# Patient Record
Sex: MALE | Race: ASIAN | NOT HISPANIC OR LATINO | Employment: UNEMPLOYED | URBAN - METROPOLITAN AREA
[De-identification: names, ages, dates, MRNs, and addresses within clinical notes are randomized per-mention and may not be internally consistent; named-entity substitution may affect disease eponyms.]

---

## 2017-01-10 ENCOUNTER — TELEPHONE (OUTPATIENT)
Dept: PHARMACY | Facility: CLINIC | Age: 42
End: 2017-01-10

## 2017-01-18 ENCOUNTER — OFFICE VISIT (OUTPATIENT)
Dept: DERMATOLOGY | Facility: CLINIC | Age: 42
End: 2017-01-18
Payer: MEDICAID

## 2017-01-18 DIAGNOSIS — Z79.899 ENCOUNTER FOR LONG-TERM (CURRENT) USE OF HIGH-RISK MEDICATION: Primary | ICD-10-CM

## 2017-01-18 DIAGNOSIS — L40.0 PSORIASIS VULGARIS: ICD-10-CM

## 2017-01-18 PROCEDURE — 99213 OFFICE O/P EST LOW 20 MIN: CPT | Mod: S$PBB,,, | Performed by: DERMATOLOGY

## 2017-01-18 PROCEDURE — 99212 OFFICE O/P EST SF 10 MIN: CPT | Mod: PBBFAC | Performed by: DERMATOLOGY

## 2017-01-18 PROCEDURE — 99999 PR PBB SHADOW E&M-EST. PATIENT-LVL II: CPT | Mod: PBBFAC,,, | Performed by: DERMATOLOGY

## 2017-01-18 RX ORDER — PREDNISONE 20 MG/1
TABLET ORAL
Refills: 0 | COMMUNITY
Start: 2016-10-21 | End: 2017-04-04

## 2017-01-18 RX ORDER — HYDROCODONE BITARTRATE AND ACETAMINOPHEN 5; 325 MG/1; MG/1
TABLET ORAL
Refills: 0 | COMMUNITY
Start: 2016-12-14 | End: 2017-04-19

## 2017-01-18 RX ORDER — SULFAMETHOXAZOLE AND TRIMETHOPRIM 800; 160 MG/1; MG/1
TABLET ORAL
Refills: 0 | COMMUNITY
Start: 2016-12-14 | End: 2017-04-19

## 2017-01-18 RX ORDER — INDOMETHACIN 50 MG/1
CAPSULE ORAL
Refills: 2 | COMMUNITY
Start: 2016-10-21 | End: 2017-04-19

## 2017-01-18 NOTE — PROGRESS NOTES
After obtaining consent, and per orders of Dr. Patricia Thompson, injection of Stelara 90mg Subq. Lot #[id number] Exp.06/2018 given by Britany Veliz. Patient instructed to remain in clinic for 20 minutes afterwards, and to report any adverse reaction to me immediately.

## 2017-01-18 NOTE — PROGRESS NOTES
Subjective:       Patient ID:  Adrien Panda is a 41 y.o. male who presents for   Chief Complaint   Patient presents with    Psoriasis     Stelara injection      Psoriasis  - Follow-up  Diagnosis: Psoriasis   Currently using: Stelara injection   Affected locations: left lower leg and right lower leg  Signs / symptoms: redness      Started Stelara on 9/21/16 and had his second injection 10/19/16. Here today for another Stelara injection.  Initially seemed to have a flare of his lower legs after starting Stelara, but pt states that eventually calmed down and now his skin is much improved. Currently has psorcon to use prn flares.  No new flares. No recent infections. No side effects noted. Did have a recent gout flare of L elbow treated by Dr. Hermosillo.    PMH: h/o dilated cardiomyopathy tx by Dr. Rubin at [facility], cutaneous and joint gout tx by Dr. Hermosillo (psoriasis flared on colchicine).       PSORIASIS: failed tx with Enbrel, Humira, Soriatane, clobetasol topical, betamethasone topical, Lidex topical, elocon topical.    ILK helps temporarily. ultravate works best out of all the topicals he's tried. psorcon 0.05% oint helps.    On Humira from 6/2015 - 4/2016 until he stopped responding to it. Switched back to enbrel at that point which only helped temporarily.    Added Soriatane to Enbrel but ended up with a bad flare on lower legs.  Not a good candidate for NBUVB as he lives on the [facility] and there is not light unit in this area.     Review of Systems   Constitutional: Negative for fever, chills, weight loss, weight gain, fatigue, night sweats and malaise.   Skin: Negative for daily sunscreen use, activity-related sunscreen use and recent sunburn.   Hematologic/Lymphatic: Does not bruise/bleed easily.        Objective:    Physical Exam   Constitutional: He appears well-developed and well-nourished. No distress.   Neurological: He is alert and oriented to person, place, and time. He is not disoriented.    Psychiatric: He has a normal mood and affect.   Skin:   Areas Examined (abnormalities noted in diagram):   Head / Face Inspection Performed  Neck Inspection Performed  Chest / Axilla Inspection Performed  Abdomen Inspection Performed  Back Inspection Performed  RUE Inspected  LUE Inspection Performed  RLE Inspected  LLE Inspection Performed              Diagram Legend     Erythematous scaling macule/papule c/w actinic keratosis       Vascular papule c/w angioma      Pigmented verrucoid papule/plaque c/w seborrheic keratosis      Yellow umbilicated papule c/w sebaceous hyperplasia      Irregularly shaped tan macule c/w lentigo     1-2 mm smooth white papules consistent with Milia      Movable subcutaneous cyst with punctum c/w epidermal inclusion cyst      Subcutaneous movable cyst c/w pilar cyst      Firm pink to brown papule c/w dermatofibroma      Pedunculated fleshy papule(s) c/w skin tag(s)      Evenly pigmented macule c/w junctional nevus     Mildly variegated pigmented, slightly irregular-bordered macule c/w mildly atypical nevus      Flesh colored to evenly pigmented papule c/w intradermal nevus       Pink pearly papule/plaque c/w basal cell carcinoma      Erythematous hyperkeratotic cursted plaque c/w SCC      Surgical scar with no sign of skin cancer recurrence      Open and closed comedones      Inflammatory papules and pustules      Verrucoid papule consistent consistent with wart     Erythematous eczematous patches and plaques     Dystrophic onycholytic nail with subungual debris c/w onychomycosis     Umbilicated papule    Erythematous-base heme-crusted tan verrucoid plaque consistent with inflamed seborrheic keratosis     Erythematous Silvery Scaling Plaque c/w Psoriasis     See annotation      Assessment / Plan:        Encounter for long-term (current) use of high-risk medication  -     CBC auto differential; Future  -     Comprehensive metabolic panel; Future    Psoriasis vulgaris  Responding well  to Rafal now. Will give next injection today and then again in 3 months.  Pt will notify me of any infections. Counseled to avoid live vaccines.  Continue psorcon and/or ultravate prn flares.     Quantiferon gold due next visit.    Return in about 3 months (around 4/18/2017).

## 2017-01-20 ENCOUNTER — TELEPHONE (OUTPATIENT)
Dept: PHARMACY | Facility: CLINIC | Age: 42
End: 2017-01-20

## 2017-01-31 ENCOUNTER — PATIENT MESSAGE (OUTPATIENT)
Dept: RHEUMATOLOGY | Facility: CLINIC | Age: 42
End: 2017-01-31

## 2017-02-01 ENCOUNTER — PATIENT MESSAGE (OUTPATIENT)
Dept: RHEUMATOLOGY | Facility: CLINIC | Age: 42
End: 2017-02-01

## 2017-02-01 RX ORDER — ALLOPURINOL 300 MG/1
TABLET ORAL
Qty: 45 TABLET | Refills: 6 | Status: SHIPPED | OUTPATIENT
Start: 2017-02-01 | End: 2017-02-02 | Stop reason: SDUPTHER

## 2017-02-02 RX ORDER — ALLOPURINOL 300 MG/1
TABLET ORAL
Qty: 45 TABLET | Refills: 0 | Status: SHIPPED | OUTPATIENT
Start: 2017-02-02 | End: 2017-03-06 | Stop reason: SDUPTHER

## 2017-02-07 ENCOUNTER — TELEPHONE (OUTPATIENT)
Dept: PHARMACY | Facility: CLINIC | Age: 42
End: 2017-02-07

## 2017-02-10 ENCOUNTER — PATIENT MESSAGE (OUTPATIENT)
Dept: DERMATOLOGY | Facility: CLINIC | Age: 42
End: 2017-02-10

## 2017-02-10 DIAGNOSIS — L40.0 PSORIASIS VULGARIS: ICD-10-CM

## 2017-02-15 ENCOUNTER — PATIENT MESSAGE (OUTPATIENT)
Dept: DERMATOLOGY | Facility: CLINIC | Age: 42
End: 2017-02-15

## 2017-02-15 RX ORDER — HALOBETASOL PROPIONATE 0.5 MG/G
OINTMENT TOPICAL 2 TIMES DAILY
Qty: 50 G | Refills: 3 | Status: SHIPPED | OUTPATIENT
Start: 2017-02-15 | End: 2017-07-02 | Stop reason: SDUPTHER

## 2017-02-15 NOTE — TELEPHONE ENCOUNTER
Pt has Medicaid but I have been seeing him now for years (prior to us not seeing Medicaid). Please allow him to be seen in our department.

## 2017-02-22 ENCOUNTER — PATIENT MESSAGE (OUTPATIENT)
Dept: DERMATOLOGY | Facility: CLINIC | Age: 42
End: 2017-02-22

## 2017-03-03 ENCOUNTER — PATIENT MESSAGE (OUTPATIENT)
Dept: DERMATOLOGY | Facility: CLINIC | Age: 42
End: 2017-03-03

## 2017-03-06 RX ORDER — ALLOPURINOL 300 MG/1
TABLET ORAL
Qty: 45 TABLET | Refills: 0 | Status: SHIPPED | OUTPATIENT
Start: 2017-03-06 | End: 2017-04-03 | Stop reason: SDUPTHER

## 2017-03-29 ENCOUNTER — TELEPHONE (OUTPATIENT)
Dept: PHARMACY | Facility: CLINIC | Age: 42
End: 2017-03-29

## 2017-04-04 ENCOUNTER — OFFICE VISIT (OUTPATIENT)
Dept: RHEUMATOLOGY | Facility: CLINIC | Age: 42
End: 2017-04-04
Payer: MEDICAID

## 2017-04-04 VITALS
WEIGHT: 226.81 LBS | HEART RATE: 71 BPM | DIASTOLIC BLOOD PRESSURE: 70 MMHG | SYSTOLIC BLOOD PRESSURE: 102 MMHG | BODY MASS INDEX: 34.37 KG/M2 | HEIGHT: 68 IN

## 2017-04-04 DIAGNOSIS — M1A.9XX1 GOUT WITH TOPHI: Primary | ICD-10-CM

## 2017-04-04 DIAGNOSIS — L40.9 PSORIASIS: ICD-10-CM

## 2017-04-04 DIAGNOSIS — M54.50 ACUTE MIDLINE LOW BACK PAIN WITHOUT SCIATICA: ICD-10-CM

## 2017-04-04 PROCEDURE — 99213 OFFICE O/P EST LOW 20 MIN: CPT | Mod: S$PBB,,, | Performed by: INTERNAL MEDICINE

## 2017-04-04 PROCEDURE — 99213 OFFICE O/P EST LOW 20 MIN: CPT | Mod: PBBFAC | Performed by: INTERNAL MEDICINE

## 2017-04-04 PROCEDURE — 99999 PR PBB SHADOW E&M-EST. PATIENT-LVL III: CPT | Mod: PBBFAC,,, | Performed by: INTERNAL MEDICINE

## 2017-04-04 RX ORDER — ALLOPURINOL 300 MG/1
TABLET ORAL
Qty: 45 TABLET | Refills: 6 | Status: SHIPPED | OUTPATIENT
Start: 2017-04-04 | End: 2018-04-03 | Stop reason: SDUPTHER

## 2017-04-04 RX ORDER — BACLOFEN 20 MG/1
20 TABLET ORAL 3 TIMES DAILY PRN
Qty: 90 TABLET | Refills: 3 | Status: SHIPPED | OUTPATIENT
Start: 2017-04-04 | End: 2017-04-28

## 2017-04-04 NOTE — PROGRESS NOTES
History of present illness: 41-year-old gentleman who has a long history of psoriasis. He also has an idiopathic cardiomyopathy. I have been following him since  for joint problems. His main problem has been an intermittent arthritis. The working diagnosis initially was gout even though I was not able to aspirate any fluid. In  he developed subcutaneous nodules, which were biopsied. This confirmed the presence of monosodium urate crystals. He was started on allopurinol at that time. He also has x-ray evidence of chondrocalcinosis. There was concerned about psoriatic arthritis although the diagnosis was never confirmed. He does have erosive disease on x-ray which is more compatible with gout than psoriatic arthritis.    He has had a recent URI.  He was having what appeared to be a gout attack when I saw him in November.  This resolved and he is had no gout since then.  His psoriasis flared one month ago.  This occurred after his mother .  He is scheduled for his next Stelara injection in 2 weeks.  He has been using topical medications.    His newest problem is pain in the middle of the back.  This began 2 weeks ago.  He had no history of trauma or increased activity.  He denies any similar problem in the back although I had treated him for neck pain in the past.  The pain is worse at the end of the day.  It does wake him up at night.  It is better in the morning.  It does not radiate down the legs.  He has no morning stiffness.  He has been taking Tylenol and tramadol without any response.  Topical medications have given him some relief.  He has not tried heat or ice.  He has had no other recent medical problems.    Physical examination:  Skin: He has diffuse erythema on both legs with no significant scaling  Musculoskeletal: Shoulders, elbows are unremarkable.  He has soft tissue swelling of the left wrist.  He has to tophi in the left hand.  He is tender to palpation in the mid back.  He has full range  of motion of the back without pain on range of motion.  He has no paravertebral spasm.  He has no swelling or tenderness in the lower extremities.    Assessment:  1.  Intercritical gout  2.  Low back pain which is most likely muscular    Plans:  1.  Continue allopurinol 450 mg daily  2.  I am not repeating his uric acid level since he ran out of allopurinol 2 days ago  3.  I placed him on baclofen 3 times daily for his back pain.  It helped his neck in the past.  4.  Return to see me in 6 months

## 2017-04-12 ENCOUNTER — TELEPHONE (OUTPATIENT)
Dept: PHARMACY | Facility: CLINIC | Age: 42
End: 2017-04-12

## 2017-04-19 ENCOUNTER — LAB VISIT (OUTPATIENT)
Dept: LAB | Facility: HOSPITAL | Age: 42
End: 2017-04-19
Attending: DERMATOLOGY
Payer: MEDICAID

## 2017-04-19 ENCOUNTER — OFFICE VISIT (OUTPATIENT)
Dept: DERMATOLOGY | Facility: CLINIC | Age: 42
End: 2017-04-19
Payer: MEDICAID

## 2017-04-19 DIAGNOSIS — Z79.899 ENCOUNTER FOR LONG-TERM (CURRENT) USE OF HIGH-RISK MEDICATION: ICD-10-CM

## 2017-04-19 DIAGNOSIS — L40.0 PSORIASIS VULGARIS: Primary | ICD-10-CM

## 2017-04-19 PROCEDURE — 36415 COLL VENOUS BLD VENIPUNCTURE: CPT

## 2017-04-19 PROCEDURE — 99214 OFFICE O/P EST MOD 30 MIN: CPT | Mod: S$PBB,,, | Performed by: DERMATOLOGY

## 2017-04-19 PROCEDURE — 99999 PR PBB SHADOW E&M-EST. PATIENT-LVL II: CPT | Mod: PBBFAC,,, | Performed by: DERMATOLOGY

## 2017-04-19 PROCEDURE — 86480 TB TEST CELL IMMUN MEASURE: CPT

## 2017-04-19 RX ORDER — TRAMADOL HYDROCHLORIDE 50 MG/1
TABLET ORAL
Refills: 1 | COMMUNITY
Start: 2017-01-26 | End: 2018-09-27

## 2017-04-19 RX ORDER — TRIAMCINOLONE ACETONIDE 1 MG/G
CREAM TOPICAL 2 TIMES DAILY
Qty: 454 G | Refills: 1 | Status: SHIPPED | OUTPATIENT
Start: 2017-04-19 | End: 2018-04-13

## 2017-04-19 NOTE — PROGRESS NOTES
Subjective:       Patient ID:  Adrien Panda is a 41 y.o. male who presents for   Chief Complaint   Patient presents with    Psoriasis     f/u stelara injection     Psoriasis  - Follow-up  Symptom Course: had improved initially but flared after recent stress with death of his mother.  Currently using: stelara 90 mg/mL (injection dates 9/21/16, 10/19/16, 1/18/17); Ultravate topically.  Affected locations: diffuse  Signs / symptoms: redness    Mom passed away almost 2 months ago and has been under significant stress.  Ps started flaring during that time    PMH: h/o dilated cardiomyopathy tx by Dr. Rubin at [facility], cutaneous and joint gout tx by Dr. Hermosillo (psoriasis flared on colchicine).       PSORIASIS Hx: failed tx with Enbrel, Humira, Soriatane, clobetasol topical, betamethasone topical, Lidex topical, elocon topical.    ILK helps temporarily. ultravate works best out of all the topicals he's tried. psorcon 0.05% oint helps.    On Humira from 6/2015 - 4/2016 until he stopped responding to it. Switched back to enbrel at that point which only helped temporarily.    Added Soriatane to Enbrel but ended up with a bad flare on lower legs.  Not a good candidate for NBUVB as he lives on the [facility] and there is not light unit in this area.    Past Medical History:   Diagnosis Date    Arthritis     Blood clotting tendency     Congestive heart failure     Diabetes mellitus     High cholesterol     Hyperlipemia     Hypertension     Joint pain     Psoriasis      Review of Systems   Constitutional: Negative for fever, chills, weight loss, fatigue, night sweats and malaise.   Gastrointestinal: Negative for indigestion.   Musculoskeletal: Positive for arthralgias (hands (h/o gout as well)).   Skin: Positive for itching, rash and activity-related sunscreen use. Negative for daily sunscreen use.   Hematologic/Lymphatic: Negative for adenopathy. Does not bruise/bleed easily.        Objective:    Physical  Exam   Constitutional: He appears well-developed and well-nourished. No distress.   Neurological: He is alert and oriented to person, place, and time. He is not disoriented.   Psychiatric: He has a normal mood and affect.   Skin:   Areas Examined (abnormalities noted in diagram):   Scalp / Hair Palpated and Inspected  Head / Face Inspection Performed  Neck Inspection Performed  Chest / Axilla Inspection Performed  Abdomen Inspection Performed  Back Inspection Performed  RUE Inspected  LUE Inspection Performed  RLE Inspected  LLE Inspection Performed  Nails and Digits Inspection Performed              Diagram Legend     Erythematous scaling macule/papule c/w actinic keratosis       Vascular papule c/w angioma      Pigmented verrucoid papule/plaque c/w seborrheic keratosis      Yellow umbilicated papule c/w sebaceous hyperplasia      Irregularly shaped tan macule c/w lentigo     1-2 mm smooth white papules consistent with Milia      Movable subcutaneous cyst with punctum c/w epidermal inclusion cyst      Subcutaneous movable cyst c/w pilar cyst      Firm pink to brown papule c/w dermatofibroma      Pedunculated fleshy papule(s) c/w skin tag(s)      Evenly pigmented macule c/w junctional nevus     Mildly variegated pigmented, slightly irregular-bordered macule c/w mildly atypical nevus      Flesh colored to evenly pigmented papule c/w intradermal nevus       Pink pearly papule/plaque c/w basal cell carcinoma      Erythematous hyperkeratotic cursted plaque c/w SCC      Surgical scar with no sign of skin cancer recurrence      Open and closed comedones      Inflammatory papules and pustules      Verrucoid papule consistent consistent with wart     Erythematous eczematous patches and plaques     Dystrophic onycholytic nail with subungual debris c/w onychomycosis     Umbilicated papule    Erythematous-base heme-crusted tan verrucoid plaque consistent with inflamed seborrheic keratosis     Erythematous Silvery Scaling  Plaque c/w Psoriasis     See annotation      Assessment / Plan:        Psoriasis vulgaris-currently flaring; approx 15% TBSA involvement today  Will switch to TAC so we can get a larger quantity per month for the patient  -     triamcinolone acetonide 0.1% (KENALOG) 0.1 % cream; Apply topically 2 (two) times daily.  Dispense: 454 g; Refill: 1    Discussed  benefits and risks of Stelara including but not limited to injection site reactions, increased risk of infection, and decreased tumor surveillance.   Patient counseled to avoid live vaccines.    Injection given today (90 mg/mL) by Rena Freeman LPN and pt tolerated well (Lot# [id number]; Exp 9/2018)    Encounter for long-term (current) use of high-risk medication  -     CBC auto differential; Future  -     Comprehensive metabolic panel; Future  -     Quantiferon Gold TB; Future         Return in about 3 months (around 7/19/2017).

## 2017-04-20 LAB
MITOGEN NIL: >10 IU/ML
NIL: 0.04 IU/ML
TB ANTIGEN NIL: 0.01 IU/ML
TB ANTIGEN: 0.05 IU/ML
TB GOLD: NEGATIVE

## 2017-04-21 ENCOUNTER — PATIENT MESSAGE (OUTPATIENT)
Dept: DERMATOLOGY | Facility: CLINIC | Age: 42
End: 2017-04-21

## 2017-04-26 ENCOUNTER — PATIENT MESSAGE (OUTPATIENT)
Dept: RHEUMATOLOGY | Facility: CLINIC | Age: 42
End: 2017-04-26

## 2017-04-26 ENCOUNTER — PATIENT MESSAGE (OUTPATIENT)
Dept: DERMATOLOGY | Facility: CLINIC | Age: 42
End: 2017-04-26

## 2017-04-28 ENCOUNTER — HOSPITAL ENCOUNTER (OUTPATIENT)
Dept: RADIOLOGY | Facility: HOSPITAL | Age: 42
Discharge: HOME | End: 2017-04-28
Attending: INTERNAL MEDICINE
Payer: MEDICAID

## 2017-04-28 ENCOUNTER — OFFICE VISIT (OUTPATIENT)
Dept: RHEUMATOLOGY | Facility: CLINIC | Age: 42
End: 2017-04-28
Payer: MEDICAID

## 2017-04-28 DIAGNOSIS — M54.50 ACUTE MIDLINE LOW BACK PAIN WITHOUT SCIATICA: Primary | ICD-10-CM

## 2017-04-28 DIAGNOSIS — M1A.9XX1 GOUT WITH TOPHI: ICD-10-CM

## 2017-04-28 DIAGNOSIS — M54.50 ACUTE MIDLINE LOW BACK PAIN WITHOUT SCIATICA: ICD-10-CM

## 2017-04-28 DIAGNOSIS — M25.521 RIGHT ELBOW PAIN: ICD-10-CM

## 2017-04-28 PROCEDURE — 99999 PR PBB SHADOW E&M-EST. PATIENT-LVL II: CPT | Mod: PBBFAC,,, | Performed by: INTERNAL MEDICINE

## 2017-04-28 PROCEDURE — 72110 X-RAY EXAM L-2 SPINE 4/>VWS: CPT | Mod: 26,,, | Performed by: RADIOLOGY

## 2017-04-28 PROCEDURE — 99213 OFFICE O/P EST LOW 20 MIN: CPT | Mod: S$PBB,,, | Performed by: INTERNAL MEDICINE

## 2017-04-28 PROCEDURE — 72110 X-RAY EXAM L-2 SPINE 4/>VWS: CPT | Mod: TC

## 2017-04-28 RX ORDER — TIZANIDINE 2 MG/1
2 TABLET ORAL EVERY 8 HOURS
Qty: 90 TABLET | Refills: 2 | Status: SHIPPED | OUTPATIENT
Start: 2017-04-28 | End: 2017-05-05 | Stop reason: SINTOL

## 2017-04-29 ENCOUNTER — PATIENT MESSAGE (OUTPATIENT)
Dept: DERMATOLOGY | Facility: CLINIC | Age: 42
End: 2017-04-29

## 2017-05-01 NOTE — PROGRESS NOTES
History of present illness: 41-year-old gentleman with idiopathic cardiomyopathy.  He has a long history of psoriasis and is presently on Stelara.  He has tophaceous gout.  He recently had muscular back pain.  His back pain has been worse for the past month.  He has had no similar back problems in the past.  The pain is worse with prolonged sitting.  He has some pain in the morning.  He does have some pain with activity.  The pain does not radiate into his legs.  He also had a flare of his right elbow 2 days ago.  He was started on prednisone and this helped.    He has had no response to baclofen.  Topical medications have not helped.  Ice gives him some relief.  He has not had physical therapy or seeing a chiropractor.    Physical examination:  Musculoskeletal: Neck and shoulders are unremarkable.  He has tenderness of the right elbow but no swelling.  Left elbow is unremarkable.  Wrist and hands are within normal limits.  He has tenderness to palpation lower lumbar spine.  He has decreased range of motion with pain on range of motion.  Straight leg raising is negative.  Knees, ankles, small joints of feet are unremarkable.    Assessment:  1.  He may have had another gout attack  2.  I do not think we're dealing with psoriatic arthritis  3.  Mechanical back problems    Plans:  1.  I have referred him for x-ray of the lumbar spine  2.  I have referred him to physical therapy  3.  I discontinued baclofen and placed him on Zanaflex 2 mg 3 times daily.  4.  He is to keep his previous appointment.

## 2017-05-02 ENCOUNTER — PATIENT MESSAGE (OUTPATIENT)
Dept: RHEUMATOLOGY | Facility: CLINIC | Age: 42
End: 2017-05-02

## 2017-05-05 ENCOUNTER — PATIENT MESSAGE (OUTPATIENT)
Dept: RHEUMATOLOGY | Facility: CLINIC | Age: 42
End: 2017-05-05

## 2017-05-05 RX ORDER — METAXALONE 800 MG/1
800 TABLET ORAL 3 TIMES DAILY
Qty: 90 TABLET | Refills: 1 | Status: SHIPPED | OUTPATIENT
Start: 2017-05-05 | End: 2017-05-05 | Stop reason: SDUPTHER

## 2017-05-05 RX ORDER — METAXALONE 800 MG/1
800 TABLET ORAL 3 TIMES DAILY
Qty: 90 TABLET | Refills: 1 | Status: SHIPPED | OUTPATIENT
Start: 2017-05-05 | End: 2017-05-12 | Stop reason: CLARIF

## 2017-05-08 DIAGNOSIS — L29.9 PRURITIC CONDITION: Primary | ICD-10-CM

## 2017-05-08 RX ORDER — HYDROXYZINE PAMOATE 25 MG/1
25 CAPSULE ORAL EVERY 8 HOURS PRN
Qty: 60 CAPSULE | Refills: 1 | Status: SHIPPED | OUTPATIENT
Start: 2017-05-08 | End: 2017-10-18 | Stop reason: SDUPTHER

## 2017-05-10 ENCOUNTER — CLINICAL SUPPORT (OUTPATIENT)
Dept: REHABILITATION | Facility: HOSPITAL | Age: 42
End: 2017-05-10
Attending: INTERNAL MEDICINE
Payer: MEDICAID

## 2017-05-10 ENCOUNTER — TELEPHONE (OUTPATIENT)
Dept: PHARMACY | Facility: CLINIC | Age: 42
End: 2017-05-10

## 2017-05-10 DIAGNOSIS — G89.29 CHRONIC MIDLINE LOW BACK PAIN WITHOUT SCIATICA: ICD-10-CM

## 2017-05-10 DIAGNOSIS — M62.81 WEAKNESS OF TRUNK MUSCULATURE: ICD-10-CM

## 2017-05-10 DIAGNOSIS — M53.86 DECREASED ROM OF LUMBAR SPINE: ICD-10-CM

## 2017-05-10 DIAGNOSIS — M54.50 CHRONIC MIDLINE LOW BACK PAIN WITHOUT SCIATICA: ICD-10-CM

## 2017-05-10 DIAGNOSIS — M62.89 MUSCLE TIGHTNESS: ICD-10-CM

## 2017-05-10 PROCEDURE — 97110 THERAPEUTIC EXERCISES: CPT | Mod: PN

## 2017-05-10 PROCEDURE — 97161 PT EVAL LOW COMPLEX 20 MIN: CPT | Mod: PN

## 2017-05-10 NOTE — PROGRESS NOTES
"  TIME RECORD    Date: 05/10/2017    Start Time:  0905  Stop Time:  1000      OUTPATIENT PHYSICAL THERAPY   PATIENT EVALUATION  Onset Date: 2 months prior  Primary Diagnosis:   1. Chronic midline low back pain without sciatica     2. Weakness of trunk musculature       Treatment Diagnosis: decreased ROM lumbar spine, weakness lumbar spine, muscle tightness  Past Medical History:   Diagnosis Date    Arthritis     Blood clotting tendency     Congestive heart failure     Diabetes mellitus     High cholesterol     Hyperlipemia     Hypertension     Joint pain     Psoriasis      Precautions: CHF, DM, psoriasis  Prior Therapy: none  Medications: Adrien Panda has a current medication list which includes the following prescription(s): allopurinol, calcipotriene, carvedilol, furosemide, halobetasol, hydroxyzine pamoate, klor-con m20, lisinopril, metaxalone, ranitidine, stelara, tramadol, and triamcinolone acetonide 0.1%.  Nutrition:  Overweight  History of Present Illness: Pt reporting insidious onset of lower back pain about 2 months prior. Pt rheumatologist prescribed muscle relaxers with minimal relief.  Pt denies previous history of lower back pain  Prior Level of Function: Independent  Social History: on disability - household chores - running errands - plays with son  Place of Residence (Steps/Adaptations): lives with girlfriend and children - 1 story - 0 steps  Functional Deficits Leading to Referral/Nature of Injury: bending over, sitting for long periods of time  Patient Therapy Goals: "hope the pain would go away"    Subjective     Adrien Panda states his pain is intermittent depending on what he is doing.  Pt reports occasional referred pain into bilateral groin. Pt denies paresthesia in BLE. PT denies drop foot, loss of B/B control, unexplained weight gain/loss, fever, chills, or night sweats. Pt reports pain will wake him up at night, and most of the time he is able to change position " and return to sleep.      Pain:  Location: lower back   Description: Aching and Throbbing  Activities Which Increase Pain: Sitting, Laying and Bending  Activities Which Decrease Pain: pain medication and pain patches  Pain Scale: 0/10 at best 8/10 now  10/10 at worst    Objective     Observation: pt in standing with posterior pelvic tilt - adipose tissue deposit about the L3/4/5 level - decreased lumbar lordosis, increased thoracic kyphosis - level bony prominences  AROM:    FB : fingertips to 10 inches from floor with increased pain midline and bilateral - no reversal of lumbar spine                    BB : mild increased lordosis - 50% limitation -- decreased pain                    SBR : fingertips to 3 inches above tibiofemoral joint line with increased pain L side                         SBL: fingertips to 2 inches above tibiofemoral joint line with increased pain R side                     RotL: 50% limitation increased pain L side                          Rot R: 50% limitation increased pain L side  STR/myotome:  R L   hip flex      4+ 4                      knee ext     3+ 4-                          ankle DF        4+ 4+                   great toe ext         5 5               ankle PF        5 5                       knee flex  4- 4-  Sensation/Reflexes: grossly intact to light touch throughout BLE  Palpation: significant tenderness to palpation over bilateral ES, QL, and SP with VERY light palpation  Gait: without AD - guarded, decreased trunk rotation  Tests:                      SLR: neg   Crossed SLR: neg   Slump Test: neg   Prone Instability/lumbar joint play: unable to assess secondary to increased pain with palpation   GERDA: positive bilateral lumbar  Flexibility:   Hamstring: pain in lumbar with RLE  - 10% limitation bilaterally       hip flexors: positive Ely bilaterally                 rectus femoris: positive homa bilat                Presentation:    supine bridging: increased pain with  increased repetitions            LTR: able to perform within tolerable range,                  SKTC: stretching LLE,               DKTC : increased pain lumbar spine bilaterally                  prone on elbows : relief of pain                                             Hip screen:      R L   Flex 4+ 4    Abd 4- 4   Add 4 4-   Ext 3+ 3+                                                                                                                  Functional assessment: pt able to heel and toe walk yet with provocation of pain - unable to SLS without support with increased pain    FOTO: 72% limitation    today's treatment x10 minutes:  education: educated in evaluation findings and POC.  Educated in expectations of treatment, provider's contact information (email and phone) given to pt for communication of any questions and concerns.  HEP instruction and ther ex: instructed and performed following:  LTR x10  HL piriformis stretch 2x30 seconds ea (FADDIR)  SL clamshell      Assessment       Initial Assessment (Pertinent finding, problem list and factors affecting outcome): Ms. Adrien Panda is a 40 y/o male referred to outpatient PT for lower back pain. Pt presenting with decreased and painful ROM of the lumbar spine, weakness throughout bilateral hips, significant tenderness throughout bilateral lumbar spine musculature, and muscle tightness in BLE.  Pt signs and symptoms consistent with referring diagnosis likely secondary to muscle strain of bilateral erector spinae and QL of the lumbar spine. Pt would benefit from skilled PT to address above stated problems, as well as, achieve pt goals within a timely manner. Pt has set realistic goals and has verbalized good understanding and agreement with reported diagnosis, prognosis and treatment. Pt demonstrates no additional cultural, spiritual or educational need and currently has no barriers to learning.      History  Co-morbidities and personal factors that may impact  the plan of care Examination  Body Structures and Functions, activity limitations and participation restrictions that may impact the plan of care Clinical Presentation   Decision Making/ Complexity Score   Co-morbidities:     Arthritis   Congestive heart failure   Diabetes mellitus   Joint pain   Psoriasis       Personal Factors:   none Body Regions: Lumbar spine, BLE  weakness, pain, decreased ROM, impaired joint extensibility and impaired muscle length  Body Systems:   MSK      Activity limitations:   Unable to squat, lift, push, pull, sit for >10 minutes    Participation Restrictions: (W/D/S)  Unable to sit to eat dinner with family, unable to assist with household chores, unable to play baseball with son       stable  low             Rehab Potiential: fair    Short Term Goals (4 Weeks):   1. Pt will report 20% reduced pain within the lumbar spine for ease with sitting x30 minutes  2. Pt will demonstrate 1/3 improvement MMT in BLE for ease with carrying groceries  4. Pt will demonstrate static standing balance on BLE for 30 seconds without obvious instability or use of UE assistance  5. Pt will demonstrate improved lumbar ROM by 25% in all directions for ease with picking an object up from the floor  Long Term Goals (8 Weeks):   1.Pt will report <4/10 pain within the lumbar spine for ease with ADL's  2. Pt will demonstrate 50% improvement of hamstring and hip flexor length in BLE for ease with ambulation  3. Pt will be independent with HEP for maintenance of improvements gained in therapy sessions   4. Pt will demonstrate 4+/5 strength or greater in BLE for ease with running errands         Plan     Certification Period: 5/10/17 to 8/10/17  Recommended Treatment Plan: 2 times per week for 8 weeks: Manual Therapy, Moist Heat/ Ice, Neuromuscular Re-ed, Patient Education and Therapeutic Exercise  Other Recommendations: none      Therapist: Cece Downey, PT    I CERTIFY THE NEED FOR THESE SERVICES FURNISHED UNDER THIS  PLAN OF TREATMENT AND WHILE UNDER MY CARE    Physician's comments: ________________________________________________________________________________________________________________________________________________      Physician's Name: ___________________________________

## 2017-05-10 NOTE — PLAN OF CARE
"  TIME RECORD    Date: 05/10/2017    Start Time:  0905  Stop Time:  1000      OUTPATIENT PHYSICAL THERAPY   PATIENT EVALUATION  Onset Date: 2 months prior  Primary Diagnosis:   1. Chronic midline low back pain without sciatica     2. Weakness of trunk musculature       Treatment Diagnosis: decreased ROM lumbar spine, weakness lumbar spine, muscle tightness  Past Medical History:   Diagnosis Date    Arthritis     Blood clotting tendency     Congestive heart failure     Diabetes mellitus     High cholesterol     Hyperlipemia     Hypertension     Joint pain     Psoriasis      Precautions: CHF, DM, psoriasis  Prior Therapy: none  Medications: Adrien Panda has a current medication list which includes the following prescription(s): allopurinol, calcipotriene, carvedilol, furosemide, halobetasol, hydroxyzine pamoate, klor-con m20, lisinopril, metaxalone, ranitidine, stelara, tramadol, and triamcinolone acetonide 0.1%.  Nutrition:  Overweight  History of Present Illness: Pt reporting insidious onset of lower back pain about 2 months prior. Pt rheumatologist prescribed muscle relaxers with minimal relief.  Pt denies previous history of lower back pain  Prior Level of Function: Independent  Social History: on disability - household chores - running errands - plays with son  Place of Residence (Steps/Adaptations): lives with girlfriend and children - 1 story - 0 steps  Functional Deficits Leading to Referral/Nature of Injury: bending over, sitting for long periods of time  Patient Therapy Goals: "hope the pain would go away"    Subjective     Adrien Panda states his pain is intermittent depending on what he is doing.  Pt reports occasional referred pain into bilateral groin. Pt denies paresthesia in BLE. PT denies drop foot, loss of B/B control, unexplained weight gain/loss, fever, chills, or night sweats. Pt reports pain will wake him up at night, and most of the time he is able to change position " and return to sleep.      Pain:  Location: lower back   Description: Aching and Throbbing  Activities Which Increase Pain: Sitting, Laying and Bending  Activities Which Decrease Pain: pain medication and pain patches  Pain Scale: 0/10 at best 8/10 now  10/10 at worst    Objective     Observation: pt in standing with posterior pelvic tilt - adipose tissue deposit about the L3/4/5 level - decreased lumbar lordosis, increased thoracic kyphosis - level bony prominences  AROM:    FB : fingertips to 10 inches from floor with increased pain midline and bilateral - no reversal of lumbar spine                    BB : mild increased lordosis - 50% limitation -- decreased pain                    SBR : fingertips to 3 inches above tibiofemoral joint line with increased pain L side                         SBL: fingertips to 2 inches above tibiofemoral joint line with increased pain R side                     RotL: 50% limitation increased pain L side                          Rot R: 50% limitation increased pain L side  STR/myotome:  R L   hip flex      4+ 4                      knee ext     3+ 4-                          ankle DF        4+ 4+                   great toe ext         5 5               ankle PF        5 5                       knee flex  4- 4-  Sensation/Reflexes: grossly intact to light touch throughout BLE  Palpation: significant tenderness to palpation over bilateral ES, QL, and SP with VERY light palpation  Gait: without AD - guarded, decreased trunk rotation  Tests:                      SLR: neg   Crossed SLR: neg   Slump Test: neg   Prone Instability/lumbar joint play: unable to assess secondary to increased pain with palpation   GERDA: positive bilateral lumbar  Flexibility:   Hamstring: pain in lumbar with RLE  - 10% limitation bilaterally       hip flexors: positive E[place] bilaterally                 rectus femoris: positive homa bilat                Presentation:    supine bridging: increased pain with  increased repetitions            LTR: able to perform within tolerable range,                  SKTC: stretching LLE,               DKTC : increased pain lumbar spine bilaterally                  prone on elbows : relief of pain                                             Hip screen:      R L   Flex 4+ 4    Abd 4- 4   Add 4 4-   Ext 3+ 3+                                                                                                                  Functional assessment: pt able to heel and toe walk yet with provocation of pain - unable to SLS without support with increased pain    FOTO: 72% limitation    today's treatment x10 minutes:  education: educated in evaluation findings and POC.  Educated in expectations of treatment, provider's contact information (email and phone) given to pt for communication of any questions and concerns.  HEP instruction and ther ex: instructed and performed following:  LTR x10  HL piriformis stretch 2x30 seconds ea (FADDIR)  SL clamshell      Assessment       Initial Assessment (Pertinent finding, problem list and factors affecting outcome): Ms. Adrien Panda is a 42 y/o male referred to outpatient PT for lower back pain. Pt presenting with decreased and painful ROM of the lumbar spine, weakness throughout bilateral hips, significant tenderness throughout bilateral lumbar spine musculature, and muscle tightness in BLE.  Pt signs and symptoms consistent with referring diagnosis likely secondary to muscle strain of bilateral erector spinae and QL of the lumbar spine. Pt would benefit from skilled PT to address above stated problems, as well as, achieve pt goals within a timely manner. Pt has set realistic goals and has verbalized good understanding and agreement with reported diagnosis, prognosis and treatment. Pt demonstrates no additional cultural, spiritual or educational need and currently has no barriers to learning.      History  Co-morbidities and personal factors that may impact  the plan of care Examination  Body Structures and Functions, activity limitations and participation restrictions that may impact the plan of care Clinical Presentation   Decision Making/ Complexity Score   Co-morbidities:     Arthritis   Congestive heart failure   Diabetes mellitus   Joint pain   Psoriasis       Personal Factors:   none Body Regions: Lumbar spine, BLE  weakness, pain, decreased ROM, impaired joint extensibility and impaired muscle length  Body Systems:   MSK      Activity limitations:   Unable to squat, lift, push, pull, sit for >10 minutes    Participation Restrictions: (W/D/S)  Unable to sit to eat dinner with family, unable to assist with household chores, unable to play baseball with son       stable  low             Rehab Potiential: fair    Short Term Goals (4 Weeks):   1. Pt will report 20% reduced pain within the lumbar spine for ease with sitting x30 minutes  2. Pt will demonstrate 1/3 improvement MMT in BLE for ease with carrying groceries  4. Pt will demonstrate static standing balance on BLE for 30 seconds without obvious instability or use of UE assistance  5. Pt will demonstrate improved lumbar ROM by 25% in all directions for ease with picking an object up from the floor  Long Term Goals (8 Weeks):   1.Pt will report <4/10 pain within the lumbar spine for ease with ADL's  2. Pt will demonstrate 50% improvement of hamstring and hip flexor length in BLE for ease with ambulation  3. Pt will be independent with HEP for maintenance of improvements gained in therapy sessions   4. Pt will demonstrate 4+/5 strength or greater in BLE for ease with running errands         Plan     Certification Period: 5/10/17 to 8/10/17  Recommended Treatment Plan: 2 times per week for 8 weeks: Manual Therapy, Moist Heat/ Ice, Neuromuscular Re-ed, Patient Education and Therapeutic Exercise  Other Recommendations: none      Therapist: Cece Downey, PT    I CERTIFY THE NEED FOR THESE SERVICES FURNISHED UNDER THIS  PLAN OF TREATMENT AND WHILE UNDER MY CARE    Physician's comments: ________________________________________________________________________________________________________________________________________________      Physician's Name: ___________________________________

## 2017-05-12 ENCOUNTER — TELEPHONE (OUTPATIENT)
Dept: RHEUMATOLOGY | Facility: CLINIC | Age: 42
End: 2017-05-12

## 2017-05-12 RX ORDER — METHOCARBAMOL 750 MG/1
750 TABLET, FILM COATED ORAL 4 TIMES DAILY
Qty: 120 TABLET | Refills: 0 | Status: SHIPPED | OUTPATIENT
Start: 2017-05-12 | End: 2017-06-11

## 2017-05-12 NOTE — TELEPHONE ENCOUNTER
----- Message from Sherry Gonzalez MA sent at 5/12/2017  2:41 PM CDT -----  Need to have tried robaxin per insurance company

## 2017-05-15 ENCOUNTER — TELEPHONE (OUTPATIENT)
Dept: REHABILITATION | Facility: HOSPITAL | Age: 42
End: 2017-05-15

## 2017-05-16 ENCOUNTER — PATIENT MESSAGE (OUTPATIENT)
Dept: RHEUMATOLOGY | Facility: CLINIC | Age: 42
End: 2017-05-16

## 2017-05-16 RX ORDER — METHYLPREDNISOLONE 4 MG/1
TABLET ORAL
Qty: 21 TABLET | Refills: 0 | Status: SHIPPED | OUTPATIENT
Start: 2017-05-16 | End: 2018-09-14 | Stop reason: SDUPTHER

## 2017-05-19 ENCOUNTER — CLINICAL SUPPORT (OUTPATIENT)
Dept: REHABILITATION | Facility: HOSPITAL | Age: 42
End: 2017-05-19
Attending: INTERNAL MEDICINE
Payer: MEDICAID

## 2017-05-19 DIAGNOSIS — M53.86 DECREASED ROM OF LUMBAR SPINE: ICD-10-CM

## 2017-05-19 DIAGNOSIS — G89.29 CHRONIC MIDLINE LOW BACK PAIN WITHOUT SCIATICA: Primary | ICD-10-CM

## 2017-05-19 DIAGNOSIS — M62.89 MUSCLE TIGHTNESS: ICD-10-CM

## 2017-05-19 DIAGNOSIS — M62.81 WEAKNESS OF TRUNK MUSCULATURE: ICD-10-CM

## 2017-05-19 DIAGNOSIS — M54.50 CHRONIC MIDLINE LOW BACK PAIN WITHOUT SCIATICA: Primary | ICD-10-CM

## 2017-05-19 PROCEDURE — 97110 THERAPEUTIC EXERCISES: CPT | Mod: PN

## 2017-05-19 NOTE — PROGRESS NOTES
Physical Therapy Daily Note     Name: Adrien Arias   Clinic Number: [id number]  Diagnosis:   Encounter Diagnoses   Name Primary?    Chronic midline low back pain without sciatica Yes    Weakness of trunk musculature     Muscle tightness     Decreased ROM of lumbar spine      Physician: Devonte Hermosillo MD  Visit #: 1 of 6  PTA Visit #: 1    Time In: 0902  Time Out: 0955  Total Treatment time: 53 minutes (1:1 with PTA 30 minutes of treatment session)    Subjective     Pt reports: Adrien reports he is having pain on the left side of his lower back this morning.   Pain Scale: Adrien rates pain on a scale of 0-10 to be 8 currently.    Objective     Adrien received individual therapeutic exercises to develop strength, endurance, ROM, flexibility, posture and core stabilization for 45 minutes including:  Nu step x 7 minutes  LTR x 2 minutes  DKTC on physioball x 2 minutes  HL piriformis stretch 2x30 seconds ea (FADDIR)  Supine hip flexor stretch 2x 1 minutess ea   Posterior pelvic tilts 2x10, 3 sec hold  TrA activation x 2 minutes  TrA with mini marches x 2 minutes   Supine hip adduction ball squeeze x 2 minutes  Supine hip abduction (GTB) x 2 minutes  SL clamshell 1x15 ea LE   Seated ball roll outs x 2 minutes (foward and right direction)     Adrien received the following manual therapy techniques: Soft tissue Mobilization were applied to the: left QL musculature for 10 minutes.  Manual left QL stretch     Written Home Exercises Provided: Reviewed HEP from IE with patient.   Pt demo good understanding of the education provided. Adrien demonstrated good return demonstration of activities.     Education provided re:Adrien verbalized good understanding of education provided.   No spiritual or educational barriers to learning provided    Assessment     Adrien tolerated treatment well today. Patient with increased tissue restriction through left QL  musculature with complaints of pain/discomfort with direct palpation. Patient with easily achieved therapeutic effect when performing QL stretching indicating the needed to increased muscle pliability/flexibility. Patient demonstrates moderate difficulty achieving posterior pelvic tilt positioning but improvements noted when instructed to performing in conjunction with glute set. Patient able to progress to gentle core and hip stabilization exercises today with heavy cues needed for appropriate muscle activation without compensatory patterns.   This is a 41 y.o. male referred to outpatient physical therapy and presents with a medical diagnosis of low back pain and demonstrates limitations as described in the problem list. Pt prognosis is Fair. Pt will continue to benefit from skilled outpatient physical therapy to address the deficits listed in the problem list, provide pt/family education and to maximize pt's level of independence in the home and community environment.     Goals as follows  Short Term Goals (4 Weeks):   1. Pt will report 20% reduced pain within the lumbar spine for ease with sitting x30 minutes  2. Pt will demonstrate 1/3 improvement MMT in BLE for ease with carrying groceries  4. Pt will demonstrate static standing balance on BLE for 30 seconds without obvious instability or use of UE assistance  5. Pt will demonstrate improved lumbar ROM by 25% in all directions for ease with picking an object up from the floor  Long Term Goals (8 Weeks):   1.Pt will report <4/10 pain within the lumbar spine for ease with ADL's  2. Pt will demonstrate 50% improvement of hamstring and hip flexor length in BLE for ease with ambulation  3. Pt will be independent with HEP for maintenance of improvements gained in therapy sessions   4. Pt will demonstrate 4+/5 strength or greater in BLE for ease with running errands   Plan     Continue with established Plan of Care towards PT goals.    Therapist: Brooklynn Fleming,  PTA  5/19/2017

## 2017-05-24 ENCOUNTER — CLINICAL SUPPORT (OUTPATIENT)
Dept: REHABILITATION | Facility: HOSPITAL | Age: 42
End: 2017-05-24
Attending: INTERNAL MEDICINE
Payer: MEDICAID

## 2017-05-24 DIAGNOSIS — G89.29 CHRONIC MIDLINE LOW BACK PAIN WITHOUT SCIATICA: Primary | ICD-10-CM

## 2017-05-24 DIAGNOSIS — M53.86 DECREASED ROM OF LUMBAR SPINE: ICD-10-CM

## 2017-05-24 DIAGNOSIS — M62.81 WEAKNESS OF TRUNK MUSCULATURE: ICD-10-CM

## 2017-05-24 DIAGNOSIS — M62.89 MUSCLE TIGHTNESS: ICD-10-CM

## 2017-05-24 DIAGNOSIS — M54.50 CHRONIC MIDLINE LOW BACK PAIN WITHOUT SCIATICA: Primary | ICD-10-CM

## 2017-05-24 PROCEDURE — 97110 THERAPEUTIC EXERCISES: CPT | Mod: PN

## 2017-05-24 NOTE — PROGRESS NOTES
Physical Therapy Daily Note     Name: Adrien Arias   Clinic Number: [id number]  Diagnosis:   Encounter Diagnoses   Name Primary?    Chronic midline low back pain without sciatica Yes    Weakness of trunk musculature     Muscle tightness     Decreased ROM of lumbar spine      Physician: Devonte Hermosillo MD  Visit #: 2 of 6  PTA Visit #: 1    Time In: 0902  Time Out: 0955  Total Treatment time: 53 minutes (1:1 with PT 30 minutes of treatment session)    Subjective     Pt reports: Adrien reports he is having pain on the left side of his lower back this morning.   Pain Scale: Adrien rates pain on a scale of 0-10 to be 8 currently.    Objective     Adrien received individual therapeutic exercises to develop strength, endurance, ROM, flexibility, posture and core stabilization for 48 minutes including:  Nu step x 7 minutes  LTR x 2 minutes on PB  DKTC on physioball x 2 minutes  HL piriformis stretch 2x30 seconds ea (FADDIR)  Supine hip flexor stretch 2x 1 minutess ea   Posterior pelvic tilts 2x10, 3 sec hold  TrA activation x 2 minutes  TrA with mini marches x 2 minutes   Supine hip adduction ball squeeze x 2 minutes  Supine hip abduction (GTB) x 2 minutes  SL clamshell 1x15 ea LE   Prayer stretch x30 seconds 3 ways  Seated ball roll outs x 2 minutes (foward and right direction)     MHP applied to lumbar spine in HL at end of session x10 minutes    Adrien received the following manual therapy techniques: Soft tissue Mobilization were applied to the: left QL musculature for 5 minutes.  Manual left QL stretch  Gentle sustained pressure with lateral and superior lateral pull     Written Home Exercises Provided: Reviewed HEP from IE with patient.   Pt demo good understanding of the education provided. Adrien demonstrated good return demonstration of activities.     Education provided re:Adrien verbalized good understanding of education provided.   No  spiritual or educational barriers to learning provided    Assessment     Adrien tolerated treatment well today. Patient continues with significant limitations in L QL tissue mobility with significant tenderness to palpation (unable to palpate through subcutaneous adipose tissue layer).  Patient able to progress to gentle core and hip stabilization exercises today with heavy cues needed for appropriate muscle activation without compensatory patterns. Pt with excellent response to moist heat at end of treatment session, demonstrating ease with bed mobility.   This is a 41 y.o. male referred to outpatient physical therapy and presents with a medical diagnosis of low back pain and demonstrates limitations as described in the problem list. Pt prognosis is Fair. Pt will continue to benefit from skilled outpatient physical therapy to address the deficits listed in the problem list, provide pt/family education and to maximize pt's level of independence in the home and community environment.     Goals as follows  Short Term Goals (4 Weeks):   1. Pt will report 20% reduced pain within the lumbar spine for ease with sitting x30 minutes  2. Pt will demonstrate 1/3 improvement MMT in BLE for ease with carrying groceries  4. Pt will demonstrate static standing balance on BLE for 30 seconds without obvious instability or use of UE assistance  5. Pt will demonstrate improved lumbar ROM by 25% in all directions for ease with picking an object up from the floor  Long Term Goals (8 Weeks):   1.Pt will report <4/10 pain within the lumbar spine for ease with ADL's  2. Pt will demonstrate 50% improvement of hamstring and hip flexor length in BLE for ease with ambulation  3. Pt will be independent with HEP for maintenance of improvements gained in therapy sessions   4. Pt will demonstrate 4+/5 strength or greater in BLE for ease with running errands   Plan     Continue with established Plan of Care towards PT goals.    Therapist: Cece  Nasreen, PT  5/24/2017

## 2017-05-30 ENCOUNTER — CLINICAL SUPPORT (OUTPATIENT)
Dept: REHABILITATION | Facility: HOSPITAL | Age: 42
End: 2017-05-30
Attending: INTERNAL MEDICINE
Payer: MEDICAID

## 2017-05-30 DIAGNOSIS — M54.50 CHRONIC MIDLINE LOW BACK PAIN WITHOUT SCIATICA: Primary | ICD-10-CM

## 2017-05-30 DIAGNOSIS — M62.89 MUSCLE TIGHTNESS: ICD-10-CM

## 2017-05-30 DIAGNOSIS — M62.81 WEAKNESS OF TRUNK MUSCULATURE: ICD-10-CM

## 2017-05-30 DIAGNOSIS — G89.29 CHRONIC MIDLINE LOW BACK PAIN WITHOUT SCIATICA: Primary | ICD-10-CM

## 2017-05-30 DIAGNOSIS — M53.86 DECREASED ROM OF LUMBAR SPINE: ICD-10-CM

## 2017-05-30 PROCEDURE — 97110 THERAPEUTIC EXERCISES: CPT | Mod: PN

## 2017-05-30 NOTE — PROGRESS NOTES
Physical Therapy Daily Note     Name: Adrien Arias   Clinic Number: [id number]  Diagnosis:   Encounter Diagnoses   Name Primary?    Chronic midline low back pain without sciatica Yes    Weakness of trunk musculature     Muscle tightness     Decreased ROM of lumbar spine      Physician: Devonte Hermosillo MD  Visit #: 3 of 6  PTA Visit #: 1    Time In: 0930  Time Out: 100  Total Treatment time: 50 minutes (1:1 with PTA 30 minutes of treatment session)    Subjective     Pt reports that his back is feeling somewhat descent, however his left knee is giving him more trouble today.   Pt states that he is having a gout flare up.    Pain Scale: Adrien rates pain on a scale of 0-10 to be 7 currently in his low back, 9/10 pain in his left knee.     Objective     Adrien received individual therapeutic exercises to develop strength, endurance, ROM, flexibility, posture and core stabilization for 40 minutes including:  Nu step x 7 minutes  LTR x 2 minutes on PB  DKTC on physioball x 2 minutes - np d/t increased knee pain  HL piriformis stretch 2x30 seconds ea (FADDIR)  Supine hip flexor stretch 2x 1 minutess ea   Posterior pelvic tilts 2x10, 3 sec hold  TrA activation x 2 minutes  TrA with mini marches x 2 minutes   Supine hip adduction ball squeeze x 2 minutes  Supine hip abduction (GTB) x 2 minutes  SL clamshell 1x15 ea LE   Prayer stretch x30 seconds 3 ways - NP d/t knee pain  Seated ball roll outs x 2 minutes (foward and right direction)     MHP applied to lumbar spine in HL at end of session x10 minutes    Adrien received the following manual therapy techniques: Soft tissue Mobilization were applied to the: left QL musculature for 0 minutes.  Manual left QL stretch  Gentle sustained pressure with lateral and superior lateral pull     Written Home Exercises Provided: Reviewed HEP from IE with patient.   Pt demo good understanding of the education provided.  Adrien demonstrated good return demonstration of activities.     Education provided re:Adrien verbalized good understanding of education provided.   No spiritual or educational barriers to learning provided    Assessment     Adrien tolerated treatment fairly well today.  Patient with limited participation with exercises due to provocation of knee pain.  Patient with good response to MHP with Reduction of pain reported post treatment session.  Per patient presentation exercises were not progressed this session.  This is a 41 y.o. male referred to outpatient physical therapy and presents with a medical diagnosis of low back pain and demonstrates limitations as described in the problem list. Pt prognosis is Fair. Pt will continue to benefit from skilled outpatient physical therapy to address the deficits listed in the problem list, provide pt/family education and to maximize pt's level of independence in the home and community environment.     Goals as follows  Short Term Goals (4 Weeks):   1. Pt will report 20% reduced pain within the lumbar spine for ease with sitting x30 minutes  2. Pt will demonstrate 1/3 improvement MMT in BLE for ease with carrying groceries  4. Pt will demonstrate static standing balance on BLE for 30 seconds without obvious instability or use of UE assistance  5. Pt will demonstrate improved lumbar ROM by 25% in all directions for ease with picking an object up from the floor  Long Term Goals (8 Weeks):   1.Pt will report <4/10 pain within the lumbar spine for ease with ADL's  2. Pt will demonstrate 50% improvement of hamstring and hip flexor length in BLE for ease with ambulation  3. Pt will be independent with HEP for maintenance of improvements gained in therapy sessions   4. Pt will demonstrate 4+/5 strength or greater in BLE for ease with running errands   Plan     Continue with established Plan of Care towards PT goals.    Therapist: Rosalia Shea, PTA  5/30/2017

## 2017-06-01 ENCOUNTER — PATIENT MESSAGE (OUTPATIENT)
Dept: RHEUMATOLOGY | Facility: CLINIC | Age: 42
End: 2017-06-01

## 2017-06-01 DIAGNOSIS — M54.50 MIDLINE LOW BACK PAIN WITHOUT SCIATICA, UNSPECIFIED CHRONICITY: Primary | ICD-10-CM

## 2017-06-01 RX ORDER — ALLOPURINOL 300 MG/1
TABLET ORAL
Qty: 45 TABLET | Refills: 6 | Status: SHIPPED | OUTPATIENT
Start: 2017-06-01 | End: 2018-01-09 | Stop reason: SDUPTHER

## 2017-06-02 ENCOUNTER — CLINICAL SUPPORT (OUTPATIENT)
Dept: REHABILITATION | Facility: HOSPITAL | Age: 42
End: 2017-06-02
Attending: INTERNAL MEDICINE
Payer: MEDICAID

## 2017-06-02 DIAGNOSIS — M62.81 WEAKNESS OF TRUNK MUSCULATURE: ICD-10-CM

## 2017-06-02 DIAGNOSIS — M54.50 CHRONIC MIDLINE LOW BACK PAIN WITHOUT SCIATICA: Primary | ICD-10-CM

## 2017-06-02 DIAGNOSIS — M53.86 DECREASED ROM OF LUMBAR SPINE: ICD-10-CM

## 2017-06-02 DIAGNOSIS — M62.89 MUSCLE TIGHTNESS: ICD-10-CM

## 2017-06-02 DIAGNOSIS — G89.29 CHRONIC MIDLINE LOW BACK PAIN WITHOUT SCIATICA: Primary | ICD-10-CM

## 2017-06-02 PROCEDURE — 97110 THERAPEUTIC EXERCISES: CPT | Mod: PN

## 2017-06-02 NOTE — PROGRESS NOTES
Physical Therapy Daily Note     Name: Adrien Arias   Clinic Number: [id number]  Diagnosis:   Encounter Diagnoses   Name Primary?    Chronic midline low back pain without sciatica Yes    Weakness of trunk musculature     Muscle tightness     Decreased ROM of lumbar spine      Physician: Devonte Hermosillo MD  Visit #: 4 of 6  PTA Visit #: 2    Time In: 0938  Time Out: 1030  Total Treatment time: 52 minutes (1:1 with PTA 30 minutes of treatment session)    Subjective     Pt reports: Adrien states his back sore and stiff this morning, mainly on the left side. Patient states his left knee is feeling much better this AM.     Pain Scale: Adrien rates pain on a scale of 0-10 to be 9 currently in his low back.    Objective     Adrien received individual therapeutic exercises to develop strength, endurance, ROM, flexibility, posture and core stabilization for 40 minutes including:  Nu step x 7 minutes  LTR x 2 minutes on PB  DKTC on physioball x 2 minutes   HL piriformis stretch 2x30 seconds ea (FADDIR)  Supine hip flexor stretch 2x 1 minutess ea   Posterior pelvic tilts 2x10, 3 sec hold  TrA activation x 2 minutes  TrA with mini marches x 2 minutes   +Supine mini bridge 1x10  Supine hip adduction ball squeeze x 2 minutes  Supine hip abduction (BTB) x 2 minutes  SL clamshell 1x15 ea LE (BTB)  Prayer stretch x30 seconds 3 ways - NP d/t knee pain  Seated ball roll outs x 2 minutes (foward and right direction)     MHP applied to lumbar spine in HL at end of session x10 minutes    Adrien received the following manual therapy techniques: Soft tissue Mobilization were applied to the: left QL musculature for 0 minutes.  Manual left QL stretch  +kinesiotape star technique to left QL. Patient received education regarding appropriate care and removal of Kinesiotape. Patient instructed in proper removal techniques if skin irritation occurs.      Written Home Exercises  Provided: Reviewed HEP from IE with patient.   Pt demo good understanding of the education provided. Adrien demonstrated good return demonstration of activities.     Education provided re:Adrien verbalized good understanding of education provided.   No spiritual or educational barriers to learning provided    Assessment     Adrien tolerated treatment well today. Patient demonstrates much improved response to manual therapy techniques with fewer complaints of pain/hypersenisitivity with direct palpation to left QL musculature. Patient able to progress to supine bridging today with fair maintenance of gluteal activation and posterior pelvic tilt.   This is a 41 y.o. male referred to outpatient physical therapy and presents with a medical diagnosis of low back pain and demonstrates limitations as described in the problem list. Pt prognosis is Fair. Pt will continue to benefit from skilled outpatient physical therapy to address the deficits listed in the problem list, provide pt/family education and to maximize pt's level of independence in the home and community environment.     Goals as follows  Short Term Goals (4 Weeks):   1. Pt will report 20% reduced pain within the lumbar spine for ease with sitting x30 minutes  2. Pt will demonstrate 1/3 improvement MMT in BLE for ease with carrying groceries  4. Pt will demonstrate static standing balance on BLE for 30 seconds without obvious instability or use of UE assistance  5. Pt will demonstrate improved lumbar ROM by 25% in all directions for ease with picking an object up from the floor  Long Term Goals (8 Weeks):   1.Pt will report <4/10 pain within the lumbar spine for ease with ADL's  2. Pt will demonstrate 50% improvement of hamstring and hip flexor length in BLE for ease with ambulation  3. Pt will be independent with HEP for maintenance of improvements gained in therapy sessions   4. Pt will demonstrate 4+/5 strength or greater in BLE for ease with running  errands   Plan     Continue with established Plan of Care towards PT goals.    Therapist: Brooklynn Fleming, PTA  6/2/2017

## 2017-06-07 ENCOUNTER — TELEPHONE (OUTPATIENT)
Dept: SPINE | Facility: CLINIC | Age: 42
End: 2017-06-07

## 2017-06-07 DIAGNOSIS — M54.50 LOW BACK PAIN WITHOUT SCIATICA, UNSPECIFIED BACK PAIN LATERALITY, UNSPECIFIED CHRONICITY: Primary | ICD-10-CM

## 2017-06-09 ENCOUNTER — HOSPITAL ENCOUNTER (OUTPATIENT)
Dept: RADIOLOGY | Facility: HOSPITAL | Age: 42
Discharge: HOME | End: 2017-06-09
Attending: ORTHOPAEDIC SURGERY
Payer: MEDICAID

## 2017-06-09 DIAGNOSIS — M54.50 LUMBAR SPINE PAIN: Primary | ICD-10-CM

## 2017-06-09 DIAGNOSIS — M54.50 LOW BACK PAIN WITHOUT SCIATICA, UNSPECIFIED BACK PAIN LATERALITY, UNSPECIFIED CHRONICITY: ICD-10-CM

## 2017-06-09 PROCEDURE — 72100 X-RAY EXAM L-S SPINE 2/3 VWS: CPT | Mod: TC

## 2017-06-09 PROCEDURE — 72110 X-RAY EXAM L-2 SPINE 4/>VWS: CPT | Mod: 26,,, | Performed by: RADIOLOGY

## 2017-06-20 ENCOUNTER — PATIENT MESSAGE (OUTPATIENT)
Dept: RHEUMATOLOGY | Facility: CLINIC | Age: 42
End: 2017-06-20

## 2017-06-26 ENCOUNTER — PATIENT MESSAGE (OUTPATIENT)
Dept: DERMATOLOGY | Facility: CLINIC | Age: 42
End: 2017-06-26

## 2017-07-02 DIAGNOSIS — L40.0 PSORIASIS VULGARIS: ICD-10-CM

## 2017-07-03 RX ORDER — HALOBETASOL PROPIONATE 0.5 MG/G
OINTMENT TOPICAL
Qty: 50 G | Refills: 3 | Status: SHIPPED | OUTPATIENT
Start: 2017-07-03 | End: 2017-07-19 | Stop reason: SDUPTHER

## 2017-07-10 DIAGNOSIS — L40.0 PSORIASIS VULGARIS: ICD-10-CM

## 2017-07-12 ENCOUNTER — TELEPHONE (OUTPATIENT)
Dept: PHARMACY | Facility: CLINIC | Age: 42
End: 2017-07-12

## 2017-07-14 ENCOUNTER — TELEPHONE (OUTPATIENT)
Dept: DERMATOLOGY | Facility: CLINIC | Age: 42
End: 2017-07-14

## 2017-07-14 NOTE — TELEPHONE ENCOUNTER
----- Message from Khadijah Kendall sent at 7/14/2017  2:47 PM CDT -----  Contact: [facility]   [facility]- Licha is calling to speak with the nurse pharmacy needs to make sure where to deliver the pts Stelara medication pt has an appt on 7-19 can you please call Licha at ext [phone]    DARLYN

## 2017-07-19 ENCOUNTER — OFFICE VISIT (OUTPATIENT)
Dept: DERMATOLOGY | Facility: CLINIC | Age: 42
End: 2017-07-19
Payer: MEDICAID

## 2017-07-19 DIAGNOSIS — L40.0 PSORIASIS VULGARIS: ICD-10-CM

## 2017-07-19 DIAGNOSIS — Z79.899 ENCOUNTER FOR LONG-TERM (CURRENT) USE OF HIGH-RISK MEDICATION: Primary | ICD-10-CM

## 2017-07-19 DIAGNOSIS — L40.9 PSORIASIS: ICD-10-CM

## 2017-07-19 PROCEDURE — 99999 PR PBB SHADOW E&M-EST. PATIENT-LVL II: CPT | Mod: PBBFAC,,, | Performed by: DERMATOLOGY

## 2017-07-19 PROCEDURE — 11900 INJECT SKIN LESIONS </W 7: CPT | Mod: PBBFAC | Performed by: DERMATOLOGY

## 2017-07-19 PROCEDURE — 99212 OFFICE O/P EST SF 10 MIN: CPT | Mod: PBBFAC,25 | Performed by: DERMATOLOGY

## 2017-07-19 PROCEDURE — 99213 OFFICE O/P EST LOW 20 MIN: CPT | Mod: 25,S$PBB,, | Performed by: DERMATOLOGY

## 2017-07-19 PROCEDURE — 11900 INJECT SKIN LESIONS </W 7: CPT | Mod: S$PBB,,, | Performed by: DERMATOLOGY

## 2017-07-19 RX ORDER — HALOBETASOL PROPIONATE 0.5 MG/G
OINTMENT TOPICAL 2 TIMES DAILY
Qty: 100 G | Refills: 3 | Status: SHIPPED | OUTPATIENT
Start: 2017-07-19 | End: 2017-12-22 | Stop reason: SDUPTHER

## 2017-07-19 RX ORDER — CLOBETASOL PROPIONATE 0.46 MG/ML
SOLUTION TOPICAL
Qty: 60 ML | Refills: 3 | Status: SHIPPED | OUTPATIENT
Start: 2017-07-19 | End: 2017-09-09 | Stop reason: SDUPTHER

## 2017-07-19 RX ADMIN — TRIAMCINOLONE ACETONIDE 10 MG: 10 INJECTION, SUSPENSION INTRA-ARTICULAR; INTRALESIONAL at 09:07

## 2017-07-19 NOTE — PROGRESS NOTES
After obtaining consent, and per orders of Dr. Patricia Thompson, injection of Stelar 90mg Lot# [id number] Exp. 12/2018 given in Left Arm by Britany Veliz. Patient instructed to remain in clinic for 20 minutes afterwards, and to report any adverse reaction to me immediately.

## 2017-07-19 NOTE — PROGRESS NOTES
Subjective:       Patient ID:  Adrien Panda is a 41 y.o. male who presents for   Chief Complaint   Patient presents with    Follow-up     Psoriasis     HPI  Pt is here today for f/u psoriasis. Currently on Stelara (1st injection was 9/21/16). States he doesn't think it's working as well now as his lower legs are flared again and he's starting to get small lesions elsewhere on his skin.  No infections, no side effects noted. Interested in ILK today for his legs.    PMH: h/o dilated cardiomyopathy tx by Dr. Rubin at [facility], cutaneous and joint gout tx by Dr. Hermosillo (psoriasis flared on colchicine).       PSORIASIS: failed tx with Enbrel, Humira, Soriatane, clobetasol topical, betamethasone topical, Lidex topical, elocon topical.    ILK helps temporarily. ultravate works best out of all the topicals he's tried. psorcon 0.05% oint helps.    On Humira from 6/2015 - 4/2016 until he stopped responding to it. Switched back to enbrel at that point which only helped temporarily.    Added Soriatane to Enbrel but ended up with a bad flare on lower legs.  Not a good candidate for NBUVB as he lives on the [facility] and there is not light unit in this area.     Review of Systems   Constitutional: Positive for weight gain and fatigue. Negative for fever, chills, weight loss, night sweats and malaise.   Skin: Negative for daily sunscreen use and recent sunburn.   Hematologic/Lymphatic: Does not bruise/bleed easily.        Objective:    Physical Exam   Constitutional: He appears well-developed and well-nourished. No distress.   Neurological: He is alert and oriented to person, place, and time. He is not disoriented.   Psychiatric: He has a normal mood and affect.   Skin:   Areas Examined (abnormalities noted in diagram):   Scalp / Hair Palpated and Inspected  Head / Face Inspection Performed  Neck Inspection Performed  Chest / Axilla Inspection Performed  Abdomen Inspection Performed  Back Inspection Performed  RUE  Inspected  LUE Inspection Performed  RLE Inspected  LLE Inspection Performed              Diagram Legend     Erythematous scaling macule/papule c/w actinic keratosis       Vascular papule c/w angioma      Pigmented verrucoid papule/plaque c/w seborrheic keratosis      Yellow umbilicated papule c/w sebaceous hyperplasia      Irregularly shaped tan macule c/w lentigo     1-2 mm smooth white papules consistent with Milia      Movable subcutaneous cyst with punctum c/w epidermal inclusion cyst      Subcutaneous movable cyst c/w pilar cyst      Firm pink to brown papule c/w dermatofibroma      Pedunculated fleshy papule(s) c/w skin tag(s)      Evenly pigmented macule c/w junctional nevus     Mildly variegated pigmented, slightly irregular-bordered macule c/w mildly atypical nevus      Flesh colored to evenly pigmented papule c/w intradermal nevus       Pink pearly papule/plaque c/w basal cell carcinoma      Erythematous hyperkeratotic cursted plaque c/w SCC      Surgical scar with no sign of skin cancer recurrence      Open and closed comedones      Inflammatory papules and pustules      Verrucoid papule consistent consistent with wart     Erythematous eczematous patches and plaques     Dystrophic onycholytic nail with subungual debris c/w onychomycosis     Umbilicated papule    Erythematous-base heme-crusted tan verrucoid plaque consistent with inflamed seborrheic keratosis     Erythematous Silvery Scaling Plaque c/w Psoriasis     See annotation      Assessment / Plan:        Encounter for long-term (current) use of high-risk medication  -     CBC auto differential; Future  -     Comprehensive metabolic panel; Future    Psoriasis vulgaris  Flaring on Stelara (~20% TBSA today)  Pt will get today's dose of Stelara since he already paid for it, but then he would like to switch to another agent in 3 months if he is still flared at that time.  Will consider Taltz (will have to send through [employer])  Will give more  topicals today:  -     clobetasol (TEMOVATE) 0.05 % external solution; Use on scalp one - two times daily as needed for scaling or itching  Dispense: 60 mL; Refill: 3  -     halobetasol (ULTRAVATE) 0.05 % ointment; Apply topically 2 (two) times daily.  Dispense: 100 g; Refill: 3  Letter written today for pt's insurance company to provide him with more than one tube of ultravate at a time.    Intralesional Kenalog 10mg/cc (4 cc total) injected into 2 large lesions on the lower legs today after obtaining verbal consent including risk of surrounding hypopigmentation. Patient tolerated procedure well.    rtc 2-3 months

## 2017-08-03 ENCOUNTER — PATIENT MESSAGE (OUTPATIENT)
Dept: DERMATOLOGY | Facility: CLINIC | Age: 42
End: 2017-08-03

## 2017-08-04 ENCOUNTER — TELEPHONE (OUTPATIENT)
Dept: DERMATOLOGY | Facility: CLINIC | Age: 42
End: 2017-08-04

## 2017-08-04 NOTE — TELEPHONE ENCOUNTER
I did a PA for Halobetasol prop oint 0.05% #100 gram tube and it was approved by [facility] from 8-3-17 to 7-19-18. I faxed the approval letter to [employer] and spoke to the phar.

## 2017-08-09 NOTE — TELEPHONE ENCOUNTER
DOCUMENTATION ONLY   Prior authorization re-approval for [employer] is approved from 8/9/2017 through 8/9/2018. We will follow up with patient about a week before next refill is due. LIZZETTE

## 2017-08-16 ENCOUNTER — PATIENT MESSAGE (OUTPATIENT)
Dept: DERMATOLOGY | Facility: CLINIC | Age: 42
End: 2017-08-16

## 2017-08-18 ENCOUNTER — TELEPHONE (OUTPATIENT)
Dept: DERMATOLOGY | Facility: CLINIC | Age: 42
End: 2017-08-18

## 2017-08-18 NOTE — TELEPHONE ENCOUNTER
----- Message from Khadijah Kendall sent at 8/18/2017  9:41 AM CDT -----  Contact: [facility]   [facility]- Neri is calling to speak with the nurse to see if the pt is going to need his Stelara injection for his 10-18 appt can you please call Apoorva Guerin at [phone] option one    DARLYN

## 2017-09-08 ENCOUNTER — TELEPHONE (OUTPATIENT)
Dept: DERMATOLOGY | Facility: CLINIC | Age: 42
End: 2017-09-08

## 2017-09-09 DIAGNOSIS — L40.0 PSORIASIS VULGARIS: ICD-10-CM

## 2017-09-09 RX ORDER — CLOBETASOL PROPIONATE 0.46 MG/ML
SOLUTION TOPICAL
Qty: 60 ML | Refills: 3 | Status: SHIPPED | OUTPATIENT
Start: 2017-09-09 | End: 2018-03-22 | Stop reason: SDUPTHER

## 2017-09-27 ENCOUNTER — PATIENT MESSAGE (OUTPATIENT)
Dept: DERMATOLOGY | Facility: CLINIC | Age: 42
End: 2017-09-27

## 2017-09-27 RX ORDER — DOXYCYCLINE 100 MG/1
100 CAPSULE ORAL 2 TIMES DAILY
Qty: 20 CAPSULE | Refills: 0 | Status: SHIPPED | OUTPATIENT
Start: 2017-09-27 | End: 2017-11-17

## 2017-09-28 ENCOUNTER — PATIENT MESSAGE (OUTPATIENT)
Dept: DERMATOLOGY | Facility: CLINIC | Age: 42
End: 2017-09-28

## 2017-09-28 ENCOUNTER — PATIENT MESSAGE (OUTPATIENT)
Dept: RHEUMATOLOGY | Facility: CLINIC | Age: 42
End: 2017-09-28

## 2017-09-29 RX ORDER — METHYLPREDNISOLONE 4 MG/1
TABLET ORAL
Qty: 21 TABLET | Refills: 0 | Status: SHIPPED | OUTPATIENT
Start: 2017-09-29 | End: 2019-01-08 | Stop reason: ALTCHOICE

## 2017-10-03 ENCOUNTER — OFFICE VISIT (OUTPATIENT)
Dept: RHEUMATOLOGY | Facility: CLINIC | Age: 42
End: 2017-10-03
Payer: MEDICAID

## 2017-10-03 ENCOUNTER — LAB VISIT (OUTPATIENT)
Dept: LAB | Facility: HOSPITAL | Age: 42
End: 2017-10-03
Attending: INTERNAL MEDICINE
Payer: MEDICAID

## 2017-10-03 VITALS
DIASTOLIC BLOOD PRESSURE: 66 MMHG | SYSTOLIC BLOOD PRESSURE: 94 MMHG | HEART RATE: 69 BPM | WEIGHT: 231 LBS | BODY MASS INDEX: 35.12 KG/M2

## 2017-10-03 DIAGNOSIS — M13.0 POLYARTHRITIS: Primary | ICD-10-CM

## 2017-10-03 DIAGNOSIS — M1A.9XX1 GOUT WITH TOPHI: ICD-10-CM

## 2017-10-03 DIAGNOSIS — L40.9 PSORIASIS: ICD-10-CM

## 2017-10-03 DIAGNOSIS — I42.9 CARDIOMYOPATHY, IDIOPATHIC: ICD-10-CM

## 2017-10-03 LAB — URATE SERPL-MCNC: 10.5 MG/DL

## 2017-10-03 PROCEDURE — 36415 COLL VENOUS BLD VENIPUNCTURE: CPT

## 2017-10-03 PROCEDURE — 99999 PR PBB SHADOW E&M-EST. PATIENT-LVL III: CPT | Mod: PBBFAC,,, | Performed by: INTERNAL MEDICINE

## 2017-10-03 PROCEDURE — 99214 OFFICE O/P EST MOD 30 MIN: CPT | Mod: S$PBB,,, | Performed by: INTERNAL MEDICINE

## 2017-10-03 PROCEDURE — 84550 ASSAY OF BLOOD/URIC ACID: CPT

## 2017-10-03 PROCEDURE — 99213 OFFICE O/P EST LOW 20 MIN: CPT | Mod: PBBFAC | Performed by: INTERNAL MEDICINE

## 2017-10-03 ASSESSMENT — ROUTINE ASSESSMENT OF PATIENT INDEX DATA (RAPID3)
FATIGUE SCORE: 8
TOTAL RAPID3 SCORE: 7.22
PAIN SCORE: 9
AM STIFFNESS SCORE: 1, YES
WHEN YOU AWAKENED IN THE MORNING OVER THE LAST WEEK, PLEASE INDICATE THE AMOUNT OF TIME IT TAKES UNTIL YOU ARE AS LIMBER AS YOU WILL BE FOR THE DAY: 30MIN
PSYCHOLOGICAL DISTRESS SCORE: .4
PATIENT GLOBAL ASSESSMENT SCORE: 8
MDHAQ FUNCTION SCORE: 1.4

## 2017-10-08 NOTE — PROGRESS NOTES
History of present illness: 42-year-old gentleman with idiopathic cardiomyopathy.  He was last seen in April.  He was having problems in the lower back.  He is still having problems in the low back.  He went to therapy which helped some.  He is still doing the exercises.  He never went to the back and spine clinic.  He has been using ice with some relief.  Topical medications have been helping.  He has a history of tophaceous gout but has x-ray evidence of chondrocalcinosis.  He also has psoriasis and possible psoriatic arthritis.  He remains on Stelara which does help but not lasting 3 months.  He is complaining of increased pain in both knees, elbows, and ankles.  It started 2 weeks ago.  It was of sudden onset.  He did develop some swelling in the right olecranon bursa.  He was placed on a Medrol Dosepak but did not start it until Sunday.  He has had no response as yet.  The pain is bad in the morning.  It also increases with activity.  He has 30 minutes of morning stiffness.  He has some nocturnal pain.  His cardiac status has been stable.    Physical examination:  Skin: He has psoriatic plaques on both legs  Musculoskeletal: Cervical spine and shoulders are unremarkable.  He has thickening of the right olecranon bursa but no effusion.  He has no nodules.  He has no tenderness.  Left elbow is unremarkable.  Wrist and hands have no synovitis.  Hips have good range of motion.  The left knee is tender and has pain on range of motion.  He has no effusion in the knee.  Right knee is unremarkable.    Assessment: He has multiple musculoskeletal problems including chronic low back pain, gout, pseudogout, and possibly psoriatic arthritis.  It is hard to tell what is causing the acute problem.    Plans:  1.  Finish up the Medrol Dosepak  2.  I added arthritis Tylenol 2 twice daily.  3.  I added the uric acid to his next blood work  4.  I will see him in follow-up in 6 months unless his pain does not improve.

## 2017-10-10 ENCOUNTER — TELEPHONE (OUTPATIENT)
Dept: PHARMACY | Facility: CLINIC | Age: 42
End: 2017-10-10

## 2017-10-11 ENCOUNTER — TELEPHONE (OUTPATIENT)
Dept: DERMATOLOGY | Facility: CLINIC | Age: 42
End: 2017-10-11

## 2017-10-11 NOTE — TELEPHONE ENCOUNTER
----- Message from She Gipson sent at 10/10/2017 11:32 AM CDT -----  Call BENITO in specialty pharmacy about patient getting medication on the day of his appt on 10/18. Call her at ex [phone]

## 2017-10-13 ENCOUNTER — PATIENT MESSAGE (OUTPATIENT)
Dept: RHEUMATOLOGY | Facility: CLINIC | Age: 42
End: 2017-10-13

## 2017-10-18 ENCOUNTER — OFFICE VISIT (OUTPATIENT)
Dept: DERMATOLOGY | Facility: CLINIC | Age: 42
End: 2017-10-18
Payer: MEDICAID

## 2017-10-18 DIAGNOSIS — L40.0 PSORIASIS VULGARIS: ICD-10-CM

## 2017-10-18 DIAGNOSIS — Z79.899 ENCOUNTER FOR LONG-TERM (CURRENT) USE OF HIGH-RISK MEDICATION: Primary | ICD-10-CM

## 2017-10-18 DIAGNOSIS — L29.9 PRURITIC CONDITION: ICD-10-CM

## 2017-10-18 DIAGNOSIS — L40.9 PSORIASIS: ICD-10-CM

## 2017-10-18 PROCEDURE — 99213 OFFICE O/P EST LOW 20 MIN: CPT | Mod: 25,S$PBB,, | Performed by: DERMATOLOGY

## 2017-10-18 PROCEDURE — 11900 INJECT SKIN LESIONS </W 7: CPT | Mod: S$PBB,,, | Performed by: DERMATOLOGY

## 2017-10-18 PROCEDURE — 99999 PR PBB SHADOW E&M-EST. PATIENT-LVL II: CPT | Mod: PBBFAC,,, | Performed by: DERMATOLOGY

## 2017-10-18 PROCEDURE — 99212 OFFICE O/P EST SF 10 MIN: CPT | Mod: PBBFAC | Performed by: DERMATOLOGY

## 2017-10-18 PROCEDURE — 11900 INJECT SKIN LESIONS </W 7: CPT | Mod: PBBFAC | Performed by: DERMATOLOGY

## 2017-10-18 RX ORDER — HYDROXYZINE PAMOATE 25 MG/1
25 CAPSULE ORAL NIGHTLY PRN
Qty: 30 CAPSULE | Refills: 1 | Status: SHIPPED | OUTPATIENT
Start: 2017-10-18 | End: 2018-09-27

## 2017-10-18 RX ADMIN — TRIAMCINOLONE ACETONIDE 10 MG: 10 INJECTION, SUSPENSION INTRA-ARTICULAR; INTRALESIONAL at 11:10

## 2017-10-18 NOTE — PROGRESS NOTES
Subjective:       Patient ID:  Adrien Panda is a 42 y.o. male who presents for   Chief Complaint   Patient presents with    Follow-up     psoriasis     Pt is here today for f/u psoriasis, last seen 7/23/17. Currently on Stelara (1st injection was 9/21/16). At last visit patient was flaring on legs. He had 4cc ILK 10 to legs, and Stelara injection. He cleared for 2 weeks, but then flared shortly after. Currently with flare on legs, arms and back. He is also using halobetasol ointment prn and clobetasol solution to scalp prn.     No infections, no side effects noted.    Patient endorses constant lower back pain throughout the day.      PMH: h/o dilated cardiomyopathy tx by Dr. Rubin at [facility], cutaneous and joint gout tx by Dr. Hermosillo (psoriasis flared on colchicine).       PSORIASIS: failed tx with Enbrel, Humira, Soriatane, clobetasol topical, betamethasone topical, Lidex topical, elocon topical.    ILK helps temporarily. ultravate works best out of all the topicals he's tried. psorcon 0.05% oint helps.    On Humira from 6/2015 - 4/2016 until he stopped responding to it. Switched back to enbrel at that point which only helped temporarily.    Added Soriatane to Enbrel but ended up with a bad flare on lower legs.  Not a good candidate for NBUVB as he lives on the [facility] and there is not light unit in this area.     TB gold: negative 04/17  CBC CMP wnl 07/17        Review of Systems   Constitutional: Negative for fever, chills, weight loss, fatigue and night sweats.   Cardiovascular: Negative for chest pain.   Gastrointestinal: Negative for nausea and vomiting.   Skin: Negative for daily sunscreen use.   Hematologic/Lymphatic: Does not bruise/bleed easily.        Objective:    Physical Exam   Constitutional: He appears well-developed and well-nourished. No distress.   Neurological: He is alert and oriented to person, place, and time. He is not disoriented.   Psychiatric: He has a normal mood and  affect.   Skin:   Areas Examined (abnormalities noted in diagram):   Scalp / Hair Palpated and Inspected  Head / Face Inspection Performed  Neck Inspection Performed  Chest / Axilla Inspection Performed  Abdomen Inspection Performed  Back Inspection Performed  RUE Inspected  LUE Inspection Performed  RLE Inspected  LLE Inspection Performed              Diagram Legend     Erythematous scaling macule/papule c/w actinic keratosis       Vascular papule c/w angioma      Pigmented verrucoid papule/plaque c/w seborrheic keratosis      Yellow umbilicated papule c/w sebaceous hyperplasia      Irregularly shaped tan macule c/w lentigo     1-2 mm smooth white papules consistent with Milia      Movable subcutaneous cyst with punctum c/w epidermal inclusion cyst      Subcutaneous movable cyst c/w pilar cyst      Firm pink to brown papule c/w dermatofibroma      Pedunculated fleshy papule(s) c/w skin tag(s)      Evenly pigmented macule c/w junctional nevus     Mildly variegated pigmented, slightly irregular-bordered macule c/w mildly atypical nevus      Flesh colored to evenly pigmented papule c/w intradermal nevus       Pink pearly papule/plaque c/w basal cell carcinoma      Erythematous hyperkeratotic cursted plaque c/w SCC      Surgical scar with no sign of skin cancer recurrence      Open and closed comedones      Inflammatory papules and pustules      Verrucoid papule consistent consistent with wart     Erythematous eczematous patches and plaques     Dystrophic onycholytic nail with subungual debris c/w onychomycosis     Umbilicated papule    Erythematous-base heme-crusted tan verrucoid plaque consistent with inflamed seborrheic keratosis     Erythematous Silvery Scaling Plaque c/w Psoriasis     See annotation      Assessment / Plan:        Encounter for long-term (current) use of high-risk medication  -     CBC auto differential; Future  -     Comprehensive metabolic panel; Future    Psoriasis vulgaris  Will discontinue  Stelara as pt is no longer responding well to it.  Will attempt to switch to Taltz.  CBC, CMP today.  Quantiferon gold due 4/2018  -     ixekizumab (TALTZ AUTOINJECTOR, 2 PACK,) 80 mg/mL AtIn; Inject 2 pens (160mg) SC x 1, then 1 pen (80mg) SC q2wk x 12 wks  Dispense: 4 Syringe; Refill: 1    Intralesional Kenalog 10mg/cc (4 cc total) injected into 4 large lesions on the lower legs today after obtaining verbal consent including risk of surrounding hypopigmentation. Patient tolerated procedure well.  Continue halobetasol and clobetasol soln    Pruritic condition  Pt requesting refill to help with itching at bedtime  -     hydrOXYzine pamoate (VISTARIL) 25 MG Cap; Take 1 capsule (25 mg total) by mouth nightly as needed (itching).  Dispense: 30 capsule; Refill: 1    Will schedule f/u after new agent started.

## 2017-10-19 ENCOUNTER — TELEPHONE (OUTPATIENT)
Dept: DERMATOLOGY | Facility: CLINIC | Age: 42
End: 2017-10-19

## 2017-10-19 DIAGNOSIS — Z79.899 ENCOUNTER FOR LONG-TERM (CURRENT) USE OF HIGH-RISK MEDICATION: Primary | ICD-10-CM

## 2017-10-19 NOTE — TELEPHONE ENCOUNTER
----- Message from Leyla Lisa sent at 10/19/2017  1:54 PM CDT -----  Contact: [facility]  [phone]-please pharmacy on above patient need diagnoses code waiting on a call from the nurse call number in message

## 2017-10-19 NOTE — TELEPHONE ENCOUNTER
Please let pt know he had some abnormal white blood cells and elevated glucose in his last blood draw.  I would like him to repeat his labs when he is fasting.  I have already ordered them, so please schedule them whenever he is available to do them (sooner rather than later).

## 2017-10-25 ENCOUNTER — LAB VISIT (OUTPATIENT)
Dept: LAB | Facility: HOSPITAL | Age: 42
End: 2017-10-25
Attending: DERMATOLOGY
Payer: MEDICAID

## 2017-10-25 DIAGNOSIS — Z79.899 ENCOUNTER FOR LONG-TERM (CURRENT) USE OF HIGH-RISK MEDICATION: ICD-10-CM

## 2017-10-25 DIAGNOSIS — D72.829 LEUKOCYTOSIS, UNSPECIFIED TYPE: Primary | ICD-10-CM

## 2017-10-25 LAB
ANION GAP SERPL CALC-SCNC: 12 MMOL/L
BASOPHILS # BLD AUTO: 0.05 K/UL
BASOPHILS NFR BLD: 0.3 %
BUN SERPL-MCNC: 28 MG/DL
CALCIUM SERPL-MCNC: 10.5 MG/DL
CHLORIDE SERPL-SCNC: 104 MMOL/L
CO2 SERPL-SCNC: 21 MMOL/L
CREAT SERPL-MCNC: 1.2 MG/DL
DIFFERENTIAL METHOD: ABNORMAL
EOSINOPHIL # BLD AUTO: 0.2 K/UL
EOSINOPHIL NFR BLD: 1 %
ERYTHROCYTE [DISTWIDTH] IN BLOOD BY AUTOMATED COUNT: 14 %
EST. GFR  (AFRICAN AMERICAN): >60 ML/MIN/1.73 M^2
EST. GFR  (NON AFRICAN AMERICAN): >60 ML/MIN/1.73 M^2
GLUCOSE SERPL-MCNC: 89 MG/DL
HCT VFR BLD AUTO: 47.5 %
HGB BLD-MCNC: 15.6 G/DL
IMM GRANULOCYTES # BLD AUTO: 0.16 K/UL
IMM GRANULOCYTES NFR BLD AUTO: 1 %
LYMPHOCYTES # BLD AUTO: 1.8 K/UL
LYMPHOCYTES NFR BLD: 10.6 %
MCH RBC QN AUTO: 30.1 PG
MCHC RBC AUTO-ENTMCNC: 32.8 G/DL
MCV RBC AUTO: 92 FL
MONOCYTES # BLD AUTO: 0.8 K/UL
MONOCYTES NFR BLD: 4.7 %
NEUTROPHILS # BLD AUTO: 13.8 K/UL
NEUTROPHILS NFR BLD: 82.4 %
NRBC BLD-RTO: 0 /100 WBC
PLATELET # BLD AUTO: 244 K/UL
PMV BLD AUTO: 11.5 FL
POTASSIUM SERPL-SCNC: 4.5 MMOL/L
RBC # BLD AUTO: 5.19 M/UL
SODIUM SERPL-SCNC: 137 MMOL/L
WBC # BLD AUTO: 16.68 K/UL

## 2017-10-25 PROCEDURE — 80048 BASIC METABOLIC PNL TOTAL CA: CPT

## 2017-10-25 PROCEDURE — 85025 COMPLETE CBC W/AUTO DIFF WBC: CPT

## 2017-10-25 PROCEDURE — 36415 COLL VENOUS BLD VENIPUNCTURE: CPT

## 2017-10-25 NOTE — PROGRESS NOTES
I called Mr. Panda today and let him know that he still has abnormal white blood cells and an increased WBC on his CBC. He does have a cold and a sore throat currently. I let him know that I have already spoken to the schedulers in the heme/onc dept and they should be giving him a call to schedule an appt for this. I let him know the concern is that he may be developing leukemia due to his hx of psoriasis treated with biologics. He will let me know if he hasn't heard back from heme/onc to schedule the appt. I also let him know that if he receives Taltz, he should not take it until he hears it's ok from me. He did not have any further questions at this time.

## 2017-10-26 ENCOUNTER — TELEPHONE (OUTPATIENT)
Dept: HEMATOLOGY/ONCOLOGY | Facility: CLINIC | Age: 42
End: 2017-10-26

## 2017-10-26 ENCOUNTER — PATIENT MESSAGE (OUTPATIENT)
Dept: DERMATOLOGY | Facility: CLINIC | Age: 42
End: 2017-10-26

## 2017-11-01 ENCOUNTER — INITIAL CONSULT (OUTPATIENT)
Dept: HEMATOLOGY/ONCOLOGY | Facility: CLINIC | Age: 42
End: 2017-11-01
Payer: MEDICAID

## 2017-11-01 VITALS
WEIGHT: 229.5 LBS | SYSTOLIC BLOOD PRESSURE: 95 MMHG | TEMPERATURE: 98 F | HEART RATE: 71 BPM | OXYGEN SATURATION: 96 % | BODY MASS INDEX: 33.99 KG/M2 | DIASTOLIC BLOOD PRESSURE: 53 MMHG | RESPIRATION RATE: 18 BRPM | HEIGHT: 69 IN

## 2017-11-01 DIAGNOSIS — B34.9 ACUTE VIRAL DISEASE: Primary | ICD-10-CM

## 2017-11-01 PROCEDURE — 99999 PR PBB SHADOW E&M-EST. PATIENT-LVL III: CPT | Mod: PBBFAC,,, | Performed by: INTERNAL MEDICINE

## 2017-11-01 PROCEDURE — 99213 OFFICE O/P EST LOW 20 MIN: CPT | Mod: PBBFAC | Performed by: INTERNAL MEDICINE

## 2017-11-01 PROCEDURE — 99204 OFFICE O/P NEW MOD 45 MIN: CPT | Mod: S$PBB,,, | Performed by: INTERNAL MEDICINE

## 2017-11-01 RX ORDER — AZITHROMYCIN 250 MG/1
TABLET, FILM COATED ORAL
COMMUNITY
Start: 2017-10-30 | End: 2018-04-03 | Stop reason: ALTCHOICE

## 2017-11-01 RX ORDER — BENZONATATE 200 MG/1
CAPSULE ORAL
COMMUNITY
Start: 2017-10-30 | End: 2018-04-03 | Stop reason: ALTCHOICE

## 2017-11-01 NOTE — LETTER
November 3, 2017      Patricia Thompson MD  [address]  [facility] [address]           [facility]  [address]  [facility] [address]  Phone: [phone]          Patient: Adrien Panda   MR Number: [id number]   YOB: 1975   Date of Visit: 11/1/2017       Dear Dr. Patricia Thompson:    Thank you for referring Adrien Panda to me for evaluation. Attached you will find relevant portions of my assessment and plan of care.    If you have questions, please do not hesitate to call me. I look forward to following Adrien Panda along with you.    Sincerely,    Mary Green MD    Enclosure  CC:  No Recipients    If you would like to receive this communication electronically, please contact [email or web address] or [phone] to request more information on [facility] access.    For providers and/or their staff who would like to refer a patient to [facility], please contact us through our one-stop-shop provider referral line, [facility] , at [phone].    If you feel you have received this communication in error or would no longer like to receive these types of communications, please e-mail [email or web address]

## 2017-11-03 NOTE — PROGRESS NOTES
Subjective:       Patient ID: Adrien Panda is a 42 y.o. male.    Chief Complaint: New patient consult (leukocytosis)    Adrien presents for evaluation of leukocytosis. He was noted to have immature granulocytes 2 weeks ago and leukocytosis with left shift granulocytes a few days ago. He developed an upper respiratory viral illness during this time frame. He has a history of psoriasis with concern that the leukocytosis was related to longterm therapy.    HPI  Review of Systems   Constitutional: Negative.    HENT: Negative.    Eyes: Negative.    Respiratory: Negative.    Cardiovascular: Negative.    Gastrointestinal: Negative.    Endocrine: Negative.    Genitourinary: Negative.    Skin: Negative.    Allergic/Immunologic: Negative for environmental allergies, food allergies and immunocompromised state.   Neurological: Negative.    Hematological: Negative for adenopathy. Does not bruise/bleed easily.   Psychiatric/Behavioral: Negative.        Objective:      Physical Exam   Constitutional: He is oriented to person, place, and time. He appears well-developed and well-nourished.   HENT:   Head: Normocephalic and atraumatic.   Eyes: Conjunctivae are normal. No scleral icterus.   Neck: Normal range of motion. Neck supple.   Cardiovascular: Normal rate and intact distal pulses.    Pulmonary/Chest: Effort normal. No respiratory distress.   Abdominal: Soft. He exhibits no distension. There is no tenderness.   Musculoskeletal: Normal range of motion. He exhibits no edema.   Neurological: He is alert and oriented to person, place, and time. No cranial nerve deficit.   Skin: Skin is warm and dry.   Psychiatric: He has a normal mood and affect. His behavior is normal.   Nursing note and vitals reviewed.      Assessment:       1. Acute viral disease        Plan:       Pattern of leukocytosis is consistent with evolution and immune reaction to viral illness.  Repeat CBC in 4 weeks.

## 2017-11-17 ENCOUNTER — PATIENT MESSAGE (OUTPATIENT)
Dept: DERMATOLOGY | Facility: CLINIC | Age: 42
End: 2017-11-17

## 2017-11-17 DIAGNOSIS — L40.0 PSORIASIS VULGARIS: Primary | ICD-10-CM

## 2017-11-17 RX ORDER — ACITRETIN 25 MG/1
25 CAPSULE ORAL DAILY
Qty: 30 CAPSULE | Refills: 0 | Status: SHIPPED | OUTPATIENT
Start: 2017-11-17 | End: 2018-04-13

## 2017-11-19 ENCOUNTER — PATIENT MESSAGE (OUTPATIENT)
Dept: DERMATOLOGY | Facility: CLINIC | Age: 42
End: 2017-11-19

## 2017-11-20 ENCOUNTER — TELEPHONE (OUTPATIENT)
Dept: DERMATOLOGY | Facility: CLINIC | Age: 42
End: 2017-11-20

## 2017-11-20 NOTE — TELEPHONE ENCOUNTER
I did a PA for Soritane 25mg. [facility] transferred me to [employer] (phone # [phone]) which handles the PA's. I got the med approved for 1 yr from 11-20-17 to 11-20-18. I called the pt and [employer] phar to let them know.

## 2017-11-27 ENCOUNTER — PATIENT MESSAGE (OUTPATIENT)
Dept: DERMATOLOGY | Facility: CLINIC | Age: 42
End: 2017-11-27

## 2017-11-27 DIAGNOSIS — L40.0 PSORIASIS VULGARIS: Primary | ICD-10-CM

## 2017-11-29 ENCOUNTER — PATIENT MESSAGE (OUTPATIENT)
Dept: DERMATOLOGY | Facility: CLINIC | Age: 42
End: 2017-11-29

## 2017-11-29 RX ORDER — DIFLORASONE DIACETATE 0.5 MG/G
OINTMENT TOPICAL
Qty: 60 G | Refills: 3 | Status: SHIPPED | OUTPATIENT
Start: 2017-11-29 | End: 2018-09-27

## 2017-11-30 ENCOUNTER — PATIENT MESSAGE (OUTPATIENT)
Dept: DERMATOLOGY | Facility: CLINIC | Age: 42
End: 2017-11-30

## 2017-12-01 ENCOUNTER — LAB VISIT (OUTPATIENT)
Dept: LAB | Facility: HOSPITAL | Age: 42
End: 2017-12-01
Attending: INTERNAL MEDICINE
Payer: MEDICAID

## 2017-12-01 DIAGNOSIS — B34.9 ACUTE VIRAL DISEASE: ICD-10-CM

## 2017-12-01 LAB
BASOPHILS # BLD AUTO: 0.05 K/UL
BASOPHILS NFR BLD: 0.4 %
DIFFERENTIAL METHOD: ABNORMAL
EOSINOPHIL # BLD AUTO: 0.1 K/UL
EOSINOPHIL NFR BLD: 0.4 %
ERYTHROCYTE [DISTWIDTH] IN BLOOD BY AUTOMATED COUNT: 13.7 %
HCT VFR BLD AUTO: 44 %
HGB BLD-MCNC: 14.1 G/DL
IMM GRANULOCYTES # BLD AUTO: 0.08 K/UL
IMM GRANULOCYTES NFR BLD AUTO: 0.7 %
LYMPHOCYTES # BLD AUTO: 1.5 K/UL
LYMPHOCYTES NFR BLD: 12.2 %
MCH RBC QN AUTO: 30.2 PG
MCHC RBC AUTO-ENTMCNC: 32 G/DL
MCV RBC AUTO: 94 FL
MONOCYTES # BLD AUTO: 0.7 K/UL
MONOCYTES NFR BLD: 6.2 %
NEUTROPHILS # BLD AUTO: 9.6 K/UL
NEUTROPHILS NFR BLD: 80.1 %
NRBC BLD-RTO: 0 /100 WBC
PLATELET # BLD AUTO: 196 K/UL
PMV BLD AUTO: 11.2 FL
RBC # BLD AUTO: 4.67 M/UL
WBC # BLD AUTO: 12 K/UL

## 2017-12-01 PROCEDURE — 36415 COLL VENOUS BLD VENIPUNCTURE: CPT

## 2017-12-01 PROCEDURE — 85025 COMPLETE CBC W/AUTO DIFF WBC: CPT

## 2017-12-02 ENCOUNTER — PATIENT MESSAGE (OUTPATIENT)
Dept: DERMATOLOGY | Facility: CLINIC | Age: 42
End: 2017-12-02

## 2017-12-02 DIAGNOSIS — L40.0 PSORIASIS VULGARIS: Primary | ICD-10-CM

## 2017-12-05 ENCOUNTER — PATIENT MESSAGE (OUTPATIENT)
Dept: HEMATOLOGY/ONCOLOGY | Facility: CLINIC | Age: 42
End: 2017-12-05

## 2017-12-08 ENCOUNTER — PATIENT MESSAGE (OUTPATIENT)
Dept: DERMATOLOGY | Facility: CLINIC | Age: 42
End: 2017-12-08

## 2017-12-08 RX ORDER — IXEKIZUMAB 80 MG/ML
INJECTION, SOLUTION SUBCUTANEOUS
Qty: 8 ML | Refills: 0 | Status: SHIPPED | OUTPATIENT
Start: 2017-12-08 | End: 2018-04-13

## 2017-12-14 ENCOUNTER — PATIENT MESSAGE (OUTPATIENT)
Dept: DERMATOLOGY | Facility: CLINIC | Age: 42
End: 2017-12-14

## 2017-12-19 ENCOUNTER — TELEPHONE (OUTPATIENT)
Dept: DERMATOLOGY | Facility: CLINIC | Age: 42
End: 2017-12-19

## 2017-12-19 NOTE — TELEPHONE ENCOUNTER
----- Message from Khadijah Kendall sent at 12/19/2017 11:20 AM CST -----  Contact: [facility]   [facility]- Lou is calling to speak with the nurse in ref to the pts taltz prescription pharmacy needs a signature on the pts taltz together enrollment form can you please call pharmacy at [phone]    DARLYN

## 2017-12-21 ENCOUNTER — PATIENT MESSAGE (OUTPATIENT)
Dept: DERMATOLOGY | Facility: CLINIC | Age: 42
End: 2017-12-21

## 2017-12-21 DIAGNOSIS — L40.0 PSORIASIS VULGARIS: ICD-10-CM

## 2017-12-22 ENCOUNTER — PATIENT MESSAGE (OUTPATIENT)
Dept: DERMATOLOGY | Facility: CLINIC | Age: 42
End: 2017-12-22

## 2017-12-22 RX ORDER — HALOBETASOL PROPIONATE 0.5 MG/G
OINTMENT TOPICAL 2 TIMES DAILY
Qty: 100 G | Refills: 3 | Status: SHIPPED | OUTPATIENT
Start: 2017-12-22 | End: 2018-03-29 | Stop reason: SDUPTHER

## 2017-12-27 ENCOUNTER — PATIENT MESSAGE (OUTPATIENT)
Dept: DERMATOLOGY | Facility: CLINIC | Age: 42
End: 2017-12-27

## 2017-12-28 ENCOUNTER — PATIENT MESSAGE (OUTPATIENT)
Dept: DERMATOLOGY | Facility: CLINIC | Age: 42
End: 2017-12-28

## 2017-12-29 ENCOUNTER — PATIENT MESSAGE (OUTPATIENT)
Dept: DERMATOLOGY | Facility: CLINIC | Age: 42
End: 2017-12-29

## 2018-01-03 ENCOUNTER — PATIENT MESSAGE (OUTPATIENT)
Dept: DERMATOLOGY | Facility: CLINIC | Age: 43
End: 2018-01-03

## 2018-01-03 ENCOUNTER — TELEPHONE (OUTPATIENT)
Dept: DERMATOLOGY | Facility: CLINIC | Age: 43
End: 2018-01-03

## 2018-01-03 NOTE — TELEPHONE ENCOUNTER
----- Message from Khadijah Kendall sent at 1/3/2018  2:21 PM CST -----  Contact: Stefani grover is calling to speak with the nurse about the enrollment form pt needs to sign and date and they are calling about the coupon for Taltz Together can you please call Stefani  [phone]    DARLYN

## 2018-01-05 ENCOUNTER — HOSPITAL ENCOUNTER (INPATIENT)
Facility: HOSPITAL | Age: 43
LOS: 1 days | Discharge: LEFT BEFORE DISCHARGE | DRG: 065 | End: 2018-01-06
Attending: EMERGENCY MEDICINE | Admitting: EMERGENCY MEDICINE
Payer: MEDICAID

## 2018-01-05 DIAGNOSIS — R51.9 HEADACHE: ICD-10-CM

## 2018-01-05 DIAGNOSIS — I63.9 CEREBRAL INFARCT: ICD-10-CM

## 2018-01-05 DIAGNOSIS — I63.9 CEREBRAL INFARCTION, UNSPECIFIED MECHANISM: Primary | ICD-10-CM

## 2018-01-05 PROBLEM — E78.5 HYPERLIPIDEMIA: Status: ACTIVE | Noted: 2018-01-05

## 2018-01-05 PROBLEM — I10 ESSENTIAL HYPERTENSION: Status: ACTIVE | Noted: 2018-01-05

## 2018-01-05 LAB
CREAT SERPL-MCNC: 1 MG/DL
EST. GFR  (AFRICAN AMERICAN): >60 ML/MIN/1.73 M^2
EST. GFR  (NON AFRICAN AMERICAN): >60 ML/MIN/1.73 M^2
ESTIMATED PA SYSTOLIC PRESSURE: 10.76
GLOBAL PERICARDIAL EFFUSION: NORMAL
MITRAL VALVE MOBILITY: NORMAL
RETIRED EF AND QEF - SEE NOTES: 60 (ref 55–65)

## 2018-01-05 PROCEDURE — 63600175 PHARM REV CODE 636 W HCPCS: Performed by: PHYSICIAN ASSISTANT

## 2018-01-05 PROCEDURE — 93306 TTE W/DOPPLER COMPLETE: CPT | Mod: 26,,, | Performed by: INTERNAL MEDICINE

## 2018-01-05 PROCEDURE — 25000003 PHARM REV CODE 250: Performed by: PHYSICIAN ASSISTANT

## 2018-01-05 PROCEDURE — 36415 COLL VENOUS BLD VENIPUNCTURE: CPT

## 2018-01-05 PROCEDURE — 63600175 PHARM REV CODE 636 W HCPCS: Performed by: HOSPITALIST

## 2018-01-05 PROCEDURE — 82565 ASSAY OF CREATININE: CPT

## 2018-01-05 PROCEDURE — 25500020 PHARM REV CODE 255: Performed by: HOSPITALIST

## 2018-01-05 PROCEDURE — 99285 EMERGENCY DEPT VISIT HI MDM: CPT | Mod: ,,, | Performed by: PSYCHIATRY & NEUROLOGY

## 2018-01-05 PROCEDURE — 93306 TTE W/DOPPLER COMPLETE: CPT

## 2018-01-05 PROCEDURE — A9585 GADOBUTROL INJECTION: HCPCS | Performed by: HOSPITALIST

## 2018-01-05 PROCEDURE — 12000002 HC ACUTE/MED SURGE SEMI-PRIVATE ROOM

## 2018-01-05 PROCEDURE — 25000003 PHARM REV CODE 250: Performed by: HOSPITALIST

## 2018-01-05 RX ORDER — BUTALBITAL, ACETAMINOPHEN AND CAFFEINE 50; 325; 40 MG/1; MG/1; MG/1
1 TABLET ORAL EVERY 4 HOURS PRN
COMMUNITY
End: 2018-09-27

## 2018-01-05 RX ORDER — SODIUM CHLORIDE 9 MG/ML
1000 INJECTION, SOLUTION INTRAVENOUS
Status: COMPLETED | OUTPATIENT
Start: 2018-01-05 | End: 2018-01-05

## 2018-01-05 RX ORDER — ENOXAPARIN SODIUM 100 MG/ML
40 INJECTION SUBCUTANEOUS EVERY 24 HOURS
Status: DISCONTINUED | OUTPATIENT
Start: 2018-01-05 | End: 2018-01-06 | Stop reason: HOSPADM

## 2018-01-05 RX ORDER — BUTALBITAL, ACETAMINOPHEN AND CAFFEINE 50; 325; 40 MG/1; MG/1; MG/1
1 TABLET ORAL EVERY 4 HOURS PRN
Status: DISCONTINUED | OUTPATIENT
Start: 2018-01-05 | End: 2018-01-06 | Stop reason: HOSPADM

## 2018-01-05 RX ORDER — GADOBUTROL 604.72 MG/ML
10 INJECTION INTRAVENOUS
Status: COMPLETED | OUTPATIENT
Start: 2018-01-05 | End: 2018-01-05

## 2018-01-05 RX ORDER — PRAVASTATIN SODIUM 40 MG/1
40 TABLET ORAL DAILY
Status: DISCONTINUED | OUTPATIENT
Start: 2018-01-05 | End: 2018-01-06 | Stop reason: HOSPADM

## 2018-01-05 RX ORDER — ASPIRIN 325 MG
325 TABLET ORAL
Status: COMPLETED | OUTPATIENT
Start: 2018-01-05 | End: 2018-01-05

## 2018-01-05 RX ORDER — KETOROLAC TROMETHAMINE 30 MG/ML
15 INJECTION, SOLUTION INTRAMUSCULAR; INTRAVENOUS
Status: COMPLETED | OUTPATIENT
Start: 2018-01-05 | End: 2018-01-05

## 2018-01-05 RX ORDER — ALLOPURINOL 100 MG/1
300 TABLET ORAL DAILY
Status: DISCONTINUED | OUTPATIENT
Start: 2018-01-05 | End: 2018-01-06 | Stop reason: HOSPADM

## 2018-01-05 RX ORDER — PROCHLORPERAZINE EDISYLATE 5 MG/ML
10 INJECTION INTRAMUSCULAR; INTRAVENOUS
Status: COMPLETED | OUTPATIENT
Start: 2018-01-05 | End: 2018-01-05

## 2018-01-05 RX ORDER — DIPHENHYDRAMINE HYDROCHLORIDE 50 MG/ML
12.5 INJECTION INTRAMUSCULAR; INTRAVENOUS
Status: COMPLETED | OUTPATIENT
Start: 2018-01-05 | End: 2018-01-05

## 2018-01-05 RX ADMIN — SODIUM CHLORIDE 1000 ML: 0.9 INJECTION, SOLUTION INTRAVENOUS at 10:01

## 2018-01-05 RX ADMIN — KETOROLAC TROMETHAMINE 15 MG: 30 INJECTION, SOLUTION INTRAMUSCULAR at 10:01

## 2018-01-05 RX ADMIN — ENOXAPARIN SODIUM 40 MG: 100 INJECTION SUBCUTANEOUS at 05:01

## 2018-01-05 RX ADMIN — PRAVASTATIN SODIUM 40 MG: 40 TABLET ORAL at 11:01

## 2018-01-05 RX ADMIN — ASPIRIN 325 MG ORAL TABLET 325 MG: 325 PILL ORAL at 11:01

## 2018-01-05 RX ADMIN — ALLOPURINOL 300 MG: 100 TABLET ORAL at 05:01

## 2018-01-05 RX ADMIN — PROCHLORPERAZINE EDISYLATE 10 MG: 5 INJECTION INTRAMUSCULAR; INTRAVENOUS at 10:01

## 2018-01-05 RX ADMIN — GADOBUTROL 10 ML: 604.72 INJECTION INTRAVENOUS at 03:01

## 2018-01-05 RX ADMIN — DIPHENHYDRAMINE HYDROCHLORIDE 12.5 MG: 50 INJECTION, SOLUTION INTRAMUSCULAR; INTRAVENOUS at 10:01

## 2018-01-05 NOTE — ED TRIAGE NOTES
Pt to the ED with c/o frontal HA which extends to back of head. Pt seen last night in ED but LWBS.

## 2018-01-05 NOTE — HPI
This 42 y.o. male with  a past medical history of Arthritis; Blood clotting tendency? Cardiomyopathy,;  High cholesterol; Hyperlipemia; Hypertension; Joint pain; and Psoriasis, presents to the ED complaining of an acute onset headache that starts in back of his head and radiates to front for last x4 days. Pain is sharp, severe and constant. He also notes dizziness and blurry vision w/ standing up.  Denies tinnitus. Denies trauma and car accident. Denies weakness/ numbness, SOB, bladder/bowel incontinence. No hx of headaches. Denies hx of heart attack and strokes in family. No relief w/Fioricet prescribed by PCP. Patient was left w/o being seen yesterday due to long wait time in this ER. 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

## 2018-01-05 NOTE — H&P
[facility] Medicine  History & Physical    Patient Name: Adrien Panda  MRN: [id number]  Admission Date: 1/5/2018  Attending Physician: Ilia Beebe MD   Primary Care Provider: Pop Cheney MD         Patient information was obtained from patient and ER records.     Subjective:     Principal Problem:Cerebral infarction    Chief Complaint:   Chief Complaint   Patient presents with    Headache     headaches x 4 days.  posterior head pain radiating aroung to bilat sides.  denies trauma.  Primary gave Fioricet without relief.  Was here last pm, had CT of head but left prior to being seen.        HPI: This 42 y.o. male with  a past medical history of Arthritis; Blood clotting tendency? Cardiomyopathy,;  High cholesterol; Hyperlipemia; Hypertension; Joint pain; and Psoriasis, presents to the ED complaining of an acute onset headache that starts in back of his head and radiates to front for last x4 days. Pain is sharp, severe and constant. He also notes dizziness and blurry vision w/ standing up.  Denies tinnitus. Denies trauma and car accident. Denies weakness/ numbness, SOB, bladder/bowel incontinence. No hx of headaches. Denies hx of heart attack and strokes in family. No relief w/Fioricet prescribed by PCP. Patient was left w/o being seen yesterday due to long wait time in this ER. 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    Past Medical History:   Diagnosis Date    Arthritis     Blood clotting tendency     Congestive heart failure     High cholesterol     Hyperlipemia     Hypertension     Joint  pain     Psoriasis        History reviewed. No pertinent surgical history.    Review of patient's allergies indicates:  No Known Allergies    No current facility-administered medications on file prior to encounter.      Current Outpatient Prescriptions on File Prior to Encounter   Medication Sig    allopurinol (ZYLOPRIM) 300 MG tablet TAKE 1 AND 1/2 TABLET BY MOUTH DAILY    carvedilol (COREG) 25 MG tablet Take 25 mg by mouth 2 (two) times daily with meals.    furosemide (LASIX) 40 MG tablet Take 40 mg by mouth 2 (two) times daily.      KLOR-CON M20 20 mEq tablet     lisinopril (PRINIVIL,ZESTRIL) 20 MG tablet Take 20 mg by mouth once daily.      acitretin (SORIATANE) 25 MG capsule Take 1 capsule (25 mg total) by mouth once daily.    allopurinol (ZYLOPRIM) 300 MG tablet TAKE ONE & ONE-HALF TABLETS BY MOUTH ONCE DAILY    azithromycin (Z-LISSETH) 250 MG tablet     benzonatate (TESSALON) 200 MG capsule     clobetasol (TEMOVATE) 0.05 % external solution Use on scalp one - two times daily as needed for scaling or itching    diflorasone (PSORCON) 0.05 % ointment AAA bid    halobetasol (ULTRAVATE) 0.05 % ointment Apply topically 2 (two) times daily.    hydrOXYzine pamoate (VISTARIL) 25 MG Cap Take 1 capsule (25 mg total) by mouth nightly as needed (itching).    methylPREDNISolone (MEDROL DOSEPACK) 4 mg tablet use as directed    methylPREDNISolone (MEDROL DOSEPACK) 4 mg tablet use as directed    ranitidine (ZANTAC) 150 MG tablet TK 1 T PO  BID UTD    TALTZ AUTOINJECTOR, 3 PACK, 80 mg/mL AtIn Start Taltz at 160mg SQ on day 1 then 80mg SQ day 14 and qoweek through week 12    tramadol (ULTRAM) 50 mg tablet TK 1 T PO Q 6 H PRN P.    triamcinolone acetonide 0.1% (KENALOG) 0.1 % cream Apply topically 2 (two) times daily.     Family History     Problem Relation (Age of Onset)    Coronary artery disease Mother        Social History Main Topics    Smoking status: Former Smoker     Packs/day: 0.25    Smokeless  tobacco: Former User    Alcohol use Yes      Comment: Social    Drug use: No    Sexual activity: Not on file     Review of Systems   Constitutional: Negative for activity change and appetite change.   HENT: Negative for congestion and dental problem.         Head ache   Eyes: Negative for discharge and itching.   Respiratory: Negative for apnea and chest tightness.    Cardiovascular: Negative for chest pain and leg swelling.   Gastrointestinal: Negative for abdominal distention and abdominal pain.   Endocrine: Negative for cold intolerance and heat intolerance.   Genitourinary: Negative for difficulty urinating and dysuria.   Musculoskeletal: Negative for arthralgias.   Skin: Negative for color change and pallor.   Allergic/Immunologic: Negative for environmental allergies and food allergies.   Neurological: Negative for dizziness and facial asymmetry.   Hematological: Negative for adenopathy. Does not bruise/bleed easily.   Psychiatric/Behavioral: Negative for agitation and behavioral problems.     Objective:     Vital Signs (Most Recent):  Temp: 97.5 °F (36.4 °C) (01/05/18 0855)  Pulse: 66 (01/05/18 1225)  Resp: 16 (01/05/18 0855)  BP: 113/76 (01/05/18 1225)  SpO2: 98 % (01/05/18 1225) Vital Signs (24h Range):  Temp:  [97.5 °F (36.4 °C)] 97.5 °F (36.4 °C)  Pulse:  [] 66  Resp:  [16] 16  SpO2:  [97 %-98 %] 98 %  BP: (113-135)/() 113/76     Weight: 102.1 kg (225 lb)  Body mass index is 33.23 kg/m².    Physical Exam   Constitutional: He is oriented to person, place, and time. No distress.   HENT:   Head: Atraumatic.   Eyes: EOM are normal. Pupils are equal, round, and reactive to light.   Neck: Normal range of motion. Neck supple.   Cardiovascular: Normal rate and regular rhythm.    Pulmonary/Chest: Effort normal and breath sounds normal.   Abdominal: Soft. Bowel sounds are normal.   Musculoskeletal: Normal range of motion. He exhibits no edema.   Neurological: He is oriented to person, place, and  time. No cranial nerve deficit. Coordination normal.   Skin: Skin is warm and dry. He is not diaphoretic.   Psychiatric: He has a normal mood and affect. His behavior is normal.         CRANIAL NERVES     CN III, IV, VI   Pupils are equal, round, and reactive to light.  Extraocular motions are normal.        Significant Labs: CBC: No results for input(s): WBC, HGB, HCT, PLT in the last 48 hours.  CMP: No results for input(s): NA, K, CL, CO2, GLU, BUN, CREATININE, CALCIUM, PROT, ALBUMIN, BILITOT, ALKPHOS, AST, ALT, ANIONGAP, EGFRNONAA in the last 48 hours.    Invalid input(s): ESTGF[place]    Significant Imaging:reviuewed.    Assessment/Plan:     * Cerebral infarction    Not sure acuity,he will have MRI,MRA brain.carotid doppler show no significant stenosis.he will be on ASA,Statin,consulted neurology,no need for ST since patient is eating well with no problems.possible thrombophilia is not exluded per history,will follow Echo and monitor in [facility] for cardiac arrhyhtmia,consulted hematology.          Essential hypertension      Permissive HTN.        Hyperlipidemia    On statin,check lipid panel.          Gout with tophi      On Allupuriinol.        Cardiomyopathy, idiopathic    Was on lasix at home,will check Echo and follow.            VTE Risk Mitigation     None             Cristino Morley MD  Department of Hospital Medicine   [facility]

## 2018-01-05 NOTE — ED TRIAGE NOTES
C/o constant HA since New Year's darlene. No OTC meds taken today. fioricet ineffective. Reports nausea, dizziness, blurred vision.

## 2018-01-05 NOTE — CONSULTS
[facility]  Neurology  Consult Note    Patient Name: Adrien Panda  MRN: [id number]  Admission Date: 1/5/2018  Hospital Length of Stay: 0 days  Code Status: No Order   Attending Provider: Ilia Beebe MD   Consulting Provider: Chay Jo MD  Primary Care Physician: Pop Cheney MD  Principal Problem:Cerebral infarction    Consults  Subjective:     Chief Complaint: Headache for one week     HPI: 41 y/o male with medical Hx as listed below comes to ED for an intractable headache since 12/31/17. Mr. Panda states that headache began in the left lateral/posterior aspect and then progressed as a generalized headache. Denies phono/photophobia, nausea or vomiting but intensity varies 8-10/10. Slightly relieved by OTC analgesics. No definite worsening factor. Denies fever, weakness, numbness, loss of vision, vertigo, weakness.    Patient states that he has Hx of psoriasis and has been on Enbrel which later was switched to Stelara. Due to abnormal lab workup and suspicion of leukemia Stelara was stopped . Workup was negative for malignancy. Pt then decided to take Enbrel again as he has medication left at home and took a total of 6 injections last one being on 12/31/17.    Pt had a head CT that showed a hypodense area on right frontal lobe concerning for stroke.    Past Medical History:   Diagnosis Date    Arthritis     Blood clotting tendency     Congestive heart failure     High cholesterol     Hyperlipemia     Hypertension     Joint pain     Psoriasis        History reviewed. No pertinent surgical history.    Review of patient's allergies indicates:  No Known Allergies    Current Neurological Medications:     No current facility-administered medications on file prior to encounter.      Current Outpatient Prescriptions on File Prior to Encounter   Medication Sig    allopurinol (ZYLOPRIM) 300 MG tablet TAKE 1 AND 1/2 TABLET BY MOUTH DAILY    carvedilol (COREG) 25 MG tablet Take 25  mg by mouth 2 (two) times daily with meals.    furosemide (LASIX) 40 MG tablet Take 40 mg by mouth 2 (two) times daily.      KLOR-CON M20 20 mEq tablet     lisinopril (PRINIVIL,ZESTRIL) 20 MG tablet Take 20 mg by mouth once daily.      acitretin (SORIATANE) 25 MG capsule Take 1 capsule (25 mg total) by mouth once daily.    allopurinol (ZYLOPRIM) 300 MG tablet TAKE ONE & ONE-HALF TABLETS BY MOUTH ONCE DAILY    azithromycin (Z-LISSETH) 250 MG tablet     benzonatate (TESSALON) 200 MG capsule     clobetasol (TEMOVATE) 0.05 % external solution Use on scalp one - two times daily as needed for scaling or itching    diflorasone (PSORCON) 0.05 % ointment AAA bid    halobetasol (ULTRAVATE) 0.05 % ointment Apply topically 2 (two) times daily.    hydrOXYzine pamoate (VISTARIL) 25 MG Cap Take 1 capsule (25 mg total) by mouth nightly as needed (itching).    methylPREDNISolone (MEDROL DOSEPACK) 4 mg tablet use as directed    methylPREDNISolone (MEDROL DOSEPACK) 4 mg tablet use as directed    ranitidine (ZANTAC) 150 MG tablet TK 1 T PO  BID UTD    TALTZ AUTOINJECTOR, 3 PACK, 80 mg/mL AtIn Start Taltz at 160mg SQ on day 1 then 80mg SQ day 14 and qoweek through week 12    tramadol (ULTRAM) 50 mg tablet TK 1 T PO Q 6 H PRN P.    triamcinolone acetonide 0.1% (KENALOG) 0.1 % cream Apply topically 2 (two) times daily.      Family History     Problem Relation (Age of Onset)    Coronary artery disease Mother        Social History Main Topics    Smoking status: Former Smoker     Packs/day: 0.25    Smokeless tobacco: Former User    Alcohol use Yes      Comment: Social    Drug use: No    Sexual activity: Not on file     Review of Systems   Constitutional: Negative for fever and unexpected weight change.   HENT: Negative for trouble swallowing and voice change.    Eyes: Negative for photophobia.   Respiratory: Negative for shortness of breath.    Cardiovascular: Negative for chest pain.   Gastrointestinal: Negative for  abdominal pain.   Endocrine: Negative for polyuria.   Musculoskeletal: Negative for back pain.   Neurological: Positive for headaches. Negative for tremors, seizures, syncope, facial asymmetry, speech difficulty, weakness and numbness.   Psychiatric/Behavioral: Negative for confusion and hallucinations.          Objective:     Vital Signs (Most Recent):  Temp: 97.5 °F (36.4 °C) (01/05/18 0855)  Pulse: 66 (01/05/18 1225)  Resp: 16 (01/05/18 0855)  BP: 113/76 (01/05/18 1225)  SpO2: 98 % (01/05/18 1225) Vital Signs (24h Range):  Temp:  [97.5 °F (36.4 °C)] 97.5 °F (36.4 °C)  Pulse:  [] 66  Resp:  [16] 16  SpO2:  [97 %-98 %] 98 %  BP: (113-135)/() 113/76     Weight: 102.1 kg (225 lb)  Body mass index is 33.23 kg/m².    Physical Exam   Constitutional: No distress.   HENT:   Head: Atraumatic.   Eyes: Right eye exhibits no discharge. Left eye exhibits no discharge. No scleral icterus.   Neck: Neck supple.   Cardiovascular: Normal rate and regular rhythm.    Pulmonary/Chest: Breath sounds normal.   Abdominal: Bowel sounds are normal.   Musculoskeletal: He exhibits no edema.   Neurological: He has normal strength. He has a normal Finger-Nose-Finger Test.   Skin: He is not diaphoretic.   Psychiatric: His speech is normal.       NEUROLOGICAL EXAMINATION:     MENTAL STATUS   Oriented to person.   Oriented to place.   Oriented to time.   Attention: normal. Concentration: normal.   Speech: speech is normal   Level of consciousness: alert    CRANIAL NERVES     CN III, IV, VI   Right pupil: Size: 2 mm. Shape: regular. Reactivity: brisk.   Left pupil: Size: 2 mm. Shape: regular. Reactivity: brisk.   Nystagmus: none   Ophthalmoparesis: none    CN V   Right facial sensation deficit: none  Left facial sensation deficit: none    CN VII   Right facial weakness: none  Left facial weakness: none    CN IX, X   CN IX normal.   CN X normal.     CN XI   Right sternocleidomastoid strength: normal  Left sternocleidomastoid strength:  normal    CN XII   Tongue deviation: none    MOTOR EXAM   Overall muscle tone: normal    Strength   Strength 5/5 throughout.     SENSORY EXAM   Right arm light touch: normal  Left arm light touch: normal  Right leg light touch: normal  Left leg light touch: normal    GAIT AND COORDINATION      Coordination   Finger to nose coordination: normal    Tremor   Resting tremor: absent  Intention tremor: absent  Action tremor: absent      Significant Labs: CBC: No results for input(s): WBC, HGB, HCT, PLT in the last 48 hours.  CMP: No results for input(s): GLU, NA, K, CL, CO2, BUN, CREATININE, CALCIUM, MG, PROT, ALBUMIN, BILITOT, ALKPHOS, AST, ALT, ANIONGAP, EGFRNONAA in the last 48 hours.    Significant Imaging:   Head CT  Small right frontal lobe infarction, likely remote although new since previous CT head from 2011.    Otherwise no acute intracranial abnormalities identified.      Electronically signed by: RONEN MIJARES MD  Date: 01/04/18  Time: 20:06              Assessment and Plan:     41 y/o male consulted for stroke    1. Stroke: right frontal hypodense area of unknown age. Pt is young but has risk factors for neurovascular insults.   -To better assess the are MRI of brain is recommended.   -In ED Toradol/Compazine/Benadryl given with partial,relief of headache. May repeat in 6 hours after first dose.    Active Diagnoses:    Diagnosis Date Noted POA    PRINCIPAL PROBLEM:  Cerebral infarction [I63.9] 01/05/2018 Yes    Gout with tophi [M1A.9XX1] 03/06/2015 Yes    Cardiomyopathy, idiopathic [I42.8] 12/12/2012 Yes      Problems Resolved During this Admission:    Diagnosis Date Noted Date Resolved POA       VTE Risk Mitigation     None          Thank you for your consult. I will follow-up with patient. Please contact us if you have any additional questions.    Chay Jo MD  Neurology  [facility]

## 2018-01-05 NOTE — ED PROVIDER NOTES
Encounter Date: 1/5/2018    SCRIBE #1 NOTE: I, Gina Mejia, am scribing for, and in the presence of,  Yi Felton PA-C. I have scribed the following portions of the note - Other sections scribed: ROS and HPI.       History     Chief Complaint   Patient presents with    Headache     headaches x 4 days.  posterior head pain radiating aroung to bilat sides.  denies trauma.  Primary gave Fioricet without relief.  Was here last pm, had CT of head but left prior to being seen.     CC: Headache  HPI: This 42 y.o. male with  a past medical history of Arthritis; Blood clotting tendency; Congestive heart failure; Diabetes mellitus; High cholesterol; Hyperlipemia; Hypertension; Joint pain; and Psoriasis, presents to the ED complaining of an acute onset headache that starts in back of his head and radiates to front for last x4 days. Pain is sharp, severe and constant. He also notes dizziness and blurry vision w/ standing up.  Denies tinnitus. Denies trauma and car accident. Denies weakness/ numbness, SOB, bladder/bowel incontinence. No hx of headaches. Denies hx of heart attack and strokes in family. No relief w/Fioricet prescribed by PCP. Patient was left w/o being seen yesterday due to long wait time in this ER. He did get a CT done yesterday.       The history is provided by the patient. No  was used.     Review of patient's allergies indicates:  No Known Allergies  Past Medical History:   Diagnosis Date    Arthritis     Blood clotting tendency     Congestive heart failure     High cholesterol     Hyperlipemia     Hypertension     Joint pain     Psoriasis      History reviewed. No pertinent surgical history.  Family History   Problem Relation Age of Onset    Coronary artery disease Mother     Melanoma Neg Hx     Psoriasis Neg Hx     Lupus Neg Hx      Social History   Substance Use Topics    Smoking status: Former Smoker     Packs/day: 0.25    Smokeless tobacco: Former User    Alcohol  use Yes      Comment: Social     Review of Systems   Constitutional: Negative for activity change, appetite change, chills, diaphoresis, fatigue and fever.   HENT: Negative for congestion, rhinorrhea and sore throat.    Eyes: Negative for pain, redness and visual disturbance.   Respiratory: Negative for cough.    Cardiovascular: Negative for chest pain.   Gastrointestinal: Negative for abdominal pain, constipation, diarrhea, nausea and vomiting.        (-) bladder/bowel incontinence or retention   Genitourinary: Negative for dysuria.   Musculoskeletal: Negative for back pain, gait problem and neck pain.   Skin: Negative for color change and rash.   Neurological: Positive for headaches (posterior head). Negative for tremors, syncope, speech difficulty, weakness, light-headedness and numbness.   Psychiatric/Behavioral: Negative for confusion. The patient is not nervous/anxious.        Physical Exam     Initial Vitals [01/05/18 0855]   BP Pulse Resp Temp SpO2   (!) 135/100 (!) 111 16 97.5 °F (36.4 °C) 98 %      MAP       111.67         Physical Exam    Nursing note and vitals reviewed.  Constitutional: Vital signs are normal. He appears well-developed and well-nourished. He is not diaphoretic. He is cooperative.  Non-toxic appearance. He does not have a sickly appearance. He does not appear ill. No distress.   HENT:   Head: Normocephalic and atraumatic. Head is without abrasion and without contusion.   Right Ear: Tympanic membrane, external ear and ear canal normal. Tympanic membrane is not perforated. No middle ear effusion. No hemotympanum.   Left Ear: Tympanic membrane, external ear and ear canal normal. Tympanic membrane is not perforated.  No middle ear effusion. No hemotympanum.   Nose: Nose normal. No rhinorrhea.   Mouth/Throat: Uvula is midline, oropharynx is clear and moist and mucous membranes are normal. No trismus in the jaw. No uvula swelling. No oropharyngeal exudate, posterior oropharyngeal edema or  posterior oropharyngeal erythema.   Eyes: Conjunctivae, EOM and lids are normal. Pupils are equal, round, and reactive to light.   Neck: Trachea normal, normal range of motion, full passive range of motion without pain and phonation normal. Neck supple. No neck rigidity.   Cardiovascular: Normal rate, regular rhythm, normal heart sounds and intact distal pulses. Exam reveals no gallop and no friction rub.    No murmur heard.  Pulmonary/Chest: Effort normal and breath sounds normal. No respiratory distress. He has no decreased breath sounds. He has no wheezes. He has no rhonchi. He has no rales.   Abdominal: Soft. Normal appearance and bowel sounds are normal. He exhibits no distension. There is no tenderness. There is no rigidity, no rebound and no guarding.   Musculoskeletal: Normal range of motion.   Lymphadenopathy:     He has no cervical adenopathy.   Neurological: He is alert and oriented to person, place, and time. He has normal strength and normal reflexes. He displays no atrophy and no tremor. No cranial nerve deficit or sensory deficit. He exhibits normal muscle tone. Coordination and gait normal. GCS eye subscore is 4. GCS verbal subscore is 5. GCS motor subscore is 6.   Cerebellar function intact.    Skin: Skin is warm and dry. Capillary refill takes less than 2 seconds. No rash noted.   Psychiatric: He has a normal mood and affect. His speech is normal and behavior is normal. Judgment and thought content normal. Cognition and memory are normal.         ED Course   Procedures  Labs Reviewed   COMPREHENSIVE METABOLIC PANEL   B-TYPE NATRIURETIC PEPTIDE   LIPID PANEL             Medical Decision Making:   Initial Assessment:   This is an evaluation of a 42 y.o. male that presents to the Emergency Department for headache.  She presents with 4 day history of posterior headache that radiates to the front of his scalp.  He describes his headache as a stabbing pain 8 out of 10.  He also admits to intermittent  dizziness.  He reports seeing his PCP yesterday and receiving Fioricet with no relief.  Patient was seen in this ED last night and had a head CT but chose to leave before results were given.  He reports that he came back this morning because his headache is so severe.    Physical Exam shows a non-toxic, afebrile, and well appearing male. PERRL. EOMI. Nares patent, no polyps.  Oropharyngeal cavity clear.  TMs clear.  No facial drooping.  No slurred speech.  Facial nerve intact.  No focal deficits.  No gait abnormality.  Strength and sensation intact.  Normal coordination.  Rapid alternating intact with rapid alternating movements and heel-to-shin. There are diffuse psoriatic plaques on the trunk and bilateral upper and lower extremities. No rashes. RRR, no murmurs, gallops or rubs. Lungs CTA bilaterally, no adventitious breath sounds. Abdomen soft and nontender. +BS. No peritoneal signs.     Vital Signs Are Reassuring. If available, previous records reviewed.   RESULTS: US carotid bilateral shows less than 50% stenosis of the internal carotid arteries bilaterally and antegrade vertebral arteries bilaterally. MRI brain no evidence of acute intracranial pathology; remote bilateral frontal artifacts noted.     My overall impression is Right frontal cerebral infarction with headache. I considered, but at this time, do not suspect TIA, subdural hematoma, seizure, brain tumor.    ED Course: IV fluids, IV Compazine, IV Benadryl, IV Toradol. This patient meets admission criteria. I consulted with Dr. Jo who agreed to see this patient as an inpatient and do a neuro workup; Dr Sy will be primary during admission. The diagnosis, treatment plan and admission plan was discussed with the patient. He expressed understanding and is agreeable with the plan.  All questions or concerns have been addressed. Patient was discharged home with an instructional sheet which gave not only information regarding the most likely diagnoses  but also information regarding when to return to the emergency department for alarming symptoms and when to seek further care.      This case was discussed with and the patient has been examined by Dr. Beebe who is in agreement with my assessment and plan.     Yi Felton PA-C    Clinical Tests:   Lab Tests: Ordered and Reviewed  Radiological Study: Ordered and Reviewed            Scribe Attestation:   Scribe #1: I performed the above scribed service and the documentation accurately describes the services I performed. I attest to the accuracy of the note.    Attending Attestation:   Physician Attestation Statement for Resident:  As the supervising MD   Physician Attestation Statement: I have personally seen and examined this patient.   I agree with the above history. -:   As the supervising MD I agree with the above PE.   -: Neurologic examination: Cranial nerves II through XII intact; no central neglect is noted; there is no ataxia.  Symmetric sensory as well as motor function is noted with no obvious deficits.  There is no dysmetria.  -: Unenhanced CT scan of the brain done yesterday shows a small to moderate-sized right frontal lobe infarct with a small amount of surrounding vasogenic edema.  There is no midline shift.  Findings: Size with the start of his headache, approximately 4 years ago.    We'll treat the headache, consult neurology, inpatient workup to follow.  I have reviewed the following: old x-rays and old CTs.          Physician Attestation for Scribe:  Physician Attestation Statement for Scribe #1: I, Yi Felton PA-C, reviewed documentation, as scribed by Gina Mejia in my presence, and it is both accurate and complete.                 ED Course      Clinical Impression:   The primary encounter diagnosis was Cerebral infarction, unspecified mechanism. Diagnoses of Headache and Cerebral infarct were also pertinent to this visit.    Disposition:   Disposition: Admitted                         Yi eFlton PA-C  01/05/18 3289

## 2018-01-05 NOTE — SUBJECTIVE & OBJECTIVE
Past Medical History:   Diagnosis Date    Arthritis     Blood clotting tendency     Congestive heart failure     High cholesterol     Hyperlipemia     Hypertension     Joint pain     Psoriasis        History reviewed. No pertinent surgical history.    Review of patient's allergies indicates:  No Known Allergies    No current facility-administered medications on file prior to encounter.      Current Outpatient Prescriptions on File Prior to Encounter   Medication Sig    allopurinol (ZYLOPRIM) 300 MG tablet TAKE 1 AND 1/2 TABLET BY MOUTH DAILY    carvedilol (COREG) 25 MG tablet Take 25 mg by mouth 2 (two) times daily with meals.    furosemide (LASIX) 40 MG tablet Take 40 mg by mouth 2 (two) times daily.      KLOR-CON M20 20 mEq tablet     lisinopril (PRINIVIL,ZESTRIL) 20 MG tablet Take 20 mg by mouth once daily.      acitretin (SORIATANE) 25 MG capsule Take 1 capsule (25 mg total) by mouth once daily.    allopurinol (ZYLOPRIM) 300 MG tablet TAKE ONE & ONE-HALF TABLETS BY MOUTH ONCE DAILY    azithromycin (Z-LISSETH) 250 MG tablet     benzonatate (TESSALON) 200 MG capsule     clobetasol (TEMOVATE) 0.05 % external solution Use on scalp one - two times daily as needed for scaling or itching    diflorasone (PSORCON) 0.05 % ointment AAA bid    halobetasol (ULTRAVATE) 0.05 % ointment Apply topically 2 (two) times daily.    hydrOXYzine pamoate (VISTARIL) 25 MG Cap Take 1 capsule (25 mg total) by mouth nightly as needed (itching).    methylPREDNISolone (MEDROL DOSEPACK) 4 mg tablet use as directed    methylPREDNISolone (MEDROL DOSEPACK) 4 mg tablet use as directed    ranitidine (ZANTAC) 150 MG tablet TK 1 T PO  BID UTD    TALTZ AUTOINJECTOR, 3 PACK, 80 mg/mL AtIn Start Taltz at 160mg SQ on day 1 then 80mg SQ day 14 and qoweek through week 12    tramadol (ULTRAM) 50 mg tablet TK 1 T PO Q 6 H PRN P.    triamcinolone acetonide 0.1% (KENALOG) 0.1 % cream Apply topically 2 (two) times daily.     Family  History     Problem Relation (Age of Onset)    Coronary artery disease Mother        Social History Main Topics    Smoking status: Former Smoker     Packs/day: 0.25    Smokeless tobacco: Former User    Alcohol use Yes      Comment: Social    Drug use: No    Sexual activity: Not on file     Review of Systems   Constitutional: Negative for activity change and appetite change.   HENT: Negative for congestion and dental problem.         Head ache   Eyes: Negative for discharge and itching.   Respiratory: Negative for apnea and chest tightness.    Cardiovascular: Negative for chest pain and leg swelling.   Gastrointestinal: Negative for abdominal distention and abdominal pain.   Endocrine: Negative for cold intolerance and heat intolerance.   Genitourinary: Negative for difficulty urinating and dysuria.   Musculoskeletal: Negative for arthralgias.   Skin: Negative for color change and pallor.   Allergic/Immunologic: Negative for environmental allergies and food allergies.   Neurological: Negative for dizziness and facial asymmetry.   Hematological: Negative for adenopathy. Does not bruise/bleed easily.   Psychiatric/Behavioral: Negative for agitation and behavioral problems.     Objective:     Vital Signs (Most Recent):  Temp: 97.5 °F (36.4 °C) (01/05/18 0855)  Pulse: 66 (01/05/18 1225)  Resp: 16 (01/05/18 0855)  BP: 113/76 (01/05/18 1225)  SpO2: 98 % (01/05/18 1225) Vital Signs (24h Range):  Temp:  [97.5 °F (36.4 °C)] 97.5 °F (36.4 °C)  Pulse:  [] 66  Resp:  [16] 16  SpO2:  [97 %-98 %] 98 %  BP: (113-135)/() 113/76     Weight: 102.1 kg (225 lb)  Body mass index is 33.23 kg/m².    Physical Exam   Constitutional: He is oriented to person, place, and time. No distress.   HENT:   Head: Atraumatic.   Eyes: EOM are normal. Pupils are equal, round, and reactive to light.   Neck: Normal range of motion. Neck supple.   Cardiovascular: Normal rate and regular rhythm.    Pulmonary/Chest: Effort normal and breath  sounds normal.   Abdominal: Soft. Bowel sounds are normal.   Musculoskeletal: Normal range of motion. He exhibits no edema.   Neurological: He is oriented to person, place, and time. No cranial nerve deficit. Coordination normal.   Skin: Skin is warm and dry. He is not diaphoretic.   Psychiatric: He has a normal mood and affect. His behavior is normal.         CRANIAL NERVES     CN III, IV, VI   Pupils are equal, round, and reactive to light.  Extraocular motions are normal.        Significant Labs: CBC: No results for input(s): WBC, HGB, HCT, PLT in the last 48 hours.  CMP: No results for input(s): NA, K, CL, CO2, GLU, BUN, CREATININE, CALCIUM, PROT, ALBUMIN, BILITOT, ALKPHOS, AST, ALT, ANIONGAP, EGFRNONAA in the last 48 hours.    Invalid input(s): ESTGFA[place]    Significant Imaging:reviuewed.

## 2018-01-05 NOTE — H&P
[facility] Hospital Medicine  History & Physical    Patient Name: Adrien Panda  MRN: [id number]  Admission Date: 1/5/2018  Attending Physician: Ilia Beebe MD   Primary Care Provider: Pop Cheney MD         Patient information was obtained from patient and ER records.     Subjective:     Principal Problem:Cerebral infarction    Chief Complaint:   Chief Complaint   Patient presents with    Headache     headaches x 4 days.  posterior head pain radiating aroung to bilat sides.  denies trauma.  Primary gave Fioricet without relief.  Was here last pm, had CT of head but left prior to being seen.        HPI: This 42 y.o. male with  a past medical history of Arthritis; Blood clotting tendency? Cardiomyopathy,;  High cholesterol; Hyperlipemia; Hypertension; Joint pain; and Psoriasis, presents to the ED complaining of an acute onset headache that starts in back of his head and radiates to front for last x4 days. Pain is sharp, severe and constant. He also notes dizziness and blurry vision w/ standing up.  Denies tinnitus. Denies trauma and car accident. Denies weakness/ numbness, SOB, bladder/bowel incontinence. No hx of headaches. Denies hx of heart attack and strokes in family. No relief w/Fioricet prescribed by PCP. Patient was left w/o being seen yesterday due to long wait time in this ER. 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    No new subjective & objective note has been filed under this hospital service since the last note was generated.    Assessment/Plan:     * Cerebral infarction    Not sure acuity,he  will have MRI,MRA brain.carotid doppler show no significant stenosis.he will be on ASA,Statin,consulted neurology,no need for ST since patient is eating well with no problems.possible thrombophilia is not exluded per history,will follow Echo and monitor in [facility] for cardiac arrhyhtmia,consulted hematology.          Essential hypertension      Permissive HTN.        Hyperlipidemia    On statin,check lipid panel.          Gout with tophi      On Allupuriinol.        Cardiomyopathy, idiopathic    Was on lasix at home,will check Echo and follow.            VTE Risk Mitigation     None             Cristino Morley MD  Department of Hospital Medicine   [facility]

## 2018-01-06 VITALS
HEART RATE: 88 BPM | TEMPERATURE: 96 F | BODY MASS INDEX: 33.33 KG/M2 | DIASTOLIC BLOOD PRESSURE: 101 MMHG | OXYGEN SATURATION: 98 % | SYSTOLIC BLOOD PRESSURE: 136 MMHG | RESPIRATION RATE: 18 BRPM | WEIGHT: 225 LBS | HEIGHT: 69 IN

## 2018-01-06 LAB
ALBUMIN SERPL BCP-MCNC: 3.3 G/DL
ALP SERPL-CCNC: 40 U/L
ALT SERPL W/O P-5'-P-CCNC: 33 U/L
ANION GAP SERPL CALC-SCNC: 13 MMOL/L
AST SERPL-CCNC: 33 U/L
BASOPHILS # BLD AUTO: 0.02 K/UL
BASOPHILS NFR BLD: 0.2 %
BILIRUB SERPL-MCNC: 0.4 MG/DL
BUN SERPL-MCNC: 21 MG/DL
CALCIUM SERPL-MCNC: 9.3 MG/DL
CHLORIDE SERPL-SCNC: 108 MMOL/L
CHOLEST SERPL-MCNC: 195 MG/DL
CHOLEST/HDLC SERPL: 4.1 {RATIO}
CO2 SERPL-SCNC: 19 MMOL/L
CREAT SERPL-MCNC: 0.9 MG/DL
DIFFERENTIAL METHOD: ABNORMAL
EOSINOPHIL # BLD AUTO: 0.3 K/UL
EOSINOPHIL NFR BLD: 4.1 %
ERYTHROCYTE [DISTWIDTH] IN BLOOD BY AUTOMATED COUNT: 14.1 %
EST. GFR  (AFRICAN AMERICAN): >60 ML/MIN/1.73 M^2
EST. GFR  (NON AFRICAN AMERICAN): >60 ML/MIN/1.73 M^2
GLUCOSE SERPL-MCNC: 73 MG/DL
HCT VFR BLD AUTO: 48.2 %
HDLC SERPL-MCNC: 47 MG/DL
HDLC SERPL: 24.1 %
HGB BLD-MCNC: 15.7 G/DL
LDLC SERPL CALC-MCNC: 106.4 MG/DL
LYMPHOCYTES # BLD AUTO: 2.1 K/UL
LYMPHOCYTES NFR BLD: 24.6 %
MCH RBC QN AUTO: 30.5 PG
MCHC RBC AUTO-ENTMCNC: 32.6 G/DL
MCV RBC AUTO: 94 FL
MONOCYTES # BLD AUTO: 0.8 K/UL
MONOCYTES NFR BLD: 10.1 %
NEUTROPHILS # BLD AUTO: 5 K/UL
NEUTROPHILS NFR BLD: 60.3 %
NONHDLC SERPL-MCNC: 148 MG/DL
PLATELET # BLD AUTO: 93 K/UL
PMV BLD AUTO: 12.1 FL
POTASSIUM SERPL-SCNC: 4.2 MMOL/L
PROT SERPL-MCNC: 6.9 G/DL
RBC # BLD AUTO: 5.15 M/UL
SODIUM SERPL-SCNC: 140 MMOL/L
TRIGL SERPL-MCNC: 208 MG/DL
WBC # BLD AUTO: 8.35 K/UL

## 2018-01-06 PROCEDURE — 85025 COMPLETE CBC W/AUTO DIFF WBC: CPT

## 2018-01-06 PROCEDURE — 25000003 PHARM REV CODE 250: Performed by: HOSPITALIST

## 2018-01-06 PROCEDURE — 36415 COLL VENOUS BLD VENIPUNCTURE: CPT

## 2018-01-06 PROCEDURE — 80053 COMPREHEN METABOLIC PANEL: CPT

## 2018-01-06 PROCEDURE — 25000003 PHARM REV CODE 250: Performed by: PHYSICIAN ASSISTANT

## 2018-01-06 PROCEDURE — 80061 LIPID PANEL: CPT

## 2018-01-06 RX ADMIN — ALLOPURINOL 300 MG: 100 TABLET ORAL at 08:01

## 2018-01-06 RX ADMIN — PRAVASTATIN SODIUM 40 MG: 40 TABLET ORAL at 08:01

## 2018-01-06 NOTE — NURSING
When entering pt's room upon rounding I noteiced pt's personal belongings packed and sitting on the bed, I asked ptsuman BELCHER. Was discharging elif, pt stated no but im going home, he stated that his son did not return home on yesterday and he had to go [place] for him, Dr. Sy was informed and he came spoke to the pt informing him of the seriousness of his disease process and the risk of him leaving the hospital without proper evaluation and treatment, pt read the refusal of treatment form aloud then stated his son was more important at this time, I d/c'd his IV and pt left the unit. Dr. Yossi durham.

## 2018-01-06 NOTE — PT/OT/SLP PROGRESS
Occupational Therapy      Patient Name:  Adrien Panda   MRN:  [id number]    Patient not seen today secondary to  D/C prior to OT evaluation completion.    Yolanda Jules, OT  1/6/2018

## 2018-01-06 NOTE — PROGRESS NOTES
[facility] Hospital Medicine  Progress Note    Patient Name: Adrien Panda  MRN: [id number]  Patient Class: IP- Inpatient   Admission Date: 1/5/2018  Length of Stay: 1 days  Attending Physician: Cristino Morley MD  Primary Care Provider: Pop Cheney MD        Subjective:     Principal Problem:Cerebral infarction    HPI:  This 42 y.o. male with  a past medical history of Arthritis; Blood clotting tendency? Cardiomyopathy,;  High cholesterol; Hyperlipemia; Hypertension; Joint pain; and Psoriasis, presents to the ED complaining of an acute onset headache that starts in back of his head and radiates to front for last x4 days. Pain is sharp, severe and constant. He also notes dizziness and blurry vision w/ standing up.  Denies tinnitus. Denies trauma and car accident. Denies weakness/ numbness, SOB, bladder/bowel incontinence. No hx of headaches. Denies hx of heart attack and strokes in family. No relief w/Fioricet prescribed by PCP. Patient was left w/o being seen yesterday due to long wait time in this ER. 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    Hospital Course:  This 42 y.o. male with  a past medical history of Arthritis; Blood clotting tendency? Cardiomyopathy,;  High cholesterol; Hyperlipemia; Hypertension; Joint pain; and Psoriasis, presents to the ED complaining of an acute onset headache that starts in back of his head and radiates to front for last x4 days. Pain is sharp, severe and constant. He also notes dizziness and blurry vision w/ standing up.  Denies tinnitus. Denies trauma and car  accident. Denies weakness/ numbness, SOB, bladder/bowel incontinence. No hx of headaches. Denies hx of heart attack and strokes in family. No relief w/Fioricet prescribed by PCP. Patient was left w/o being seen  due to long wait time in this ER. He did get a CT done yesterday,but did not wait for results,he came back  because his headache was getting worse,he has left frontal CVA on CT head,with no sign of focal neuro deficiency,he denies blurry vision,facial drop,slurtred speech,local weakness,numbness,dysphagia,he say he has history of thrombosis in his heart,unknown origin , about 2 years ago and was treated with coumadin,currentyly is not on any OAC.MRI confirm bilateral frontal CVA,he will have MRA brain today.Echo and carotid doppler is unremarkable,will monitor in Tele for any arryhmia and consulted hematology for thrombophilia workout.    Past Medical History:   Diagnosis Date    Arthritis     Blood clotting tendency     Congestive heart failure     High cholesterol     Hyperlipemia     Hypertension     Joint pain     Psoriasis        History reviewed. No pertinent surgical history.    Review of patient's allergies indicates:  No Known Allergies    No current facility-administered medications on file prior to encounter.      Current Outpatient Prescriptions on File Prior to Encounter   Medication Sig    allopurinol (ZYLOPRIM) 300 MG tablet TAKE 1 AND 1/2 TABLET BY MOUTH DAILY    carvedilol (COREG) 25 MG tablet Take 25 mg by mouth 2 (two) times daily with meals.    furosemide (LASIX) 40 MG tablet Take 40 mg by mouth 2 (two) times daily.      KLOR-CON M20 20 mEq tablet     lisinopril (PRINIVIL,ZESTRIL) 20 MG tablet Take 20 mg by mouth once daily.      acitretin (SORIATANE) 25 MG capsule Take 1 capsule (25 mg total) by mouth once daily.    allopurinol (ZYLOPRIM) 300 MG tablet TAKE ONE & ONE-HALF TABLETS BY MOUTH ONCE DAILY    azithromycin (Z-LISSETH) 250 MG tablet     benzonatate (TESSALON) 200 MG  capsule     clobetasol (TEMOVATE) 0.05 % external solution Use on scalp one - two times daily as needed for scaling or itching    diflorasone (PSORCON) 0.05 % ointment AAA bid    halobetasol (ULTRAVATE) 0.05 % ointment Apply topically 2 (two) times daily.    hydrOXYzine pamoate (VISTARIL) 25 MG Cap Take 1 capsule (25 mg total) by mouth nightly as needed (itching).    methylPREDNISolone (MEDROL DOSEPACK) 4 mg tablet use as directed    methylPREDNISolone (MEDROL DOSEPACK) 4 mg tablet use as directed    ranitidine (ZANTAC) 150 MG tablet TK 1 T PO  BID UTD    TALTZ AUTOINJECTOR, 3 PACK, 80 mg/mL AtIn Start Taltz at 160mg SQ on day 1 then 80mg SQ day 14 and qoweek through week 12    tramadol (ULTRAM) 50 mg tablet TK 1 T PO Q 6 H PRN P.    triamcinolone acetonide 0.1% (KENALOG) 0.1 % cream Apply topically 2 (two) times daily.     Family History     Problem Relation (Age of Onset)    Coronary artery disease Mother        Social History Main Topics    Smoking status: Former Smoker     Packs/day: 0.25    Smokeless tobacco: Former User    Alcohol use Yes      Comment: Social    Drug use: No    Sexual activity: Not on file     Review of Systems   Constitutional: Negative for activity change and appetite change.   HENT: Negative for congestion and dental problem.         Head ache   Eyes: Negative for discharge and itching.   Respiratory: Negative for apnea and chest tightness.    Cardiovascular: Negative for chest pain and leg swelling.   Gastrointestinal: Negative for abdominal distention and abdominal pain.   Endocrine: Negative for cold intolerance and heat intolerance.   Genitourinary: Negative for difficulty urinating and dysuria.   Musculoskeletal: Negative for arthralgias.   Skin: Negative for color change and pallor.   Allergic/Immunologic: Negative for environmental allergies and food allergies.   Neurological: Negative for dizziness and facial asymmetry.   Hematological: Negative for adenopathy. Does  not bruise/bleed easily.   Psychiatric/Behavioral: Negative for agitation and behavioral problems.     Objective:     Vital Signs (Most Recent):  Temp: 97.5 °F (36.4 °C) (01/05/18 0855)  Pulse: 66 (01/05/18 1225)  Resp: 16 (01/05/18 0855)  BP: 113/76 (01/05/18 1225)  SpO2: 98 % (01/05/18 1225) Vital Signs (24h Range):  Temp:  [97.5 °F (36.4 °C)] 97.5 °F (36.4 °C)  Pulse:  [] 66  Resp:  [16] 16  SpO2:  [97 %-98 %] 98 %  BP: (113-135)/() 113/76     Weight: 102.1 kg (225 lb)  Body mass index is 33.23 kg/m².    Physical Exam   Constitutional: He is oriented to person, place, and time. No distress.   HENT:   Head: Atraumatic.   Eyes: EOM are normal. Pupils are equal, round, and reactive to light.   Neck: Normal range of motion. Neck supple.   Cardiovascular: Normal rate and regular rhythm.    Pulmonary/Chest: Effort normal and breath sounds normal.   Abdominal: Soft. Bowel sounds are normal.   Musculoskeletal: Normal range of motion. He exhibits no edema.   Neurological: He is oriented to person, place, and time. No cranial nerve deficit. Coordination normal.   Skin: Skin is warm and dry. He is not diaphoretic.   Psychiatric: He has a normal mood and affect. His behavior is normal.         CRANIAL NERVES     CN III, IV, VI   Pupils are equal, round, and reactive to light.  Extraocular motions are normal.        Significant Labs: CBC: No results for input(s): WBC, HGB, HCT, PLT in the last 48 hours.  CMP: No results for input(s): NA, K, CL, CO2, GLU, BUN, CREATININE, CALCIUM, PROT, ALBUMIN, BILITOT, ALKPHOS, AST, ALT, ANIONGAP, EGFRNONAA in the last 48 hours.    Invalid input(s): ESTGFAFRICA    Significant Imaging:reviuewed.    Assessment/Plan:      * Cerebral infarction    MRI show bilateral frontal CVA,will have ,MRA brain.carotid doppler show no significant stenosis.on ASA,Statin,consulted neurology,no need for ST since patient is eating well with no problems.possible thrombophilia is not exluded per  history, Echo show no thrombosis, monitor in Telemetrty for cardiac arrhyhtmia,consulted hematology.          Essential hypertension      Permissive HTN.        Hyperlipidemia    On statin,check lipid panel.          Gout with tophi      On Allupuriinol.        Cardiomyopathy, idiopathic    Was on lasix at home, Echo is unremarkable.            VTE Risk Mitigation         Ordered     enoxaparin injection 40 mg  Daily     Route:  Subcutaneous        01/05/18 1425     Medium Risk of VTE  Once      01/05/18 1425     Place BILLIE hose  Until discontinued      01/05/18 1425     Place sequential compression device  Until discontinued      01/05/18 1425              Cristino Morley MD  Department of Hospital Medicine   [facility]

## 2018-01-06 NOTE — PT/OT/SLP PROGRESS
Physical Therapy Discharge      Patient Name:  Adrien Panda   MRN:  [id number]    Patient not seen today secondary to pt left AMA.  PT to sign off.    Hortencia Merrill, PT

## 2018-01-06 NOTE — DISCHARGE SUMMARY
[facility] Hospital Medicine  Discharge Summary      Patient Name: Adrien Panda  MRN: [id number]  Admission Date: 1/5/2018  Hospital Length of Stay: 1 days  Discharge Date and Time:  01/06/2018 10:55 AM  Attending Physician: No att. providers found   Discharging Provider: Cristino Morley MD  Primary Care Provider: Pop Cheney MD      HPI:   This 42 y.o. male with  a past medical history of Arthritis; Blood clotting tendency? Cardiomyopathy,;  High cholesterol; Hyperlipemia; Hypertension; Joint pain; and Psoriasis, presents to the ED complaining of an acute onset headache that starts in back of his head and radiates to front for last x4 days. Pain is sharp, severe and constant. He also notes dizziness and blurry vision w/ standing up.  Denies tinnitus. Denies trauma and car accident. Denies weakness/ numbness, SOB, bladder/bowel incontinence. No hx of headaches. Denies hx of heart attack and strokes in family. No relief w/Fioricet prescribed by PCP. Patient was left w/o being seen yesterday due to long wait time in this ER. 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    * No surgery found *      Hospital Course:   This 42 y.o. male with  a past medical history of Arthritis; Blood clotting tendency? Cardiomyopathy,;  High cholesterol; Hyperlipemia; Hypertension; Joint pain; and Psoriasis, presents to the ED complaining of an acute onset headache that starts in back of his head and radiates to front for last x4 days. Pain is sharp, severe and constant. He also notes dizziness and blurry vision w/ standing up.   Denies tinnitus. Denies trauma and car accident. Denies weakness/ numbness, SOB, bladder/bowel incontinence. No hx of headaches. Denies hx of heart attack and strokes in family. No relief w/Fioricet prescribed by PCP. Patient was left w/o being seen  due to long wait time in this ER. He did get a CT done yesterday,but did not wait for results,he came back  because his headache was getting worse,he has left frontal CVA on CT head,with no sign of focal neuro deficiency,he denies blurry vision,facial drop,slurtred speech,local weakness,numbness,dysphagia,he say he has history of thrombosis in his heart,unknown origin , about 2 years ago and was treated with coumadin,currentyly is not on any OAC.MRI confirm bilateral frontal CVA,he will have MRA brain today.Echo and carotid doppler is unremarkable,will monitor in Tele for any arryhmia and consulted hematology for thrombophilia workout.  Patient decide leaving hospital before workout was finished,he has been consulted possible major CVA and possible death,he understand consulting,but he decide leave AMA.     Consults:   Consults         Status Ordering Provider     Inpatient consult to Hematology/Oncology - Community  Once     Provider:  Marcello Flannery MD    Acknowledged TESS OSORIO     Inpatient consult to Neurology  Once     Provider:  Chay Jo MD    Completed MALGORZATA FOX          No new Assessment & Plan notes have been filed under this hospital service since the last note was generated.  Service: Hospital Medicine    Final Active Diagnoses:    Diagnosis Date Noted POA    PRINCIPAL PROBLEM:  Cerebral infarction [I63.9] 01/05/2018 Yes    Hyperlipidemia [E78.5] 01/05/2018 Yes    Essential hypertension [I10] 01/05/2018 Yes    Gout with tophi [M1A.9XX1] 03/06/2015 Yes    Cardiomyopathy, idiopathic [I42.8] 12/12/2012 Yes      Problems Resolved During this Admission:    Diagnosis Date Noted Date Resolved POA       Discharged Condition: against  medical advice    Disposition: Left Against Medical Adv*    Follow Up:    Patient Instructions:   No discharge procedures on file.    Significant Diagnostic Studies: Labs:   BMP:   Recent Labs  Lab 01/05/18  1456 01/06/18  0509   GLU  --  73   NA  --  140   K  --  4.2   CL  --  108   CO2  --  19*   BUN  --  21*   CREATININE 1.0 0.9   CALCIUM  --  9.3   , CBC   Recent Labs  Lab 01/06/18  0509   WBC 8.35   HGB 15.7   HCT 48.2   PLT 93*    and Lipid Panel   Lab Results   Component Value Date    CHOL 195 01/06/2018    HDL 47 01/06/2018    LDLCALC 106.4 01/06/2018    TRIG 208 (H) 01/06/2018    CHOLHDL 24.1 01/06/2018     Radiology: MRI: brain   CT scan: head   Cardiac Graphics: Echocardiogram:   2D echo with color flow doppler:   Results for orders placed or performed during the hospital encounter of 01/05/18   2D echo with color flow doppler   Result Value Ref Range    EF 60 55 - 65    Est. PA Systolic Pressure 10.76     Pericardial Effusion NONE     Mitral Valve Mobility NORMAL        Pending Diagnostic Studies:     Procedure Component Value Units Date/Time    MRA Brain without contrast [[id number]]     Order Status:  Sent Lab Status:  No result          Medications:  Reconciled Home Medications:   Discharge Medication List as of 1/6/2018  9:58 AM      CONTINUE these medications which have NOT CHANGED    Details   acitretin (SORIATANE) 25 MG capsule Take 1 capsule (25 mg total) by mouth once daily., Starting Fri 11/17/2017, Until Sat 11/17/2018, Normal      !! allopurinol (ZYLOPRIM) 300 MG tablet TAKE 1 AND 1/2 TABLET BY MOUTH DAILY, Normal      !! allopurinol (ZYLOPRIM) 300 MG tablet TAKE ONE & ONE-HALF TABLETS BY MOUTH ONCE DAILY, Normal      azithromycin (Z-LISSETH) 250 MG tablet Starting Mon 10/30/2017, Historical Med      benzonatate (TESSALON) 200 MG capsule Starting Mon 10/30/2017, Historical Med      butalbital-acetaminophen-caffeine -40 mg (FIORICET, ESGIC) -40 mg per tablet Take 1 tablet by mouth every  4 (four) hours as needed for Pain., Historical Med      carvedilol (COREG) 25 MG tablet Take 25 mg by mouth 2 (two) times daily with meals., Until Discontinued, Historical Med      clobetasol (TEMOVATE) 0.05 % external solution Use on scalp one - two times daily as needed for scaling or itching, Normal      diflorasone (PSORCON) 0.05 % ointment AAA bid, Normal      furosemide (LASIX) 40 MG tablet Take 40 mg by mouth 2 (two) times daily.  , Until Discontinued, Historical Med      halobetasol (ULTRAVATE) 0.05 % ointment Apply topically 2 (two) times daily., Starting Fri 12/22/2017, Normal      hydrOXYzine pamoate (VISTARIL) 25 MG Cap Take 1 capsule (25 mg total) by mouth nightly as needed (itching)., Starting Wed 10/18/2017, Normal      KLOR-CON M20 20 mEq tablet Starting 1/3/2016, Until Discontinued, Historical Med      lisinopril (PRINIVIL,ZESTRIL) 20 MG tablet Take 20 mg by mouth once daily.  , Until Discontinued, Historical Med      !! methylPREDNISolone (MEDROL DOSEPACK) 4 mg tablet use as directed, Normal      !! methylPREDNISolone (MEDROL DOSEPACK) 4 mg tablet use as directed, Normal      ranitidine (ZANTAC) 150 MG tablet TK 1 T PO  BID UTD, Historical Med      TALTZ AUTOINJECTOR, 3 PACK, 80 mg/mL AtIn Start Taltz at 160mg SQ on day 1 then 80mg SQ day 14 and qoweek through week 12, Normal      tramadol (ULTRAM) 50 mg tablet TK 1 T PO Q 6 H PRN P., Historical Med      triamcinolone acetonide 0.1% (KENALOG) 0.1 % cream Apply topically 2 (two) times daily., Starting 4/19/2017, Until Discontinued, Normal       !! - Potential duplicate medications found. Please discuss with provider.          Indwelling Lines/Drains at time of discharge:   Lines/Drains/Airways          No matching active lines, drains, or airways          Time spent on the discharge of patient: 30  minutes  Patient was seen and examined on the date of discharge and determined to be suitable for discharge.         Cristino Morley,  MD  Department of Hospital Medicine  [facility]

## 2018-01-06 NOTE — PLAN OF CARE
Problem: Patient Care Overview  Goal: Plan of Care Review  Pt headache free, no stoke symptoms, resting with safety in place.

## 2018-01-06 NOTE — HOSPITAL COURSE
This 42 y.o. male with  a past medical history of Arthritis; Blood clotting tendency? Cardiomyopathy,;  High cholesterol; Hyperlipemia; Hypertension; Joint pain; and Psoriasis, presents to the ED complaining of an acute onset headache that starts in back of his head and radiates to front for last x4 days. Pain is sharp, severe and constant. He also notes dizziness and blurry vision w/ standing up.  Denies tinnitus. Denies trauma and car accident. Denies weakness/ numbness, SOB, bladder/bowel incontinence. No hx of headaches. Denies hx of heart attack and strokes in family. No relief w/Fioricet prescribed by PCP. Patient was left w/o being seen  due to long wait time in this ER. He did get a CT done yesterday,but did not wait for results,he came back  because his headache was getting worse,he has left frontal CVA on CT head,with no sign of focal neuro deficiency,he denies blurry vision,facial drop,slurtred speech,local weakness,numbness,dysphagia,he say he has history of thrombosis in his heart,unknown origin , about 2 years ago and was treated with coumadin,currentyly is not on any OAC.MRI confirm bilateral frontal CVA,he will have MRA brain today.Echo and carotid doppler is unremarkable,will monitor in Tele for any arryhmia and consulted hematology for thrombophilia workout.  Patient decide leaving hospital before workout was finished,he has been consulted possible major CVA and possible death,he understand consulting,but he decide leave AMA.

## 2018-01-10 RX ORDER — ALLOPURINOL 300 MG/1
TABLET ORAL
Qty: 45 TABLET | Refills: 6 | Status: SHIPPED | OUTPATIENT
Start: 2018-01-10 | End: 2018-04-03

## 2018-01-11 ENCOUNTER — PATIENT MESSAGE (OUTPATIENT)
Dept: DERMATOLOGY | Facility: CLINIC | Age: 43
End: 2018-01-11

## 2018-01-28 ENCOUNTER — PATIENT MESSAGE (OUTPATIENT)
Dept: DERMATOLOGY | Facility: CLINIC | Age: 43
End: 2018-01-28

## 2018-02-16 ENCOUNTER — DOCUMENTATION ONLY (OUTPATIENT)
Dept: DERMATOLOGY | Facility: CLINIC | Age: 43
End: 2018-02-16

## 2018-02-16 NOTE — PROGRESS NOTES
The PA for Lilia [facility] 1-10-19. His co-pay is $3.00 per [employer].

## 2018-02-27 ENCOUNTER — TELEPHONE (OUTPATIENT)
Dept: DERMATOLOGY | Facility: CLINIC | Age: 43
End: 2018-02-27

## 2018-03-07 ENCOUNTER — DOCUMENTATION ONLY (OUTPATIENT)
Dept: DERMATOLOGY | Facility: CLINIC | Age: 43
End: 2018-03-07

## 2018-03-22 DIAGNOSIS — L40.0 PSORIASIS VULGARIS: ICD-10-CM

## 2018-03-22 RX ORDER — CLOBETASOL PROPIONATE 0.46 MG/ML
SOLUTION TOPICAL
Qty: 50 ML | Refills: 3 | Status: SHIPPED | OUTPATIENT
Start: 2018-03-22 | End: 2021-04-08

## 2018-03-28 ENCOUNTER — PATIENT MESSAGE (OUTPATIENT)
Dept: DERMATOLOGY | Facility: CLINIC | Age: 43
End: 2018-03-28

## 2018-03-28 DIAGNOSIS — L40.0 PSORIASIS VULGARIS: ICD-10-CM

## 2018-03-29 ENCOUNTER — PATIENT MESSAGE (OUTPATIENT)
Dept: DERMATOLOGY | Facility: CLINIC | Age: 43
End: 2018-03-29

## 2018-03-29 RX ORDER — HALOBETASOL PROPIONATE 0.5 MG/G
OINTMENT TOPICAL 2 TIMES DAILY
Qty: 100 G | Refills: 3 | Status: SHIPPED | OUTPATIENT
Start: 2018-03-29 | End: 2018-06-22 | Stop reason: SDUPTHER

## 2018-04-03 ENCOUNTER — LAB VISIT (OUTPATIENT)
Dept: LAB | Facility: HOSPITAL | Age: 43
End: 2018-04-03
Attending: INTERNAL MEDICINE
Payer: MEDICAID

## 2018-04-03 ENCOUNTER — OFFICE VISIT (OUTPATIENT)
Dept: RHEUMATOLOGY | Facility: CLINIC | Age: 43
End: 2018-04-03
Payer: MEDICAID

## 2018-04-03 VITALS
BODY MASS INDEX: 33.18 KG/M2 | SYSTOLIC BLOOD PRESSURE: 102 MMHG | DIASTOLIC BLOOD PRESSURE: 69 MMHG | WEIGHT: 224 LBS | HEIGHT: 69 IN | HEART RATE: 66 BPM

## 2018-04-03 DIAGNOSIS — G89.29 CHRONIC MIDLINE LOW BACK PAIN WITHOUT SCIATICA: ICD-10-CM

## 2018-04-03 DIAGNOSIS — M1A.9XX1 GOUT WITH TOPHI: ICD-10-CM

## 2018-04-03 DIAGNOSIS — L40.9 PSORIASIS: ICD-10-CM

## 2018-04-03 DIAGNOSIS — M54.50 CHRONIC MIDLINE LOW BACK PAIN WITHOUT SCIATICA: ICD-10-CM

## 2018-04-03 DIAGNOSIS — M1A.9XX1 GOUT WITH TOPHI: Primary | ICD-10-CM

## 2018-04-03 LAB — URATE SERPL-MCNC: 4.4 MG/DL

## 2018-04-03 PROCEDURE — 99999 PR PBB SHADOW E&M-EST. PATIENT-LVL III: CPT | Mod: PBBFAC,,, | Performed by: INTERNAL MEDICINE

## 2018-04-03 PROCEDURE — 84550 ASSAY OF BLOOD/URIC ACID: CPT

## 2018-04-03 PROCEDURE — 99213 OFFICE O/P EST LOW 20 MIN: CPT | Mod: PBBFAC | Performed by: INTERNAL MEDICINE

## 2018-04-03 PROCEDURE — 99213 OFFICE O/P EST LOW 20 MIN: CPT | Mod: S$PBB,,, | Performed by: INTERNAL MEDICINE

## 2018-04-03 PROCEDURE — 36415 COLL VENOUS BLD VENIPUNCTURE: CPT

## 2018-04-03 RX ORDER — ALLOPURINOL 300 MG/1
600 TABLET ORAL DAILY
Qty: 60 TABLET | Refills: 6 | Status: SHIPPED | OUTPATIENT
Start: 2018-04-03 | End: 2018-05-03

## 2018-04-03 NOTE — PROGRESS NOTES
History of present illness: 42-year-old gentleman with idiopathic cardiomyopathy.  He has tophaceous gout.  He has x-ray evidence of chondrocalcinosis.  He has psoriasis and possibly psoriatic arthritis.  At the time of his last visit he was complaining of back problems.  I referred him to back and spine clinic but he did not make the appointment.  His back is actually better.  He had one possible gout flare.  He took a Medrol pack and a went away.  His psoriasis medicine was changed to Taltz.  This has helped his skin.  He is overall feeling better on the new medication.  He denies any joint swelling.  He was hospitalized with headaches since his last visit.  He was found to have previous CVA.  He has been seeing neurology at [facility] who has scheduled him for a sleep study.  He remains on allopurinol 600 mg daily.  He had laboratory studies today.    Physical examination:  Skin: He has psoriatic plaques on his legs  Musculoskeletal: He has nodules of the left fifth PIP, third DIP, and the right ankle.  He has no active synovitis.    Assessment:  1.  Intercritical tophaceous gout  2.  Psoriasis with possible psoriatic arthritis  3.  Chondrocalcinosis  4.  Chronic low back pain    Plans: Review laboratory studies from today.  Continue allopurinol based on the laboratory studies.  I will see him in follow-up in 6 months.

## 2018-04-13 ENCOUNTER — OFFICE VISIT (OUTPATIENT)
Dept: DERMATOLOGY | Facility: CLINIC | Age: 43
End: 2018-04-13
Payer: MEDICAID

## 2018-04-13 DIAGNOSIS — Z79.899 ENCOUNTER FOR LONG-TERM (CURRENT) USE OF HIGH-RISK MEDICATION: Primary | ICD-10-CM

## 2018-04-13 DIAGNOSIS — L40.0 PSORIASIS VULGARIS: ICD-10-CM

## 2018-04-13 DIAGNOSIS — L40.9 PSORIASIS: ICD-10-CM

## 2018-04-13 PROCEDURE — 99999 PR PBB SHADOW E&M-EST. PATIENT-LVL II: CPT | Mod: PBBFAC,,, | Performed by: DERMATOLOGY

## 2018-04-13 PROCEDURE — 99213 OFFICE O/P EST LOW 20 MIN: CPT | Mod: S$PBB,,, | Performed by: DERMATOLOGY

## 2018-04-13 PROCEDURE — 99212 OFFICE O/P EST SF 10 MIN: CPT | Mod: PBBFAC | Performed by: DERMATOLOGY

## 2018-04-13 RX ORDER — TRIAMCINOLONE ACETONIDE 1 MG/G
OINTMENT TOPICAL
Qty: 454 G | Refills: 2 | Status: SHIPPED | OUTPATIENT
Start: 2018-04-13 | End: 2018-07-05 | Stop reason: SDUPTHER

## 2018-04-13 RX ORDER — IXEKIZUMAB 80 MG/ML
INJECTION, SOLUTION SUBCUTANEOUS
Qty: 3 SYRINGE | Refills: 1 | Status: SHIPPED | OUTPATIENT
Start: 2018-04-13 | End: 2018-09-24 | Stop reason: SDUPTHER

## 2018-04-13 NOTE — PROGRESS NOTES
Subjective:       Patient ID:  Adrien Panda is a 42 y.o. male who presents for   Chief Complaint   Patient presents with    Follow-up     Psoriasis     HPI  Pt is here today for f/u psoriasis, last seen 10/2017. Last Stelara injection was 7/2017, and then pt was switched to Taltz because he stopped responding to Stelara (had bad flare on legs, arms, back).  He is also using halobetasol ointment prn and clobetasol solution to scalp prn.      Started Taltz 1/2018. States he was clear when he first started it, but psoriasis has started to recur on his lower legs. Last injection was 3 days ago on Tuesday. He admits that he has not been taking the shots on a regular schedule.  No infections, no side effects noted.     Would like a large jar of TAC ointment to use when he runs out of ultravate.    Just prior to starting Taltz, pt states he developed terrible headaches. Ended up going to the ER and was told he had a stroke.      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 Rubin at [facility], cutaneous and joint gout tx by Dr. Hermosillo (psoriasis flared on colchicine), stroke      PSORIASIS: failed tx with Enbrel, Humira, Soriatane, clobetasol topical, betamethasone topical, Lidex topical, elocon topical.    ILK helps temporarily. ultravate works best out of all the topicals he's tried. psorcon 0.05% oint helps.    On Humira from 6/2015 - 4/2016 until he stopped responding to it. Switched back to enbrel at that point which only helped temporarily.    Added Soriatane to Enbrel but ended up with a bad flare on lower legs.  Not a good candidate for NBUVB as he lives on the [facility] and there is not light unit in this area.   Stelara 9/21/16 - 7/19/17 (stopped responding to it)    TB gold: negative 04/17    Review of Systems   Constitutional: Negative for fever, chills and fatigue.   Skin: Positive for itching and rash. Negative for daily sunscreen use.   Hematologic/Lymphatic: Does not  bruise/bleed easily.        Objective:    Physical Exam   Constitutional: He appears well-developed and well-nourished. No distress.   Neurological: He is alert and oriented to person, place, and time. He is not disoriented.   Psychiatric: He has a normal mood and affect.   Skin:   Areas Examined (abnormalities noted in diagram):   Scalp / Hair Palpated and Inspected  Head / Face Inspection Performed  Neck Inspection Performed  Chest / Axilla Inspection Performed  Abdomen Inspection Performed  Back Inspection Performed  RUE Inspected  LUE Inspection Performed  RLE Inspected  LLE Inspection Performed              Diagram Legend     Erythematous scaling macule/papule c/w actinic keratosis       Vascular papule c/w angioma      Pigmented verrucoid papule/plaque c/w seborrheic keratosis      Yellow umbilicated papule c/w sebaceous hyperplasia      Irregularly shaped tan macule c/w lentigo     1-2 mm smooth white papules consistent with Milia      Movable subcutaneous cyst with punctum c/w epidermal inclusion cyst      Subcutaneous movable cyst c/w pilar cyst      Firm pink to brown papule c/w dermatofibroma      Pedunculated fleshy papule(s) c/w skin tag(s)      Evenly pigmented macule c/w junctional nevus     Mildly variegated pigmented, slightly irregular-bordered macule c/w mildly atypical nevus      Flesh colored to evenly pigmented papule c/w intradermal nevus       Pink pearly papule/plaque c/w basal cell carcinoma      Erythematous hyperkeratotic cursted plaque c/w SCC      Surgical scar with no sign of skin cancer recurrence      Open and closed comedones      Inflammatory papules and pustules      Verrucoid papule consistent consistent with wart     Erythematous eczematous patches and plaques     Dystrophic onycholytic nail with subungual debris c/w onychomycosis     Umbilicated papule    Erythematous-base heme-crusted tan verrucoid plaque consistent with inflamed seborrheic keratosis     Erythematous Silvery  Scaling Plaque c/w Psoriasis     See annotation      Assessment / Plan:        Encounter for long-term (current) use of high-risk medication  -     CBC auto differential; Future  -     Comprehensive metabolic panel; Future  -     Quantiferon Gold TB; Future    Psoriasis  -     triamcinolone acetonide 0.1% (KENALOG) 0.1 % ointment; AAA bid  Dispense: 454 g; Refill: 2    Psoriasis vulgaris  Emphasized importance of sticking to a schedule with Taltz. Encouraged using a calendar to remind himself when his injections are due.  He will do 2 more Taltz injections 2 weeks apart, then will space out as directed to monthly.  -     TALTZ AUTOINJECTOR, 3 PACK, 80 mg/mL AtIn; 1 - 80mg dose SQ q month  Dispense: 3 Syringe; Refill: 1  Labs above due today.    rtc 3 months

## 2018-04-17 ENCOUNTER — LAB VISIT (OUTPATIENT)
Dept: LAB | Facility: HOSPITAL | Age: 43
End: 2018-04-17
Attending: DERMATOLOGY
Payer: MEDICAID

## 2018-04-17 DIAGNOSIS — Z79.899 ENCOUNTER FOR LONG-TERM (CURRENT) USE OF HIGH-RISK MEDICATION: ICD-10-CM

## 2018-04-17 LAB
ALBUMIN SERPL BCP-MCNC: 3.8 G/DL
ALP SERPL-CCNC: 57 U/L
ALT SERPL W/O P-5'-P-CCNC: 25 U/L
ANION GAP SERPL CALC-SCNC: 8 MMOL/L
AST SERPL-CCNC: 20 U/L
BASOPHILS # BLD AUTO: 0.02 K/UL
BASOPHILS NFR BLD: 0.3 %
BILIRUB SERPL-MCNC: 0.4 MG/DL
BUN SERPL-MCNC: 19 MG/DL
CALCIUM SERPL-MCNC: 9.3 MG/DL
CHLORIDE SERPL-SCNC: 103 MMOL/L
CO2 SERPL-SCNC: 26 MMOL/L
CREAT SERPL-MCNC: 0.9 MG/DL
DIFFERENTIAL METHOD: NORMAL
EOSINOPHIL # BLD AUTO: 0.3 K/UL
EOSINOPHIL NFR BLD: 4.5 %
ERYTHROCYTE [DISTWIDTH] IN BLOOD BY AUTOMATED COUNT: 13.2 %
EST. GFR  (AFRICAN AMERICAN): >60 ML/MIN/1.73 M^2
EST. GFR  (NON AFRICAN AMERICAN): >60 ML/MIN/1.73 M^2
GLUCOSE SERPL-MCNC: 100 MG/DL
HCT VFR BLD AUTO: 43.9 %
HGB BLD-MCNC: 14.4 G/DL
IMM GRANULOCYTES # BLD AUTO: 0.03 K/UL
IMM GRANULOCYTES NFR BLD AUTO: 0.4 %
LYMPHOCYTES # BLD AUTO: 1.4 K/UL
LYMPHOCYTES NFR BLD: 18.8 %
MCH RBC QN AUTO: 30.1 PG
MCHC RBC AUTO-ENTMCNC: 32.8 G/DL
MCV RBC AUTO: 92 FL
MONOCYTES # BLD AUTO: 0.5 K/UL
MONOCYTES NFR BLD: 6.7 %
NEUTROPHILS # BLD AUTO: 5.1 K/UL
NEUTROPHILS NFR BLD: 69.3 %
NRBC BLD-RTO: 0 /100 WBC
PLATELET # BLD AUTO: 203 K/UL
PMV BLD AUTO: 11.2 FL
POTASSIUM SERPL-SCNC: 3.6 MMOL/L
PROT SERPL-MCNC: 7.7 G/DL
RBC # BLD AUTO: 4.79 M/UL
SODIUM SERPL-SCNC: 137 MMOL/L
WBC # BLD AUTO: 7.36 K/UL

## 2018-04-17 PROCEDURE — 80053 COMPREHEN METABOLIC PANEL: CPT

## 2018-04-17 PROCEDURE — 85025 COMPLETE CBC W/AUTO DIFF WBC: CPT

## 2018-04-18 ENCOUNTER — PATIENT MESSAGE (OUTPATIENT)
Dept: DERMATOLOGY | Facility: CLINIC | Age: 43
End: 2018-04-18

## 2018-05-02 ENCOUNTER — PATIENT MESSAGE (OUTPATIENT)
Dept: DERMATOLOGY | Facility: CLINIC | Age: 43
End: 2018-05-02

## 2018-06-20 ENCOUNTER — PATIENT MESSAGE (OUTPATIENT)
Dept: DERMATOLOGY | Facility: CLINIC | Age: 43
End: 2018-06-20

## 2018-06-22 DIAGNOSIS — L40.0 PSORIASIS VULGARIS: ICD-10-CM

## 2018-06-22 RX ORDER — HALOBETASOL PROPIONATE 0.5 MG/G
OINTMENT TOPICAL
Qty: 100 G | Refills: 3 | Status: SHIPPED | OUTPATIENT
Start: 2018-06-22 | End: 2019-04-23 | Stop reason: SDUPTHER

## 2018-06-27 ENCOUNTER — OFFICE VISIT (OUTPATIENT)
Dept: DERMATOLOGY | Facility: CLINIC | Age: 43
End: 2018-06-27
Payer: MEDICAID

## 2018-06-27 DIAGNOSIS — Z79.899 ENCOUNTER FOR LONG-TERM (CURRENT) USE OF HIGH-RISK MEDICATION: ICD-10-CM

## 2018-06-27 DIAGNOSIS — L40.0 PSORIASIS VULGARIS: Primary | ICD-10-CM

## 2018-06-27 PROCEDURE — 11900 INJECT SKIN LESIONS </W 7: CPT | Mod: S$PBB,,, | Performed by: DERMATOLOGY

## 2018-06-27 PROCEDURE — 99212 OFFICE O/P EST SF 10 MIN: CPT | Mod: PBBFAC | Performed by: DERMATOLOGY

## 2018-06-27 PROCEDURE — 99999 PR PBB SHADOW E&M-EST. PATIENT-LVL II: CPT | Mod: PBBFAC,,, | Performed by: DERMATOLOGY

## 2018-06-27 PROCEDURE — 11900 INJECT SKIN LESIONS </W 7: CPT | Mod: PBBFAC | Performed by: DERMATOLOGY

## 2018-06-27 PROCEDURE — 99499 UNLISTED E&M SERVICE: CPT | Mod: S$PBB,,, | Performed by: DERMATOLOGY

## 2018-06-27 RX ORDER — ALLOPURINOL 300 MG/1
TABLET ORAL
COMMUNITY
Start: 2018-05-24 | End: 2018-12-11 | Stop reason: SDUPTHER

## 2018-06-27 RX ORDER — DIVALPROEX SODIUM 500 MG/1
TABLET, FILM COATED, EXTENDED RELEASE ORAL
COMMUNITY
Start: 2018-06-02 | End: 2020-06-01

## 2018-06-27 NOTE — PROGRESS NOTES
Subjective:       Patient ID:  Adrien Panda is a 42 y.o. male who presents for   Chief Complaint   Patient presents with    Follow-up     Psoriasis     HPI  Pt is here today for f/u psoriasis, last seen 4/2018. Last Taltz injection was yesterday.  He is also using halobetasol ointment and TAC prn and clobetasol solution to scalp prn. Is here for more ILK due to breakthrough of psoriasis on legs.     Started Taltz 1/2018. States he was clear when he first started it, but psoriasis has started to recur on his lower legs. He is getting better about taking the shots on a regular schedule.  No infections, but he has noticed a runny nose x a few months that won't go away. States the discharge is clear, and it happens off and on.  Just prior to starting Taltz, pt states he developed terrible headaches. Ended up going to the ER and was told he had a stroke.      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 Rubin at [facility], cutaneous and joint gout tx by Dr. Hermosillo (psoriasis flared on colchicine), stroke      PSORIASIS: failed tx with Enbrel, Humira, Soriatane, clobetasol topical, betamethasone topical, Lidex topical, elocon topical.    ILK helps temporarily. ultravate works best out of all the topicals he's tried. psorcon 0.05% oint helps.    On Humira from 6/2015 - 4/2016 until he stopped responding to it. Switched back to enbrel at that point which only helped temporarily.    Added Soriatane to Enbrel but ended up with a bad flare on lower legs.  Not a good candidate for NBUVB as he lives on the [facility] and there is not light unit in this area.   Stelara 9/21/16 - 7/19/17 (stopped responding to it)     TB gold: negative 04/18    Review of Systems   Constitutional: Positive for fatigue. Negative for fever, chills, weight loss, weight gain, night sweats and malaise.   Skin: Positive for recent sunburn. Negative for daily sunscreen use.   Hematologic/Lymphatic: Does not bruise/bleed  easily.        Objective:    Physical Exam   Constitutional: He appears well-developed and well-nourished. No distress.   Neurological: He is alert and oriented to person, place, and time. He is not disoriented.   Psychiatric: He has a normal mood and affect.   Skin:   Areas Examined (abnormalities noted in diagram):   RLE Inspected  LLE Inspection Performed              Diagram Legend     Erythematous scaling macule/papule c/w actinic keratosis       Vascular papule c/w angioma      Pigmented verrucoid papule/plaque c/w seborrheic keratosis      Yellow umbilicated papule c/w sebaceous hyperplasia      Irregularly shaped tan macule c/w lentigo     1-2 mm smooth white papules consistent with Milia      Movable subcutaneous cyst with punctum c/w epidermal inclusion cyst      Subcutaneous movable cyst c/w pilar cyst      Firm pink to brown papule c/w dermatofibroma      Pedunculated fleshy papule(s) c/w skin tag(s)      Evenly pigmented macule c/w junctional nevus     Mildly variegated pigmented, slightly irregular-bordered macule c/w mildly atypical nevus      Flesh colored to evenly pigmented papule c/w intradermal nevus       Pink pearly papule/plaque c/w basal cell carcinoma      Erythematous hyperkeratotic cursted plaque c/w SCC      Surgical scar with no sign of skin cancer recurrence      Open and closed comedones      Inflammatory papules and pustules      Verrucoid papule consistent consistent with wart     Erythematous eczematous patches and plaques     Dystrophic onycholytic nail with subungual debris c/w onychomycosis     Umbilicated papule    Erythematous-base heme-crusted tan verrucoid plaque consistent with inflamed seborrheic keratosis     Erythematous Silvery Scaling Plaque c/w Psoriasis     See annotation      Assessment / Plan:        Psoriasis vulgaris  Continue halobetasol ointment, TAC ointment, and clobetasol scalp soln as needed.  Will continue monthly Taltz injections.  Rec: seeing PCP or ENT  regarding ongoing clear nasal drainage    Intralesional Kenalog 10mg/cc (2.7 cc total) injected into 4 lesions on the lower legs today after obtaining verbal consent including risk of surrounding hypopigmentation. Patient tolerated procedure well.    Units: 3  NDC for Kenalog 10mg/cc:  5535-7502-93      rtc 2 months with labs

## 2018-07-05 DIAGNOSIS — L40.9 PSORIASIS: ICD-10-CM

## 2018-07-06 RX ORDER — TRIAMCINOLONE ACETONIDE 1 MG/G
OINTMENT TOPICAL
Qty: 454 G | Refills: 2 | Status: SHIPPED | OUTPATIENT
Start: 2018-07-06 | End: 2019-01-16 | Stop reason: SDUPTHER

## 2018-07-13 RX ORDER — ALLOPURINOL 300 MG/1
TABLET ORAL
Qty: 45 TABLET | Refills: 6 | Status: SHIPPED | OUTPATIENT
Start: 2018-07-13 | End: 2019-01-28 | Stop reason: SDUPTHER

## 2018-08-09 ENCOUNTER — PATIENT MESSAGE (OUTPATIENT)
Dept: DERMATOLOGY | Facility: CLINIC | Age: 43
End: 2018-08-09

## 2018-08-31 ENCOUNTER — PATIENT MESSAGE (OUTPATIENT)
Dept: DERMATOLOGY | Facility: CLINIC | Age: 43
End: 2018-08-31

## 2018-09-08 ENCOUNTER — PATIENT MESSAGE (OUTPATIENT)
Dept: DERMATOLOGY | Facility: CLINIC | Age: 43
End: 2018-09-08

## 2018-09-14 ENCOUNTER — PATIENT MESSAGE (OUTPATIENT)
Dept: RHEUMATOLOGY | Facility: CLINIC | Age: 43
End: 2018-09-14

## 2018-09-14 RX ORDER — METHYLPREDNISOLONE 4 MG/1
TABLET ORAL
Qty: 21 TABLET | Refills: 0 | Status: SHIPPED | OUTPATIENT
Start: 2018-09-14 | End: 2018-09-27

## 2018-09-24 DIAGNOSIS — L40.0 PSORIASIS VULGARIS: ICD-10-CM

## 2018-09-24 RX ORDER — IXEKIZUMAB 80 MG/ML
INJECTION, SOLUTION SUBCUTANEOUS
Qty: 3 SYRINGE | Refills: 1 | Status: SHIPPED | OUTPATIENT
Start: 2018-09-24 | End: 2018-09-24

## 2018-09-24 RX ORDER — IXEKIZUMAB 80 MG/ML
INJECTION, SOLUTION SUBCUTANEOUS
Qty: 3 SYRINGE | Refills: 1 | Status: SHIPPED | OUTPATIENT
Start: 2018-09-24 | End: 2018-12-18

## 2018-09-24 NOTE — TELEPHONE ENCOUNTER
Spoke with pharmacy and explained that Dr. Thompson is out on maternity leave and will send over the request to the physician he will be seeing while she is out on leave.

## 2018-09-27 ENCOUNTER — LAB VISIT (OUTPATIENT)
Dept: LAB | Facility: HOSPITAL | Age: 43
End: 2018-09-27
Payer: MEDICAID

## 2018-09-27 ENCOUNTER — OFFICE VISIT (OUTPATIENT)
Dept: INTERNAL MEDICINE | Facility: CLINIC | Age: 43
End: 2018-09-27
Payer: MEDICAID

## 2018-09-27 VITALS
WEIGHT: 225.75 LBS | HEIGHT: 69 IN | SYSTOLIC BLOOD PRESSURE: 110 MMHG | BODY MASS INDEX: 33.44 KG/M2 | HEART RATE: 64 BPM | OXYGEN SATURATION: 97 % | DIASTOLIC BLOOD PRESSURE: 70 MMHG

## 2018-09-27 DIAGNOSIS — M1A.9XX1 GOUT WITH TOPHI: ICD-10-CM

## 2018-09-27 DIAGNOSIS — E78.5 HYPERLIPIDEMIA, UNSPECIFIED HYPERLIPIDEMIA TYPE: ICD-10-CM

## 2018-09-27 DIAGNOSIS — I42.9 CARDIOMYOPATHY, IDIOPATHIC: ICD-10-CM

## 2018-09-27 DIAGNOSIS — I10 ESSENTIAL HYPERTENSION: ICD-10-CM

## 2018-09-27 DIAGNOSIS — Z00.00 HEALTHCARE MAINTENANCE: ICD-10-CM

## 2018-09-27 DIAGNOSIS — Z00.00 HEALTHCARE MAINTENANCE: Primary | ICD-10-CM

## 2018-09-27 DIAGNOSIS — L40.9 PSORIASIS: ICD-10-CM

## 2018-09-27 LAB
25(OH)D3+25(OH)D2 SERPL-MCNC: 14 NG/ML
CHOLEST SERPL-MCNC: 231 MG/DL
CHOLEST/HDLC SERPL: 4.4 {RATIO}
ESTIMATED AVG GLUCOSE: 103 MG/DL
HBA1C MFR BLD HPLC: 5.2 %
HDLC SERPL-MCNC: 53 MG/DL
HDLC SERPL: 22.9 %
LDLC SERPL CALC-MCNC: 133.6 MG/DL
NONHDLC SERPL-MCNC: 178 MG/DL
TRIGL SERPL-MCNC: 222 MG/DL

## 2018-09-27 PROCEDURE — 80061 LIPID PANEL: CPT

## 2018-09-27 PROCEDURE — 99999 PR PBB SHADOW E&M-EST. PATIENT-LVL IV: CPT | Mod: PBBFAC,,, | Performed by: STUDENT IN AN ORGANIZED HEALTH CARE EDUCATION/TRAINING PROGRAM

## 2018-09-27 PROCEDURE — 99214 OFFICE O/P EST MOD 30 MIN: CPT | Mod: PBBFAC | Performed by: STUDENT IN AN ORGANIZED HEALTH CARE EDUCATION/TRAINING PROGRAM

## 2018-09-27 PROCEDURE — 83036 HEMOGLOBIN GLYCOSYLATED A1C: CPT

## 2018-09-27 PROCEDURE — 99214 OFFICE O/P EST MOD 30 MIN: CPT | Mod: S$PBB,,, | Performed by: STUDENT IN AN ORGANIZED HEALTH CARE EDUCATION/TRAINING PROGRAM

## 2018-09-27 PROCEDURE — 82306 VITAMIN D 25 HYDROXY: CPT

## 2018-09-27 PROCEDURE — 36415 COLL VENOUS BLD VENIPUNCTURE: CPT

## 2018-09-27 NOTE — PROGRESS NOTES
Subjective:       Patient ID: Adrien Panda is a 43 y.o. male.    Chief Complaint: Establish Care    This is Adrien Panda, 43 y.o., patient of Dr. Pop Cheney MD, presented to the primary care clinic for establish care.     He is known to have Essential hypertension, Cardiomyopathy, idiopathic, Hyperlipidemia. He is also had Cerebral infarction and resulted in weakness of trunk musculature and Muscle tightness as well as decreased ROM of lumbar spine.     Gout with tophi  Taking Allopurinol 300 mg following Dr. Hermosillo and asymptomatic and UA number is < 6     Hyperlipidemia  LDL ~ 100 not taking Statin. The 10-year ASCVD risk score ([place] DC Jr., et al., 2013) is: 1.2%    Essential hypertension  Taking carvedilol 25 mg PO BID and Lisinopril 20 mg PO daily    Cardiomyopathy, idiopathic  Around 8 years ago, non ischemic in nature and unsure about the etiology EF: 60% 01/05/2018.  Medication on GDMT with carvedilol 25 mg PO BID and Lisinopril 20 mg PO daily  Furosemide 40 mg PO BID and he has been on it for the last 7 years ago   Has an appointment with Dr. De La Torre in Cardiology next month     Chronic midline low back pain without sciatica, weakness of trunk musculature and muscle tightness  PRN Tylenol and stable    Psoriasis   Using topical medication triamcinolone acetonide 0.1% (KENALOG) 0.1 % ointment and TALTZ AUTOINJECTOR  Dr. Patricia Thompson following him closely with labs.       Problem List:  Patient Active Problem List   Diagnosis    Cardiomyopathy, idiopathic    Psoriasis    Chondrocalcinosis    Visit for suture removal    Gout with tophi    Chronic midline low back pain without sciatica    Weakness of trunk musculature    Muscle tightness    Decreased ROM of lumbar spine    Cerebral infarction    Hyperlipidemia    Essential hypertension    Healthcare maintenance       Past Medical History:   Past Medical History:   Diagnosis Date    Arthritis     Blood clotting tendency      Congestive heart failure     High cholesterol     Hyperlipemia     Hypertension     Joint pain     Psoriasis         Past Surgical History:   No past surgical history on file.    Medication History:  Current Outpatient Medications on File Prior to Visit   Medication Sig Dispense Refill    allopurinol (ZYLOPRIM) 300 MG tablet TAKE ONE AND ONE-HALF TABLETS BY MOUTH ONCE DAILY 45 tablet 6    carvedilol (COREG) 25 MG tablet Take 25 mg by mouth 2 (two) times daily with meals.      clobetasol (TEMOVATE) 0.05 % external solution USE ON SCALP ONCE TO TWICE DAILY AS NEEDED FOR SCALING OR ITCHING 50 mL 3    furosemide (LASIX) 40 MG tablet Take 40 mg by mouth 2 (two) times daily.        halobetasol (ULTRAVATE) 0.05 % ointment APPLY  OINTMENT TOPICALLY TO AFFECTED AREA TWICE DAILY 100 g 3    KLOR-CON M20 20 mEq tablet       lisinopril (PRINIVIL,ZESTRIL) 20 MG tablet Take 20 mg by mouth once daily.        triamcinolone acetonide 0.1% (KENALOG) 0.1 % ointment APPLY  OINTMENT TOPICALLY TO AFFECTED AREA TWICE DAILY 454 g 2    allopurinol (ZYLOPRIM) 300 MG tablet       divalproex ER (DEPAKOTE) 500 MG Tb24       methylPREDNISolone (MEDROL DOSEPACK) 4 mg tablet use as directed 21 tablet 0    TALTZ AUTOINJECTOR, 3 PACK, 80 mg/mL AtIn 1 - 80mg dose SQ q month 3 Syringe 1    [DISCONTINUED] butalbital-acetaminophen-caffeine -40 mg (FIORICET, ESGIC) -40 mg per tablet Take 1 tablet by mouth every 4 (four) hours as needed for Pain.      [DISCONTINUED] diflorasone (PSORCON) 0.05 % ointment AAA bid 60 g 3    [DISCONTINUED] hydrOXYzine pamoate (VISTARIL) 25 MG Cap Take 1 capsule (25 mg total) by mouth nightly as needed (itching). 30 capsule 1    [DISCONTINUED] methylPREDNISolone (MEDROL DOSEPACK) 4 mg tablet use as directed 21 tablet 0    [DISCONTINUED] ranitidine (ZANTAC) 150 MG tablet TK 1 T PO  BID UTD  2    [DISCONTINUED] tramadol (ULTRAM) 50 mg tablet TK 1 T PO Q 6 H PRN P.  1     No current  facility-administered medications on file prior to visit.        Allergy:   Review of patient's allergies indicates:  No Known Allergies    Social Hx:  Social History     Tobacco Use    Smoking status: Former Smoker     Packs/day: 0.25    Smokeless tobacco: Former User   Substance Use Topics    Alcohol use: Yes     Comment: Social    Drug use: No       Family Hx:  Family History   Problem Relation Age of Onset    Coronary artery disease Mother     Melanoma Neg Hx     Psoriasis Neg Hx     Lupus Neg Hx         Review of Systems   Constitutional: Negative for activity change, appetite change, chills, diaphoresis, fatigue and fever.   HENT: Negative for dental problem and drooling.    Respiratory: Negative for choking and shortness of breath.    Cardiovascular: Negative for chest pain, palpitations and leg swelling.   Gastrointestinal: Negative for abdominal distention, abdominal pain, constipation and diarrhea.   Genitourinary: Negative for difficulty urinating, dysuria and hematuria.   Musculoskeletal: Negative for arthralgias and back pain.   Neurological: Negative for weakness.   Psychiatric/Behavioral: Negative for agitation and behavioral problems.       Objective:      Physical Exam   Constitutional: He is oriented to person, place, and time. He appears well-developed and well-nourished.   HENT:   Head: Normocephalic.   Eyes: Pupils are equal, round, and reactive to light. Right eye exhibits no discharge. Left eye exhibits no discharge.   Neck: No JVD present.   Cardiovascular: Normal rate and regular rhythm.   Pulmonary/Chest: Effort normal. He has no wheezes. He has no rales.   Abdominal: Soft. He exhibits no distension. There is no tenderness.   Musculoskeletal: He exhibits no edema.   Neurological: He is alert and oriented to person, place, and time.   Skin: Skin is warm.        Non scaly psoriatic  Rash    Vitals reviewed.      Assessment:       1. Healthcare maintenance    2. Cardiomyopathy,  idiopathic    3. Psoriasis    4. Hyperlipidemia, unspecified hyperlipidemia type    5. Essential hypertension    6. Gout with tophi        Plan:       Adrien was seen today for establish care.    Diagnoses and all orders for this visit:    Healthcare maintenance  -     Advised to lose weigt and decrease his caloritc intake   - Vitamin D; Hemoglobin A1c; Lipid panel; Future    Cardiomyopathy, idiopathic       - Around 8 years ago, non ischemic in nature and unsure about the etiology EF: 60% 01/05/2018.       - Medication on GDMT with carvedilol 25 mg PO BID and Lisinopril 20 mg PO daily       - KCl and Furosemide 40 mg PO BID and he has been on it for the last 7 years ago        - Has an appointment with Dr. De La Torre in Cardiology next month     Gout with tophi      - Taking Allopurinol 300 mg following Dr. Hermosillo and asymptomatic and UA number is < 6     Hyperlipidemia     - LDL ~ 100 not taking Statin. The 10-year ASCVD risk score ([place] DC Jr., et al., 2013) is: 1.2%     - Will repeat Lipid Panel     Essential hypertension     - Taking carvedilol 25 mg PO BID and Lisinopril 20 mg PO daily    Chronic midline low back pain without sciatica, weakness of trunk musculature and muscle tightness     - PRN Tylenol and stable    Psoriasis      - Using topical medication triamcinolone acetonide 0.1% (KENALOG) 0.1 % ointment and TALTZ AUTOINJECTOR     - Dr. Patricia Thompson following him closely with labs.     The patient Adrien Panda was seen, assessed, evaluated and examined with the presence of the attending provider Dr. Camacho.  Return to clinic in 3 months.    Kimberley Sandoval MD  Internal Medicine Resident (PGY-III)  Pager: [phone]

## 2018-09-29 ENCOUNTER — TELEPHONE (OUTPATIENT)
Dept: INTERNAL MEDICINE | Facility: CLINIC | Age: 43
End: 2018-09-29

## 2018-09-29 DIAGNOSIS — E55.9 VITAMIN D DEFICIENCY: Primary | ICD-10-CM

## 2018-09-29 RX ORDER — ERGOCALCIFEROL 1.25 MG/1
50000 CAPSULE ORAL
Qty: 8 CAPSULE | Refills: 0 | Status: SHIPPED | OUTPATIENT
Start: 2018-09-29 | End: 2020-06-01

## 2018-09-29 NOTE — PROGRESS NOTES
Called about and informed him about the result of Vit D, Lipid panel and A1c.    Encouraged to improve his diet and avoid fat food and fried food and will repeat it on near future.    Sent a prescription for Vit D for the next 8 weeks

## 2018-10-22 ENCOUNTER — OFFICE VISIT (OUTPATIENT)
Dept: CARDIOLOGY | Facility: CLINIC | Age: 43
End: 2018-10-22
Payer: MEDICAID

## 2018-10-22 VITALS
RESPIRATION RATE: 20 BRPM | WEIGHT: 224.88 LBS | HEART RATE: 92 BPM | SYSTOLIC BLOOD PRESSURE: 116 MMHG | BODY MASS INDEX: 33.21 KG/M2 | OXYGEN SATURATION: 96 % | DIASTOLIC BLOOD PRESSURE: 72 MMHG

## 2018-10-22 DIAGNOSIS — I50.22 CHRONIC SYSTOLIC CONGESTIVE HEART FAILURE: Primary | ICD-10-CM

## 2018-10-22 DIAGNOSIS — E78.2 MIXED HYPERLIPIDEMIA: ICD-10-CM

## 2018-10-22 DIAGNOSIS — I63.89 CEREBRAL INFARCTION DUE TO OTHER MECHANISM: ICD-10-CM

## 2018-10-22 DIAGNOSIS — I10 ESSENTIAL HYPERTENSION: ICD-10-CM

## 2018-10-22 DIAGNOSIS — R06.02 SOB (SHORTNESS OF BREATH): ICD-10-CM

## 2018-10-22 PROCEDURE — 93005 ELECTROCARDIOGRAM TRACING: CPT | Mod: PBBFAC | Performed by: INTERNAL MEDICINE

## 2018-10-22 PROCEDURE — 93010 ELECTROCARDIOGRAM REPORT: CPT | Mod: S$PBB,,, | Performed by: INTERNAL MEDICINE

## 2018-10-22 PROCEDURE — 99204 OFFICE O/P NEW MOD 45 MIN: CPT | Mod: S$PBB,,, | Performed by: INTERNAL MEDICINE

## 2018-10-22 PROCEDURE — 99999 PR PBB SHADOW E&M-EST. PATIENT-LVL III: CPT | Mod: PBBFAC,,, | Performed by: INTERNAL MEDICINE

## 2018-10-22 PROCEDURE — 99213 OFFICE O/P EST LOW 20 MIN: CPT | Mod: PBBFAC,25 | Performed by: INTERNAL MEDICINE

## 2018-10-22 NOTE — PROGRESS NOTES
Subjective:    Patient ID:  Adrien Panda is a 43 y.o. male who presents for follow-up of [facility] ([facility] ); Hypertension; and Congestive Heart Failure      HPI  Patient is here for establishing care with history of cardiomyopathy.  Says he was previously followed at [facility] but lives locally.  He says at 1 point he was told that his ejection fraction was 10%.  He is known to have normalized by a recent echo.  He has been stable on his medications.  The weaned his diuretic to 40 mg daily last visit.  He is somewhat limited from previous admission with possible cerebral infarction.  He says he just feels weak and gets more short of breath these days.  He has been trying to start exercising and his friend gave him a treadmill which she uses daily per him.  He denies any PND, orthopnea or lower extremity edema.  He has not experiencing dizziness to the point of presyncope or syncope.  He says he does notice when he bends over gets up too fast he feels lightheaded.    Review of Systems   Constitution: Positive for weakness and malaise/fatigue.   HENT: Negative.    Eyes: Negative.    Cardiovascular: Positive for dyspnea on exertion. Negative for chest pain, irregular heartbeat, leg swelling, near-syncope, orthopnea, palpitations, paroxysmal nocturnal dyspnea and syncope.   Respiratory: Negative for shortness of breath.    Skin: Negative.    Musculoskeletal: Negative.    Gastrointestinal: Negative for abdominal pain, constipation and diarrhea.   Genitourinary: Negative for dysuria.   Neurological: Negative for dizziness.   Psychiatric/Behavioral: Negative.      Past Medical History:   Diagnosis Date    Arthritis     Blood clotting tendency     Congestive heart failure     High cholesterol     Hyperlipemia     Hypertension     Joint pain     Psoriasis    History reviewed. No pertinent surgical history.   Social History     Tobacco Use    Smoking status: Former Smoker     Packs/day: 0.25     Smokeless tobacco: Former User   Substance Use Topics    Alcohol use: Yes     Comment: Social    Drug use: No     Family History   Problem Relation Age of Onset    Coronary artery disease Mother     Melanoma Neg Hx     Psoriasis Neg Hx     Lupus Neg Hx         Objective:    Physical Exam   Constitutional: He is oriented to person, place, and time. He appears well-developed and well-nourished. No distress.   HENT:   Head: Normocephalic and atraumatic.   Eyes: Conjunctivae and EOM are normal. Pupils are equal, round, and reactive to light.   Neck: Normal range of motion. Neck supple. No thyromegaly present.   Cardiovascular: Normal rate, regular rhythm and normal heart sounds.   No murmur heard.  Pulmonary/Chest: Effort normal and breath sounds normal. No respiratory distress. He has no wheezes. He has no rales. He exhibits no tenderness.   Abdominal: Soft. Bowel sounds are normal.   Musculoskeletal: He exhibits no edema.   Neurological: He is alert and oriented to person, place, and time.   Skin: Skin is warm and dry.   Psychiatric: He has a normal mood and affect. His behavior is normal.       ekg normal sinus rhythm with nonspecific ST-T changes    ECHO:  1-18  CONCLUSIONS     1 - TDS.     2 - Normal left ventricular systolic function (EF 60-65%).     3 - No diagnostic regional wall motion abnormalities.     4 - Concentric remodeling.     5 - No intracardiac source of embolus noted on this exam.  If high clinical suspicion, consider BENNETT +/- bubble study.     Carotid ultrasound:   Less than 50% stenosis of the internal carotid arteries bilaterally    Antegrade vertebral arteries bilateral    Assessment:       1. Chronic systolic congestive heart failure    2. Essential hypertension    3. Mixed hyperlipidemia    4. Cerebral infarction due to other mechanism         Plan:       -continue medical therapy  -wean diuretic is tolerated  -get records from [facility]  -counseled on sodium restriction and exercise as  tolerated    Return to clinic in 6 months

## 2018-10-25 ENCOUNTER — PATIENT MESSAGE (OUTPATIENT)
Dept: DERMATOLOGY | Facility: CLINIC | Age: 43
End: 2018-10-25

## 2018-10-30 ENCOUNTER — OFFICE VISIT (OUTPATIENT)
Dept: DERMATOLOGY | Facility: CLINIC | Age: 43
End: 2018-10-30
Payer: MEDICAID

## 2018-10-30 DIAGNOSIS — Z79.899 ENCOUNTER FOR LONG-TERM (CURRENT) USE OF HIGH-RISK MEDICATION: ICD-10-CM

## 2018-10-30 DIAGNOSIS — L40.9 PSORIASIS: Primary | ICD-10-CM

## 2018-10-30 PROCEDURE — 99999 PR PBB SHADOW E&M-EST. PATIENT-LVL I: CPT | Mod: PBBFAC,,, | Performed by: PATHOLOGY

## 2018-10-30 PROCEDURE — 99214 OFFICE O/P EST MOD 30 MIN: CPT | Mod: S$PBB,,, | Performed by: PATHOLOGY

## 2018-10-30 PROCEDURE — 99211 OFF/OP EST MAY X REQ PHY/QHP: CPT | Mod: PBBFAC | Performed by: PATHOLOGY

## 2018-10-30 RX ORDER — FOLIC ACID 1 MG/1
TABLET ORAL
Qty: 30 TABLET | Refills: 5 | Status: SHIPPED | OUTPATIENT
Start: 2018-10-30 | End: 2020-06-01

## 2018-10-30 RX ORDER — METHOTREXATE 2.5 MG/1
TABLET ORAL
Qty: 16 TABLET | Refills: 2 | Status: SHIPPED | OUTPATIENT
Start: 2018-10-30 | End: 2018-12-18

## 2018-10-30 RX ORDER — GUSELKUMAB 100 MG/ML
INJECTION SUBCUTANEOUS
Qty: 2 ML | Refills: 2 | Status: SHIPPED | OUTPATIENT
Start: 2018-10-30 | End: 2020-08-13

## 2018-10-30 NOTE — PROGRESS NOTES
Subjective:       Patient ID:  Adrien Panda is a 43 y.o. male who presents for   Chief Complaint   Patient presents with    Follow-up     Psoriasis f/u     HPI  Pt of Dr. Thompson's here today for f/u psoriasis:    Pt is here today for f/u psoriasis, last seen 6/2018. Currently on Taltz since January 2018.  He is also using halobetasol ointment and TAC prn and clobetasol solution to scalp prn.  Taltz initially worked best of all of the other systemic agents he has tried, but legs flaring within the last month with progressive new scaly plaques moving proximal on bilateral legs to thighs.       Started Taltz 1/2018. States he was clear when he first started it, but psoriasis has started to recur on his legs. No infections.       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 Rubin at [facility], cutaneous and joint gout tx by Dr. Hermosillo (psoriasis flared on colchicine), stroke      PSORIASIS: failed tx with Enbrel, Humira, Soriatane, clobetasol topical, betamethasone topical, Lidex topical, elocon topical.    ILK helps temporarily. ultravate works best out of all the topicals he's tried. psorcon 0.05% oint helps.    On Humira from 6/2015 - 4/2016 until he stopped responding to it. Switched back to enbrel at that point which only helped temporarily.    Added Soriatane to Enbrel but ended up with a bad flare on lower legs.  Not a good candidate for NBUVB as he lives on the [facility] and there is not light unit in this area.   Stelara 9/21/16 - 7/19/17 (stopped responding to it)     TB gold: negative 04/18    Review of Systems   Constitutional: Negative for fever, chills, weight loss, weight gain, fatigue, night sweats and malaise.   HENT: Negative for mouth sores (no tongue whiteness).    Respiratory: Negative for shortness of breath.    Gastrointestinal: Negative for nausea, vomiting, abdominal pain and diarrhea.   Musculoskeletal: Negative for arthralgias.   Skin: Positive for itching  and rash. Negative for daily sunscreen use, activity-related sunscreen use and recent sunburn.        Injection site reaction - no   yes   Psychiatric/Behavioral: Negative for depressed mood.   Hematologic/Lymphatic: Bruises/bleeds easily.        Objective:    Physical Exam   Constitutional: He appears well-developed and well-nourished. No distress.   Neurological: He is alert and oriented to person, place, and time. He is not disoriented.   Psychiatric: He has a normal mood and affect.   Skin:   Areas Examined (abnormalities noted in diagram):   Scalp / Hair Palpated and Inspected  Head / Face Inspection Performed  Neck Inspection Performed  Chest / Axilla Inspection Performed  Abdomen Inspection Performed  Back Inspection Performed  RUE Inspected  LUE Inspection Performed  RLE Inspected  LLE Inspection Performed              Diagram Legend     Erythematous scaling macule/papule c/w actinic keratosis       Vascular papule c/w angioma      Pigmented verrucoid papule/plaque c/w seborrheic keratosis      Yellow umbilicated papule c/w sebaceous hyperplasia      Irregularly shaped tan macule c/w lentigo     1-2 mm smooth white papules consistent with Milia      Movable subcutaneous cyst with punctum c/w epidermal inclusion cyst      Subcutaneous movable cyst c/w pilar cyst      Firm pink to brown papule c/w dermatofibroma      Pedunculated fleshy papule(s) c/w skin tag(s)      Evenly pigmented macule c/w junctional nevus     Mildly variegated pigmented, slightly irregular-bordered macule c/w mildly atypical nevus      Flesh colored to evenly pigmented papule c/w intradermal nevus       Pink pearly papule/plaque c/w basal cell carcinoma      Erythematous hyperkeratotic cursted plaque c/w SCC      Surgical scar with no sign of skin cancer recurrence      Open and closed comedones      Inflammatory papules and pustules      Verrucoid papule consistent consistent with wart     Erythematous eczematous patches and plaques      Dystrophic onycholytic nail with subungual debris c/w onychomycosis     Umbilicated papule    Erythematous-base heme-crusted tan verrucoid plaque consistent with inflamed seborrheic keratosis     Erythematous Silvery Scaling Plaque c/w Psoriasis     See annotation      Assessment / Plan:        Psoriasis - has failed multiple systemic agents including Enbrel with Soriatane, Humira, and now flaring on Taltz.  Will plan to switch to Tremfya.  Continue Talta and add MTX for now.  Start MTX at 10 mg weekly.  CBC, CMP in 2 weeks, then may increase to 15 mg weekly if WNL.  Will update pt on Tremfya approval.  Continue topical steroids.  -     methotrexate 2.5 MG Tab; Take 4 tablets po weekly in divided doses.  Dispense: 16 tablet; Refill: 2  -     folic acid (FOLVITE) 1 MG tablet; 1 po qday - do not take on same day as taking methotrexate  Dispense: 30 tablet; Refill: 5  -     TREMFYA 100 mg/mL Syrg; Inject 100mg (1ml) SQ at week 0 and week 4 then q o month  Dispense: 2 mL; Refill: 2    Encounter for long-term (current) use of high-risk medication  -     CBC auto differential; Future  -     Comprehensive metabolic panel; Future             Follow-up in about 6 weeks (around 12/11/2018).

## 2018-10-31 ENCOUNTER — TELEPHONE (OUTPATIENT)
Dept: PHARMACY | Facility: CLINIC | Age: 43
End: 2018-10-31

## 2018-11-02 NOTE — TELEPHONE ENCOUNTER
FOR DOCUMENTATION:  Tremfya prior authorization approved   Dates: 10/31/18 through 11/01/2019  Case ID: [id number]  Co pay: $3

## 2018-11-13 ENCOUNTER — LAB VISIT (OUTPATIENT)
Dept: LAB | Facility: HOSPITAL | Age: 43
End: 2018-11-13
Payer: MEDICAID

## 2018-11-13 DIAGNOSIS — Z79.899 ENCOUNTER FOR LONG-TERM (CURRENT) USE OF HIGH-RISK MEDICATION: ICD-10-CM

## 2018-11-13 LAB
ALBUMIN SERPL BCP-MCNC: 3.7 G/DL
ALP SERPL-CCNC: 46 U/L
ALT SERPL W/O P-5'-P-CCNC: 22 U/L
ANION GAP SERPL CALC-SCNC: 9 MMOL/L
AST SERPL-CCNC: 21 U/L
BASOPHILS # BLD AUTO: 0.04 K/UL
BASOPHILS NFR BLD: 0.5 %
BILIRUB SERPL-MCNC: 0.6 MG/DL
BUN SERPL-MCNC: 21 MG/DL
CALCIUM SERPL-MCNC: 9.9 MG/DL
CHLORIDE SERPL-SCNC: 107 MMOL/L
CO2 SERPL-SCNC: 22 MMOL/L
CREAT SERPL-MCNC: 0.9 MG/DL
DIFFERENTIAL METHOD: ABNORMAL
EOSINOPHIL # BLD AUTO: 0.3 K/UL
EOSINOPHIL NFR BLD: 3.6 %
ERYTHROCYTE [DISTWIDTH] IN BLOOD BY AUTOMATED COUNT: 13.3 %
EST. GFR  (AFRICAN AMERICAN): >60 ML/MIN/1.73 M^2
EST. GFR  (NON AFRICAN AMERICAN): >60 ML/MIN/1.73 M^2
GLUCOSE SERPL-MCNC: 117 MG/DL
HCT VFR BLD AUTO: 46.1 %
HGB BLD-MCNC: 14.7 G/DL
IMM GRANULOCYTES # BLD AUTO: 0.04 K/UL
IMM GRANULOCYTES NFR BLD AUTO: 0.5 %
LYMPHOCYTES # BLD AUTO: 1.3 K/UL
LYMPHOCYTES NFR BLD: 17.7 %
MCH RBC QN AUTO: 30.6 PG
MCHC RBC AUTO-ENTMCNC: 31.9 G/DL
MCV RBC AUTO: 96 FL
MONOCYTES # BLD AUTO: 0.5 K/UL
MONOCYTES NFR BLD: 6.6 %
NEUTROPHILS # BLD AUTO: 5.4 K/UL
NEUTROPHILS NFR BLD: 71.1 %
NRBC BLD-RTO: 0 /100 WBC
PLATELET # BLD AUTO: 238 K/UL
PMV BLD AUTO: 11.1 FL
POTASSIUM SERPL-SCNC: 4.1 MMOL/L
PROT SERPL-MCNC: 7.6 G/DL
RBC # BLD AUTO: 4.8 M/UL
SODIUM SERPL-SCNC: 138 MMOL/L
WBC # BLD AUTO: 7.55 K/UL

## 2018-11-13 PROCEDURE — 80053 COMPREHEN METABOLIC PANEL: CPT

## 2018-11-13 PROCEDURE — 36415 COLL VENOUS BLD VENIPUNCTURE: CPT

## 2018-11-13 PROCEDURE — 85025 COMPLETE CBC W/AUTO DIFF WBC: CPT

## 2018-11-15 ENCOUNTER — TELEPHONE (OUTPATIENT)
Dept: DERMATOLOGY | Facility: CLINIC | Age: 43
End: 2018-11-15

## 2018-11-15 NOTE — TELEPHONE ENCOUNTER
Mayra called to see if the pt was still on Taltz. I checked the office notes and I told them that [facility] said Tremfya was approved. I told Mayra the pt is no longer on Taltz, to discontinue the script.  I then called [facility] to find out when the pt started his Tremfya. The pt has not picked up the med. [facility] said the pt wanted to wait adam Galavizgianam before he started.

## 2018-12-11 ENCOUNTER — PATIENT MESSAGE (OUTPATIENT)
Dept: CARDIOLOGY | Facility: CLINIC | Age: 43
End: 2018-12-11

## 2018-12-11 RX ORDER — FUROSEMIDE 40 MG/1
40 TABLET ORAL 2 TIMES DAILY
Qty: 60 TABLET | Refills: 3 | Status: SHIPPED | OUTPATIENT
Start: 2018-12-11 | End: 2019-02-20 | Stop reason: SDUPTHER

## 2018-12-11 RX ORDER — ALLOPURINOL 300 MG/1
300 TABLET ORAL
Qty: 30 TABLET | Refills: 4 | Status: SHIPPED | OUTPATIENT
Start: 2018-12-11 | End: 2019-01-29 | Stop reason: SDUPTHER

## 2018-12-11 RX ORDER — LISINOPRIL 20 MG/1
20 TABLET ORAL DAILY
Qty: 60 TABLET | Refills: 3 | Status: SHIPPED | OUTPATIENT
Start: 2018-12-11 | End: 2019-09-05 | Stop reason: SDUPTHER

## 2018-12-11 RX ORDER — CARVEDILOL 25 MG/1
25 TABLET ORAL 2 TIMES DAILY WITH MEALS
Qty: 60 TABLET | Refills: 3 | Status: SHIPPED | OUTPATIENT
Start: 2018-12-11 | End: 2019-09-05 | Stop reason: SDUPTHER

## 2018-12-18 ENCOUNTER — OFFICE VISIT (OUTPATIENT)
Dept: DERMATOLOGY | Facility: CLINIC | Age: 43
End: 2018-12-18
Payer: MEDICAID

## 2018-12-18 DIAGNOSIS — L40.9 PSORIASIS: Primary | ICD-10-CM

## 2018-12-18 DIAGNOSIS — Z79.899 ENCOUNTER FOR LONG-TERM (CURRENT) USE OF HIGH-RISK MEDICATION: ICD-10-CM

## 2018-12-18 PROCEDURE — 99213 OFFICE O/P EST LOW 20 MIN: CPT | Mod: PBBFAC | Performed by: PATHOLOGY

## 2018-12-18 PROCEDURE — 99213 OFFICE O/P EST LOW 20 MIN: CPT | Mod: S$PBB,,, | Performed by: PATHOLOGY

## 2018-12-18 PROCEDURE — 99999 PR PBB SHADOW E&M-EST. PATIENT-LVL III: CPT | Mod: PBBFAC,,, | Performed by: PATHOLOGY

## 2018-12-18 RX ORDER — METHOTREXATE 2.5 MG/1
TABLET ORAL
Qty: 24 TABLET | Refills: 2 | Status: SHIPPED | OUTPATIENT
Start: 2018-12-18 | End: 2019-01-22 | Stop reason: SDUPTHER

## 2018-12-18 NOTE — PROGRESS NOTES
Subjective:       Patient ID:  Adrien Panda is a 43 y.o. male who presents for   Chief Complaint   Patient presents with    Psoriasis     Follow up for Tremfya     Psoriasis  - Follow-up  Symptom course: unchanged  Signs / symptoms: asymptomatic    Pt of Dr. Thompson's here today for f/u psoriasis:     Pt is here today for f/u psoriasis, last seen 10/30/2018. Currently on Tremfya - had first dose 3 weeks ago (November 2018).  He is also using halobetasol ointment and TAC prn and clobetasol solution to scalp prn.  Taltz initially worked best of all of the other systemic agents he has tried, but legs flared prior to last visit with progressive new scaly plaques moving proximal on bilateral legs to thighs.  Started MTX 10 mg weekly last visit and had first Tremfya injection 11/26/2018.  Had dark stools for a few days after this injection.  No associated symptoms.       Started Taltz 1/2018. States he was clear when he first started it, but psoriasis has started to recur on his legs. No infections.       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 Rubin at [facility], cutaneous and joint gout tx by Dr. Hermosillo (psoriasis flared on colchicine), stroke      PSORIASIS: failed tx with Enbrel, Humira, Soriatane, clobetasol topical, betamethasone topical, Lidex topical, elocon topical.    ILK helps temporarily. ultravate works best out of all the topicals he's tried. psorcon 0.05% oint helps.    On Humira from 6/2015 - 4/2016 until he stopped responding to it. Switched back to enbrel at that point which only helped temporarily.    Added Soriatane to Enbrel but ended up with a bad flare on lower legs.  Not a good candidate for NBUVB as he lives on the [facility] and there is not light unit in this area.   Stelara 9/21/16 - 7/19/17 (stopped responding to it)  Taltz 1/2018 - 11/2018 (stopped responding to it)     TB gold: negative 04/18    Review of Systems   Constitutional: Negative for fever,  chills, weight loss, weight gain, fatigue, night sweats and malaise.   HENT: Negative for mouth sores (no tongue whiteness).    Respiratory: Negative for shortness of breath.    Gastrointestinal: Negative for nausea, vomiting, abdominal pain and diarrhea.   Musculoskeletal: Negative for arthralgias.   Skin: Positive for itching and rash (but no rashes/itching in folds). Negative for daily sunscreen use, activity-related sunscreen use and recent sunburn.        Injection site reaction - no   yes   Psychiatric/Behavioral: Negative for depressed mood.   Hematologic/Lymphatic: Bruises/bleeds easily.        Objective:    Physical Exam   Constitutional: He appears well-developed and well-nourished. No distress.   Neurological: He is alert and oriented to person, place, and time. He is not disoriented.   Psychiatric: He has a normal mood and affect.   Skin:   Areas Examined (abnormalities noted in diagram):   Scalp / Hair Palpated and Inspected  Head / Face Inspection Performed  Neck Inspection Performed  Chest / Axilla Inspection Performed  Abdomen Inspection Performed  Back Inspection Performed  RUE Inspected  LUE Inspection Performed  RLE Inspected  LLE Inspection Performed              Diagram Legend     Erythematous scaling macule/papule c/w actinic keratosis       Vascular papule c/w angioma      Pigmented verrucoid papule/plaque c/w seborrheic keratosis      Yellow umbilicated papule c/w sebaceous hyperplasia      Irregularly shaped tan macule c/w lentigo     1-2 mm smooth white papules consistent with Milia      Movable subcutaneous cyst with punctum c/w epidermal inclusion cyst      Subcutaneous movable cyst c/w pilar cyst      Firm pink to brown papule c/w dermatofibroma      Pedunculated fleshy papule(s) c/w skin tag(s)      Evenly pigmented macule c/w junctional nevus     Mildly variegated pigmented, slightly irregular-bordered macule c/w mildly atypical nevus      Flesh colored to evenly pigmented papule c/w  intradermal nevus       Pink pearly papule/plaque c/w basal cell carcinoma      Erythematous hyperkeratotic cursted plaque c/w SCC      Surgical scar with no sign of skin cancer recurrence      Open and closed comedones      Inflammatory papules and pustules      Verrucoid papule consistent consistent with wart     Erythematous eczematous patches and plaques     Dystrophic onycholytic nail with subungual debris c/w onychomycosis     Umbilicated papule    Erythematous-base heme-crusted tan verrucoid plaque consistent with inflamed seborrheic keratosis     Erythematous Silvery Scaling Plaque c/w Psoriasis     See annotation      Assessment / Plan:        Psoriasis - has failed multiple systemic agents including Enbrel with Soriatane, Humira, Taltz.  Currently has had 1 dose of Tremfya and on MTX 10 mg weekly and continuing to flare.  CBC, CMP in 2-3 weeks.  Increase MTX to 15 mg weekly.  Continue topical steroids.  -     CBC auto differential; Future  -     Hepatic function panel; Future  -     methotrexate 2.5 MG Tab; Take 6 tablets po qweek in divided doses as directed  Dispense: 24 tablet; Refill: 2  - continue Tremfya    Encounter for long tern use of high risk medication - labs in 2-3 weeks         Follow-up in about 4 weeks (around 1/15/2019).

## 2018-12-31 PROBLEM — Z00.00 HEALTHCARE MAINTENANCE: Status: RESOLVED | Noted: 2018-09-27 | Resolved: 2018-12-31

## 2019-01-02 ENCOUNTER — PATIENT MESSAGE (OUTPATIENT)
Dept: DERMATOLOGY | Facility: CLINIC | Age: 44
End: 2019-01-02

## 2019-01-02 ENCOUNTER — PATIENT MESSAGE (OUTPATIENT)
Dept: RHEUMATOLOGY | Facility: CLINIC | Age: 44
End: 2019-01-02

## 2019-01-02 DIAGNOSIS — M25.50 PAIN IN JOINT INVOLVING MULTIPLE SITES: ICD-10-CM

## 2019-01-02 DIAGNOSIS — M10.9 GOUT, ARTHRITIS: Primary | ICD-10-CM

## 2019-01-08 ENCOUNTER — TELEPHONE (OUTPATIENT)
Dept: RHEUMATOLOGY | Facility: CLINIC | Age: 44
End: 2019-01-08

## 2019-01-08 ENCOUNTER — LAB VISIT (OUTPATIENT)
Dept: LAB | Facility: HOSPITAL | Age: 44
End: 2019-01-08
Payer: MEDICAID

## 2019-01-08 ENCOUNTER — OFFICE VISIT (OUTPATIENT)
Dept: RHEUMATOLOGY | Facility: CLINIC | Age: 44
End: 2019-01-08
Payer: MEDICAID

## 2019-01-08 VITALS
HEIGHT: 69 IN | BODY MASS INDEX: 33.11 KG/M2 | WEIGHT: 223.56 LBS | DIASTOLIC BLOOD PRESSURE: 80 MMHG | SYSTOLIC BLOOD PRESSURE: 113 MMHG | HEART RATE: 77 BPM

## 2019-01-08 DIAGNOSIS — M54.50 CHRONIC MIDLINE LOW BACK PAIN WITHOUT SCIATICA: ICD-10-CM

## 2019-01-08 DIAGNOSIS — M10.9 GOUT, ARTHRITIS: ICD-10-CM

## 2019-01-08 DIAGNOSIS — M25.50 PAIN IN JOINT INVOLVING MULTIPLE SITES: ICD-10-CM

## 2019-01-08 DIAGNOSIS — L40.9 PSORIASIS: ICD-10-CM

## 2019-01-08 DIAGNOSIS — G89.29 CHRONIC MIDLINE LOW BACK PAIN WITHOUT SCIATICA: ICD-10-CM

## 2019-01-08 DIAGNOSIS — L40.50 PSORIATIC ARTHRITIS: Primary | ICD-10-CM

## 2019-01-08 DIAGNOSIS — Z55.9 EDUCATIONAL CIRCUMSTANCE: ICD-10-CM

## 2019-01-08 DIAGNOSIS — E55.9 VITAMIN D INSUFFICIENCY: ICD-10-CM

## 2019-01-08 DIAGNOSIS — E87.6 HYPOKALEMIA: Primary | ICD-10-CM

## 2019-01-08 DIAGNOSIS — Z79.899 LONG TERM CURRENT USE OF IMMUNOSUPPRESSIVE DRUG: ICD-10-CM

## 2019-01-08 DIAGNOSIS — E66.9 OBESITY (BMI 30.0-34.9): ICD-10-CM

## 2019-01-08 DIAGNOSIS — Z79.631 LONG TERM METHOTREXATE USER: ICD-10-CM

## 2019-01-08 LAB
ALBUMIN SERPL BCP-MCNC: 3.8 G/DL
ALBUMIN SERPL BCP-MCNC: 3.8 G/DL
ALP SERPL-CCNC: 51 U/L
ALP SERPL-CCNC: 51 U/L
ALT SERPL W/O P-5'-P-CCNC: 28 U/L
ALT SERPL W/O P-5'-P-CCNC: 28 U/L
ANION GAP SERPL CALC-SCNC: 8 MMOL/L
AST SERPL-CCNC: 26 U/L
AST SERPL-CCNC: 26 U/L
BASOPHILS # BLD AUTO: 0.03 K/UL
BASOPHILS # BLD AUTO: 0.03 K/UL
BASOPHILS NFR BLD: 0.3 %
BASOPHILS NFR BLD: 0.3 %
BILIRUB DIRECT SERPL-MCNC: 0.3 MG/DL
BILIRUB SERPL-MCNC: 0.7 MG/DL
BILIRUB SERPL-MCNC: 0.7 MG/DL
BUN SERPL-MCNC: 16 MG/DL
CALCIUM SERPL-MCNC: 9.6 MG/DL
CHLORIDE SERPL-SCNC: 106 MMOL/L
CO2 SERPL-SCNC: 25 MMOL/L
CREAT SERPL-MCNC: 1 MG/DL
CRP SERPL-MCNC: 10.7 MG/L
DIFFERENTIAL METHOD: ABNORMAL
DIFFERENTIAL METHOD: ABNORMAL
EOSINOPHIL # BLD AUTO: 0.2 K/UL
EOSINOPHIL # BLD AUTO: 0.2 K/UL
EOSINOPHIL NFR BLD: 2.5 %
EOSINOPHIL NFR BLD: 2.5 %
ERYTHROCYTE [DISTWIDTH] IN BLOOD BY AUTOMATED COUNT: 12.8 %
ERYTHROCYTE [DISTWIDTH] IN BLOOD BY AUTOMATED COUNT: 12.8 %
ERYTHROCYTE [SEDIMENTATION RATE] IN BLOOD BY WESTERGREN METHOD: 43 MM/HR
EST. GFR  (AFRICAN AMERICAN): >60 ML/MIN/1.73 M^2
EST. GFR  (NON AFRICAN AMERICAN): >60 ML/MIN/1.73 M^2
GLUCOSE SERPL-MCNC: 96 MG/DL
HCT VFR BLD AUTO: 46.8 %
HCT VFR BLD AUTO: 46.8 %
HGB BLD-MCNC: 15.2 G/DL
HGB BLD-MCNC: 15.2 G/DL
IMM GRANULOCYTES # BLD AUTO: 0.03 K/UL
IMM GRANULOCYTES # BLD AUTO: 0.03 K/UL
IMM GRANULOCYTES NFR BLD AUTO: 0.3 %
IMM GRANULOCYTES NFR BLD AUTO: 0.3 %
LYMPHOCYTES # BLD AUTO: 1.5 K/UL
LYMPHOCYTES # BLD AUTO: 1.5 K/UL
LYMPHOCYTES NFR BLD: 16.9 %
LYMPHOCYTES NFR BLD: 16.9 %
MCH RBC QN AUTO: 30 PG
MCH RBC QN AUTO: 30 PG
MCHC RBC AUTO-ENTMCNC: 32.5 G/DL
MCHC RBC AUTO-ENTMCNC: 32.5 G/DL
MCV RBC AUTO: 93 FL
MCV RBC AUTO: 93 FL
MONOCYTES # BLD AUTO: 0.6 K/UL
MONOCYTES # BLD AUTO: 0.6 K/UL
MONOCYTES NFR BLD: 7 %
MONOCYTES NFR BLD: 7 %
NEUTROPHILS # BLD AUTO: 6.4 K/UL
NEUTROPHILS # BLD AUTO: 6.4 K/UL
NEUTROPHILS NFR BLD: 73 %
NEUTROPHILS NFR BLD: 73 %
NRBC BLD-RTO: 0 /100 WBC
NRBC BLD-RTO: 0 /100 WBC
PLATELET # BLD AUTO: 221 K/UL
PLATELET # BLD AUTO: 221 K/UL
PMV BLD AUTO: 11 FL
PMV BLD AUTO: 11 FL
POTASSIUM SERPL-SCNC: 3.4 MMOL/L
PROT SERPL-MCNC: 7.6 G/DL
PROT SERPL-MCNC: 7.6 G/DL
RBC # BLD AUTO: 5.06 M/UL
RBC # BLD AUTO: 5.06 M/UL
SODIUM SERPL-SCNC: 139 MMOL/L
URATE SERPL-MCNC: 5.7 MG/DL
WBC # BLD AUTO: 8.83 K/UL
WBC # BLD AUTO: 8.83 K/UL

## 2019-01-08 PROCEDURE — 99999 PR PBB SHADOW E&M-EST. PATIENT-LVL IV: CPT | Mod: PBBFAC,,, | Performed by: INTERNAL MEDICINE

## 2019-01-08 PROCEDURE — 86706 HEP B SURFACE ANTIBODY: CPT

## 2019-01-08 PROCEDURE — 80053 COMPREHEN METABOLIC PANEL: CPT

## 2019-01-08 PROCEDURE — 80076 HEPATIC FUNCTION PANEL: CPT

## 2019-01-08 PROCEDURE — 86140 C-REACTIVE PROTEIN: CPT

## 2019-01-08 PROCEDURE — 99999 PR PBB SHADOW E&M-EST. PATIENT-LVL IV: ICD-10-PCS | Mod: PBBFAC,,, | Performed by: INTERNAL MEDICINE

## 2019-01-08 PROCEDURE — 99214 OFFICE O/P EST MOD 30 MIN: CPT | Mod: PBBFAC | Performed by: INTERNAL MEDICINE

## 2019-01-08 PROCEDURE — 86803 HEPATITIS C AB TEST: CPT

## 2019-01-08 PROCEDURE — 36415 COLL VENOUS BLD VENIPUNCTURE: CPT

## 2019-01-08 PROCEDURE — 85025 COMPLETE CBC W/AUTO DIFF WBC: CPT

## 2019-01-08 PROCEDURE — 86704 HEP B CORE ANTIBODY TOTAL: CPT

## 2019-01-08 PROCEDURE — 84550 ASSAY OF BLOOD/URIC ACID: CPT

## 2019-01-08 PROCEDURE — 87340 HEPATITIS B SURFACE AG IA: CPT

## 2019-01-08 PROCEDURE — 99215 PR OFFICE/OUTPT VISIT, EST, LEVL V, 40-54 MIN: ICD-10-PCS | Mod: S$PBB,,, | Performed by: INTERNAL MEDICINE

## 2019-01-08 PROCEDURE — 85652 RBC SED RATE AUTOMATED: CPT

## 2019-01-08 PROCEDURE — 99215 OFFICE O/P EST HI 40 MIN: CPT | Mod: S$PBB,,, | Performed by: INTERNAL MEDICINE

## 2019-01-08 SDOH — SOCIAL DETERMINANTS OF HEALTH (SDOH): PROBLEMS RELATED TO EDUCATION AND LITERACY, UNSPECIFIED: Z55.9

## 2019-01-08 ASSESSMENT — ROUTINE ASSESSMENT OF PATIENT INDEX DATA (RAPID3)
TOTAL RAPID3 SCORE: 6.28
FATIGUE SCORE: 5
WHEN YOU AWAKENED IN THE MORNING OVER THE LAST WEEK, PLEASE INDICATE THE AMOUNT OF TIME IT TAKES UNTIL YOU ARE AS LIMBER AS YOU WILL BE FOR THE DAY: 1 HOUR
PATIENT GLOBAL ASSESSMENT SCORE: 5.5
PSYCHOLOGICAL DISTRESS SCORE: 4.4
MDHAQ FUNCTION SCORE: 1.3
PAIN SCORE: 9
AM STIFFNESS SCORE: 1, YES

## 2019-01-08 NOTE — PATIENT INSTRUCTIONS
Daily, low impact, dedicated, aerobic exercise.    Eat breakfast, a late lunch and a very small early dinner.    Do not eat anything late at night (just water).

## 2019-01-08 NOTE — PROGRESS NOTES
Subjective:     Patient ID: Adrien Panda is a 43 y.o. male     Chief Complaint: Disease Management       HPI   43 yr old man last seen by Dr. Hermosillo on 4/3/18. He has multiple morbidities which include tophaceous gout & psoriasis (with possibly PsA).  He has chronic intermittent low back pain and chondrocalcinosis of knees on imaging.  He has cardiac issues with idiopathic cardiomyopathy, hypertension and dyslipidemia. His mom  with heart disease and a cardiac tumor.     He is currently on allopurinol 450 mg/day. Last attack on L elbow about 6 months ago that responded to medrol dose flash. No renal calculi. No moonshine. Occasional ETOH on weekend (cognac); no beer; little brother has gout.     Has had psoriasis most of his life. Had been on Enbrel, Humira & Taltz in past for psoriasis. At first these drugs worked well for his skin lesions but subsequently lost efficacy and treatment was changed. Does not recall having any joint pain or swelling other than gout until recently after Taltz was discontinued. He's been off Taltz x 2 months.  Was switched from Taltz to Tremfya by Dr. Daryl Son. Has gotten only 2 doses yet (wk 0 & 4).  MTX was also added --started 10 mg on 10/30/18 & increased to 15 mg/wk on 18. Has not seen a response yet to either rash or joint pain.    Began having joint pain about 6 weeks ago. Both ankles and, hands and low back are stiff and painful. AM stiffness lasts 1 hr or so. He does not appreciate much joint swelling.   Psoriasis is still active in many areas, especially LEs. Denies enthesopathies. Denies IBD sxs; Denies uveitis. Low back has been stiff chronically (can't tell at what age began).   Back pain does not improve with activity.       Current Outpatient Medications   Medication Sig Dispense Refill    allopurinol (ZYLOPRIM) 300 MG tablet TAKE ONE AND ONE-HALF TABLETS BY MOUTH ONCE DAILY 45 tablet 6    allopurinol (ZYLOPRIM) 300 MG tablet Take 1 tablet (300  mg total) by mouth daily 2 hours after breakfast. 30 tablet 4    carvedilol (COREG) 25 MG tablet Take 1 tablet (25 mg total) by mouth 2 (two) times daily with meals. 60 tablet 3    clobetasol (TEMOVATE) 0.05 % external solution USE ON SCALP ONCE TO TWICE DAILY AS NEEDED FOR SCALING OR ITCHING 50 mL 3    diphth,pertus,acell,,tetanus (BOOSTRIX) 2.5-8-5 Lf-mcg-Lf/0.5mL Syrg injection Inject into the muscle. 0.5 mL 0    divalproex ER (DEPAKOTE) 500 MG Tb24       ergocalciferol (ERGOCALCIFEROL) 50,000 unit Cap Take 1 capsule (50,000 Units total) by mouth every 7 days. 8 capsule 0    folic acid (FOLVITE) 1 MG tablet 1 po qday - do not take on same day as taking methotrexate 30 tablet 5    furosemide (LASIX) 40 MG tablet Take 1 tablet (40 mg total) by mouth 2 (two) times daily. 60 tablet 3    halobetasol (ULTRAVATE) 0.05 % ointment APPLY  OINTMENT TOPICALLY TO AFFECTED AREA TWICE DAILY 100 g 3    KLOR-CON M20 20 mEq tablet       lisinopril (PRINIVIL,ZESTRIL) 20 MG tablet Take 1 tablet (20 mg total) by mouth once daily. 60 tablet 3    methotrexate 2.5 MG Tab Take 6 tablets po qweek in divided doses as directed 24 tablet 2    methylPREDNISolone (MEDROL DOSEPACK) 4 mg tablet use as directed 21 tablet 0    TREMFYA 100 mg/mL Syrg Inject 100mg (1ml) into the skin at week 0 and week 4 then every other month 2 mL 2    triamcinolone acetonide 0.1% (KENALOG) 0.1 % ointment APPLY  OINTMENT TOPICALLY TO AFFECTED AREA TWICE DAILY 454 g 2     No current facility-administered medications for this visit.        Review of patient's allergies indicates:  No Known Allergies    Review of Systems   Constitutional: Positive for fatigue. Negative for fever.   HENT: Negative.  Negative for hearing loss, mouth sores, sore throat, tinnitus and trouble swallowing.         Dry mouth   Eyes: Negative.  Negative for visual disturbance.   Respiratory: Positive for cough. Negative for choking, chest tightness and shortness of breath.   "  Cardiovascular: Negative.  Negative for chest pain, palpitations and leg swelling.   Gastrointestinal: Positive for constipation. Negative for abdominal pain, blood in stool, diarrhea, nausea and vomiting.   Endocrine: Negative.  Negative for cold intolerance and heat intolerance.   Genitourinary: Negative.  Negative for frequency, hematuria and urgency.   Musculoskeletal: Negative.  Negative for back pain, myalgias, neck pain and neck stiffness.   Skin: Positive for rash.   Allergic/Immunologic: Positive for immunocompromised state. Negative for environmental allergies and food allergies.   Neurological: Positive for headaches. Negative for dizziness, syncope and numbness.   Hematological: Does not bruise/bleed easily.   Psychiatric/Behavioral: Positive for dysphoric mood and sleep disturbance. The patient is not nervous/anxious.        Past Medical History:   Diagnosis Date    Arthritis     Blood clotting tendency     Congestive heart failure     High cholesterol     Hyperlipemia     Hypertension     Joint pain     Psoriasis        No past surgical history on file.    Family History   Problem Relation Age of Onset    Coronary artery disease Mother     Melanoma Neg Hx     Psoriasis Neg Hx     Lupus Neg Hx    Mom had tumor on her heart ("atrial myxoma') & CAD.  Paternal hx unknown.  2 children ages 16 & 20;   Son with depression on prozac.    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     Objective:     /80   Pulse 77   Ht 5' 9" (1.753 m)   Wt 101.4 kg (223 lb 8.7 oz)   BMI 33.01 kg/m²     Physical Exam   Vitals reviewed.  Constitutional: He is oriented to person, place, and time and well-developed, well-nourished, and in " no distress. No distress.   HENT:   Head: Normocephalic and atraumatic.   Mouth/Throat: Oropharynx is clear and moist. No oropharyngeal exudate.   No facial rashes  Parotids not enlarged  No oral ulcers   Eyes: Conjunctivae and EOM are normal. Pupils are equal, round, and reactive to light. Right eye exhibits no discharge. Left eye exhibits no discharge. No scleral icterus.   Neck: Neck supple. No JVD present. No tracheal deviation present. No thyromegaly present.   Cardiovascular: Normal rate, regular rhythm, normal heart sounds and intact distal pulses.  Exam reveals no gallop and no friction rub.    No murmur heard.  Pulmonary/Chest: Effort normal and breath sounds normal. No respiratory distress. He has no wheezes. He has no rales. He exhibits no tenderness.   Abdominal: Soft. Bowel sounds are normal. He exhibits no distension and no mass. There is no splenomegaly or hepatomegaly. There is no tenderness. There is no rebound and no guarding.   Lymphadenopathy:     He has no cervical adenopathy.        Right: No inguinal adenopathy present.        Left: No inguinal adenopathy present.   Neurological: He is alert and oriented to person, place, and time. He has normal reflexes. No cranial nerve deficit. Gait normal.   Proximal and distal muscle strength 5/5.   Skin: Skin is warm and dry. Rash noted. He is not diaphoretic.     See photos of psoriasis; also plaques on shins.   Multiple tattoos.  No nail pitting but onycholysis of R 5th toenail w discoloration.    Psychiatric: Mood, memory, affect and judgment normal.   Musculoskeletal: Normal range of motion. He exhibits no edema or tenderness.   See photos:  Mild dactylitis 2nd L digit.  Mild hypertrophy of several DIPs (tophi vs Heberden's)  Bilateral ankle puffiness?synovitis? Non tender; no metatarsalgia or metacarpalgia;   L spine with FROM                            Left thigh      1/8/19: ESR 43; CRP 10.7; CBC ok; CMP K+ 3.4; UA 5.7;   10/25/16: Bilateral  ankles:personally viewed: mild swelling R; mild OA L  10/25/16: Bilateral feet: personally viewed: R There is mild DJD and a hallux valgus deformity.  No fracture dislocation bone destruction or coalition seen. Left: There is mild DJD and a mild hallux valgus deformity.  No fracture dislocation bone destruction or coalition seen.Bilater    Assessment:   Psoriatic Arthritis--some activity   Elevated ESR & CRP  Gout   On target  Chronic LBP   Doubt due to SpA  Idiopathic cardiomyopathy   Mom w CAD & cardiac tumor  Bilateral frontal infarcts--remote by brain MRI  HTN  Dyslipidemia        Plan:   Add HCV & HBV to labs done today.  Imaging of hands, feet and SI joints & Arthritis Survey.  Ordered HLA B-27 to be done next time labs drawn.   Reviewed diagnoses;   Shown 3 types of low back exercises to do.  Counseled on weight loss.  Will need labs q 3 months on MTX.  RTC 3-4 months or prn.

## 2019-01-09 ENCOUNTER — PATIENT MESSAGE (OUTPATIENT)
Dept: RHEUMATOLOGY | Facility: CLINIC | Age: 44
End: 2019-01-09

## 2019-01-09 LAB
HBV CORE AB SERPL QL IA: NEGATIVE
HBV SURFACE AB SER-ACNC: NEGATIVE M[IU]/ML
HBV SURFACE AG SERPL QL IA: NEGATIVE
HCV AB SERPL QL IA: NEGATIVE

## 2019-01-10 ENCOUNTER — HOSPITAL ENCOUNTER (OUTPATIENT)
Dept: RADIOLOGY | Facility: HOSPITAL | Age: 44
Discharge: HOME | End: 2019-01-10
Attending: INTERNAL MEDICINE
Payer: MEDICAID

## 2019-01-10 DIAGNOSIS — M10.9 GOUT, ARTHRITIS: ICD-10-CM

## 2019-01-10 DIAGNOSIS — L40.50 PSORIATIC ARTHRITIS: ICD-10-CM

## 2019-01-10 PROCEDURE — 77077 JOINT SURVEY SINGLE VIEW: CPT | Mod: TC

## 2019-01-10 PROCEDURE — 77077 JOINT SURVEY SINGLE VIEW: CPT | Mod: 26,,, | Performed by: RADIOLOGY

## 2019-01-10 PROCEDURE — 73630 X-RAY EXAM OF FOOT: CPT | Mod: 50,TC,FY

## 2019-01-10 PROCEDURE — 73130 X-RAY EXAM OF HAND: CPT | Mod: 26,50,, | Performed by: RADIOLOGY

## 2019-01-10 PROCEDURE — 77077 XR ARTHRITIS SURVEY: ICD-10-PCS | Mod: 26,,, | Performed by: RADIOLOGY

## 2019-01-10 PROCEDURE — 73130 XR HAND COMPLETE 3 VIEWS BILATERAL: ICD-10-PCS | Mod: 26,50,, | Performed by: RADIOLOGY

## 2019-01-10 PROCEDURE — 72200 X-RAY EXAM SI JOINTS: CPT | Mod: 26,,, | Performed by: RADIOLOGY

## 2019-01-10 PROCEDURE — 72200 X-RAY EXAM SI JOINTS: CPT | Mod: TC,FY

## 2019-01-10 PROCEDURE — 73630 X-RAY EXAM OF FOOT: CPT | Mod: 26,50,, | Performed by: RADIOLOGY

## 2019-01-10 PROCEDURE — 72200 XR SACROILIAC JOINTS 3 VIEWS: ICD-10-PCS | Mod: 26,,, | Performed by: RADIOLOGY

## 2019-01-10 PROCEDURE — 73130 X-RAY EXAM OF HAND: CPT | Mod: 50,TC,FY

## 2019-01-10 PROCEDURE — 73630 XR FOOT COMPLETE 3 VIEW BILATERAL: ICD-10-PCS | Mod: 26,50,, | Performed by: RADIOLOGY

## 2019-01-16 ENCOUNTER — PATIENT MESSAGE (OUTPATIENT)
Dept: CARDIOLOGY | Facility: CLINIC | Age: 44
End: 2019-01-16

## 2019-01-16 ENCOUNTER — PATIENT MESSAGE (OUTPATIENT)
Dept: INTERNAL MEDICINE | Facility: CLINIC | Age: 44
End: 2019-01-16

## 2019-01-16 DIAGNOSIS — I42.9 CARDIOMYOPATHY, IDIOPATHIC: Primary | ICD-10-CM

## 2019-01-16 DIAGNOSIS — L40.9 PSORIASIS: ICD-10-CM

## 2019-01-16 RX ORDER — TRIAMCINOLONE ACETONIDE 1 MG/G
OINTMENT TOPICAL
Qty: 454 G | Refills: 2 | Status: SHIPPED | OUTPATIENT
Start: 2019-01-16 | End: 2019-04-23 | Stop reason: SDUPTHER

## 2019-01-16 RX ORDER — POTASSIUM CHLORIDE 1500 MG/1
20 TABLET, EXTENDED RELEASE ORAL DAILY
Qty: 90 TABLET | Refills: 2 | Status: SHIPPED | OUTPATIENT
Start: 2019-01-16 | End: 2019-01-25 | Stop reason: SDUPTHER

## 2019-01-22 ENCOUNTER — OFFICE VISIT (OUTPATIENT)
Dept: DERMATOLOGY | Facility: CLINIC | Age: 44
End: 2019-01-22
Payer: MEDICAID

## 2019-01-22 DIAGNOSIS — L40.9 PSORIASIS: ICD-10-CM

## 2019-01-22 PROCEDURE — 99999 PR PBB SHADOW E&M-EST. PATIENT-LVL III: ICD-10-PCS | Mod: PBBFAC,,, | Performed by: PATHOLOGY

## 2019-01-22 PROCEDURE — 99213 OFFICE O/P EST LOW 20 MIN: CPT | Mod: PBBFAC | Performed by: PATHOLOGY

## 2019-01-22 PROCEDURE — 99213 OFFICE O/P EST LOW 20 MIN: CPT | Mod: S$PBB,,, | Performed by: PATHOLOGY

## 2019-01-22 PROCEDURE — 99999 PR PBB SHADOW E&M-EST. PATIENT-LVL III: CPT | Mod: PBBFAC,,, | Performed by: PATHOLOGY

## 2019-01-22 PROCEDURE — 99213 PR OFFICE/OUTPT VISIT, EST, LEVL III, 20-29 MIN: ICD-10-PCS | Mod: S$PBB,,, | Performed by: PATHOLOGY

## 2019-01-22 RX ORDER — METHOTREXATE 2.5 MG/1
TABLET ORAL
Qty: 24 TABLET | Refills: 2 | Status: SHIPPED | OUTPATIENT
Start: 2019-01-22 | End: 2019-12-12

## 2019-01-22 NOTE — PROGRESS NOTES
Subjective:       Patient ID:  Adrien Panda is a 43 y.o. male who presents for   Chief Complaint   Patient presents with    Psoriasis     f/u, worse, legs, elbows, Tx Tremfya, folic acid     Psoriasis  - Follow-up  Symptom course: worsening  Affected locations: left lower leg, right lower leg, left elbow and right elbow  Signs / symptoms: redness and itching  Severity: moderate    Pt is here today for f/u psoriasis, last seen 12/18/2018. Currently on Tremfya - has had first 2 doses in late November and December 2018.  Has had worsening arthritis with imaging showing both inflammatory and osteoarthritis.  He is also using halobetasol ointment and TAC prn and clobetasol solution to scalp prn.  Taltz initially worked best of all of the other systemic agents he has tried, but legs flared late summer/early fall 2018 with progressive new scaly plaques moving proximal on bilateral legs to thighs.  Started MTX 10 mg weekly in October 2018, which was subsequently increased to 15 mg weekly .  First Tremfya injection 11/26/2018.  Had dark stools for a few days after this injection.  No associated symptoms.       Started Taltz 1/2018. States he was clear when he first started it, but psoriasis has started to recur on his legs. No infections.       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 Rubin at [facility], cutaneous and joint gout tx by Dr. Hermosillo (psoriasis flared on colchicine), stroke      PSORIASIS: failed tx with Enbrel, Humira, Soriatane, clobetasol topical, betamethasone topical, Lidex topical, elocon topical.    ILK helps temporarily. ultravate works best out of all the topicals he's tried. psorcon 0.05% oint helps.    On Humira from 6/2015 - 4/2016 until he stopped responding to it. Switched back to enbrel at that point which only helped temporarily.    Added Soriatane to Enbrel but ended up with a bad flare on lower legs.  [place] a good candidate for [place]  [facility] [place].   Stelara 9/21/16 - 7/19/17 (stopped responding to it)  Taltz 1/2018 - 11/2018 (stopped responding to it)     TB gold: negative 04/18    Review of Systems   Constitutional: Negative for fever, chills, weight loss, weight gain, fatigue, night sweats and malaise.   HENT: Negative for mouth sores (no tongue whiteness).    Respiratory: Negative for shortness of breath.    Gastrointestinal: Negative for nausea, vomiting, abdominal pain and diarrhea.   Musculoskeletal: Positive for joint swelling and arthralgias.   Skin: Positive for itching and rash. Negative for daily sunscreen use and activity-related sunscreen use.        Injection site reaction - no   yes   Psychiatric/Behavioral: Negative for depressed mood.        Objective:    Physical Exam   Constitutional: He appears well-developed and well-nourished. No distress.   Neurological: He is alert and oriented to person, place, and time. He is not disoriented.   Psychiatric: He has a normal mood and affect.   Skin:   Areas Examined (abnormalities noted in diagram):   Scalp / Hair Palpated and Inspected  Head / Face Inspection Performed  Neck Inspection Performed  Chest / Axilla Inspection Performed  Abdomen Inspection Performed  Back Inspection Performed  RUE Inspected  LUE Inspection Performed  RLE Inspected  LLE Inspection Performed              Diagram Legend     Erythematous scaling macule/papule c/w actinic keratosis       Vascular papule c/w angioma      Pigmented verrucoid papule/plaque c/w seborrheic keratosis      Yellow umbilicated papule c/w sebaceous hyperplasia      Irregularly shaped tan macule c/w lentigo     1-2 mm smooth white papules consistent with Milia      Movable subcutaneous cyst with punctum c/w epidermal inclusion cyst      Subcutaneous movable cyst c/w pilar cyst      Firm pink to brown papule c/w dermatofibroma      Pedunculated fleshy papule(s) c/w skin tag(s)      Evenly pigmented macule  c/w junctional nevus     Mildly variegated pigmented, slightly irregular-bordered macule c/w mildly atypical nevus      Flesh colored to evenly pigmented papule c/w intradermal nevus       Pink pearly papule/plaque c/w basal cell carcinoma      Erythematous hyperkeratotic cursted plaque c/w SCC      Surgical scar with no sign of skin cancer recurrence      Open and closed comedones      Inflammatory papules and pustules      Verrucoid papule consistent consistent with wart     Erythematous eczematous patches and plaques     Dystrophic onycholytic nail with subungual debris c/w onychomycosis     Umbilicated papule    Erythematous-base heme-crusted tan verrucoid plaque consistent with inflamed seborrheic keratosis     Erythematous Silvery Scaling Plaque c/w Psoriasis     See annotation      Assessment / Plan:        Psoriasis - skin with slightly more extensive involvement than last visit ~8% BSA but worsening arthritis.  Has only had first 2 Tremfya injections but also on MTX 15 mg weekly.  Failed multiple systemic agents including Enbrel with Soriatane, Humira, Taltz.  Continue topical steroids, MTX 15 mg weekly and Tremfya for now.  Would like for him to wait another 1-2 months on this regimen, given that he has already tried and failed multiple biologics.  If no improvement in 6-8 weeks, consider Cosentyx or Ilumya.  -     methotrexate 2.5 MG Tab; Take 6 tablets po qweek in divided doses as directed  Dispense: 24 tablet; Refill: 2  - continue folate  - labs at next visit - CBC, CMP, quant gold             No Follow-up on file.

## 2019-01-25 DIAGNOSIS — I42.9 CARDIOMYOPATHY, IDIOPATHIC: ICD-10-CM

## 2019-01-25 RX ORDER — POTASSIUM CHLORIDE 1500 MG/1
20 TABLET, EXTENDED RELEASE ORAL DAILY
Qty: 90 TABLET | Refills: 2 | Status: SHIPPED | OUTPATIENT
Start: 2019-01-25 | End: 2019-01-25 | Stop reason: SDUPTHER

## 2019-01-25 RX ORDER — POTASSIUM CHLORIDE 1500 MG/1
20 TABLET, EXTENDED RELEASE ORAL DAILY
Qty: 90 TABLET | Refills: 2 | Status: SHIPPED | OUTPATIENT
Start: 2019-01-25 | End: 2019-01-27

## 2019-01-27 DIAGNOSIS — I42.9 CARDIOMYOPATHY, IDIOPATHIC: Primary | ICD-10-CM

## 2019-01-27 RX ORDER — POTASSIUM CHLORIDE 20 MEQ/1
20 TABLET, EXTENDED RELEASE ORAL DAILY
Qty: 90 TABLET | Refills: 0 | Status: SHIPPED | OUTPATIENT
Start: 2019-01-27 | End: 2019-05-06 | Stop reason: SDUPTHER

## 2019-01-29 RX ORDER — ALLOPURINOL 300 MG/1
TABLET ORAL
Qty: 45 TABLET | Refills: 6 | Status: SHIPPED | OUTPATIENT
Start: 2019-01-29 | End: 2019-08-20 | Stop reason: SDUPTHER

## 2019-01-31 ENCOUNTER — PATIENT MESSAGE (OUTPATIENT)
Dept: DERMATOLOGY | Facility: CLINIC | Age: 44
End: 2019-01-31

## 2019-02-04 ENCOUNTER — TELEPHONE (OUTPATIENT)
Dept: PHARMACY | Facility: CLINIC | Age: 44
End: 2019-02-04

## 2019-02-07 ENCOUNTER — PATIENT MESSAGE (OUTPATIENT)
Dept: DERMATOLOGY | Facility: CLINIC | Age: 44
End: 2019-02-07

## 2019-02-13 RX ORDER — ALLOPURINOL 300 MG/1
TABLET ORAL
Qty: 45 TABLET | Refills: 6 | Status: SHIPPED | OUTPATIENT
Start: 2019-02-13 | End: 2020-06-24 | Stop reason: SDUPTHER

## 2019-02-15 ENCOUNTER — PATIENT MESSAGE (OUTPATIENT)
Dept: DERMATOLOGY | Facility: CLINIC | Age: 44
End: 2019-02-15

## 2019-02-18 ENCOUNTER — PATIENT MESSAGE (OUTPATIENT)
Dept: RHEUMATOLOGY | Facility: CLINIC | Age: 44
End: 2019-02-18

## 2019-02-19 ENCOUNTER — OFFICE VISIT (OUTPATIENT)
Dept: RHEUMATOLOGY | Facility: CLINIC | Age: 44
End: 2019-02-19
Payer: MEDICAID

## 2019-02-19 VITALS
DIASTOLIC BLOOD PRESSURE: 89 MMHG | HEIGHT: 69 IN | WEIGHT: 227.06 LBS | HEART RATE: 82 BPM | SYSTOLIC BLOOD PRESSURE: 135 MMHG | BODY MASS INDEX: 33.63 KG/M2

## 2019-02-19 DIAGNOSIS — E66.9 OBESITY (BMI 30.0-34.9): ICD-10-CM

## 2019-02-19 DIAGNOSIS — M10.9 GOUT, ARTHRITIS: Primary | ICD-10-CM

## 2019-02-19 DIAGNOSIS — Z79.899 LONG TERM CURRENT USE OF IMMUNOSUPPRESSIVE DRUG: ICD-10-CM

## 2019-02-19 DIAGNOSIS — Z79.631 LONG TERM METHOTREXATE USER: ICD-10-CM

## 2019-02-19 DIAGNOSIS — E55.9 VITAMIN D INSUFFICIENCY: ICD-10-CM

## 2019-02-19 DIAGNOSIS — L40.50 PSORIATIC ARTHRITIS: ICD-10-CM

## 2019-02-19 PROCEDURE — 99214 PR OFFICE/OUTPT VISIT, EST, LEVL IV, 30-39 MIN: ICD-10-PCS | Mod: S$PBB,,, | Performed by: INTERNAL MEDICINE

## 2019-02-19 PROCEDURE — 99999 PR PBB SHADOW E&M-EST. PATIENT-LVL IV: ICD-10-PCS | Mod: PBBFAC,,, | Performed by: INTERNAL MEDICINE

## 2019-02-19 PROCEDURE — 99214 OFFICE O/P EST MOD 30 MIN: CPT | Mod: PBBFAC | Performed by: INTERNAL MEDICINE

## 2019-02-19 PROCEDURE — 99999 PR PBB SHADOW E&M-EST. PATIENT-LVL IV: CPT | Mod: PBBFAC,,, | Performed by: INTERNAL MEDICINE

## 2019-02-19 PROCEDURE — 99214 OFFICE O/P EST MOD 30 MIN: CPT | Mod: S$PBB,,, | Performed by: INTERNAL MEDICINE

## 2019-02-19 RX ORDER — TRIAMCINOLONE ACETONIDE 40 MG/ML
40 INJECTION, SUSPENSION INTRA-ARTICULAR; INTRAMUSCULAR
Status: COMPLETED | OUTPATIENT
Start: 2019-02-19 | End: 2019-02-19

## 2019-02-19 RX ORDER — COLCHICINE 0.6 MG/1
0.6 CAPSULE ORAL 2 TIMES DAILY
Qty: 70 CAPSULE | Refills: 2 | Status: SHIPPED | OUTPATIENT
Start: 2019-02-19 | End: 2019-03-12

## 2019-02-19 RX ADMIN — TRIAMCINOLONE ACETONIDE 40 MG: 40 INJECTION, SUSPENSION INTRA-ARTICULAR; INTRAMUSCULAR at 03:02

## 2019-02-19 ASSESSMENT — ROUTINE ASSESSMENT OF PATIENT INDEX DATA (RAPID3)
PATIENT GLOBAL ASSESSMENT SCORE: 8
FATIGUE SCORE: 8
PAIN SCORE: 10
MDHAQ FUNCTION SCORE: 2.1
TOTAL RAPID3 SCORE: 8.33
PSYCHOLOGICAL DISTRESS SCORE: 5.5
WHEN YOU AWAKENED IN THE MORNING OVER THE LAST WEEK, PLEASE INDICATE THE AMOUNT OF TIME IT TAKES UNTIL YOU ARE AS LIMBER AS YOU WILL BE FOR THE DAY: 1 HOUR
AM STIFFNESS SCORE: 1, YES

## 2019-02-19 NOTE — PATIENT INSTRUCTIONS
To abort an acute attack of gout take 2 colchicine capsules immediately and then one more, one hour later.  If it doesn't stop the attack let us know and we will order a medrol dose flash to take.   Medrol is a steroid and has multiple side effects: may increase your blood pressure, blood sugar, appetite and weaken your bones etc.    Read on steroids.    Discuss taking a biologic agent with your dermatologist that will also treat arthritis such as: Cosentyx or a TNF inhibitor that you haven't been on like Cimzia    Slow weight loss.    Eat properly.

## 2019-02-19 NOTE — PROGRESS NOTES
Subjective:     Patient ID: Adrien Panda is a 43 y.o. male with PsA & gout.     Chief Complaint: No chief complaint on file.       HPI   43 yr old man last seen by us on 19. He is a patient of Dr. Romero. He has multiple morbidities which include tophaceous gout & psoriasis (with possibly PsA).  He has chronic intermittent low back pain and chondrocalcinosis of knees on imaging.  He has cardiac issues with idiopathic cardiomyopathy, hypertension and dyslipidemia. His mom  with heart disease and a cardiac tumor.     He returns urgently. He developed L podagra after a meal on Sanchez's day. Getting a bit better but he requests an injection.  Last attack on L elbow >7  months ago that responded to medrol dose flash. No renal calculi. No moonshine. Still occasional ETOH on weekend (cognac); no beer; little brother has gout.  Has not been able to afford colchicine tablets. Has not tried mitigare.     He is currently on allopurinol 450 mg/day. His last uric acids were on target. He is usually more or less compliant with his diet.     He also has psoriasis with PsA and is currently on Tremfya which does not seem to be helping. He has AM stiffness x 1 hr and multiple joint pain wo visible swelling.         From previous note 19  Has had psoriasis most of his life. Had been on Enbrel, Humira & Taltz in past for psoriasis. At first these drugs worked well for his skin lesions but subsequently lost efficacy and treatment was changed. Does not recall having any joint pain or swelling other than gout until recently after Taltz was discontinued. He's been off Taltz x 2 months.  Was switched from Taltz to Tremfya by Dr. Daryl Son. Has gotten only 2 doses yet (wk 0 & 4).  MTX was also added --started 10 mg on 10/30/18 & increased to 15 mg/wk on 18. Has not seen a response yet to either rash or joint pain.    Began having joint pain about 6 weeks ago. Both ankles and, hands and low back are stiff  and painful. AM stiffness lasts 1 hr or so. He does not appreciate much joint swelling.   Psoriasis is still active in many areas, especially LEs. Denies enthesopathies. Denies IBD sxs; Denies uveitis. Low back has been stiff chronically (can't tell at what age began).   Back pain does not improve with activity.     Current Outpatient Medications   Medication Sig Dispense Refill    allopurinol (ZYLOPRIM) 300 MG tablet TAKE 1 & 1/2 (ONE & ONE-HALF) TABLETS BY MOUTH ONCE DAILY 45 tablet 6    allopurinol (ZYLOPRIM) 300 MG tablet TAKE 1 & 1/2 (ONE & ONE-HALF) TABLETS BY MOUTH ONCE DAILY 45 tablet 6    carvedilol (COREG) 25 MG tablet Take 1 tablet (25 mg total) by mouth 2 (two) times daily with meals. 60 tablet 3    clobetasol (TEMOVATE) 0.05 % external solution USE ON SCALP ONCE TO TWICE DAILY AS NEEDED FOR SCALING OR ITCHING 50 mL 3    diphth,pertus,acell,,tetanus (BOOSTRIX) 2.5-8-5 Lf-mcg-Lf/0.5mL Syrg injection Inject into the muscle. 0.5 mL 0    folic acid (FOLVITE) 1 MG tablet 1 po qday - do not take on same day as taking methotrexate 30 tablet 5    furosemide (LASIX) 40 MG tablet Take 1 tablet (40 mg total) by mouth 2 (two) times daily. 60 tablet 3    halobetasol (ULTRAVATE) 0.05 % ointment APPLY  OINTMENT TOPICALLY TO AFFECTED AREA TWICE DAILY 100 g 3    lisinopril (PRINIVIL,ZESTRIL) 20 MG tablet Take 1 tablet (20 mg total) by mouth once daily. 60 tablet 3    methotrexate 2.5 MG Tab Take 6 tablets po qweek in divided doses as directed 24 tablet 2    potassium chloride SA (K-DUR,KLOR-CON) 20 MEQ tablet Take 1 tablet (20 mEq total) by mouth once daily. 90 tablet 0    TREMFYA 100 mg/mL Syrg Inject 100mg (1ml) into the skin at week 0 and week 4 then every other month 2 mL 2    triamcinolone acetonide 0.1% (KENALOG) 0.1 % ointment APPLY OINTMENT TOPICALLY TO AFFECTED AREA TWICE DAILY 454 g 2    colchicine (MITIGARE) 0.6 mg Cap Take 1 capsule (0.6 mg total) by mouth 2 (two) times daily. To abort an acute  attack take 2 capsules immediately and 1 capsule one hour later. 70 capsule 2    divalproex ER (DEPAKOTE) 500 MG Tb24       ergocalciferol (ERGOCALCIFEROL) 50,000 unit Cap Take 1 capsule (50,000 Units total) by mouth every 7 days. 8 capsule 0     No current facility-administered medications for this visit.            Review of patient's allergies indicates:  No Known Allergies    Review of Systems   Constitutional: Positive for fatigue. Negative for fever.   HENT: Negative.  Negative for hearing loss, mouth sores, sore throat, tinnitus and trouble swallowing.         Dry mouth   Eyes: Negative.  Negative for visual disturbance.   Respiratory: Positive for cough. Negative for choking, chest tightness and shortness of breath.    Cardiovascular: Negative.  Negative for chest pain, palpitations and leg swelling.   Gastrointestinal: Negative for abdominal pain, blood in stool, constipation, diarrhea, nausea and vomiting.   Endocrine: Negative.  Negative for cold intolerance and heat intolerance.   Genitourinary: Negative.  Negative for frequency, hematuria and urgency.   Musculoskeletal: Negative.  Negative for back pain, myalgias, neck pain and neck stiffness.   Skin: Positive for rash.   Allergic/Immunologic: Positive for immunocompromised state. Negative for environmental allergies and food allergies.   Neurological: Positive for headaches. Negative for dizziness, syncope and numbness.   Hematological: Bruises/bleeds easily.   Psychiatric/Behavioral: Positive for dysphoric mood and sleep disturbance. The patient is not nervous/anxious.        Past Medical History:   Diagnosis Date    Arthritis     Blood clotting tendency     Congestive heart failure     High cholesterol     Hyperlipemia     Hypertension     Joint pain     Psoriasis        No past surgical history on file.    Family History   Problem Relation Age of Onset    Coronary artery disease Mother     Melanoma Neg Hx     Psoriasis Neg Hx     Lupus  "Neg Hx    Mom had tumor on her heart ("atrial myxoma') & CAD.  Paternal hx unknown.  2 children ages 16 & 20;   Son with depression on prozac.    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     Objective:     /89   Pulse 82   Ht 5' 9" (1.753 m)   Wt 103 kg (227 lb 1.2 oz)   BMI 33.53 kg/m²   PE is from 1/9/19 except for left foot      Physical Exam   Vitals reviewed.  Constitutional: He is oriented to person, place, and time and well-developed, well-nourished, and in no distress. No distress.   HENT:   Head: Normocephalic and atraumatic.   Mouth/Throat: Oropharynx is clear and moist. No oropharyngeal exudate.   No facial rashes  Parotids not enlarged  No oral ulcers   Eyes: Conjunctivae and EOM are normal. Pupils are equal, round, and reactive to light. Right eye exhibits no discharge. Left eye exhibits no discharge. No scleral icterus.   Neck: Neck supple. No JVD present. No tracheal deviation present. No thyromegaly present.   Cardiovascular: Normal rate, regular rhythm, normal heart sounds and intact distal pulses.  Exam reveals no gallop and no friction rub.    No murmur heard.  Pulmonary/Chest: Effort normal and breath sounds normal. No respiratory distress. He has no wheezes. He has no rales. He exhibits no tenderness.   Abdominal: Soft. Bowel sounds are normal. He exhibits no distension and no mass. There is no splenomegaly or hepatomegaly. There is no tenderness. There is no rebound and no guarding.   Lymphadenopathy:     He has no cervical adenopathy.        Right: No inguinal adenopathy present.        Left: No inguinal adenopathy present.   Neurological: He is alert and oriented to person, place, and time. He has normal " reflexes. No cranial nerve deficit. Gait normal.   Proximal and distal muscle strength 5/5.   Skin: Skin is warm and dry. Rash noted. He is not diaphoretic.     See photos of psoriasis; also plaques on shins.   Multiple tattoos.  No nail pitting but onycholysis of R 5th toenail w discoloration.    Psychiatric: Mood, memory, affect and judgment normal.   Musculoskeletal: Normal range of motion. He exhibits no edema or tenderness.   See photos:  Mild dactylitis 2nd L digit.  Mild hypertrophy of several DIPs (tophi vs Heberden's)  Bilateral ankle puffiness?synovitis? Non tender; no metatarsalgia or metacarpalgia;   L spine with FROM          From 1/9/19 1/9/19 1/9/19 1/9/19      Left thigh 1/9/19 1/8/19: ESR 43; CRP 10.7; CBC ok; CMP K+ 3.4; UA 5.7; HCV neg; HBV neg;   9/27/18: Vit D 14;   4/17/18: QG neg      1/10/19: Bilateral feet: personally viewed: juxta-articular demineralization at IP and MTP joints.  Erosive changes lateral left 5th MTP joint.  DJD changes tarsal joints, ankles, and 1st left MTP joint.  1/10/19: Bilateral hands: personally viewed:  Juxta-articular erosive cystic change left wrist particularly 2nd through 5th metacarpal carpal joint areas.  Superimposed DJD changes left radiocarpal, 3rd DIP joint.  Focal benign periosteal cortical change lateral ventral aspect 3rd metacarpal.  Tiny remote calcification ventral aspect 3rd PIP   1/10/19: SI joints: personally viewed: OA changes; SI jts ok;   1/10/19: Arthritis survey:personally viewed: Fusion C3-C4,, spondylosis degenerative disc disease C2 and C4 disc levels.  C1-C2 neck soft tissues normal.    10/25/16: Bilateral ankles:personally viewed: mild swelling R; mild OA L  10/25/16: Bilateral feet: personally viewed: R There is mild DJD and a hallux valgus deformity.  No fracture dislocation bone destruction or coalition seen. Left: There is mild DJD and a mild hallux valgus deformity.  No fracture dislocation bone destruction or  brendonition seen.Bilater    Assessment:     L sided Podagra due to Gout   Attributed to dietary indiscretion   Brother w gout   On target on allopurinol 450 mg/    Psoriatic Arthritis--some activity   On MTX since 10/30/18--handled by dermatology   On Tremfya (IL23) since 10/3/18--handled by dermatology   Elevated ESR & CRP    Chronic LBP   No evidence due to SpA    Idiopathic cardiomyopathy   Mom w CAD & cardiac tumor    Bilateral frontal infarcts--remote by brain MRI    HTN    Dyslipidemia    Obesity        Plan:   Kenalog 40 mg IM today.  Reviewed colchicine use. He's been on it before.  We will try for the capsules. They may be covered.  Reviewed abortive doses.  Sent to [facility]  If we can't get it will consider providing him with medrol dose flash rx to keep & use prn attacks  He is aware of do's & don'ts for gout.  Will need labs q 3 months on MTX.  Ordered HLA B-27 to be done next time labs drawn.   Reviewed biologics for PsA & PsO  RTC 6 months or prn to see me or Dr. Hermosillo

## 2019-02-20 RX ORDER — FUROSEMIDE 40 MG/1
40 TABLET ORAL 2 TIMES DAILY
Qty: 60 TABLET | Refills: 3 | Status: SHIPPED | OUTPATIENT
Start: 2019-02-20 | End: 2019-09-05 | Stop reason: SDUPTHER

## 2019-02-22 ENCOUNTER — TELEPHONE (OUTPATIENT)
Dept: PHARMACY | Facility: CLINIC | Age: 44
End: 2019-02-22

## 2019-03-12 ENCOUNTER — TELEPHONE (OUTPATIENT)
Dept: RHEUMATOLOGY | Facility: CLINIC | Age: 44
End: 2019-03-12

## 2019-03-12 DIAGNOSIS — M10.9 GOUT, ARTHRITIS: ICD-10-CM

## 2019-03-12 RX ORDER — COLCHICINE 0.6 MG/1
0.6 CAPSULE ORAL 2 TIMES DAILY
Qty: 70 CAPSULE | Refills: 2 | Status: SHIPPED | OUTPATIENT
Start: 2019-03-12 | End: 2019-04-29 | Stop reason: SDUPTHER

## 2019-03-12 NOTE — TELEPHONE ENCOUNTER
Rx for mitigare sent again as [employer] denial is due to their approved indication for the use of this drug ONLY for prevention of gout rather than for treatment of gout flares.

## 2019-03-13 ENCOUNTER — PATIENT MESSAGE (OUTPATIENT)
Dept: DERMATOLOGY | Facility: CLINIC | Age: 44
End: 2019-03-13

## 2019-03-14 ENCOUNTER — TELEPHONE (OUTPATIENT)
Dept: PHARMACY | Facility: CLINIC | Age: 44
End: 2019-03-14

## 2019-03-14 DIAGNOSIS — L40.9 PSORIASIS: Primary | ICD-10-CM

## 2019-03-14 RX ORDER — APREMILAST 30 MG/1
TABLET, FILM COATED ORAL
Qty: 60 TABLET | Refills: 2 | Status: SHIPPED | OUTPATIENT
Start: 2019-03-14 | End: 2019-08-09

## 2019-03-14 RX ORDER — APREMILAST 10-20-30MG
KIT ORAL
Qty: 56 TABLET | Refills: 0 | Status: SHIPPED | OUTPATIENT
Start: 2019-03-14 | End: 2019-04-23

## 2019-03-21 NOTE — TELEPHONE ENCOUNTER
DOCUMENTATION ONLY:  Prior authorization for Otezla approved from 3/20/19 to 3/19/20    Co-pay: $3.00    Patient Assistance is not required.

## 2019-03-26 ENCOUNTER — OFFICE VISIT (OUTPATIENT)
Dept: DERMATOLOGY | Facility: CLINIC | Age: 44
End: 2019-03-26
Payer: MEDICAID

## 2019-03-26 DIAGNOSIS — Z79.899 ENCOUNTER FOR LONG-TERM (CURRENT) USE OF HIGH-RISK MEDICATION: Primary | ICD-10-CM

## 2019-03-26 DIAGNOSIS — L40.50 PSORIATIC ARTHRITIS: ICD-10-CM

## 2019-03-26 DIAGNOSIS — L40.9 PSORIASIS: ICD-10-CM

## 2019-03-26 PROCEDURE — 99999 PR PBB SHADOW E&M-EST. PATIENT-LVL II: ICD-10-PCS | Mod: PBBFAC,,, | Performed by: PATHOLOGY

## 2019-03-26 PROCEDURE — 99999 PR PBB SHADOW E&M-EST. PATIENT-LVL II: CPT | Mod: PBBFAC,,, | Performed by: PATHOLOGY

## 2019-03-26 PROCEDURE — 99212 OFFICE O/P EST SF 10 MIN: CPT | Mod: PBBFAC | Performed by: PATHOLOGY

## 2019-03-26 PROCEDURE — 99214 PR OFFICE/OUTPT VISIT, EST, LEVL IV, 30-39 MIN: ICD-10-PCS | Mod: S$PBB,,, | Performed by: PATHOLOGY

## 2019-03-26 PROCEDURE — 99214 OFFICE O/P EST MOD 30 MIN: CPT | Mod: S$PBB,,, | Performed by: PATHOLOGY

## 2019-03-26 NOTE — PROGRESS NOTES
Subjective:       Patient ID:  Adrien Panda is a 43 y.o. male who presents for   Chief Complaint   Patient presents with    Psoriasis     Follow up, worsening      HPI  Pt is here today for f/u psoriasis, last seen 12/18/2018. Currently on Tremfya - had first dose November 2018.  He is also using halobetasol ointment and TAC prn and clobetasol solution to scalp prn.  Taltz initially worked best of all of the other systemic agents he has tried, but legs flared Fall 2018 with progressive new scaly plaques moving proximal on bilateral legs to thighs.  Currently on MTX 15 mg weekly and Tremfya x 4 months.  Has some improvement for 1-2 weeks after injection, but psoriatic plaques quickly recur to legs > extensor arms > frontal scalp.       Started Taltz 1/2018. States he was clear when he first started it, but psoriasis recurred on his legs. No infections.       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 Rubin at [facility], cutaneous and joint gout tx by Dr. Hermosillo (psoriasis flared on colchicine), stroke      PSORIASIS: failed tx with Enbrel, Humira, Soriatane, Taltz, clobetasol topical, betamethasone topical, Lidex topical, elocon topical.    ILK helps temporarily. ultravate works best out of all the topicals he's tried. psorcon 0.05% oint helps.    On Humira from 6/2015 - 4/2016 until he stopped responding to it. Switched back to enbrel at that point which only helped temporarily.    Added Soriatane to Enbrel but ended up with a bad flare on lower legs.  Not a good candidate for NBUVB as he lives on the [facility] and there is not light unit in this area.   Stelara 9/21/16 - 7/19/17 (stopped responding to it)  Taltz 1/2018 - 11/2018 (stopped responding to it)  Tremfya 11/2018 - present (never achieved significant clearance)     TB gold: negative 04/18    Pt also with psoriatic arthritis - joints in feet have also been flaring.    Prescribed Otezla a few weeks ago, but pt awaiting  approval.        Review of Systems   Constitutional: Positive for fatigue. Negative for fever, chills, weight loss, weight gain, night sweats and malaise.   HENT: Negative for mouth sores.    Respiratory: Negative for cough.    Gastrointestinal: Negative for nausea and vomiting.   Genitourinary: Negative for dysuria and hematuria.   Musculoskeletal: Positive for arthralgias.   Skin: Positive for itching, rash and dry skin. Negative for sun sensitivity, daily sunscreen use, recent sunburn and abscesses.   Hematologic/Lymphatic: Does not bruise/bleed easily.        Objective:    Physical Exam   Constitutional: He appears well-developed and well-nourished. No distress.   Neurological: He is alert and oriented to person, place, and time. He is not disoriented.   Psychiatric: He has a normal mood and affect.   Skin:   Areas Examined (abnormalities noted in diagram):   Scalp / Hair Palpated and Inspected  Head / Face Inspection Performed  Neck Inspection Performed  Chest / Axilla Inspection Performed  Abdomen Inspection Performed  Back Inspection Performed  RUE Inspected  LUE Inspection Performed  RLE Inspected  LLE Inspection Performed  Nails and Digits Inspection Performed                   Diagram Legend     Erythematous scaling macule/papule c/w actinic keratosis       Vascular papule c/w angioma      Pigmented verrucoid papule/plaque c/w seborrheic keratosis      Yellow umbilicated papule c/w sebaceous hyperplasia      Irregularly shaped tan macule c/w lentigo     1-2 mm smooth white papules consistent with Milia      Movable subcutaneous cyst with punctum c/w epidermal inclusion cyst      Subcutaneous movable cyst c/w pilar cyst      Firm pink to brown papule c/w dermatofibroma      Pedunculated fleshy papule(s) c/w skin tag(s)      Evenly pigmented macule c/w junctional nevus     Mildly variegated pigmented, slightly irregular-bordered macule c/w mildly atypical nevus      Flesh colored to evenly pigmented papule  c/w intradermal nevus       Pink pearly papule/plaque c/w basal cell carcinoma      Erythematous hyperkeratotic cursted plaque c/w SCC      Surgical scar with no sign of skin cancer recurrence      Open and closed comedones      Inflammatory papules and pustules      Verrucoid papule consistent consistent with wart     Erythematous eczematous patches and plaques     Dystrophic onycholytic nail with subungual debris c/w onychomycosis     Umbilicated papule    Erythematous-base heme-crusted tan verrucoid plaque consistent with inflamed seborrheic keratosis     Erythematous Silvery Scaling Plaque c/w Psoriasis     See annotation      Assessment / Plan:        Encounter for long-term (current) use of high-risk medication  -     CBC auto differential; Future; Expected date: 03/26/2019  -     Comprehensive metabolic panel; Future; Expected date: 03/26/2019  -     Quantiferon Gold TB; Future; Expected date: 03/26/2019  -     Hepatitis B surface antigen; Future; Expected date: 03/26/2019  -     Hepatitis B surface antibody; Future; Expected date: 03/26/2019  -     Hepatitis C antibody; Future; Expected date: 03/26/2019    Psoriasis - ~ 18% BSA affected.  Has failed multiple systemic agents including Enbrel with Soriatane, Humira, Taltz.  Currently has had 3 doses of Tremfya and on MTX 15 mg weekly and continuing to flare.  Continue topical steroids.  Would like to change biologic agent to Ilumya from Tremfya.  Would like to change pt from MTX to Otezla - awaiting approval.  -     tildrakizumab-asmn (ILUMYA) 100 mg/mL Syrg; Inject 100 mg SQ at weeks 0, 4, and every 12 weeks thereafter  Dispense: 1 mL; Refill: 4  - continue topical steroids prn    Psoriatic arthritis - treatment as above.  Failed Tremfya and MTX (current systemic regimen).  Would like to add Otezla and d/c Tremfya and start Ilumya.  -     tildrakizumab-asmn (ILUMYA) 100 mg/mL Syrg; Inject 100 mg SQ at weeks 0, 4, and every 12 weeks thereafter  Dispense: 1 mL;  Refill: 4             Follow up in about 3 months (around 6/26/2019).

## 2019-03-27 ENCOUNTER — TELEPHONE (OUTPATIENT)
Dept: PHARMACY | Facility: CLINIC | Age: 44
End: 2019-03-27

## 2019-03-27 NOTE — TELEPHONE ENCOUNTER
Pt plans to  Otezla starter kit from [facility] on 3/27. Name and  verified, $3 copay in 004. Pt plans to start on 3/28.     Patient was given Otezla counseling as follows:  - Take Otezla Starter pack as directed on package label, then take Otezla 30mg (maintenance dose) twice daily with or without food.   - Patient was counseled on the following possible side effects, which include, but are not limited to: headache, diarrhea, upset stomach, weight loss. Contact provider if allergic reaction, major weight loss, or depression should occur.    Compliance stressed. Patient has no further questions. Patient plans to start Otezla on 3/28. I will f/u with patient in 1 week and [facility] will f/u in 3 weeks for further refills.

## 2019-03-28 ENCOUNTER — TELEPHONE (OUTPATIENT)
Dept: PHARMACY | Facility: CLINIC | Age: 44
End: 2019-03-28

## 2019-04-12 ENCOUNTER — PATIENT MESSAGE (OUTPATIENT)
Dept: DERMATOLOGY | Facility: CLINIC | Age: 44
End: 2019-04-12

## 2019-04-12 ENCOUNTER — TELEPHONE (OUTPATIENT)
Dept: DERMATOLOGY | Facility: CLINIC | Age: 44
End: 2019-04-12

## 2019-04-12 NOTE — TELEPHONE ENCOUNTER
DOCUMENTATION ONLY:  [place] prior authorization approved x 1 year  Dates: 4/11/19 through 4/11/20  Case ID: [id number]  Co pay: $0  Co pay assistance IS NOT required  Forwarded to clinical pharmacist for consultation & shipment.

## 2019-04-16 ENCOUNTER — LAB VISIT (OUTPATIENT)
Dept: LAB | Facility: HOSPITAL | Age: 44
End: 2019-04-16
Payer: MEDICAID

## 2019-04-16 DIAGNOSIS — Z79.899 ENCOUNTER FOR LONG-TERM (CURRENT) USE OF HIGH-RISK MEDICATION: ICD-10-CM

## 2019-04-16 PROCEDURE — 86480 TB TEST CELL IMMUN MEASURE: CPT

## 2019-04-16 PROCEDURE — 36415 COLL VENOUS BLD VENIPUNCTURE: CPT

## 2019-04-17 LAB
M TB IFN-G CD4+ BCKGRND COR BLD-ACNC: -0.01 IU/ML
MITOGEN IGNF BCKGRD COR BLD-ACNC: 7.55 IU/ML
MITOGEN IGNF BCKGRD COR BLD-ACNC: NEGATIVE [IU]/ML
NIL: 0.04 IU/ML
TB2 - NIL: -0.01 IU/ML

## 2019-04-22 ENCOUNTER — TELEPHONE (OUTPATIENT)
Dept: PHARMACY | Facility: CLINIC | Age: 44
End: 2019-04-22

## 2019-04-22 NOTE — TELEPHONE ENCOUNTER
Ilumya consult declined, pt will receive counseling at MD office when he presents for first dose. Pt will receive Ilumya injection at MD office. Pt plans to go to [facility] office on  for 1st dose. Pt gave consent to schedule . Will schedule  on . Name and  verified, $0 copay in 004. Messaged [facility] staff on  for exact delivery address. Pt plans to  Otezla from [facility] on . $0 copay in 004. Pt reported about 1 week on hand. He reported he may have forgotten 1 or 2 tablets. Pt had no further questions or concerns.

## 2019-04-23 ENCOUNTER — PATIENT MESSAGE (OUTPATIENT)
Dept: DERMATOLOGY | Facility: CLINIC | Age: 44
End: 2019-04-23

## 2019-04-23 DIAGNOSIS — L40.0 PSORIASIS VULGARIS: ICD-10-CM

## 2019-04-23 DIAGNOSIS — L40.9 PSORIASIS: ICD-10-CM

## 2019-04-24 RX ORDER — TRIAMCINOLONE ACETONIDE 1 MG/G
OINTMENT TOPICAL
Qty: 454 G | Refills: 2 | OUTPATIENT
Start: 2019-04-24

## 2019-04-24 RX ORDER — HALOBETASOL PROPIONATE 0.5 MG/G
OINTMENT TOPICAL
Qty: 100 G | Refills: 3 | Status: SHIPPED | OUTPATIENT
Start: 2019-04-24 | End: 2020-02-12

## 2019-04-24 RX ORDER — HALOBETASOL PROPIONATE 0.5 MG/G
OINTMENT TOPICAL
Qty: 100 G | Refills: 3 | OUTPATIENT
Start: 2019-04-24

## 2019-04-24 RX ORDER — TRIAMCINOLONE ACETONIDE 1 MG/G
OINTMENT TOPICAL
Qty: 454 G | Refills: 2 | Status: SHIPPED | OUTPATIENT
Start: 2019-04-24 | End: 2019-09-18 | Stop reason: SDUPTHER

## 2019-04-29 DIAGNOSIS — M10.9 GOUT, ARTHRITIS: ICD-10-CM

## 2019-04-29 RX ORDER — COLCHICINE 0.6 MG/1
0.6 CAPSULE ORAL 2 TIMES DAILY
Qty: 70 CAPSULE | Refills: 0 | Status: SHIPPED | OUTPATIENT
Start: 2019-04-29 | End: 2021-04-08 | Stop reason: SDUPTHER

## 2019-04-30 ENCOUNTER — CLINICAL SUPPORT (OUTPATIENT)
Dept: DERMATOLOGY | Facility: CLINIC | Age: 44
End: 2019-04-30
Payer: MEDICAID

## 2019-04-30 PROCEDURE — 96372 PR INJECTION,THERAP/PROPH/DIAG2ST, IM OR SUBCUT: ICD-10-PCS | Mod: S$PBB,,, | Performed by: PATHOLOGY

## 2019-04-30 PROCEDURE — 96372 THER/PROPH/DIAG INJ SC/IM: CPT | Mod: S$PBB,,, | Performed by: PATHOLOGY

## 2019-05-01 NOTE — PROGRESS NOTES
Late entry  After obtaining consent, and per orders of Dr. Son, instructed patient on injection of Ilumya 100mg/ml given in the left upper arm subcutaneously by Jewel Trim on 04/30/2019. Lot #[id number] Exp. 08/17/2020 Patient instructed to remain in clinic for 20 minutes afterwards, and to report any adverse reaction to me immediately.NDC # 41022-535-32

## 2019-05-06 DIAGNOSIS — I42.9 CARDIOMYOPATHY, IDIOPATHIC: ICD-10-CM

## 2019-05-07 RX ORDER — POTASSIUM CHLORIDE 20 MEQ/1
20 TABLET, EXTENDED RELEASE ORAL DAILY
Qty: 90 TABLET | Refills: 0 | Status: SHIPPED | OUTPATIENT
Start: 2019-05-07 | End: 2019-09-05 | Stop reason: SDUPTHER

## 2019-05-10 ENCOUNTER — PATIENT MESSAGE (OUTPATIENT)
Dept: DERMATOLOGY | Facility: CLINIC | Age: 44
End: 2019-05-10

## 2019-05-16 ENCOUNTER — PATIENT MESSAGE (OUTPATIENT)
Dept: RHEUMATOLOGY | Facility: CLINIC | Age: 44
End: 2019-05-16

## 2019-05-20 ENCOUNTER — TELEPHONE (OUTPATIENT)
Dept: PHARMACY | Facility: CLINIC | Age: 44
End: 2019-05-20

## 2019-05-20 NOTE — TELEPHONE ENCOUNTER
RX call regarding Otezla and Ilumya from [facility]. Patient reached-- shipping Otezla 5/20 for 5/21 arrival with patients consent. Patient was unsure of when appointment for Ilumya was, so I emailed the doctor and am awaiting response. Copay of 0.00  004.

## 2019-05-28 ENCOUNTER — OFFICE VISIT (OUTPATIENT)
Dept: DERMATOLOGY | Facility: CLINIC | Age: 44
End: 2019-05-28
Payer: MEDICAID

## 2019-05-28 DIAGNOSIS — L40.9 PSORIASIS: Primary | ICD-10-CM

## 2019-05-28 DIAGNOSIS — Z79.899 ENCOUNTER FOR LONG-TERM (CURRENT) USE OF HIGH-RISK MEDICATION: ICD-10-CM

## 2019-05-28 PROCEDURE — 99213 OFFICE O/P EST LOW 20 MIN: CPT | Mod: PBBFAC | Performed by: PATHOLOGY

## 2019-05-28 PROCEDURE — 99999 PR PBB SHADOW E&M-EST. PATIENT-LVL III: CPT | Mod: PBBFAC,,, | Performed by: PATHOLOGY

## 2019-05-28 PROCEDURE — 99214 PR OFFICE/OUTPT VISIT, EST, LEVL IV, 30-39 MIN: ICD-10-PCS | Mod: S$PBB,,, | Performed by: PATHOLOGY

## 2019-05-28 PROCEDURE — 99999 PR PBB SHADOW E&M-EST. PATIENT-LVL III: ICD-10-PCS | Mod: PBBFAC,,, | Performed by: PATHOLOGY

## 2019-05-28 PROCEDURE — 99214 OFFICE O/P EST MOD 30 MIN: CPT | Mod: S$PBB,,, | Performed by: PATHOLOGY

## 2019-05-28 NOTE — PROGRESS NOTES
Subjective:       Patient ID:  Adrien Panda is a 43 y.o. male who presents for   Chief Complaint   Patient presents with    Follow-up     Psoriasis f/y, getting worse      HPI  Pt is here today for f/u psoriasis, last seen 3/26/2019. Currently on ilumya - had first dose 4/30/19 and here for 2nd injection today.  Also started Otezla 3/28/19, but did not take it for a few weeks due to losing this medication.  Had some stomach discomfort and diarrhea with it that improved after the first few doses.  He is also using halobetasol ointment and TAC prn and clobetasol solution to scalp prn.  Taltz initially worked best of all of the other systemic agents he has tried, but legs flared Fall 2018 with progressive new scaly plaques moving proximal on bilateral legs to thighs.  Most recently, was on MTX and Tremfya, which were ineffective and discontinued last visit.  Currently with psoriatic plaques to legs > extensor arms > frontal scalp.       Started Taltz 1/2018. States he was clear when he first started it, but psoriasis recurred on his legs. No infections.       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 Rubin at [facility], cutaneous and joint gout tx by Dr. Hermosillo (psoriasis flared on colchicine), stroke      PSORIASIS: failed tx with Enbrel, Humira, Soriatane, Taltz, clobetasol topical, betamethasone topical, Lidex topical, elocon topical.    ILK helps temporarily. ultravate works best out of all the topicals he's tried. psorcon 0.05% oint helps.    On Humira from 6/2015 - 4/2016 until he stopped responding to it. Switched back to enbrel at that point which only helped temporarily.    Added Soriatane to Enbrel but ended up with a bad flare on lower legs.  Not a good candidate for NBUVB as he lives on the [facility] and there is not light unit in this area.   Stelara 9/21/16 - 7/19/17 (stopped responding to it)  Taltz 1/2018 - 11/2018 (stopped responding to it)  Tremfya 11/2018 -  4/2019 - never achieved significant clearance     TB gold: negative 04/19     Pt also with psoriatic arthritis - joints in feet have also been flaring.  Missed rheum appt and now re-scheduled for July.          Review of Systems   Constitutional: Negative for fever, chills, weight loss, weight gain, fatigue, night sweats and malaise.   Respiratory: Negative for cough and shortness of breath.    Gastrointestinal: Negative for nausea, vomiting and diarrhea.   Musculoskeletal: Positive for joint swelling and arthralgias.   Skin: Negative for daily sunscreen use, activity-related sunscreen use, recent sunburn and abscesses.   Hematologic/Lymphatic: Bruises/bleeds easily.        Objective:    Physical Exam   Constitutional: He appears well-developed and well-nourished. No distress.   Neurological: He is alert and oriented to person, place, and time. He is not disoriented.   Psychiatric: He has a normal mood and affect.   Skin:   Areas Examined (abnormalities noted in diagram):   Scalp / Hair Palpated and Inspected  Head / Face Inspection Performed  Neck Inspection Performed  Chest / Axilla Inspection Performed  Abdomen Inspection Performed  Back Inspection Performed  RUE Inspected  LUE Inspection Performed  RLE Inspected  LLE Inspection Performed  Nails and Digits Inspection Performed                   Diagram Legend     Erythematous scaling macule/papule c/w actinic keratosis       Vascular papule c/w angioma      Pigmented verrucoid papule/plaque c/w seborrheic keratosis      Yellow umbilicated papule c/w sebaceous hyperplasia      Irregularly shaped tan macule c/w lentigo     1-2 mm smooth white papules consistent with Milia      Movable subcutaneous cyst with punctum c/w epidermal inclusion cyst      Subcutaneous movable cyst c/w pilar cyst      Firm pink to brown papule c/w dermatofibroma      Pedunculated fleshy papule(s) c/w skin tag(s)      Evenly pigmented macule c/w junctional nevus     Mildly variegated  pigmented, slightly irregular-bordered macule c/w mildly atypical nevus      Flesh colored to evenly pigmented papule c/w intradermal nevus       Pink pearly papule/plaque c/w basal cell carcinoma      Erythematous hyperkeratotic cursted plaque c/w SCC      Surgical scar with no sign of skin cancer recurrence      Open and closed comedones      Inflammatory papules and pustules      Verrucoid papule consistent consistent with wart     Erythematous eczematous patches and plaques     Dystrophic onycholytic nail with subungual debris c/w onychomycosis     Umbilicated papule    Erythematous-base heme-crusted tan verrucoid plaque consistent with inflamed seborrheic keratosis     Erythematous Silvery Scaling Plaque c/w Psoriasis     See annotation      Assessment / Plan:        Psoriasis - ~ 15% BSA affected.  Has failed multiple systemic agents including Enbrel with Soriatane, Humira, Taltz, Tremfya with MTX.  Has had 1 dose of Ilumya without much improvement.  Inconsistent concomitant use of Otezla for the past 2 months.  Continue topical steroids.  - Ilumya 100 mg injected SQ today by LPN without complications  - next injection in 12 weeks    Encounter for long-term (current) use of high-risk medication - labs 4/2019 WNL; repeat labs in 12 weeks  -     CBC auto differential; Future; Expected date: 08/28/2019  -     Comprehensive metabolic panel; Future; Expected date: 08/28/2019             No follow-ups on file.

## 2019-06-13 ENCOUNTER — TELEPHONE (OUTPATIENT)
Dept: PHARMACY | Facility: CLINIC | Age: 44
End: 2019-06-13

## 2019-07-01 ENCOUNTER — TELEPHONE (OUTPATIENT)
Dept: PHARMACY | Facility: CLINIC | Age: 44
End: 2019-07-01

## 2019-07-03 ENCOUNTER — PATIENT MESSAGE (OUTPATIENT)
Dept: CARDIOLOGY | Facility: CLINIC | Age: 44
End: 2019-07-03

## 2019-07-23 ENCOUNTER — TELEPHONE (OUTPATIENT)
Dept: RHEUMATOLOGY | Facility: CLINIC | Age: 44
End: 2019-07-23

## 2019-07-23 NOTE — TELEPHONE ENCOUNTER
Left message to follow up on the cancelled appointment and that the next available would be in October or november

## 2019-08-09 DIAGNOSIS — L40.9 PSORIASIS: ICD-10-CM

## 2019-08-12 RX ORDER — APREMILAST 30 MG/1
TABLET, FILM COATED ORAL
Qty: 60 TABLET | Refills: 2 | Status: SHIPPED | OUTPATIENT
Start: 2019-08-12 | End: 2019-11-26 | Stop reason: SDUPTHER

## 2019-08-19 ENCOUNTER — TELEPHONE (OUTPATIENT)
Dept: PHARMACY | Facility: CLINIC | Age: 44
End: 2019-08-19

## 2019-08-19 NOTE — TELEPHONE ENCOUNTER
Pt gave consent to schedule Ilumya delivery. Name and  verified, $0 copay in 004. Pt has derm appt  for Ilumya dose. Pt did not start any new medications. Pt had no further questions.      address -   Dr. Martha Son - Pathology   11th Floor - Derm Clinic   [address] [place] [address]

## 2019-08-20 ENCOUNTER — OFFICE VISIT (OUTPATIENT)
Dept: DERMATOLOGY | Facility: CLINIC | Age: 44
End: 2019-08-20
Payer: MEDICAID

## 2019-08-20 ENCOUNTER — LAB VISIT (OUTPATIENT)
Dept: LAB | Facility: HOSPITAL | Age: 44
End: 2019-08-20
Payer: MEDICAID

## 2019-08-20 ENCOUNTER — PATIENT MESSAGE (OUTPATIENT)
Dept: CARDIOLOGY | Facility: CLINIC | Age: 44
End: 2019-08-20

## 2019-08-20 ENCOUNTER — TELEPHONE (OUTPATIENT)
Dept: PHARMACY | Facility: CLINIC | Age: 44
End: 2019-08-20

## 2019-08-20 DIAGNOSIS — M10.9 GOUT, ARTHRITIS: ICD-10-CM

## 2019-08-20 DIAGNOSIS — Z79.899 ENCOUNTER FOR LONG-TERM (CURRENT) USE OF HIGH-RISK MEDICATION: ICD-10-CM

## 2019-08-20 DIAGNOSIS — L40.9 PSORIASIS: Primary | ICD-10-CM

## 2019-08-20 DIAGNOSIS — L40.50 PSORIATIC ARTHRITIS: ICD-10-CM

## 2019-08-20 LAB
ALBUMIN SERPL BCP-MCNC: 3.8 G/DL (ref 3.5–5.2)
ALP SERPL-CCNC: 59 U/L (ref 55–135)
ALT SERPL W/O P-5'-P-CCNC: 22 U/L (ref 10–44)
ANION GAP SERPL CALC-SCNC: 9 MMOL/L (ref 8–16)
AST SERPL-CCNC: 16 U/L (ref 10–40)
BASOPHILS # BLD AUTO: 0.05 K/UL (ref 0–0.2)
BASOPHILS NFR BLD: 0.5 % (ref 0–1.9)
BILIRUB SERPL-MCNC: 0.4 MG/DL (ref 0.1–1)
BUN SERPL-MCNC: 23 MG/DL (ref 6–20)
CALCIUM SERPL-MCNC: 10 MG/DL (ref 8.7–10.5)
CHLORIDE SERPL-SCNC: 103 MMOL/L (ref 95–110)
CO2 SERPL-SCNC: 25 MMOL/L (ref 23–29)
CREAT SERPL-MCNC: 1.1 MG/DL (ref 0.5–1.4)
DIFFERENTIAL METHOD: ABNORMAL
EOSINOPHIL # BLD AUTO: 0.3 K/UL (ref 0–0.5)
EOSINOPHIL NFR BLD: 3.4 % (ref 0–8)
ERYTHROCYTE [DISTWIDTH] IN BLOOD BY AUTOMATED COUNT: 12.9 % (ref 11.5–14.5)
EST. GFR  (AFRICAN AMERICAN): >60 ML/MIN/1.73 M^2
EST. GFR  (NON AFRICAN AMERICAN): >60 ML/MIN/1.73 M^2
GLUCOSE SERPL-MCNC: 94 MG/DL (ref 70–110)
HCT VFR BLD AUTO: 52.2 % (ref 40–54)
HGB BLD-MCNC: 16.4 G/DL (ref 14–18)
IMM GRANULOCYTES # BLD AUTO: 0.04 K/UL (ref 0–0.04)
IMM GRANULOCYTES NFR BLD AUTO: 0.4 % (ref 0–0.5)
LYMPHOCYTES # BLD AUTO: 1.6 K/UL (ref 1–4.8)
LYMPHOCYTES NFR BLD: 16 % (ref 18–48)
MCH RBC QN AUTO: 30.1 PG (ref 27–31)
MCHC RBC AUTO-ENTMCNC: 31.4 G/DL (ref 32–36)
MCV RBC AUTO: 96 FL (ref 82–98)
MONOCYTES # BLD AUTO: 0.7 K/UL (ref 0.3–1)
MONOCYTES NFR BLD: 6.6 % (ref 4–15)
NEUTROPHILS # BLD AUTO: 7.4 K/UL (ref 1.8–7.7)
NEUTROPHILS NFR BLD: 73.1 % (ref 38–73)
NRBC BLD-RTO: 0 /100 WBC
PLATELET # BLD AUTO: 244 K/UL (ref 150–350)
PMV BLD AUTO: 11.4 FL (ref 9.2–12.9)
POTASSIUM SERPL-SCNC: 4.4 MMOL/L (ref 3.5–5.1)
PROT SERPL-MCNC: 7.7 G/DL (ref 6–8.4)
RBC # BLD AUTO: 5.45 M/UL (ref 4.6–6.2)
SODIUM SERPL-SCNC: 137 MMOL/L (ref 136–145)
WBC # BLD AUTO: 10.11 K/UL (ref 3.9–12.7)

## 2019-08-20 PROCEDURE — 36415 COLL VENOUS BLD VENIPUNCTURE: CPT

## 2019-08-20 PROCEDURE — 85025 COMPLETE CBC W/AUTO DIFF WBC: CPT

## 2019-08-20 PROCEDURE — 99214 PR OFFICE/OUTPT VISIT, EST, LEVL IV, 30-39 MIN: ICD-10-PCS | Mod: S$PBB,,, | Performed by: PATHOLOGY

## 2019-08-20 PROCEDURE — 80053 COMPREHEN METABOLIC PANEL: CPT

## 2019-08-20 PROCEDURE — 99214 OFFICE O/P EST MOD 30 MIN: CPT | Mod: S$PBB,,, | Performed by: PATHOLOGY

## 2019-08-20 PROCEDURE — 99999 PR PBB SHADOW E&M-EST. PATIENT-LVL II: CPT | Mod: PBBFAC,,, | Performed by: PATHOLOGY

## 2019-08-20 PROCEDURE — 99999 PR PBB SHADOW E&M-EST. PATIENT-LVL II: ICD-10-PCS | Mod: PBBFAC,,, | Performed by: PATHOLOGY

## 2019-08-20 PROCEDURE — 99212 OFFICE O/P EST SF 10 MIN: CPT | Mod: PBBFAC | Performed by: PATHOLOGY

## 2019-08-20 RX ORDER — COLCHICINE 0.6 MG/1
CAPSULE ORAL
Qty: 70 CAPSULE | Refills: 2 | Status: SHIPPED | OUTPATIENT
Start: 2019-08-20 | End: 2021-08-24 | Stop reason: SDUPTHER

## 2019-08-20 RX ORDER — ALLOPURINOL 300 MG/1
TABLET ORAL
Qty: 45 TABLET | Refills: 6 | Status: SHIPPED | OUTPATIENT
Start: 2019-08-20 | End: 2020-05-10

## 2019-08-20 NOTE — PROGRESS NOTES
Subjective:       Patient ID:  Adrien Panda is a 43 y.o. male who presents for   Chief Complaint   Patient presents with    Psoriasis     Otezla f/u     HPI  Pt is here today for f/u psoriasis, last seen 5/28/2019. Currently on ilumya - had first dose 4/30/19 and here for 3rd injection today.  Also started Otezla 3/28/19, but did not take it for a few weeks due to losing this medication.  Had some stomach discomfort and diarrhea with it that improved after the first few doses.  However, he has had intermittent dark stools, stomach discomfort and weight loss while on this.  He is also using halobetasol ointment and TAC prn and clobetasol solution to scalp prn.  Taltz initially worked best of all of the other systemic agents he has tried, but legs flared Fall 2018 with progressive new scaly plaques moving proximal on bilateral legs to thighs.  Most recently, was on MTX and Tremfya, which were ineffective and discontinued last visit.  Currently with psoriatic plaques to legs - arms and frontal scalp essentially resolved.         Started Taltz 1/2018. States he was clear when he first started it, but psoriasis recurred on his legs. No infections.       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 Rubin at [facility], cutaneous and joint gout tx by Dr. Hermosillo (psoriasis flared on colchicine), stroke      PSORIASIS: failed tx with Enbrel, Humira, Soriatane, Taltz, clobetasol topical, betamethasone topical, Lidex topical, elocon topical.    ILK helps temporarily. ultravate works best out of all the topicals he's tried. psorcon 0.05% oint helps.    On Humira from 6/2015 - 4/2016 until he stopped responding to it. Switched back to enbrel at that point which only helped temporarily.    Added Soriatane to Enbrel but ended up with a bad flare on lower legs.  Not a good candidate for NBUVB as [place] the [facility] and [place] is not light unit in this area.   Stelara 9/21/16 - 7/19/17  (stopped responding to it)  Taltz 1/2018 - 11/2018 (stopped responding to it)  Tremfya 11/2018 - 4/2019 - never achieved significant clearance     TB gold: negative 04/19     Pt also with psoriatic arthritis - joints in feet have also been flaring.  Missed rheum appt and now re-scheduled for Oct/Nov.    Review of Systems   Constitutional: Positive for weight loss. Negative for fever, chills, weight gain, fatigue, night sweats and malaise.   Gastrointestinal: Positive for nausea, abdominal pain and diarrhea.   Skin: Positive for itching, rash and dry skin. Negative for daily sunscreen use, activity-related sunscreen use and recent sunburn.   Hematologic/Lymphatic: Does not bruise/bleed easily.        Objective:    Physical Exam   Constitutional: He appears well-developed and well-nourished. No distress.   Neurological: He is alert and oriented to person, place, and time. He is not disoriented.   Psychiatric: He has a normal mood and affect.   Skin:   Areas Examined (abnormalities noted in diagram):   Scalp / Hair Palpated and Inspected  Head / Face Inspection Performed  Neck Inspection Performed  Chest / Axilla Inspection Performed  Abdomen Inspection Performed  Back Inspection Performed  RUE Inspected  LUE Inspection Performed  RLE Inspected  LLE Inspection Performed  Nails and Digits Inspection Performed              Diagram Legend     Erythematous scaling macule/papule c/w actinic keratosis       Vascular papule c/w angioma      Pigmented verrucoid papule/plaque c/w seborrheic keratosis      Yellow umbilicated papule c/w sebaceous hyperplasia      Irregularly shaped tan macule c/w lentigo     1-2 mm smooth white papules consistent with Milia      Movable subcutaneous cyst with punctum c/w epidermal inclusion cyst      Subcutaneous movable cyst c/w pilar cyst      Firm pink to brown papule c/w dermatofibroma      Pedunculated fleshy papule(s) c/w skin tag(s)      Evenly pigmented macule c/w junctional nevus      Mildly variegated pigmented, slightly irregular-bordered macule c/w mildly atypical nevus      Flesh colored to evenly pigmented papule c/w intradermal nevus       Pink pearly papule/plaque c/w basal cell carcinoma      Erythematous hyperkeratotic cursted plaque c/w SCC      Surgical scar with no sign of skin cancer recurrence      Open and closed comedones      Inflammatory papules and pustules      Verrucoid papule consistent consistent with wart     Erythematous eczematous patches and plaques     Dystrophic onycholytic nail with subungual debris c/w onychomycosis     Umbilicated papule    Erythematous-base heme-crusted tan verrucoid plaque consistent with inflamed seborrheic keratosis     Erythematous Silvery Scaling Plaque c/w Psoriasis     See annotation      Assessment / Plan:        Psoriasis - ~ 10% BSA affected.  Has failed multiple systemic agents including Enbrel with Soriatane, Humira, Taltz, Tremfya with MTX.  Not tolerating Otezla - will d/c.  Has had 2 dose of Ilumya with 3rd dose today.  Some improvement to scalp, arms, but legs most severely affected and without much improvement.  Continue topical steroids.  - Ilumya 100 mg injected SQ today by LPN without complications  - next injection in 12 weeks  - will consider switch to Skyrizi and/or consider addition of nbUVB        Psoriatic arthritis - continue Ilumya and consider switch to Skyrizi; f/u with rheum    Encounter for long-term (current) use of high-risk medication - CBC, CMP from today reviewed and WNL.              No follow-ups on file.

## 2019-08-22 ENCOUNTER — PATIENT MESSAGE (OUTPATIENT)
Dept: CARDIOLOGY | Facility: CLINIC | Age: 44
End: 2019-08-22

## 2019-08-22 NOTE — PROGRESS NOTES
After obtaining consent, and per orders of Dr. Son, injection of Ilumya 100mg/ml given subcutaneously in the right upper arm  by Jewel Trim . Patient instructed to remain in clinic for 20 minutes afterwards, and to report any adverse reaction to me immediately. Lot [id number], Exp 08/17/2020, NDC 61967-384-37.

## 2019-08-26 ENCOUNTER — PATIENT MESSAGE (OUTPATIENT)
Dept: RHEUMATOLOGY | Facility: CLINIC | Age: 44
End: 2019-08-26

## 2019-08-27 ENCOUNTER — PATIENT MESSAGE (OUTPATIENT)
Dept: RHEUMATOLOGY | Facility: CLINIC | Age: 44
End: 2019-08-27

## 2019-09-05 DIAGNOSIS — I42.9 CARDIOMYOPATHY, IDIOPATHIC: ICD-10-CM

## 2019-09-05 RX ORDER — POTASSIUM CHLORIDE 20 MEQ/1
20 TABLET, EXTENDED RELEASE ORAL DAILY
Qty: 90 TABLET | Refills: 0 | Status: SHIPPED | OUTPATIENT
Start: 2019-09-05 | End: 2019-11-30 | Stop reason: SDUPTHER

## 2019-09-05 RX ORDER — LISINOPRIL 20 MG/1
20 TABLET ORAL DAILY
Qty: 90 TABLET | Refills: 1 | Status: SHIPPED | OUTPATIENT
Start: 2019-09-05 | End: 2020-02-24

## 2019-09-05 RX ORDER — CARVEDILOL 25 MG/1
25 TABLET ORAL 2 TIMES DAILY WITH MEALS
Qty: 180 TABLET | Refills: 1 | Status: SHIPPED | OUTPATIENT
Start: 2019-09-05 | End: 2020-03-23

## 2019-09-05 RX ORDER — FUROSEMIDE 40 MG/1
40 TABLET ORAL 2 TIMES DAILY
Qty: 180 TABLET | Refills: 1 | Status: SHIPPED | OUTPATIENT
Start: 2019-09-05 | End: 2021-02-18 | Stop reason: SDUPTHER

## 2019-09-05 NOTE — TELEPHONE ENCOUNTER
----- Message from Cathleen Kimble sent at 9/5/2019  9:40 AM CDT -----  Contact: self   Type: Patient Call Back    What is the request in detail: Pt calling to speak to a nurse regarding refills on his medication. Pt has questions before getting a new refill.     Can the clinic reply by JOVANNASGENEVIEVE? No    Would the patient rather a call back or a response via [facility]? Call back     Best call back number: [phone]

## 2019-09-16 ENCOUNTER — PATIENT MESSAGE (OUTPATIENT)
Dept: DERMATOLOGY | Facility: CLINIC | Age: 44
End: 2019-09-16

## 2019-09-17 DIAGNOSIS — L40.9 PSORIASIS: Primary | ICD-10-CM

## 2019-09-18 DIAGNOSIS — L40.9 PSORIASIS: ICD-10-CM

## 2019-09-18 RX ORDER — TRIAMCINOLONE ACETONIDE 1 MG/G
OINTMENT TOPICAL
Qty: 454 G | Refills: 2 | Status: SHIPPED | OUTPATIENT
Start: 2019-09-18 | End: 2021-04-08 | Stop reason: SDUPTHER

## 2019-09-19 ENCOUNTER — TELEPHONE (OUTPATIENT)
Dept: DERMATOLOGY | Facility: CLINIC | Age: 44
End: 2019-09-19

## 2019-09-26 ENCOUNTER — OFFICE VISIT (OUTPATIENT)
Dept: RHEUMATOLOGY | Facility: CLINIC | Age: 44
End: 2019-09-26
Payer: MEDICAID

## 2019-09-26 ENCOUNTER — LAB VISIT (OUTPATIENT)
Dept: LAB | Facility: HOSPITAL | Age: 44
End: 2019-09-26
Attending: INTERNAL MEDICINE
Payer: MEDICAID

## 2019-09-26 VITALS
TEMPERATURE: 98 F | WEIGHT: 211.19 LBS | HEIGHT: 69 IN | SYSTOLIC BLOOD PRESSURE: 108 MMHG | DIASTOLIC BLOOD PRESSURE: 74 MMHG | HEART RATE: 67 BPM | BODY MASS INDEX: 31.28 KG/M2

## 2019-09-26 DIAGNOSIS — M10.9 GOUT, ARTHRITIS: ICD-10-CM

## 2019-09-26 DIAGNOSIS — L40.50 PSORIATIC ARTHRITIS: ICD-10-CM

## 2019-09-26 DIAGNOSIS — M54.50 CHRONIC MIDLINE LOW BACK PAIN WITHOUT SCIATICA: ICD-10-CM

## 2019-09-26 DIAGNOSIS — L40.50 PSORIATIC ARTHRITIS: Primary | ICD-10-CM

## 2019-09-26 DIAGNOSIS — G89.29 CHRONIC MIDLINE LOW BACK PAIN WITHOUT SCIATICA: ICD-10-CM

## 2019-09-26 DIAGNOSIS — I42.9 CARDIOMYOPATHY, IDIOPATHIC: ICD-10-CM

## 2019-09-26 LAB
CRP SERPL-MCNC: 12.3 MG/L (ref 0–8.2)
ERYTHROCYTE [SEDIMENTATION RATE] IN BLOOD BY WESTERGREN METHOD: 56 MM/HR (ref 0–23)
URATE SERPL-MCNC: 4.5 MG/DL (ref 3.4–7)

## 2019-09-26 PROCEDURE — 99214 PR OFFICE/OUTPT VISIT, EST, LEVL IV, 30-39 MIN: ICD-10-PCS | Mod: S$PBB,,, | Performed by: INTERNAL MEDICINE

## 2019-09-26 PROCEDURE — 86140 C-REACTIVE PROTEIN: CPT

## 2019-09-26 PROCEDURE — 99999 PR PBB SHADOW E&M-EST. PATIENT-LVL III: CPT | Mod: PBBFAC,,, | Performed by: INTERNAL MEDICINE

## 2019-09-26 PROCEDURE — 36415 COLL VENOUS BLD VENIPUNCTURE: CPT

## 2019-09-26 PROCEDURE — 99999 PR PBB SHADOW E&M-EST. PATIENT-LVL III: ICD-10-PCS | Mod: PBBFAC,,, | Performed by: INTERNAL MEDICINE

## 2019-09-26 PROCEDURE — 85652 RBC SED RATE AUTOMATED: CPT

## 2019-09-26 PROCEDURE — 99214 OFFICE O/P EST MOD 30 MIN: CPT | Mod: S$PBB,,, | Performed by: INTERNAL MEDICINE

## 2019-09-26 PROCEDURE — 99213 OFFICE O/P EST LOW 20 MIN: CPT | Mod: PBBFAC | Performed by: INTERNAL MEDICINE

## 2019-09-26 PROCEDURE — 84550 ASSAY OF BLOOD/URIC ACID: CPT

## 2019-09-26 RX ORDER — MELOXICAM 15 MG/1
15 TABLET ORAL DAILY
Qty: 30 TABLET | Refills: 5 | Status: SHIPPED | OUTPATIENT
Start: 2019-09-26 | End: 2020-03-14

## 2019-09-26 RX ORDER — SULFASALAZINE 500 MG/1
TABLET, DELAYED RELEASE ORAL
Qty: 120 TABLET | Refills: 4 | Status: SHIPPED | OUTPATIENT
Start: 2019-09-26 | End: 2019-12-12 | Stop reason: ALTCHOICE

## 2019-09-26 ASSESSMENT — ROUTINE ASSESSMENT OF PATIENT INDEX DATA (RAPID3)
TOTAL RAPID3 SCORE: 6.89
WHEN YOU AWAKENED IN THE MORNING OVER THE LAST WEEK, PLEASE INDICATE THE AMOUNT OF TIME IT TAKES UNTIL YOU ARE AS LIMBER AS YOU WILL BE FOR THE DAY: 1 HR
FATIGUE SCORE: 8
PAIN SCORE: 8
MDHAQ FUNCTION SCORE: 1.7
PATIENT GLOBAL ASSESSMENT SCORE: 7
PSYCHOLOGICAL DISTRESS SCORE: 5.5
AM STIFFNESS SCORE: 1, YES

## 2019-09-26 NOTE — PROGRESS NOTES
History of present illness:  44-year-old gentleman with idiopathic cardiomyopathy.  He has had no recent evidence of congestive heart failure and sees his cardiologist infrequently.  He had had a previous CVA.  I am following him for several different problems.  He has chronic tophaceous gout.  He is on allopurinol 450 mg daily.  He has had several gout flares since I saw him last.  He blames item on dietary indiscretion.  He has x-ray evidence of chondrocalcinosis.  His main problem has been psoriasis.  He most likely has psoriatic arthritis.  He had been treated in the past with Enbrel, Humira, Taltz, Tremfya, MTX.  He was on Otezla but had a 20 lb weight loss.  He is presently on Ilumya.  He has had 3 infusions.  He does not think it is helping.    He complains of pain in his back and feet.  The foot pain is worse when he is on his feet.  It is in his toes and on the bottom of the feet.  He she describes is is a sharp pain but also some tingling.  He does have a history of neuropathy.  The back pain is worse in the morning.  He has 1 hr morning stiffness.  The pain does not radiate from the back.  Tylenol has not been helping.  He has also taken Advil and Aleve without any response but he tolerated them.    Physical examination:  Skin:  He has diffuse psoriatic plaques especially on the lower extremities  Musculoskeletal:  Cervical spine, shoulders, elbows are unremarkable.  He has soft tissue swelling of the left wrist but no tenderness.  He has full range of motion.  Small joints of the hands are unremarkable.  He has tenderness of the lower lumbar spine.  He has full range of motion without pain on range of motion.  Straight leg raising is negative.  Hips and knees are unremarkable.  He has soft tissue swelling of the ankles with pain on range of motion.  He has no tenderness in the ankle but does have tenderness in the subtalar joint and across the MTPs.  He has soft tissue swelling of the 2nd, 3rd, 4th  MTPs.  Radiology:  X-ray of the lumbar spine and SI joint shows no evidence of spondyloarthropathy.  X-ray of the foot shows erosive disease but it looks more like gout then psoriatic arthritis    Assessment:  1.  Active psoriatic arthritis  2.  Inter critical gout    Plans:  1.  Laboratory studies are obtained  2.  I placed him on meloxicam 15 mg daily  3.  For his psoriatic arthritis I added sulfasalazine starting at 500 mg daily, increasing to 2 g daily  4.  Return to see me in 3 months

## 2019-10-01 ENCOUNTER — PATIENT MESSAGE (OUTPATIENT)
Dept: DERMATOLOGY | Facility: CLINIC | Age: 44
End: 2019-10-01

## 2019-10-01 ENCOUNTER — TELEPHONE (OUTPATIENT)
Dept: DERMATOLOGY | Facility: CLINIC | Age: 44
End: 2019-10-01

## 2019-10-03 DIAGNOSIS — L40.9 PSORIASIS: Primary | ICD-10-CM

## 2019-10-04 ENCOUNTER — TELEPHONE (OUTPATIENT)
Dept: PHARMACY | Facility: CLINIC | Age: 44
End: 2019-10-04

## 2019-10-07 ENCOUNTER — TELEPHONE (OUTPATIENT)
Dept: DERMATOLOGY | Facility: CLINIC | Age: 44
End: 2019-10-07

## 2019-10-07 NOTE — TELEPHONE ENCOUNTER
Spoke with patient and he is unable to afford light at this froilan. He will contact [facility] for photo therapy.

## 2019-10-07 NOTE — TELEPHONE ENCOUNTER
----- Message from Daryl Son MD sent at 10/7/2019  2:47 PM CDT -----  Regarding: RE: Skyrizi Rx  Yes - verbal order is perfect so that pharmacy can have separate loading and maintenance dosing.  Thanks,  bv  ----- Message -----  From: Bernardo Guerrier LPN  Sent: 10/7/2019   9:22 AM CDT  To: Daryl Son MD  Subject: FW: Skyrizi Rx                                   Please advise, if it is okay for me to give verbal to pharmacy to create a new Rx for the maintenance dose.    Thank You,    JT  ----- Message -----  From: Lakshmi Steve PharmD  Sent: 10/5/2019  11:30 AM CDT  To: Daryl Son MD, #  Subject: Skyrizi Rx                                       Good morning, Dr. Son and staff,    [facility] received a Skyrizi Rx for Mr. Panda. The pt's insurance requires that we submit two separate PAs for loading and maintenance doses of Skyrizi since they will be billed for different day supplies. Can you send a separate maintenace Rx order to [facility] at your convenience? Or for the sake of your convenience, I can also take a verbal from you to authorize me to create a new Rx for the maintenance.    Thank you,    Lakshmi Steve, PharmD  Clinical Pharmacist  [facility]   [phone]

## 2019-10-15 ENCOUNTER — PATIENT MESSAGE (OUTPATIENT)
Dept: DERMATOLOGY | Facility: CLINIC | Age: 44
End: 2019-10-15

## 2019-10-21 NOTE — TELEPHONE ENCOUNTER
LVM for pt on 10/21 for Skyrizi initial consult and to gain consent to  to [facility] for first injection. $0 copay in 004. Will continue to follow up.

## 2019-10-22 ENCOUNTER — PATIENT MESSAGE (OUTPATIENT)
Dept: DERMATOLOGY | Facility: CLINIC | Age: 44
End: 2019-10-22

## 2019-10-22 NOTE — TELEPHONE ENCOUNTER
Pt was contacted on 10/22 for Skyrizi Initial Consult. Pt is scheduled to receive first dose of Skyrizi at Dr. Son's office at 10AM on 10/23. Pt gave consent for [facility] to  Savita to [facility] on 10/23. $0 copay in 004. Pt declined injection training as he will get training from [facility] staff during first dose appt. Pt is an existing pt of [facility] and is familiar with [facility] clinical services and refill process. All questions answered and addressed to patients satisfaction. Pt had no further questions or concerns. RPh will follow up with patient 1 week from start and [facility] will follow up with patient in 3 weeks for a refill.     Bernardo Guerrier LPN confirmed 10/23 Savita  to [facility]. [facility] will walk Skyrizi over to [facility] 10/23 before 10AM to the following address:  [address] [place] [address]   Dermatology 11th [place] [address]    **Pt's second loading dose will be due on 11/20 and will be delivered to pt's home.**

## 2019-10-23 ENCOUNTER — TELEPHONE (OUTPATIENT)
Dept: PHARMACY | Facility: CLINIC | Age: 44
End: 2019-10-23

## 2019-10-23 ENCOUNTER — TELEPHONE (OUTPATIENT)
Dept: DERMATOLOGY | Facility: CLINIC | Age: 44
End: 2019-10-23

## 2019-10-23 NOTE — TELEPHONE ENCOUNTER
chicho delivered  med name:skyrizi 150 (75x2)  delivered to:viki julian  Location:11th floor derm  Time:9:30  date: 10/23/19

## 2019-10-23 NOTE — TELEPHONE ENCOUNTER
----- Message from Susana Torre sent at 10/23/2019  9:31 AM CDT -----  Contact: PT  PT called to reschedule his injection that's set for today to another day  Reason: Caught a cold    Callback: [phone]

## 2019-10-29 ENCOUNTER — CLINICAL SUPPORT (OUTPATIENT)
Dept: DERMATOLOGY | Facility: CLINIC | Age: 44
End: 2019-10-29
Payer: MEDICAID

## 2019-10-29 DIAGNOSIS — L40.9 PSORIASIS: Primary | ICD-10-CM

## 2019-10-29 PROCEDURE — 99999 PR PBB SHADOW E&M-EST. PATIENT-LVL II: CPT | Mod: PBBFAC,,,

## 2019-10-29 PROCEDURE — 99999 PR PBB SHADOW E&M-EST. PATIENT-LVL II: ICD-10-PCS | Mod: PBBFAC,,,

## 2019-10-29 PROCEDURE — 99499 NO LOS: ICD-10-PCS | Mod: S$PBB,,, | Performed by: PATHOLOGY

## 2019-10-29 PROCEDURE — 99212 OFFICE O/P EST SF 10 MIN: CPT | Mod: PBBFAC

## 2019-10-29 PROCEDURE — 99499 UNLISTED E&M SERVICE: CPT | Mod: S$PBB,,, | Performed by: PATHOLOGY

## 2019-11-05 NOTE — ADDENDUM NOTE
Addended by: DAVID MCCLURE on: 11/5/2019 08:37 AM     Modules accepted: Orders, Level of Service

## 2019-11-05 NOTE — PROGRESS NOTES
After obtaining consent, and per orders of Dr. NICO Son, instruction of injection of Skyrizi 75 mg /0.83 ml per syringe given in the right and left upper abdomen by Jewel Trim . Patient instructed to remain in clinic for 20 minutes afterwards, and to report any adverse reaction to me immediately. Lot [id number], Exp MAR2021, NDC 7628-7856-96. Patient SUPPLIED medication. No charge.

## 2019-11-13 ENCOUNTER — TELEPHONE (OUTPATIENT)
Dept: PHARMACY | Facility: CLINIC | Age: 44
End: 2019-11-13

## 2019-11-21 ENCOUNTER — OFFICE VISIT (OUTPATIENT)
Dept: CARDIOLOGY | Facility: CLINIC | Age: 44
End: 2019-11-21
Payer: MEDICAID

## 2019-11-21 VITALS
DIASTOLIC BLOOD PRESSURE: 80 MMHG | SYSTOLIC BLOOD PRESSURE: 132 MMHG | WEIGHT: 207 LBS | HEIGHT: 69 IN | BODY MASS INDEX: 30.66 KG/M2 | OXYGEN SATURATION: 95 % | HEART RATE: 71 BPM | RESPIRATION RATE: 15 BRPM

## 2019-11-21 DIAGNOSIS — I63.89 CEREBRAL INFARCTION DUE TO OTHER MECHANISM: ICD-10-CM

## 2019-11-21 DIAGNOSIS — I42.9 CARDIOMYOPATHY, IDIOPATHIC: Primary | ICD-10-CM

## 2019-11-21 DIAGNOSIS — L40.9 PSORIASIS: ICD-10-CM

## 2019-11-21 DIAGNOSIS — I10 ESSENTIAL HYPERTENSION: ICD-10-CM

## 2019-11-21 PROBLEM — E78.5 HYPERLIPIDEMIA: Status: RESOLVED | Noted: 2018-01-05 | Resolved: 2019-11-21

## 2019-11-21 PROCEDURE — 99215 PR OFFICE/OUTPT VISIT, EST, LEVL V, 40-54 MIN: ICD-10-PCS | Mod: S$PBB,,, | Performed by: INTERNAL MEDICINE

## 2019-11-21 PROCEDURE — 99999 PR PBB SHADOW E&M-EST. PATIENT-LVL III: ICD-10-PCS | Mod: PBBFAC,,, | Performed by: INTERNAL MEDICINE

## 2019-11-21 PROCEDURE — 99215 OFFICE O/P EST HI 40 MIN: CPT | Mod: S$PBB,,, | Performed by: INTERNAL MEDICINE

## 2019-11-21 PROCEDURE — 93005 ELECTROCARDIOGRAM TRACING: CPT | Mod: PBBFAC,PO | Performed by: INTERNAL MEDICINE

## 2019-11-21 PROCEDURE — 93010 EKG 12-LEAD: ICD-10-PCS | Mod: S$PBB,,, | Performed by: INTERNAL MEDICINE

## 2019-11-21 PROCEDURE — 99999 PR PBB SHADOW E&M-EST. PATIENT-LVL III: CPT | Mod: PBBFAC,,, | Performed by: INTERNAL MEDICINE

## 2019-11-21 PROCEDURE — 99213 OFFICE O/P EST LOW 20 MIN: CPT | Mod: PBBFAC,PO,25 | Performed by: INTERNAL MEDICINE

## 2019-11-21 PROCEDURE — 93010 ELECTROCARDIOGRAM REPORT: CPT | Mod: S$PBB,,, | Performed by: INTERNAL MEDICINE

## 2019-11-21 NOTE — PROGRESS NOTES
CARDIOVASCULAR PROGRESS NOTE    REASON FOR CONSULT:   Adrien Panda is a 44 y.o. male who presents for follow up of NICM.    Rheum: Zakem  HISTORY OF PRESENT ILLNESS:   Previously followed by Dr. Wakefield, last seen October 2018.    The patient returns for routine follow-up of his prior nonischemic cardiomyopathy.  Extensive records were reviewed in the media tab from [facility].  He apparently had an episode of myocarditis back in 2012, thought to be related to psoriasis.  There is a report of LV apical thrombus.  He also apparently has a history of stroke related to this thrombus as there is no residual deficit.  His LV function has since normalized.  There is a report of catheterization in August 2012 noting the absence of obstructive CAD.  At present, patient denies any angina or dyspnea.  He has had no palpitations, lightheadedness, dizziness, or syncope.  There has been no PND, orthopnea, or lower extremity edema.  He denies melena, hematuria, or claudicant symptoms.  He appears to be exercising and running around after his 16-year-old son without any difficulties.    CARDIOVASCULAR HISTORY:   NICM/?psoritiac myocarditis, EF 25%->55% ([facility] records, Media tab 11/30/18).    Cath 8/20/12 with nonobst CAD ([facility] records, Media tab 11/30/18).  Hx LV apical thrombus ([facility] records, Media tab 11/30/18).    PAST MEDICAL HISTORY:     Past Medical History:   Diagnosis Date    Arthritis     Blood clotting tendency     Congestive heart failure     High cholesterol     Hyperlipemia     Hypertension     Joint pain     Psoriasis        PAST SURGICAL HISTORY:   History reviewed. No pertinent surgical history.    ALLERGIES AND MEDICATION:   Review of patient's allergies indicates:  No Known Allergies     Medication List           Accurate as of November 21, 2019 10:06 AM. If you have any questions, ask your nurse or doctor.               CONTINUE taking these medications    * allopurinol 300 MG tablet  Commonly  known as:  ZYLOPRIM  TAKE 1 & 1/2 (ONE & ONE-HALF) TABLETS BY MOUTH ONCE DAILY     * allopurinol 300 MG tablet  Commonly known as:  ZYLOPRIM  TAKE 1 & 1/2 (ONE & ONE-HALF) TABLETS BY MOUTH ONCE DAILY     carvedilol 25 MG tablet  Commonly known as:  COREG  Take 1 tablet (25 mg total) by mouth 2 (two) times daily with meals.     clobetasol 0.05 % external solution  Commonly known as:  TEMOVATE  USE ON SCALP ONCE TO TWICE DAILY AS NEEDED FOR SCALING OR ITCHING     * colchicine 0.6 mg Cap  Commonly known as:  Mitigare  Take 1 capsule (0.6 mg total) by mouth 2 (two) times daily. To prevent gout flares.     * colchicine 0.6 mg Cap  Commonly known as:  MITIGARE  TAKE 1 CAPSULE BY MOUTH TWICE DAILY TO PREVENT GOUT FLARES     diphth,pertus(acell),tetanus 2.5-8-5 Lf-mcg-Lf/0.5mL Syrg injection  Commonly known as:  BOOSTRIX  Inject into the muscle.     divalproex  MG Tb24  Commonly known as:  DEPAKOTE     ergocalciferol 50,000 unit Cap  Commonly known as:  ERGOCALCIFEROL  Take 1 capsule (50,000 Units total) by mouth every 7 days.     folic acid 1 MG tablet  Commonly known as:  FOLVITE  1 po qday - do not take on same day as taking methotrexate     furosemide 40 MG tablet  Commonly known as:  LASIX  Take 1 tablet (40 mg total) by mouth 2 (two) times daily.     halobetasol 0.05 % ointment  Commonly known as:  ULTRAVATE  APPLY OINTMENT TOPICALLY TO AFFECTED AREA TWICE DAILY     Ilumya 100 mg/mL Syrg  Generic drug:  tildrakizumab-asmn  Inject 100 mg into the skin at weeks 0, 4, and every 12 weeks thereafter     lisinopril 20 MG tablet  Commonly known as:  PRINIVIL,ZESTRIL  Take 1 tablet (20 mg total) by mouth once daily.     methotrexate 2.5 MG Tab  Take 6 tablets po qweek in divided doses as directed     Otezla 30 mg Tab  Generic drug:  apremilast  Take 1 tablet by mouth twice daily     potassium chloride SA 20 MEQ tablet  Commonly known as:  K-DUR,KLOR-CON  Take 1 tablet (20 mEq total) by mouth once daily.     * Skyrizi  150mg/1.66mL(75 mg/0.83 mL x2) subcutaneous injection  Generic drug:  risankizumab-rzaa  Inject 150 mg (2 syringes) into the skin once. Repeat in 28 days, and thereafter every 12 weeks.     * risankizumab-rzaa 150mg/1.66mL(75 mg/0.83 mL x2) subcutaneous injection  Commonly known as:  Skyrizi  Inject 150 mg (2 syringes) into the skin every 12 weeks.     sulfaSALAzine 500 MG Tbec  Commonly known as:  AZULFIDINE  1 daily for 5 days, 1 twice daily for 5 days, 1 three time daily for 5 days, 2 twice daily     Tremfya 100 mg/mL Syrg  Generic drug:  guselkumab  Inject 100mg (1ml) into the skin at week 0 and week 4 then every other month     triamcinolone acetonide 0.1% 0.1 % ointment  Commonly known as:  KENALOG  APPLY  OINTMENT TOPICALLY TO AFFECTED AREA TWICE DAILY         * This list has 6 medication(s) that are the same as other medications prescribed for you. Read the directions carefully, and ask your doctor or other care provider to review them with you.                SOCIAL HISTORY:     Social History     Socioeconomic History    Marital status: Single     Spouse name: Not on file    Number of children: Not on file    Years of education: Not on file    Highest education level: Not on file   Occupational History    Not on file   Social Needs    Financial resource strain: Not on file    Food insecurity:     Worry: Not on file     Inability: Not on file    Transportation needs:     Medical: Not on file     Non-medical: Not on file   Tobacco Use    Smoking status: Former Smoker     Packs/day: 0.25    Smokeless tobacco: Former User   Substance and Sexual Activity    Alcohol use: Yes     Comment: Social    Drug use: No    Sexual activity: Not on file   Lifestyle    Physical activity:     Days per week: Not on file     Minutes per session: Not on file    Stress: Not on file   Relationships    Social connections:     Talks on phone: Not on file     Gets together: Not on file     Attends Christian service:  "Not on file     Active member of club or organization: Not on file     Attends meetings of clubs or organizations: Not on file     Relationship status: Not on file   Other Topics Concern    Not on file   Social History Narrative    Not on file       FAMILY HISTORY:     Family History   Problem Relation Age of Onset    Coronary artery disease Mother     Melanoma Neg Hx     Psoriasis Neg Hx     Lupus Neg Hx        REVIEW OF SYSTEMS:   Review of Systems   Constitutional: Negative for chills, diaphoresis and fever.   HENT: Negative for nosebleeds.    Eyes: Negative for blurred vision, double vision and photophobia.   Respiratory: Negative for hemoptysis, shortness of breath and wheezing.    Cardiovascular: Negative for chest pain, palpitations, orthopnea, claudication, leg swelling and PND.   Gastrointestinal: Negative for abdominal pain, blood in stool, heartburn, melena, nausea and vomiting.   Genitourinary: Negative for flank pain and hematuria.   Musculoskeletal: Negative for falls, myalgias and neck pain.   Skin: Positive for rash (psoriatic).   Neurological: Negative for dizziness, seizures, loss of consciousness, weakness and headaches.   Endo/Heme/Allergies: Negative for polydipsia. Does not bruise/bleed easily.   Psychiatric/Behavioral: Negative for depression and memory loss. The patient is not nervous/anxious.        PHYSICAL EXAM:     Vitals:    11/21/19 0949   BP: 132/80   Pulse: 71   Resp: 15    Body mass index is 30.57 kg/m².  Weight: 93.9 kg (207 lb)   Height: 5' 9" (175.3 cm)     Physical Exam   Constitutional: He is oriented to person, place, and time. He appears well-developed and well-nourished. He is cooperative.  Non-toxic appearance. No distress.   HENT:   Head: Normocephalic and atraumatic.   Eyes: Pupils are equal, round, and reactive to light. Conjunctivae and EOM are normal. No scleral icterus.   Neck: Trachea normal and normal range of motion. Neck supple. Normal carotid pulses and no " JVD present. Carotid bruit is not present. No neck rigidity. No tracheal deviation and no edema present. No thyromegaly present.   Cardiovascular: Normal rate, regular rhythm, S1 normal and S2 normal. PMI is not displaced. Exam reveals no gallop and no friction rub.   No murmur heard.  Pulses:       Carotid pulses are 2+ on the right side, and 2+ on the left side.  Pulmonary/Chest: Effort normal and breath sounds normal. No stridor. No respiratory distress. He has no wheezes. He has no rales. He exhibits no tenderness.   Abdominal: Soft. He exhibits no distension. There is no hepatosplenomegaly.   Musculoskeletal: Normal range of motion. He exhibits no edema or tenderness.   Feet:   Right Foot:   Skin Integrity: Negative for ulcer.   Left Foot:   Skin Integrity: Negative for ulcer.   Neurological: He is alert and oriented to person, place, and time. No cranial nerve deficit.   Skin: Skin is warm and dry. Rash (psoriatic) noted. No erythema.   Psychiatric: He has a normal mood and affect. His speech is normal and behavior is normal.   Vitals reviewed.      DATA:   EKG: (personally reviewed tracing(s))  11/21/19 SR 71, PRWP, similar to 10/22/18    Laboratory:  CBC:  Recent Labs   Lab 01/08/19  1210 04/16/19  1147 08/20/19  1116   WBC 8.83  8.83 9.02 10.11   Hemoglobin 15.2  15.2 15.9 16.4   Hematocrit 46.8  46.8 48.7 52.2   Platelets 221  221 244 244       CHEMISTRIES:  Recent Labs   Lab 01/08/19  1210 04/16/19  1147 08/20/19  1116   Glucose 96 102 94   Sodium 139 140 137   Potassium 3.4 L 3.9 4.4   BUN, Bld 16 10 23 H   Creatinine 1.0 1.0 1.1   eGFR if African American >60.0 >60.0 >60.0   eGFR if non African American >60.0 >60.0 >60.0   Calcium 9.6 9.9 10.0       CARDIAC BIOMARKERS:        COAGS:        LIPIDS/LFTS:  Recent Labs   Lab 01/06/18  0509  09/27/18  1208  01/08/19  1210 04/16/19  1147 08/20/19  1116   Cholesterol 195  --  231 H  --   --   --   --    Triglycerides 208 H  --  222 H  --   --   --   --     HDL 47  --  53  --   --   --   --    LDL Cholesterol 106.4  --  133.6  --   --   --   --    Non-HDL Cholesterol 148  --  178  --   --   --   --    AST 33   < >  --    < > 26  26 17 16   ALT 33   < >  --    < > 28  28 27 22    < > = values in this interval not displayed.     The 10-year ASCVD risk score (Humberto FIGUEROA JrMary, et al., 2013) is: 2.1%    Values used to calculate the score:      Age: 44 years      Sex: Male      Is Non- : No      Diabetic: No      Tobacco smoker: No      Systolic Blood Pressure: 132 mmHg      Is BP treated: Yes      HDL Cholesterol: 53 mg/dL      Total Cholesterol: 231 mg/dL      Cardiovascular Testing:  Echo 1/5/18    1 - TDS.     2 - Normal left ventricular systolic function (EF 60-65%).     3 - No diagnostic regional wall motion abnormalities.     4 - Concentric remodeling.     5 - No intracardiac source of embolus noted on this exam.  If high clinical suspicion, consider BENNETT +/- bubble study.     ASSESSMENT:   # hx NICM, ?psoriatic myocarditis 2012, LVEF normalized.  Currently euvolemic and asymptomatic  # HTN, controlled  # ?hx CVA r/t LV apical thrombus.  ASCVD calc does not suggest statin benefit.  # psoriasis/gout, followed by Rheum  # BMI 31    PLAN:   Cont med rx/GDMT  Diet/exercise/weight loss  Follow up with rheum as planned  RTC 1 year, sooner prn    [place] [facility] (28 pages in media tab) reviewed      Flako Cunningham MD, FACC

## 2019-11-26 ENCOUNTER — OFFICE VISIT (OUTPATIENT)
Dept: DERMATOLOGY | Facility: CLINIC | Age: 44
End: 2019-11-26
Payer: MEDICAID

## 2019-11-26 DIAGNOSIS — L40.9 PSORIASIS: Primary | ICD-10-CM

## 2019-11-26 DIAGNOSIS — L40.50 PSORIATIC ARTHRITIS: ICD-10-CM

## 2019-11-26 DIAGNOSIS — Z79.899 ENCOUNTER FOR LONG-TERM (CURRENT) USE OF HIGH-RISK MEDICATION: ICD-10-CM

## 2019-11-26 PROCEDURE — 99213 OFFICE O/P EST LOW 20 MIN: CPT | Mod: S$PBB,,, | Performed by: PATHOLOGY

## 2019-11-26 PROCEDURE — 99999 PR PBB SHADOW E&M-EST. PATIENT-LVL II: ICD-10-PCS | Mod: PBBFAC,,, | Performed by: PATHOLOGY

## 2019-11-26 PROCEDURE — 99212 OFFICE O/P EST SF 10 MIN: CPT | Mod: PBBFAC | Performed by: PATHOLOGY

## 2019-11-26 PROCEDURE — 99999 PR PBB SHADOW E&M-EST. PATIENT-LVL II: CPT | Mod: PBBFAC,,, | Performed by: PATHOLOGY

## 2019-11-26 PROCEDURE — 99213 PR OFFICE/OUTPT VISIT, EST, LEVL III, 20-29 MIN: ICD-10-PCS | Mod: S$PBB,,, | Performed by: PATHOLOGY

## 2019-11-26 RX ORDER — APREMILAST 30 MG/1
TABLET, FILM COATED ORAL
Qty: 60 TABLET | Refills: 2 | Status: SHIPPED | OUTPATIENT
Start: 2019-11-26 | End: 2020-03-02 | Stop reason: SDUPTHER

## 2019-11-26 RX ORDER — TRIAMCINOLONE ACETONIDE 1 MG/G
OINTMENT TOPICAL
Qty: 454 G | Refills: 3 | Status: SHIPPED | OUTPATIENT
Start: 2019-11-26 | End: 2020-07-06

## 2019-11-26 NOTE — PROGRESS NOTES
Subjective:       Patient ID:  Adrien Panda is a 44 y.o. male who presents for   Chief Complaint   Patient presents with    Psoriasis     Follow up     Pt here today for psoriasis follow up, pt states improving, tx skyrizi   no new lesions since first injection 1 month ago.  Also re-started Otezla about 1 month ago.  Existing plaques thinning out.  + pruritus.  + feet/ankle joint pain.    Taltz initially worked best of all of the other systemic agents he has tried, but legs flared Fall 2018 with progressive new scaly plaques moving proximal on bilateral legs to thighs.  Most recently, was on Ilumya, and prior to that MTX and Tremfya, which were ineffective and discontinued last visit.     Started Taltz 1/2018. States he was clear when he first started it, but psoriasis recurred on his legs. No infections.       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 Rubin at [facility], cutaneous and joint gout tx by Dr. Hermosillo (psoriasis flared on colchicine), stroke      PSORIASIS: failed tx with Enbrel, Humira, Soriatane, Taltz, Ilumya, tremfya, otezla, clobetasol topical, betamethasone topical, Lidex topical, elocon topical.    ILK helps temporarily. ultravate works best out of all the topicals he's tried. psorcon 0.05% oint helps.    On Humira from 6/2015 - 4/2016 until he stopped responding to it. Switched back to enbrel at that point which only helped temporarily.    Added Soriatane to Enbrel but ended up with a bad flare on lower legs.  Not a good candidate for NBUVB as he lives on the [facility] and there is not light unit in this area.   Stelara 9/21/16 - 7/19/17 (stopped responding to it)  Taltz 1/2018 - 11/2018 (stopped responding to it)  Tremfya 11/2018 - 4/2019 - never achieved significant clearance  Ilumya 4/2019 - 8/ 2019 - no improvement and progression  Currently - Skyrizi (started Oct. 2019) and Otezla     TB gold: negative 04/19     Pt also with psoriatic arthritis - joints  in feet have also been flaring.  Rheum is following - treating arthritis with meloxicam and sulfasalazine.    Review of Systems   Constitutional: Positive for fatigue. Negative for fever, chills and malaise.   Respiratory: Negative for cough and shortness of breath.    Gastrointestinal: Negative for nausea, vomiting and diarrhea.   Musculoskeletal: Positive for arthralgias. Negative for joint swelling.   Skin: Positive for itching, rash and dry skin. Negative for abscesses.   Hematologic/Lymphatic: Does not bruise/bleed easily.        Objective:    Physical Exam   Constitutional: He appears well-developed and well-nourished. No distress.   Neurological: He is alert and oriented to person, place, and time. He is not disoriented.   Psychiatric: He has a normal mood and affect.   Skin:   Areas Examined (abnormalities noted in diagram):   Scalp / Hair Palpated and Inspected  Head / Face Inspection Performed  Neck Inspection Performed  Chest / Axilla Inspection Performed  Abdomen Inspection Performed  Back Inspection Performed  RUE Inspected  LUE Inspection Performed  RLE Inspected  LLE Inspection Performed  Nails and Digits Inspection Performed              Diagram Legend     Erythematous scaling macule/papule c/w actinic keratosis       Vascular papule c/w angioma      Pigmented verrucoid papule/plaque c/w seborrheic keratosis      Yellow umbilicated papule c/w sebaceous hyperplasia      Irregularly shaped tan macule c/w lentigo     1-2 mm smooth white papules consistent with Milia      Movable subcutaneous cyst with punctum c/w epidermal inclusion cyst      Subcutaneous movable cyst c/w pilar cyst      Firm pink to brown papule c/w dermatofibroma      Pedunculated fleshy papule(s) c/w skin tag(s)      Evenly pigmented macule c/w junctional nevus     Mildly variegated pigmented, slightly irregular-bordered macule c/w mildly atypical nevus      Flesh colored to evenly pigmented papule c/w intradermal nevus       Pink  pearly papule/plaque c/w basal cell carcinoma      Erythematous hyperkeratotic cursted plaque c/w SCC      Surgical scar with no sign of skin cancer recurrence      Open and closed comedones      Inflammatory papules and pustules      Verrucoid papule consistent consistent with wart     Erythematous eczematous patches and plaques     Dystrophic onycholytic nail with subungual debris c/w onychomycosis     Umbilicated papule    Erythematous-base heme-crusted tan verrucoid plaque consistent with inflamed seborrheic keratosis     Erythematous Silvery Scaling Plaque c/w Psoriasis     See annotation      Assessment / Plan:        Psoriasis - improving on skyrizi and otezla; see PMH for previous systemic treatment failures  -     triamcinolone acetonide 0.1% (KENALOG) 0.1 % ointment; AAA bid  Dispense: 454 g; Refill: 3  -     OTEZLA 30 mg Tab; Take 1 tablet by mouth twice daily  Dispense: 60 tablet; Refill: 2    Psoriatic arthritis - skyrizi and otezla as above; co-managed by rheum; some improvement    Encounter for long-term (current) use of high-risk medication  -     Quantiferon Gold TB; Future; Expected date: 02/26/2020  -     Comprehensive metabolic panel; Future; Expected date: 02/26/2020  -     CBC auto differential; Future; Expected date: 02/26/2020             No follow-ups on file.

## 2019-11-30 DIAGNOSIS — I42.9 CARDIOMYOPATHY, IDIOPATHIC: ICD-10-CM

## 2019-12-01 RX ORDER — POTASSIUM CHLORIDE 20 MEQ/1
TABLET, EXTENDED RELEASE ORAL
Qty: 90 TABLET | Refills: 0 | Status: SHIPPED | OUTPATIENT
Start: 2019-12-01 | End: 2020-03-23

## 2019-12-05 ENCOUNTER — PATIENT MESSAGE (OUTPATIENT)
Dept: DERMATOLOGY | Facility: CLINIC | Age: 44
End: 2019-12-05

## 2019-12-09 NOTE — PROGRESS NOTES
After obtaining consent, and per orders of Dr. NICO Son, instruction of injection of Skyrizi 75 mg /0.83 ml per syringe given in the right and left lower abdomen by Jewel Trim . Patient instructed to remain in clinic for 20 minutes afterwards, and to report any adverse reaction to me immediately. Lot [id number], Exp MAR2021, NDC 0901-5667-98. Patient SUPPLIED medication. No charge.

## 2019-12-11 ENCOUNTER — TELEPHONE (OUTPATIENT)
Dept: PHARMACY | Facility: CLINIC | Age: 44
End: 2019-12-11

## 2019-12-11 NOTE — TELEPHONE ENCOUNTER
Pt confirmed shipping of Otezla on  for delivery on .  Pt  and address verified.  Pt is continuing on this medication, and is currently out of doses.  He is familiar with [facility], because we fill his other medication.  This Rx used to be filled at [facility], but was sent to [facility].  The pt is fine with using [facility] as long as we can ship to him.  $0.00 copay in 004.  Pt had no further questions or concerns.

## 2019-12-12 ENCOUNTER — OFFICE VISIT (OUTPATIENT)
Dept: RHEUMATOLOGY | Facility: CLINIC | Age: 44
End: 2019-12-12
Payer: MEDICAID

## 2019-12-12 VITALS
SYSTOLIC BLOOD PRESSURE: 98 MMHG | WEIGHT: 215.63 LBS | HEIGHT: 69 IN | BODY MASS INDEX: 31.94 KG/M2 | DIASTOLIC BLOOD PRESSURE: 64 MMHG | HEART RATE: 73 BPM

## 2019-12-12 DIAGNOSIS — M1A.9XX1 GOUT WITH TOPHI: ICD-10-CM

## 2019-12-12 DIAGNOSIS — M11.20 CHONDROCALCINOSIS: ICD-10-CM

## 2019-12-12 DIAGNOSIS — I42.9 CARDIOMYOPATHY, IDIOPATHIC: ICD-10-CM

## 2019-12-12 DIAGNOSIS — L40.50 PSORIATIC ARTHROPATHY: Primary | ICD-10-CM

## 2019-12-12 PROCEDURE — 99999 PR PBB SHADOW E&M-EST. PATIENT-LVL III: ICD-10-PCS | Mod: PBBFAC,,, | Performed by: INTERNAL MEDICINE

## 2019-12-12 PROCEDURE — 99999 PR PBB SHADOW E&M-EST. PATIENT-LVL III: CPT | Mod: PBBFAC,,, | Performed by: INTERNAL MEDICINE

## 2019-12-12 PROCEDURE — 99213 OFFICE O/P EST LOW 20 MIN: CPT | Mod: PBBFAC | Performed by: INTERNAL MEDICINE

## 2019-12-12 PROCEDURE — 99213 OFFICE O/P EST LOW 20 MIN: CPT | Mod: S$PBB,,, | Performed by: INTERNAL MEDICINE

## 2019-12-12 PROCEDURE — 99213 PR OFFICE/OUTPT VISIT, EST, LEVL III, 20-29 MIN: ICD-10-PCS | Mod: S$PBB,,, | Performed by: INTERNAL MEDICINE

## 2019-12-12 RX ORDER — SULFASALAZINE 500 MG/1
500 TABLET ORAL 2 TIMES DAILY
Qty: 60 TABLET | Refills: 11 | Status: SHIPPED | OUTPATIENT
Start: 2019-12-12 | End: 2020-12-11

## 2019-12-12 ASSESSMENT — ROUTINE ASSESSMENT OF PATIENT INDEX DATA (RAPID3)
FATIGUE SCORE: 8
AM STIFFNESS SCORE: 1, YES
PAIN SCORE: 7.5
MDHAQ FUNCTION SCORE: 1.2
PATIENT GLOBAL ASSESSMENT SCORE: 8
PSYCHOLOGICAL DISTRESS SCORE: 4.4
PAIN SCORE: 7.5
PATIENT GLOBAL ASSESSMENT SCORE: 8
PSYCHOLOGICAL DISTRESS SCORE: 2.2
WHEN YOU AWAKENED IN THE MORNING OVER THE LAST WEEK, PLEASE INDICATE THE AMOUNT OF TIME IT TAKES UNTIL YOU ARE AS LIMBER AS YOU WILL BE FOR THE DAY: 60
WHEN YOU AWAKENED IN THE MORNING OVER THE LAST WEEK, PLEASE INDICATE THE AMOUNT OF TIME IT TAKES UNTIL YOU ARE AS LIMBER AS YOU WILL BE FOR THE DAY: 60
FATIGUE SCORE: 8
TOTAL RAPID3 SCORE: 6.5

## 2019-12-13 NOTE — PROGRESS NOTES
History of present illness:  44-year-old gentleman with idiopathic cardiomyopathy.  He has had no recent evidence of congestive heart failure and sees his cardiologist infrequently.  He had had a previous CVA.  I am following him for several different problems.  He has chronic tophaceous gout.  He is on allopurinol 450 mg daily.  He has x-ray evidence of chondrocalcinosis.  His main problem has been psoriasis.  He most likely has psoriatic arthritis.  He had been treated in the past with Enbrel, Humira, Taltz, Tremfya, MTX and Ilumya.  He is now on Skyrizi.  He was also was placed back on Otezla which she is tolerating better than before.  He has had no weight loss.    When I saw him last he still had evidence of active arthritis.  I placed him on sulfasalazine starting at 500 mg daily.  He was supposed to increase it up to 4 pills daily but is only on 2 pills daily.  This has helped his arthritis.  He is tolerating the sulfasalazine.  He also has been taking meloxicam which has helped.  He denies other recent medical problems.  He has had no recent gout attack.  His cardiac status has been stable.  He has had some difficulty getting the sulfasalazine filled    Physical examination:  Musculoskeletal:  Good range of motion of all joints.  No synovitis.  No tenderness to palpation.    Assessment:  1.  Improved psoriatic arthritis  2.  Inter critical gout  3.  Improve psoriasis    Plans:  1..  I changed him from sulfasalazine enteric-coated to plain sulfasalazine  2.  Continue other medications as before.  3.  Return to see me in 4 months

## 2019-12-19 ENCOUNTER — OFFICE VISIT (OUTPATIENT)
Dept: INTERNAL MEDICINE | Facility: CLINIC | Age: 44
End: 2019-12-19
Payer: MEDICAID

## 2019-12-19 VITALS
HEIGHT: 69 IN | SYSTOLIC BLOOD PRESSURE: 108 MMHG | OXYGEN SATURATION: 96 % | HEART RATE: 75 BPM | WEIGHT: 210.56 LBS | BODY MASS INDEX: 31.19 KG/M2 | TEMPERATURE: 98 F | DIASTOLIC BLOOD PRESSURE: 68 MMHG

## 2019-12-19 DIAGNOSIS — I42.9 CARDIOMYOPATHY, IDIOPATHIC: Primary | ICD-10-CM

## 2019-12-19 DIAGNOSIS — M1A.9XX1 GOUT WITH TOPHI: ICD-10-CM

## 2019-12-19 DIAGNOSIS — I10 ESSENTIAL HYPERTENSION: ICD-10-CM

## 2019-12-19 DIAGNOSIS — E55.9 VITAMIN D DEFICIENCY: ICD-10-CM

## 2019-12-19 DIAGNOSIS — L40.50 PSORIATIC ARTHRITIS: ICD-10-CM

## 2019-12-19 LAB
BILIRUB UR QL STRIP: NEGATIVE
CLARITY UR REFRACT.AUTO: CLEAR
COLOR UR AUTO: YELLOW
GLUCOSE UR QL STRIP: NEGATIVE
HGB UR QL STRIP: NEGATIVE
KETONES UR QL STRIP: NEGATIVE
LEUKOCYTE ESTERASE UR QL STRIP: NEGATIVE
MICROSCOPIC COMMENT: NORMAL
NITRITE UR QL STRIP: NEGATIVE
PH UR STRIP: 5 [PH] (ref 5–8)
PROT UR QL STRIP: NEGATIVE
SP GR UR STRIP: 1.01 (ref 1–1.03)
URN SPEC COLLECT METH UR: NORMAL

## 2019-12-19 PROCEDURE — 99999 PR PBB SHADOW E&M-EST. PATIENT-LVL III: ICD-10-PCS | Mod: PBBFAC,,, | Performed by: INTERNAL MEDICINE

## 2019-12-19 PROCEDURE — 99213 OFFICE O/P EST LOW 20 MIN: CPT | Mod: PBBFAC | Performed by: INTERNAL MEDICINE

## 2019-12-19 PROCEDURE — 99215 PR OFFICE/OUTPT VISIT, EST, LEVL V, 40-54 MIN: ICD-10-PCS | Mod: S$PBB,,, | Performed by: INTERNAL MEDICINE

## 2019-12-19 PROCEDURE — 99999 PR PBB SHADOW E&M-EST. PATIENT-LVL III: CPT | Mod: PBBFAC,,, | Performed by: INTERNAL MEDICINE

## 2019-12-19 PROCEDURE — 81001 URINALYSIS AUTO W/SCOPE: CPT

## 2019-12-19 PROCEDURE — 99215 OFFICE O/P EST HI 40 MIN: CPT | Mod: S$PBB,,, | Performed by: INTERNAL MEDICINE

## 2019-12-22 NOTE — PROGRESS NOTES
CC:  Establishing care.    HPI:  The patient is a 44-year-old gentleman with a history of nonischemic cardiomyopathy, myocarditis secondary to psoriasis, left ventricular mural thrombus, psoriatic arthritis, gout, and old frontal infarcts who presents today to establish care.  He was seen in internal medicine in 2018 as well.  The patient is being followed by Cardiology, Rheumatology and Dermatology.  His psoriasis is doing much better.  The patient has no new complaints.    ROS:  Weight has remained stable.  He states he had his eyes checked 6 months ago.  No auditory changes.  No dysphagia.  No chest pain.  No shortness of breath.  He does walk on a treadmill 3 times a week.  No nausea or vomiting.  No abdominal pain. No bowel changes.  No bladder changes.  He does get up 2 times at night to urinate which he attributes to a p.m. dose of Lasix.  Does have some tingling in the feet.  His skin is doing much better.  His psoriasis was worse on his legs.    Physical exam:  General appearance no acute distress.  HEENT:  Conjunctiva is clear.  Pupils are equal without photophobia.  TMs are clear.  Nasal septum is midline without discharge. Oropharynx is without erythema.  Trachea is midline without JVD.  Pulmonary:  Good inspiratory, expiratory breath sounds are heard.  Lungs are clear to auscultation.  Cardiovascular:  S1-S2.  2+ carotid pulses without bruits.  Extremities with trace edema.  GI:  Abdomen was nontender, nondistended without hepatosplenomegaly.  :  Scrotum and penis were normal.  Lymphatics:  No cervical, axillary or inguinal adenopathy.  Derm.  Patient's skin still show some changes due to his psoriasis.  Comments:  Patient's records from [facility] were reviewed as well as his recent blood test from August of this year.    Assessment:  1.  Nonischemic cardiomyopathy  2.  History of left ventricular mural thrombus  3.  History of frontal lobe infarcts  4.  Vitamin-D deficiency  5.  Gout  6.  Psoriatic  arthritis.    Plan:  1.  Will add a UA and lipid panel.  2.  The patient will follow up pending results

## 2020-01-06 ENCOUNTER — TELEPHONE (OUTPATIENT)
Dept: PHARMACY | Facility: CLINIC | Age: 45
End: 2020-01-06

## 2020-01-31 ENCOUNTER — TELEPHONE (OUTPATIENT)
Dept: PHARMACY | Facility: CLINIC | Age: 45
End: 2020-01-31

## 2020-02-12 ENCOUNTER — TELEPHONE (OUTPATIENT)
Dept: PHARMACY | Facility: CLINIC | Age: 45
End: 2020-02-12

## 2020-02-12 DIAGNOSIS — L40.0 PSORIASIS VULGARIS: ICD-10-CM

## 2020-02-12 RX ORDER — HALOBETASOL PROPIONATE 0.5 MG/G
OINTMENT TOPICAL
Qty: 100 G | Refills: 0 | Status: SHIPPED | OUTPATIENT
Start: 2020-02-12 | End: 2020-04-01

## 2020-02-21 ENCOUNTER — PATIENT MESSAGE (OUTPATIENT)
Dept: DERMATOLOGY | Facility: CLINIC | Age: 45
End: 2020-02-21

## 2020-02-21 NOTE — TELEPHONE ENCOUNTER
confirmed the need for refill of skyrizi, shipped by  to [facility] confirmed address on 2/26 to arrive on 2/27. verified 2 pt. identifier, pt. states has appointment at Dr. Martha Son - Pathology 11th Floor [facility] [address] [facility] on 2/27 for 11:40. [facility] will pend till confirmed by [facility], staff message sent.

## 2020-02-24 RX ORDER — LISINOPRIL 20 MG/1
TABLET ORAL
Qty: 90 TABLET | Refills: 3 | Status: SHIPPED | OUTPATIENT
Start: 2020-02-24 | End: 2021-02-02

## 2020-02-26 ENCOUNTER — PATIENT OUTREACH (OUTPATIENT)
Dept: ADMINISTRATIVE | Facility: OTHER | Age: 45
End: 2020-02-26

## 2020-02-26 DIAGNOSIS — L40.9 PSORIASIS: ICD-10-CM

## 2020-02-26 RX ORDER — APREMILAST 30 MG/1
TABLET, FILM COATED ORAL
Qty: 60 TABLET | Refills: 2 | Status: CANCELLED | OUTPATIENT
Start: 2020-02-26

## 2020-02-26 NOTE — PROGRESS NOTES
Chart reviewed.   Requested updates from Care Everywhere.  Immunizations reconciled, no data in Links.    updated.

## 2020-02-27 ENCOUNTER — CLINICAL SUPPORT (OUTPATIENT)
Dept: DERMATOLOGY | Facility: CLINIC | Age: 45
End: 2020-02-27
Payer: MEDICAID

## 2020-02-27 ENCOUNTER — LAB VISIT (OUTPATIENT)
Dept: LAB | Facility: HOSPITAL | Age: 45
End: 2020-02-27
Payer: MEDICAID

## 2020-02-27 DIAGNOSIS — Z79.899 ENCOUNTER FOR LONG-TERM (CURRENT) USE OF HIGH-RISK MEDICATION: ICD-10-CM

## 2020-02-27 DIAGNOSIS — L40.9 PSORIASIS: Primary | ICD-10-CM

## 2020-02-27 PROCEDURE — 99499 NO LOS: ICD-10-PCS | Mod: S$PBB,,, | Performed by: PATHOLOGY

## 2020-02-27 PROCEDURE — 99499 UNLISTED E&M SERVICE: CPT | Mod: S$PBB,,, | Performed by: PATHOLOGY

## 2020-02-27 PROCEDURE — 36415 COLL VENOUS BLD VENIPUNCTURE: CPT

## 2020-02-27 PROCEDURE — 86480 TB TEST CELL IMMUN MEASURE: CPT

## 2020-02-27 NOTE — PROGRESS NOTES
After obtaining consent, and per orders of Dr. Son, injection of Skyrizi 75 mg/0.83 ml per syringe given by patient in the left upper outer arm by Jewel Trim . Patient instructed to remain in clinic for 20 minutes afterwards, and to report any adverse reaction to me immediately. Lot [id number], Exp June 2021, NDC 5235-2973-90. Patient SUPPLIED medication no charge.

## 2020-02-28 ENCOUNTER — TELEPHONE (OUTPATIENT)
Dept: PHARMACY | Facility: CLINIC | Age: 45
End: 2020-02-28

## 2020-02-28 DIAGNOSIS — L40.9 PSORIASIS: ICD-10-CM

## 2020-02-28 RX ORDER — APREMILAST 30 MG/1
TABLET, FILM COATED ORAL
Qty: 60 TABLET | Refills: 2 | Status: CANCELLED | OUTPATIENT
Start: 2020-02-28

## 2020-02-28 NOTE — TELEPHONE ENCOUNTER
----- Message from Leyla Lisa sent at 2/28/2020  2:21 PM CST -----  Contact: [facility]  Please call [facility] pharmacist ms.Stefany at [phone]  Or fax [phone] said they need information on above patient medication Otdariusla thanks.

## 2020-02-28 NOTE — TELEPHONE ENCOUNTER
Spoke to Ms Mccall who needed the correct fax number to send refill request over for Otezla.Fax number update [phone]    LESLIE

## 2020-03-02 ENCOUNTER — TELEPHONE (OUTPATIENT)
Dept: PHARMACY | Facility: CLINIC | Age: 45
End: 2020-03-02

## 2020-03-02 DIAGNOSIS — L40.9 PSORIASIS: ICD-10-CM

## 2020-03-02 LAB
GAMMA INTERFERON BACKGROUND BLD IA-ACNC: 0.02 IU/ML
M TB IFN-G CD4+ BCKGRND COR BLD-ACNC: 0 IU/ML
MITOGEN IGNF BCKGRD COR BLD-ACNC: 7.26 IU/ML
TB GOLD PLUS: NEGATIVE
TB2 - NIL: -0.01 IU/ML

## 2020-03-02 RX ORDER — APREMILAST 30 MG/1
TABLET, FILM COATED ORAL
Qty: 60 TABLET | Refills: 2 | Status: SHIPPED | OUTPATIENT
Start: 2020-03-02 | End: 2020-03-02 | Stop reason: SDUPTHER

## 2020-03-02 RX ORDER — APREMILAST 30 MG/1
TABLET, FILM COATED ORAL
Qty: 60 TABLET | Refills: 2 | Status: SHIPPED | OUTPATIENT
Start: 2020-03-02 | End: 2020-05-15 | Stop reason: SDUPTHER

## 2020-03-10 ENCOUNTER — TELEPHONE (OUTPATIENT)
Dept: DERMATOLOGY | Facility: CLINIC | Age: 45
End: 2020-03-10

## 2020-03-10 ENCOUNTER — PATIENT MESSAGE (OUTPATIENT)
Dept: PHARMACY | Facility: CLINIC | Age: 45
End: 2020-03-10

## 2020-03-10 NOTE — TELEPHONE ENCOUNTER
----- Message from Daryl Son MD sent at 3/10/2020  2:57 PM CDT -----  Contact: [facility] at [phone] anyone  Ref #[id number] for Otezla  Can we call [facility] and let them know that pateint has 10% of body surface area affected by psoriasis?  Thanks,  bv  ----- Message -----  From: Bernardo Guerrier LPN  Sent: 3/3/2020   1:43 PM CDT  To: Daryl Son MD    Please advise, Insurance requesting what % of pt body area is affected by psoriasis. Need info in order to get Otezla Rx.    JT  ----- Message -----  From: Noris Mcintyre  Sent: 3/3/2020   1:30 PM CST  To: Huy Son pt-Ref to Otezla rx.  Needs the % of the body diane to be uses.  Is is 3% or more?

## 2020-03-10 NOTE — TELEPHONE ENCOUNTER
----- Message from Mandy Yuan PharmD sent at 3/10/2020  8:35 AM CDT -----  Regarding: Otezla  Good morning,    I am currently working on Otezla appeal for Mr. Panda. I just wanted to make you aware that patient's signature is required on appeal form in order to submit it. I will continue to try to reach out to him to get his signature.    Thank you,  Mandy Yuan, PharmD  Clinical Pharmacist  [facility]  [phone]

## 2020-03-14 RX ORDER — MELOXICAM 15 MG/1
15 TABLET ORAL DAILY
Qty: 30 TABLET | Refills: 3 | Status: SHIPPED | OUTPATIENT
Start: 2020-03-14 | End: 2020-04-13

## 2020-03-23 DIAGNOSIS — I42.9 CARDIOMYOPATHY, IDIOPATHIC: ICD-10-CM

## 2020-03-23 RX ORDER — POTASSIUM CHLORIDE 20 MEQ/1
TABLET, EXTENDED RELEASE ORAL
Qty: 90 TABLET | Refills: 3 | Status: SHIPPED | OUTPATIENT
Start: 2020-03-23 | End: 2021-03-19

## 2020-03-23 RX ORDER — CARVEDILOL 25 MG/1
TABLET ORAL
Qty: 180 TABLET | Refills: 3 | Status: SHIPPED | OUTPATIENT
Start: 2020-03-23 | End: 2021-02-18 | Stop reason: SDUPTHER

## 2020-03-26 ENCOUNTER — TELEPHONE (OUTPATIENT)
Dept: PHARMACY | Facility: CLINIC | Age: 45
End: 2020-03-26

## 2020-03-26 NOTE — TELEPHONE ENCOUNTER
DOCUMENTATION ONLY:     Appeal for Otezla OVERTURNED, APPROVED from 3/3/20 to 3/3/21.      Case ID# [id number]    Co-Pay: $0.00    Patient Assistance IS NOT required.     Forward to clinical pharmacist for consult & shipment.      [place]

## 2020-03-28 DIAGNOSIS — L40.0 PSORIASIS VULGARIS: ICD-10-CM

## 2020-03-30 NOTE — TELEPHONE ENCOUNTER
Refill readiness for Otezla confirmed with patient; name/ confirmed; no missed doses; no new medications; no side effects noted; address confirmed for 3/31 shipment and  delivery.  Patient states they have 4 doses remaining.     XU Breen.Ph., AAHIVP  Clinical Pharmacist, HIV/HCV  [facility]  Phone: [phone]

## 2020-04-01 RX ORDER — HALOBETASOL PROPIONATE 0.5 MG/G
OINTMENT TOPICAL
Qty: 100 G | Refills: 2 | Status: SHIPPED | OUTPATIENT
Start: 2020-04-01 | End: 2020-08-12

## 2020-04-24 ENCOUNTER — TELEPHONE (OUTPATIENT)
Dept: PHARMACY | Facility: CLINIC | Age: 45
End: 2020-04-24

## 2020-04-27 ENCOUNTER — PATIENT MESSAGE (OUTPATIENT)
Dept: DERMATOLOGY | Facility: CLINIC | Age: 45
End: 2020-04-27

## 2020-05-04 ENCOUNTER — TELEPHONE (OUTPATIENT)
Dept: PHARMACY | Facility: CLINIC | Age: 45
End: 2020-05-04

## 2020-05-04 DIAGNOSIS — Z79.899 ENCOUNTER FOR LONG-TERM (CURRENT) USE OF HIGH-RISK MEDICATION: Primary | ICD-10-CM

## 2020-05-04 NOTE — TELEPHONE ENCOUNTER
DOCUMENTATION ONLY:  Prior authorization for Savita approved from 05/02/20 to 05/02/21    Case Id: [id number]    Co-pay: $0    Patient Assistance IS NOT required

## 2020-05-10 RX ORDER — ALLOPURINOL 300 MG/1
TABLET ORAL
Qty: 45 TABLET | Refills: 0 | Status: SHIPPED | OUTPATIENT
Start: 2020-05-10 | End: 2021-03-23 | Stop reason: SDUPTHER

## 2020-05-15 ENCOUNTER — TELEPHONE (OUTPATIENT)
Dept: PHARMACY | Facility: CLINIC | Age: 45
End: 2020-05-15

## 2020-05-19 ENCOUNTER — PATIENT OUTREACH (OUTPATIENT)
Dept: ADMINISTRATIVE | Facility: OTHER | Age: 45
End: 2020-05-19

## 2020-05-20 ENCOUNTER — TELEPHONE (OUTPATIENT)
Dept: PHARMACY | Facility: CLINIC | Age: 45
End: 2020-05-20

## 2020-05-20 NOTE — TELEPHONE ENCOUNTER
Refill call regarding Otelza from [facility]. Shipping out Otezla on  for  arrival with patients consent. Copay of $0 @ 004. Address and  confirmed.

## 2020-05-21 ENCOUNTER — OFFICE VISIT (OUTPATIENT)
Dept: DERMATOLOGY | Facility: CLINIC | Age: 45
End: 2020-05-21
Payer: MEDICAID

## 2020-05-21 DIAGNOSIS — L40.50 PSORIATIC ARTHRITIS: ICD-10-CM

## 2020-05-21 DIAGNOSIS — L40.9 PSORIASIS: Primary | ICD-10-CM

## 2020-05-21 DIAGNOSIS — Z79.899 ENCOUNTER FOR LONG-TERM (CURRENT) USE OF HIGH-RISK MEDICATION: ICD-10-CM

## 2020-05-21 PROCEDURE — 99213 PR OFFICE/OUTPT VISIT, EST, LEVL III, 20-29 MIN: ICD-10-PCS | Mod: S$PBB,,, | Performed by: PATHOLOGY

## 2020-05-21 PROCEDURE — 99213 OFFICE O/P EST LOW 20 MIN: CPT | Mod: S$PBB,,, | Performed by: PATHOLOGY

## 2020-05-21 RX ORDER — CLOBETASOL PROPIONATE 0.46 MG/ML
SOLUTION TOPICAL
Qty: 50 ML | Refills: 3 | Status: SHIPPED | OUTPATIENT
Start: 2020-05-21 | End: 2020-10-13

## 2020-05-21 RX ORDER — CALCIPOTRIENE 50 UG/G
CREAM TOPICAL 2 TIMES DAILY
Qty: 100 G | Refills: 3 | Status: SHIPPED | OUTPATIENT
Start: 2020-05-21 | End: 2020-10-13

## 2020-05-21 NOTE — PROGRESS NOTES
Subjective:       Patient ID:  Adrien Panda is a 44 y.o. male who presents for No chief complaint on file.    HPI  Pt here today for psoriasis follow up, pt states improving - was almost clear until the last 1-2 weeks when his lower legs began to flare, tx skyrizi - started 10/23/19.  Re-started Otezla about the same time.    + pruritus.  + feet/ankle joint pain.     Taltz initially worked best of all of the other systemic agents he has tried, but legs flared Fall 2018 with progressive new scaly plaques moving proximal on bilateral legs to thighs.  Most recently, was on Ilumya, and prior to that MTX and Tremfya, which were ineffective and discontinued last visit.     Started Taltz 1/2018. States he was clear when he first started it, but psoriasis recurred on his legs. No infections.       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 Rubin at [facility], cutaneous and joint gout tx by Dr. Hermosillo (psoriasis flared on colchicine), stroke      PSORIASIS: failed tx with Enbrel, Humira, Soriatane, Taltz, Ilumya, tremfya, otezla, clobetasol topical, betamethasone topical, Lidex topical, elocon topical.    ILK helps temporarily. ultravate works best out of all the topicals he's tried. psorcon 0.05% oint helps.    On Humira from 6/2015 - 4/2016 until he stopped responding to it. Switched back to enbrel at that point which only helped temporarily.    Added Soriatane to Enbrel but ended up with a bad flare on lower legs.  Not a good candidate for NBUVB as he lives on the [facility] and there is not light unit in this area.   Stelara 9/21/16 - 7/19/17 (stopped responding to it)  Taltz 1/2018 - 11/2018 (stopped responding to it)  Tremfya 11/2018 - 4/2019 - never achieved significant clearance  Ilumya 4/2019 - 8/ 2019 - no improvement and progression  Currently - Skyrizi (started Oct. 2019) and Otezla     TB gold: negative 02/27/2020     Pt also with psoriatic arthritis - joints in feet have also  been flaring.  Rheum is following - treating arthritis with sulfasalazine - not well controlled     Review of Systems   Constitutional: Negative for fever, chills, fatigue and malaise.   HENT: Negative for mouth sores.    Respiratory: Negative for cough.    Gastrointestinal: Negative for nausea, vomiting, abdominal pain and diarrhea.   Genitourinary: Negative for dysuria and hematuria.   Musculoskeletal: Positive for joint swelling and arthralgias.   Skin: Positive for itching and rash. Negative for recent sunburn and abscesses.   Psychiatric/Behavioral: Negative for depressed mood.        Objective:    Physical Exam   Constitutional: He appears well-developed and well-nourished.   Neurological: He is alert.   Skin:   Areas Examined (abnormalities noted in diagram):   Scalp / Hair Palpated and Inspected  Head / Face Inspection Performed  Neck Inspection Performed  Chest / Axilla Inspection Performed  Abdomen Inspection Performed  Back Inspection Performed  RUE Inspected  LUE Inspection Performed  RLE Inspected  LLE Inspection Performed  Nails and Digits Inspection Performed                   Diagram Legend     Erythematous scaling macule/papule c/w actinic keratosis       Vascular papule c/w angioma      Pigmented verrucoid papule/plaque c/w seborrheic keratosis      Yellow umbilicated papule c/w sebaceous hyperplasia      Irregularly shaped tan macule c/w lentigo     1-2 mm smooth white papules consistent with Milia      Movable subcutaneous cyst with punctum c/w epidermal inclusion cyst      Subcutaneous movable cyst c/w pilar cyst      Firm pink to brown papule c/w dermatofibroma      Pedunculated fleshy papule(s) c/w skin tag(s)      Evenly pigmented macule c/w junctional nevus     Mildly variegated pigmented, slightly irregular-bordered macule c/w mildly atypical nevus      Flesh colored to evenly pigmented papule c/w intradermal nevus       Pink pearly papule/plaque c/w basal cell carcinoma      Erythematous  hyperkeratotic cursted plaque c/w SCC      Surgical scar with no sign of skin cancer recurrence      Open and closed comedones      Inflammatory papules and pustules      Verrucoid papule consistent consistent with wart     Erythematous eczematous patches and plaques     Dystrophic onycholytic nail with subungual debris c/w onychomycosis     Umbilicated papule    Erythematous-base heme-crusted tan verrucoid plaque consistent with inflamed seborrheic keratosis     Erythematous Silvery Scaling Plaque c/w Psoriasis     See annotation      Assessment / Plan:        Psoriasis - well controlled on Skyrizi.  Flares only right before he is due for his next injection (weeks 10-12).  Continue Skyrizi and Otezla.  Skyrizi 150 mg sq given today in clinic by LPN.  Repeat in 12 weeks.  -     clobetasoL (TEMOVATE) 0.05 % external solution; Use on scalp one - two times daily as needed for scaling or itching  Dispense: 50 mL; Refill: 3  -     calcipotriene (DOVONOX) 0.005 % cream; Apply topically 2 (two) times daily.  Dispense: 100 g; Refill: 3    Psoriatic arthritis - poorly controlled to feet/ ankles with significant pain and swelling; continue skyrizi and sulfasalazine; pt told to make rheum f/u appt    Encounter for long-term (current) use of high-risk medication - CBC, CMP today             Follow up in about 3 months (around 8/21/2020).

## 2020-05-21 NOTE — PROGRESS NOTES
After obtaining consent, and per orders of Dr. Son, injection of Skyrizi 150mg (2 syringes) 75mg per syringe given right and left upper arm subcutaneous  by Ann Marie Canales . Patient instructed to remain in clinic for 20 minutes afterwards, and to report any adverse reaction to me immediately. Lot [id number], Exp 6/2021, NDC 7123-9668-65. No swelling noted. PT SUPPLIED.         RD

## 2020-05-22 ENCOUNTER — PATIENT MESSAGE (OUTPATIENT)
Dept: RHEUMATOLOGY | Facility: CLINIC | Age: 45
End: 2020-05-22

## 2020-06-04 ENCOUNTER — TELEPHONE (OUTPATIENT)
Dept: RHEUMATOLOGY | Facility: CLINIC | Age: 45
End: 2020-06-04

## 2020-06-04 NOTE — TELEPHONE ENCOUNTER
Spoke to pt I let him know that we had a July 13th 9 am appt he couldn't make it to the June 5th 2pm appt

## 2020-06-12 ENCOUNTER — TELEPHONE (OUTPATIENT)
Dept: PHARMACY | Facility: CLINIC | Age: 45
End: 2020-06-12

## 2020-06-24 RX ORDER — ALLOPURINOL 300 MG/1
TABLET ORAL
Qty: 45 TABLET | Refills: 1 | Status: SHIPPED | OUTPATIENT
Start: 2020-06-24 | End: 2020-09-09

## 2020-07-14 ENCOUNTER — TELEPHONE (OUTPATIENT)
Dept: PHARMACY | Facility: CLINIC | Age: 45
End: 2020-07-14

## 2020-07-14 NOTE — TELEPHONE ENCOUNTER
RX outgoing call regarding Mumtazla @ [facility]. Shipping out  for  arrival with patients consent. Copay of $0.00 @ 004. Address and  confirmed. Patient has 4 tablets on hand at this time. Patient has not started any new medications, has had no missed doses and no side effects present. Patient is currently taking the medication as directed by doctors instruction. Patient states they do not have any questions or concerns at this time.

## 2020-08-05 ENCOUNTER — PATIENT MESSAGE (OUTPATIENT)
Dept: INTERNAL MEDICINE | Facility: CLINIC | Age: 45
End: 2020-08-05

## 2020-08-07 DIAGNOSIS — L40.9 PSORIASIS: ICD-10-CM

## 2020-08-11 ENCOUNTER — PATIENT OUTREACH (OUTPATIENT)
Dept: ADMINISTRATIVE | Facility: OTHER | Age: 45
End: 2020-08-11

## 2020-08-11 RX ORDER — APREMILAST 30 MG/1
TABLET, FILM COATED ORAL
Qty: 60 TABLET | Refills: 2 | Status: SHIPPED | OUTPATIENT
Start: 2020-08-11 | End: 2020-11-12 | Stop reason: SDUPTHER

## 2020-08-11 NOTE — PROGRESS NOTES
Requested updates within Care Everywhere.  Patient's chart was reviewed for overdue DOROTHEA topics.  Orders placed:n/a  Tasked appts:n/a  Labs Linked:n/a

## 2020-08-13 ENCOUNTER — OFFICE VISIT (OUTPATIENT)
Dept: DERMATOLOGY | Facility: CLINIC | Age: 45
End: 2020-08-13
Payer: MEDICAID

## 2020-08-13 DIAGNOSIS — L40.9 PSORIASIS: Primary | ICD-10-CM

## 2020-08-13 DIAGNOSIS — Z79.899 ENCOUNTER FOR LONG-TERM (CURRENT) USE OF HIGH-RISK MEDICATION: ICD-10-CM

## 2020-08-13 DIAGNOSIS — L40.50 PSORIATIC ARTHRITIS: ICD-10-CM

## 2020-08-13 PROCEDURE — 99213 OFFICE O/P EST LOW 20 MIN: CPT | Mod: S$PBB,,, | Performed by: PATHOLOGY

## 2020-08-13 PROCEDURE — 99213 PR OFFICE/OUTPT VISIT, EST, LEVL III, 20-29 MIN: ICD-10-PCS | Mod: S$PBB,,, | Performed by: PATHOLOGY

## 2020-08-13 PROCEDURE — 99999 PR PBB SHADOW E&M-EST. PATIENT-LVL III: ICD-10-PCS | Mod: PBBFAC,,, | Performed by: PATHOLOGY

## 2020-08-13 PROCEDURE — 99213 OFFICE O/P EST LOW 20 MIN: CPT | Mod: PBBFAC | Performed by: PATHOLOGY

## 2020-08-13 PROCEDURE — 99999 PR PBB SHADOW E&M-EST. PATIENT-LVL III: CPT | Mod: PBBFAC,,, | Performed by: PATHOLOGY

## 2020-08-13 NOTE — PROGRESS NOTES
Subjective:       Patient ID:  Adrien Panda is a 44 y.o. male who presents for   Chief Complaint   Patient presents with    Psoriasis     Pt presents today for f/u     HPI  Pt here today for psoriasis follow up, pt states improving - was almost clear until the last 1-2 weeks when his lower legs began to flare, tx skyrizi - started 10/23/19.  Re-started Otezla about the same time.    + pruritus.  + feet/ankle joint pain.     Taltz initially worked best of all of the other systemic agents he has tried, but legs flared Fall 2018 with progressive new scaly plaques moving proximal on bilateral legs to thighs.  Most recently, was on Ilumya, and prior to that MTX and Tremfya, which were ineffective and discontinued last visit.     Started Taltz 1/2018. States he was clear when he first started it, but psoriasis recurred on his legs. No infections.       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 Rubin at [facility], cutaneous and joint gout tx by Dr. Hermosillo (psoriasis flared on colchicine), stroke      PSORIASIS: failed tx with Enbrel, Humira, Soriatane, Taltz, Ilumya, tremfya, otezla, clobetasol topical, betamethasone topical, Lidex topical, elocon topical.    ILK helps temporarily. ultravate works best out of all the topicals he's tried. psorcon 0.05% oint helps.    On Humira from 6/2015 - 4/2016 until he stopped responding to it. Switched back to enbrel at that point which only helped temporarily.    Added Soriatane to Enbrel but ended up with a bad flare on lower legs.  Not a good candidate for NBUVB as he lives on the [facility] and there is not light unit in this area.   Stelara 9/21/16 - 7/19/17 (stopped responding to it)  Taltz 1/2018 - 11/2018 (stopped responding to it)  Tremfya 11/2018 - 4/2019 - never achieved significant clearance  Ilumya 4/2019 - 8/ 2019 - no improvement and progression  Currently - Skyrizi (started Oct. 2019) and Otezla     TB gold: negative  02/27/2020     Pt also with psoriatic arthritis - joints in feet have also been flaring.  Rheum is following - treating arthritis with sulfasalazine - not well controlled    Review of Systems   Constitutional: Negative for fever, chills, weight loss, weight gain, fatigue, night sweats and malaise.   HENT: Negative for mouth sores (no tongue whiteness).    Respiratory: Negative for shortness of breath.    Gastrointestinal: Negative for nausea, vomiting, abdominal pain and diarrhea.   Musculoskeletal: Negative for arthralgias.   Skin: Positive for itching, rash (but no rashes/itching in folds) and wears hat. Negative for daily sunscreen use, activity-related sunscreen use and recent sunburn.        Injection site reaction - no   yes   Psychiatric/Behavioral: Negative for depressed mood.   Hematologic/Lymphatic: Does not bruise/bleed easily.        Objective:    Physical Exam   Constitutional: He appears well-developed and well-nourished.   Neurological: He is alert and oriented to person, place, and time.   Psychiatric: He has a normal mood and affect.   Skin:   Areas Examined (abnormalities noted in diagram):   Scalp / Hair Palpated and Inspected  Head / Face Inspection Performed  Neck Inspection Performed  Chest / Axilla Inspection Performed  Abdomen Inspection Performed  Back Inspection Performed  RUE Inspected  LUE Inspection Performed  RLE Inspected  LLE Inspection Performed  Nails and Digits Inspection Performed                   Diagram Legend     Erythematous scaling macule/papule c/w actinic keratosis       Vascular papule c/w angioma      Pigmented verrucoid papule/plaque c/w seborrheic keratosis      Yellow umbilicated papule c/w sebaceous hyperplasia      Irregularly shaped tan macule c/w lentigo     1-2 mm smooth white papules consistent with Milia      Movable subcutaneous cyst with punctum c/w epidermal inclusion cyst      Subcutaneous movable cyst c/w pilar cyst      Firm pink to brown papule c/w  dermatofibroma      Pedunculated fleshy papule(s) c/w skin tag(s)      Evenly pigmented macule c/w junctional nevus     Mildly variegated pigmented, slightly irregular-bordered macule c/w mildly atypical nevus      Flesh colored to evenly pigmented papule c/w intradermal nevus       Pink pearly papule/plaque c/w basal cell carcinoma      Erythematous hyperkeratotic cursted plaque c/w SCC      Surgical scar with no sign of skin cancer recurrence      Open and closed comedones      Inflammatory papules and pustules      Verrucoid papule consistent consistent with wart     Erythematous eczematous patches and plaques     Dystrophic onycholytic nail with subungual debris c/w onychomycosis     Umbilicated papule    Erythematous-base heme-crusted tan verrucoid plaque consistent with inflamed seborrheic keratosis     Erythematous Silvery Scaling Plaque c/w Psoriasis     See annotation      Assessment / Plan:        Psoriasis - well controlled on Skyrizi.  Flares only right before he is due for his next injection (weeks 10-12).  Continue Skyrizi and Otezla.  Skyrizi 150 mg sq given today in clinic by LPN.  Repeat in 12 weeks.  Continue topical steroids and Dovonox as needed.    Psoriatic arthritis - poorly controlled; continue Skyrizi and Otezla as above; sulfasalazine per rheum; f/u with rheum next month    Encounter for long-term (current) use of high-risk medication  -     CBC auto differential; Future; Expected date: 11/13/2020  -     Hepatic function panel; Future; Expected date: 11/13/2020    Other orders  -     risankizumab-rzaa (SKYRIZI) 150mg/1.66mL(75 mg/0.83 mL x2) subcutaneous injection; Inject 150 mg (2 syringes) into the skin every 12 weeks.  Dispense: 150 mg; Refill: 3             No follow-ups on file.

## 2020-08-14 ENCOUNTER — TELEPHONE (OUTPATIENT)
Dept: PHARMACY | Facility: CLINIC | Age: 45
End: 2020-08-14

## 2020-08-14 NOTE — TELEPHONE ENCOUNTER
Incoming call regarding Otezla from [facility]. Shipping out Otezla on  for  arrival with patients consent. Copay of $0 @ 004. Address and  confirmed.

## 2020-08-14 NOTE — TELEPHONE ENCOUNTER
Call attempt 1 regarding Otezla refill. LVM and sent [employer] message. Copay $0 at 004. Last Skyrizi dose given at appt on 8/13. Will be due in 12 weeks

## 2020-08-19 NOTE — PROGRESS NOTES
After obtaining consent, and per orders of Dr. NICO Son, Instruction of  injection of Skyrizi 150 mg/1.66 ml (75 mg/0.83 mL x2) given subcutaneously in the left upper arm  by Jewel Trim . Patient instructed to remain in clinic for 20 minutes afterwards, and to report any adverse reaction to me immediately. Lot [id number], Exp JUN2021, NDC 9883-3183-05. PT SUPPLIED

## 2020-09-10 ENCOUNTER — OFFICE VISIT (OUTPATIENT)
Dept: RHEUMATOLOGY | Facility: CLINIC | Age: 45
End: 2020-09-10
Payer: MEDICAID

## 2020-09-10 VITALS
SYSTOLIC BLOOD PRESSURE: 123 MMHG | HEIGHT: 70 IN | HEART RATE: 86 BPM | BODY MASS INDEX: 30.36 KG/M2 | WEIGHT: 212.06 LBS | DIASTOLIC BLOOD PRESSURE: 81 MMHG

## 2020-09-10 DIAGNOSIS — M79.671 PAIN IN BOTH FEET: Primary | ICD-10-CM

## 2020-09-10 DIAGNOSIS — L40.50 PSORIATIC ARTHROPATHY: ICD-10-CM

## 2020-09-10 DIAGNOSIS — M79.672 PAIN IN BOTH FEET: Primary | ICD-10-CM

## 2020-09-10 DIAGNOSIS — L40.9 PSORIASIS: ICD-10-CM

## 2020-09-10 DIAGNOSIS — M1A.9XX1 GOUT WITH TOPHI: ICD-10-CM

## 2020-09-10 PROCEDURE — 99214 OFFICE O/P EST MOD 30 MIN: CPT | Mod: PBBFAC | Performed by: INTERNAL MEDICINE

## 2020-09-10 PROCEDURE — 99999 PR PBB SHADOW E&M-EST. PATIENT-LVL IV: ICD-10-PCS | Mod: PBBFAC,,, | Performed by: INTERNAL MEDICINE

## 2020-09-10 PROCEDURE — 99213 PR OFFICE/OUTPT VISIT, EST, LEVL III, 20-29 MIN: ICD-10-PCS | Mod: S$PBB,,, | Performed by: INTERNAL MEDICINE

## 2020-09-10 PROCEDURE — 99999 PR PBB SHADOW E&M-EST. PATIENT-LVL IV: CPT | Mod: PBBFAC,,, | Performed by: INTERNAL MEDICINE

## 2020-09-10 PROCEDURE — 99213 OFFICE O/P EST LOW 20 MIN: CPT | Mod: S$PBB,,, | Performed by: INTERNAL MEDICINE

## 2020-09-10 RX ORDER — MELOXICAM 15 MG/1
15 TABLET ORAL DAILY
COMMUNITY
Start: 2020-07-23 | End: 2021-04-08

## 2020-09-10 RX ORDER — IBUPROFEN 600 MG/1
TABLET ORAL
COMMUNITY
Start: 2020-06-25 | End: 2021-03-10

## 2020-09-10 ASSESSMENT — ROUTINE ASSESSMENT OF PATIENT INDEX DATA (RAPID3)
PSYCHOLOGICAL DISTRESS SCORE: 5.5
PATIENT GLOBAL ASSESSMENT SCORE: 7
TOTAL RAPID3 SCORE: 6.94
AM STIFFNESS SCORE: 1, YES
MDHAQ FUNCTION SCORE: 1.6
FATIGUE SCORE: 6.5
PAIN SCORE: 8.5

## 2020-09-10 NOTE — PROGRESS NOTES
HPI: Mr. Panda is a 44 year old male with a history of psoriatic arthritis and gout and is also being followed by dermatology. His arthritis has been stable on Skyrizi, Otezla, and SSZ since 2019, when his SSZ dose was doubled from twice daily to four times daily. The Otezla had been discontinued for a few months previously due to some weight loss, but after re-continuation, his condition has been stable. He doesn't have any complaints today related to his joints. He states he has some fatigue and diarrhea which he has had for an extended period of time and he said are unchanged.    Today he complains of burning and tingling sensations on the plantar surfaces of both feet. He states it is worse when he stands for long periods of time and that he has not been able to find shoes that make the pain better. He describes it as a burning, tingling, and sometimes needle-like sensation along the inside and outside edges of the bottoms of his feet. There is some associated redness on the skin in these areas, he states.     Physical Exam   Constitutional: He is oriented to person, place, and time.   Cardiovascular: Normal rate, regular rhythm and normal heart sounds.   Pulmonary/Chest: Effort normal and breath sounds normal.   Abdominal: Soft. He exhibits no distension. There is no abdominal tenderness. There is no guarding.   Musculoskeletal:      Comments: Normal ROM in upper and lower extremities bilaterally; no tophi noted; bony DIP swelling in several fingers, consistent with his baseline, but no synovitis  Tenderness to palpation of plantar fascia from heel to distal metatarsals; some tenderness to squeezing medio-laterally at the distal metatarsals; no MTP swelling or joint swelling in feet bilaterally; Negative for Tinel's sign bilaterally   Neurological: He is oriented to person, place, and time.   Skin: Skin is warm and dry.       Assessment:    ICD-10-CM ICD-9-CM   1. Pain in both feet  M79.671 729.5    M79.672    2.  Psoriatic arthropathy  L40.50 696.0   3. Gout with tophi  M1A.9XX1 274.03   4. Psoriasis  L40.9 696.1       Plan:  - Referral to Podiatry for foot pain  - Psoriasis and psoriatic arthitis stable. Being followed by Derm  - Gout stable- Uric acid labs  - Follow-up 6 months      Connor Gillies, DO  PGY-1, Internal Medicine Residency

## 2020-09-10 NOTE — PROGRESS NOTES
I have personally taken the history and examined the patient and concur with the resident's note as above.  His overall pain has improved with the increased dose of sulfasalazine and being back on Otezla.  His psoriasis is doing well.  He has had no gout attacks.  His main problem is the pain on the bottom of his feet.  He does have pes planus deformities.  I have referred him to Podiatry.  He will continue his other medications as before.  He will have a repeat uric acid level with his next blood work.

## 2020-09-15 ENCOUNTER — TELEPHONE (OUTPATIENT)
Dept: PHARMACY | Facility: CLINIC | Age: 45
End: 2020-09-15

## 2020-10-14 ENCOUNTER — PATIENT MESSAGE (OUTPATIENT)
Dept: CARDIOLOGY | Facility: CLINIC | Age: 45
End: 2020-10-14

## 2020-10-15 ENCOUNTER — PATIENT MESSAGE (OUTPATIENT)
Dept: RHEUMATOLOGY | Facility: CLINIC | Age: 45
End: 2020-10-15

## 2020-10-15 ENCOUNTER — TELEPHONE (OUTPATIENT)
Dept: PHARMACY | Facility: CLINIC | Age: 45
End: 2020-10-15

## 2020-10-15 RX ORDER — ALLOPURINOL 300 MG/1
450 TABLET ORAL DAILY
Qty: 45 TABLET | Refills: 0 | Status: SHIPPED | OUTPATIENT
Start: 2020-10-15 | End: 2020-11-24

## 2020-11-05 ENCOUNTER — PATIENT MESSAGE (OUTPATIENT)
Dept: PHARMACY | Facility: CLINIC | Age: 45
End: 2020-11-05

## 2020-11-12 DIAGNOSIS — L40.9 PSORIASIS: ICD-10-CM

## 2020-11-13 RX ORDER — APREMILAST 30 MG/1
TABLET, FILM COATED ORAL
Qty: 60 TABLET | Refills: 2 | Status: SHIPPED | OUTPATIENT
Start: 2020-11-13 | End: 2021-02-04 | Stop reason: SDUPTHER

## 2020-11-16 ENCOUNTER — PATIENT MESSAGE (OUTPATIENT)
Dept: CARDIOLOGY | Facility: CLINIC | Age: 45
End: 2020-11-16

## 2020-11-19 ENCOUNTER — LAB VISIT (OUTPATIENT)
Dept: LAB | Facility: HOSPITAL | Age: 45
End: 2020-11-19
Payer: MEDICAID

## 2020-11-19 ENCOUNTER — OFFICE VISIT (OUTPATIENT)
Dept: DERMATOLOGY | Facility: CLINIC | Age: 45
End: 2020-11-19
Payer: MEDICAID

## 2020-11-19 DIAGNOSIS — L40.50 PSORIATIC ARTHRITIS: ICD-10-CM

## 2020-11-19 DIAGNOSIS — Z79.899 ENCOUNTER FOR LONG-TERM (CURRENT) USE OF HIGH-RISK MEDICATION: ICD-10-CM

## 2020-11-19 DIAGNOSIS — Z79.899 ENCOUNTER FOR LONG-TERM (CURRENT) USE OF HIGH-RISK MEDICATION: Primary | ICD-10-CM

## 2020-11-19 DIAGNOSIS — M1A.9XX1 GOUT WITH TOPHI: ICD-10-CM

## 2020-11-19 DIAGNOSIS — L40.9 PSORIASIS: ICD-10-CM

## 2020-11-19 LAB
ALBUMIN SERPL BCP-MCNC: 3.7 G/DL (ref 3.5–5.2)
ALP SERPL-CCNC: 63 U/L (ref 55–135)
ALT SERPL W/O P-5'-P-CCNC: 18 U/L (ref 10–44)
AST SERPL-CCNC: 16 U/L (ref 10–40)
BASOPHILS # BLD AUTO: 0.05 K/UL (ref 0–0.2)
BASOPHILS NFR BLD: 0.6 % (ref 0–1.9)
BILIRUB DIRECT SERPL-MCNC: 0.2 MG/DL (ref 0.1–0.3)
BILIRUB SERPL-MCNC: 0.4 MG/DL (ref 0.1–1)
DIFFERENTIAL METHOD: ABNORMAL
EOSINOPHIL # BLD AUTO: 0.5 K/UL (ref 0–0.5)
EOSINOPHIL NFR BLD: 5.7 % (ref 0–8)
ERYTHROCYTE [DISTWIDTH] IN BLOOD BY AUTOMATED COUNT: 13.2 % (ref 11.5–14.5)
HCT VFR BLD AUTO: 49.5 % (ref 40–54)
HGB BLD-MCNC: 15.7 G/DL (ref 14–18)
IMM GRANULOCYTES # BLD AUTO: 0.04 K/UL (ref 0–0.04)
IMM GRANULOCYTES NFR BLD AUTO: 0.5 % (ref 0–0.5)
LYMPHOCYTES # BLD AUTO: 1.6 K/UL (ref 1–4.8)
LYMPHOCYTES NFR BLD: 19.4 % (ref 18–48)
MCH RBC QN AUTO: 30.1 PG (ref 27–31)
MCHC RBC AUTO-ENTMCNC: 31.7 G/DL (ref 32–36)
MCV RBC AUTO: 95 FL (ref 82–98)
MONOCYTES # BLD AUTO: 0.6 K/UL (ref 0.3–1)
MONOCYTES NFR BLD: 7.4 % (ref 4–15)
NEUTROPHILS # BLD AUTO: 5.6 K/UL (ref 1.8–7.7)
NEUTROPHILS NFR BLD: 66.4 % (ref 38–73)
NRBC BLD-RTO: 0 /100 WBC
PLATELET # BLD AUTO: 228 K/UL (ref 150–350)
PMV BLD AUTO: 11.3 FL (ref 9.2–12.9)
PROT SERPL-MCNC: 7.8 G/DL (ref 6–8.4)
RBC # BLD AUTO: 5.22 M/UL (ref 4.6–6.2)
URATE SERPL-MCNC: 4.5 MG/DL (ref 3.4–7)
WBC # BLD AUTO: 8.42 K/UL (ref 3.9–12.7)

## 2020-11-19 PROCEDURE — 99213 OFFICE O/P EST LOW 20 MIN: CPT | Mod: S$PBB,,, | Performed by: PATHOLOGY

## 2020-11-19 PROCEDURE — 36415 COLL VENOUS BLD VENIPUNCTURE: CPT

## 2020-11-19 PROCEDURE — 85025 COMPLETE CBC W/AUTO DIFF WBC: CPT

## 2020-11-19 PROCEDURE — 84550 ASSAY OF BLOOD/URIC ACID: CPT

## 2020-11-19 PROCEDURE — 80076 HEPATIC FUNCTION PANEL: CPT

## 2020-11-19 PROCEDURE — 99213 PR OFFICE/OUTPT VISIT, EST, LEVL III, 20-29 MIN: ICD-10-PCS | Mod: S$PBB,,, | Performed by: PATHOLOGY

## 2020-11-19 NOTE — PROGRESS NOTES
Subjective:       Patient ID:  Adrien Panda is a 45 y.o. male who presents for No chief complaint on file.    HPI   Pt here today for psoriasis follow up, pt states improving - was almost clear until the last 1-2 weeks when his lower legs began to flare, tx skyrizi - started 10/23/19.  Re-started Otezla about the same time.    + pruritus.  + feet/ankle joint pain, but he also has gout.  Pt also with psoriatic arthritis - joints in feet flare occasionally but less pain and swelling.  Rheum is following - treating arthritis with sulfasalazine - improved.       Taltz initially worked best of all of the other systemic agents he has tried, but legs flared Fall 2018 with progressive new scaly plaques moving proximal on bilateral legs to thighs.  Most recently, was on Ilumya, and prior to that MTX and Tremfya, which were ineffective and discontinued last visit.     Started Taltz 1/2018. States he was clear when he first started it, but psoriasis recurred on his legs. No infections.       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 Rubin at [facility], cutaneous and joint gout tx by Dr. Hermosillo (psoriasis flared on colchicine), stroke      PSORIASIS: failed tx with Enbrel, Humira, Soriatane, Taltz, Ilumya, tremfya, otezla, clobetasol topical, betamethasone topical, Lidex topical, elocon topical.    ILK helps temporarily. ultravate works best out of all the topicals he's tried. psorcon 0.05% oint helps.    On Humira from 6/2015 - 4/2016 until he stopped responding to it. Switched back to enbrel at that point which only helped temporarily.    Added Soriatane to Enbrel but ended up with a bad flare on lower legs.  Not a good candidate for NBUVB as he lives on the [facility] and there is not light unit in this area.   Stelara 9/21/16 - 7/19/17 (stopped responding to it)  Taltz 1/2018 - 11/2018 (stopped responding to it)  Tremfya 11/2018 - 4/2019 - never achieved significant clearance  Ilumya  4/2019 - 8/ 2019 - no improvement and progression  Currently - Skyrizi (started Oct. 2019) and Otezla     TB gold: negative 02/27/2020         Review of Systems   Constitutional: Negative for fever, chills, fatigue and malaise.   HENT: Negative for congestion, mouth sores and headaches.    Eyes: Negative for eye irritation.   Gastrointestinal: Negative for nausea, vomiting, abdominal pain and diarrhea.   Musculoskeletal: Positive for arthralgias.   Skin: Positive for itching and rash.   Neurological: Negative for headaches.        Objective:    Physical Exam   Constitutional: He appears well-developed and well-nourished.   Neurological: He is alert and oriented to person, place, and time.   Psychiatric: He has a normal mood and affect.   Skin:   Areas Examined (abnormalities noted in diagram):   Scalp / Hair Palpated and Inspected  Head / Face Inspection Performed  Neck Inspection Performed  Chest / Axilla Inspection Performed  Abdomen Inspection Performed  Back Inspection Performed  RUE Inspected  LUE Inspection Performed  RLE Inspected  LLE Inspection Performed  Nails and Digits Inspection Performed                   Diagram Legend     Erythematous scaling macule/papule c/w actinic keratosis       Vascular papule c/w angioma      Pigmented verrucoid papule/plaque c/w seborrheic keratosis      Yellow umbilicated papule c/w sebaceous hyperplasia      Irregularly shaped tan macule c/w lentigo     1-2 mm smooth white papules consistent with Milia      Movable subcutaneous cyst with punctum c/w epidermal inclusion cyst      Subcutaneous movable cyst c/w pilar cyst      Firm pink to brown papule c/w dermatofibroma      Pedunculated fleshy papule(s) c/w skin tag(s)      Evenly pigmented macule c/w junctional nevus     Mildly variegated pigmented, slightly irregular-bordered macule c/w mildly atypical nevus      Flesh colored to evenly pigmented papule c/w intradermal nevus       Pink pearly papule/plaque c/w basal cell  carcinoma      Erythematous hyperkeratotic cursted plaque c/w SCC      Surgical scar with no sign of skin cancer recurrence      Open and closed comedones      Inflammatory papules and pustules      Verrucoid papule consistent consistent with wart     Erythematous eczematous patches and plaques     Dystrophic onycholytic nail with subungual debris c/w onychomycosis     Umbilicated papule    Erythematous-base heme-crusted tan verrucoid plaque consistent with inflamed seborrheic keratosis     Erythematous Silvery Scaling Plaque c/w Psoriasis     See annotation      Assessment / Plan:        Encounter for long-term (current) use of high-risk medication - labs today; results not yet available   -     Quantiferon Gold TB; Future; Expected date: 02/18/2021  -     CBC Auto Differential; Future; Expected date: 02/18/2021  -     Hepatic Function Panel; Future; Expected date: 02/18/2021    Psoriasis - well controlled on Skyrizi.  Flares only right before he is due for his next injection (weeks 10-12).  Continue Skyrizi and Otezla.  Skyrizi 150 mg sq given today in clinic by LPN.  Repeat in 12 weeks.  Continue topical steroids and Dovonox as needed.    Psoriatic arthritis - improved; continue Skyrizi and Otezla as above; sulfasalazine per rheum.  Also has gout, which may account for his intermittent joint pain in feet.             No follow-ups on file.

## 2020-12-11 ENCOUNTER — PATIENT MESSAGE (OUTPATIENT)
Dept: OTHER | Facility: OTHER | Age: 45
End: 2020-12-11

## 2020-12-15 ENCOUNTER — DOCUMENTATION ONLY (OUTPATIENT)
Dept: DERMATOLOGY | Facility: CLINIC | Age: 45
End: 2020-12-15

## 2020-12-15 RX ORDER — RISANKIZUMAB-RZAA 75 MG/0.83
150 KIT SUBCUTANEOUS ONCE
Qty: 150 MG | Refills: 0 | Status: CANCELLED | OUTPATIENT
Start: 2020-12-15 | End: 2020-12-15

## 2020-12-15 NOTE — PROGRESS NOTES
After obtaining consent, and per orders of Dr. NICO Son, injection of Skyrizi 150 mg/1.66 ml  given subcutaneously in the Left upper arm  by Jewel Trim . Patient instructed to remain in clinic for 20 minutes afterwards, and to report any adverse reaction to me immediately. Lot [id number], Exp 10/2021, NDC 6916-2090-20. PT SUPPLIED do not charge.

## 2020-12-16 ENCOUNTER — SPECIALTY PHARMACY (OUTPATIENT)
Dept: PHARMACY | Facility: CLINIC | Age: 45
End: 2020-12-16

## 2020-12-16 NOTE — TELEPHONE ENCOUNTER
Specialty Pharmacy - Refill Coordination    Specialty Medication Orders Linked to Encounter      Most Recent Value   Medication #1  OTEZLA 30 mg Tab (Order#[id number], Rx#[id number])          Refill Questions - Documented Responses      Most Recent Value   Relationship to patient of person spoken to?  Self   HIPAA/medical authority confirmed?  Yes   Any changes in contact preferences or allowed representatives?  No   Has the patient had any insurance changes?  No   Has the patient had any changes to specialty medication, dose, or instructions?  No   Has the patient started taking any new medications, herbals, or supplements?  No   Has the patient been diagnosed with any new medical conditions?  No   Does the patient have any new allergies to medications or foods?  No   Does the patient have any concerns about side effects?  No   Can the patient store medication/sharps container properly (at the correct temperature, away from children/pets, etc.)?  Yes   Can the patient call emergency services (911) in the event of an emergency?  Yes   Does the patient have any concerns or questions about taking or administering this medication as prescribed?  No   How many doses did the patient miss in the past 4 weeks or since the last fill?  0   How many doses does the patient have on hand?  8   How many days does the patient report on hand quantity will last?  4   Does the number of doses/days supply remaining match pharmacy expected amounts?  Yes   Does the patient feel that this medication is effective?  Yes   During the past 4 weeks, has patient missed any activities due to condition or medication?  No   During the past 4 weeks, did patient have any of the following urgent care visits?  None   How will the patient receive the medication?  Mail   When does the patient need to receive the medication?  12/18/20   Shipping Address  Home   Address in [facility] confirmed and updated if neccessary?  Yes   Expected Copay ($)   0   Is the patient able to afford the medication copay?  Yes   Payment Method  zero copay   Days supply of Refill  30   Would patient like to speak to a pharmacist?  No   Do you want to trigger an intervention?  No   Do you want to trigger an additional referral task?  No   Refill activity completed?  Yes   Refill activity plan  Refill scheduled   Shipment/Pickup Date:  12/17/20          Current Outpatient Medications   Medication Sig    allopurinoL (ZYLOPRIM) 300 MG tablet TAKE 1 & 1/2 (ONE & ONE-HALF) TABLETS BY MOUTH ONCE DAILY    allopurinoL (ZYLOPRIM) 300 MG tablet TAKE 1 & 1/2 (ONE & ONE-HALF) TABLETS BY MOUTH ONCE DAILY    calcipotriene (DOVONOX) 0.005 % cream APPLY  CREAM TOPICALLY TO AFFECTED AREA TWICE DAILY    carvediloL (COREG) 25 MG tablet TAKE 1 TABLET BY MOUTH TWICE DAILY WITH MEALS    clobetasol (TEMOVATE) 0.05 % external solution USE ON SCALP ONCE TO TWICE DAILY AS NEEDED FOR SCALING OR ITCHING    clobetasoL (TEMOVATE) 0.05 % external solution APPLY  SOLUTION TOPICALLY TO SCALP AS NEEDED FOR ITCHING OR SCALING    colchicine (MITIGARE) 0.6 mg Cap Take 1 capsule (0.6 mg total) by mouth 2 (two) times daily. To prevent gout flares.    colchicine (MITIGARE) 0.6 mg Cap TAKE 1 CAPSULE BY MOUTH TWICE DAILY TO PREVENT GOUT FLARES    diphth,pertus,acell,,tetanus (BOOSTRIX) 2.5-8-5 Lf-mcg-Lf/0.5mL Syrg injection Inject into the muscle. (Patient not taking: Reported on 9/10/2020)    furosemide (LASIX) 40 MG tablet Take 1 tablet (40 mg total) by mouth 2 (two) times daily.    halobetasol (ULTRAVATE) 0.05 % ointment APPLY  OINTMENT TOPICALLY TO AFFECTED AREA TWICE DAILY    ibuprofen (ADVIL,MOTRIN) 600 MG tablet TK 1 T PO Q 4 TO 6 H PRN P    lisinopril (PRINIVIL,ZESTRIL) 20 MG tablet TAKE 1 TABLET BY MOUTH ONCE DAILY    meloxicam (MOBIC) 15 MG tablet Take 15 mg by mouth once daily.    OTEZLA 30 mg Tab Take 1 tablet by mouth twice daily    potassium chloride SA (K-DUR,KLOR-CON) 20 MEQ tablet Take 1  tablet by mouth once daily    risankizumab-rzaa (SKYRIZI) 150mg/1.66mL(75 mg/0.83 mL x2) subcutaneous injection Inject 150 mg (2 syringes) into the skin every 12 weeks.    triamcinolone acetonide 0.1% (KENALOG) 0.1 % ointment APPLY  OINTMENT TOPICALLY TO AFFECTED AREA TWICE DAILY    triamcinolone acetonide 0.1% (KENALOG) 0.1 % ointment APPLY  OINTMENT TOPICALLY TO AFFECTED AREA TWICE DAILY   Last reviewed on 11/19/2020 10:27 AM by Daryl Son MD    Review of patient's allergies indicates:  No Known Allergies Last reviewed on  12/15/2020 10:11 AM by Bernardo Guerrier      Tasks added this encounter   No tasks added.   Tasks due within next 3 months   12/11/2020 - Refill Call (Auto Added)  2/4/2021 - Refill Call (Auto Added)  2/4/2021 - Clinical - Follow Up Assesement (90 day)     Isela Kenney  [facility]  [address] [place] [id number]  Phone: [phone]  Fax: [phone]

## 2020-12-28 RX ORDER — ALLOPURINOL 300 MG/1
TABLET ORAL
Qty: 45 TABLET | Refills: 4 | Status: SHIPPED | OUTPATIENT
Start: 2020-12-28 | End: 2021-03-24 | Stop reason: SDUPTHER

## 2020-12-29 RX ORDER — ALLOPURINOL 300 MG/1
450 TABLET ORAL DAILY
Qty: 45 TABLET | Refills: 4 | Status: CANCELLED | OUTPATIENT
Start: 2020-12-29

## 2021-01-04 ENCOUNTER — PATIENT MESSAGE (OUTPATIENT)
Dept: DERMATOLOGY | Facility: CLINIC | Age: 46
End: 2021-01-04

## 2021-01-04 ENCOUNTER — PATIENT MESSAGE (OUTPATIENT)
Dept: ADMINISTRATIVE | Facility: HOSPITAL | Age: 46
End: 2021-01-04

## 2021-01-05 DIAGNOSIS — L40.50 PSORIATIC ARTHRITIS: ICD-10-CM

## 2021-01-05 DIAGNOSIS — L40.9 PSORIASIS: Primary | ICD-10-CM

## 2021-01-05 RX ORDER — METHOTREXATE 2.5 MG/1
TABLET ORAL
Qty: 12 TABLET | Refills: 2 | Status: SHIPPED | OUTPATIENT
Start: 2021-01-05 | End: 2021-09-13

## 2021-01-05 RX ORDER — FOLIC ACID 1 MG/1
TABLET ORAL
Qty: 30 TABLET | Refills: 5 | Status: SHIPPED | OUTPATIENT
Start: 2021-01-05 | End: 2021-04-08

## 2021-01-22 ENCOUNTER — SPECIALTY PHARMACY (OUTPATIENT)
Dept: PHARMACY | Facility: CLINIC | Age: 46
End: 2021-01-22

## 2021-02-04 ENCOUNTER — PATIENT MESSAGE (OUTPATIENT)
Dept: PHARMACY | Facility: CLINIC | Age: 46
End: 2021-02-04

## 2021-02-04 DIAGNOSIS — L40.9 PSORIASIS: ICD-10-CM

## 2021-02-04 RX ORDER — APREMILAST 30 MG/1
TABLET, FILM COATED ORAL
Qty: 60 TABLET | Refills: 2 | Status: SHIPPED | OUTPATIENT
Start: 2021-02-04 | End: 2021-05-13 | Stop reason: SDUPTHER

## 2021-02-18 ENCOUNTER — PATIENT MESSAGE (OUTPATIENT)
Dept: CARDIOLOGY | Facility: CLINIC | Age: 46
End: 2021-02-18

## 2021-02-20 ENCOUNTER — SPECIALTY PHARMACY (OUTPATIENT)
Dept: PHARMACY | Facility: CLINIC | Age: 46
End: 2021-02-20

## 2021-02-20 RX ORDER — FUROSEMIDE 40 MG/1
40 TABLET ORAL 2 TIMES DAILY
Qty: 180 TABLET | Refills: 0 | Status: SHIPPED | OUTPATIENT
Start: 2021-02-20 | End: 2021-03-23 | Stop reason: SDUPTHER

## 2021-02-20 RX ORDER — LISINOPRIL 20 MG/1
20 TABLET ORAL DAILY
Qty: 90 TABLET | Refills: 0 | Status: SHIPPED | OUTPATIENT
Start: 2021-02-20 | End: 2021-03-23 | Stop reason: SDUPTHER

## 2021-02-20 RX ORDER — CARVEDILOL 25 MG/1
25 TABLET ORAL 2 TIMES DAILY WITH MEALS
Qty: 180 TABLET | Refills: 0 | Status: SHIPPED | OUTPATIENT
Start: 2021-02-20 | End: 2021-03-23 | Stop reason: SDUPTHER

## 2021-02-21 ENCOUNTER — PATIENT MESSAGE (OUTPATIENT)
Dept: DERMATOLOGY | Facility: CLINIC | Age: 46
End: 2021-02-21

## 2021-02-22 ENCOUNTER — PATIENT MESSAGE (OUTPATIENT)
Dept: INTERNAL MEDICINE | Facility: CLINIC | Age: 46
End: 2021-02-22

## 2021-02-23 ENCOUNTER — PATIENT MESSAGE (OUTPATIENT)
Dept: RHEUMATOLOGY | Facility: CLINIC | Age: 46
End: 2021-02-23

## 2021-02-23 ENCOUNTER — OFFICE VISIT (OUTPATIENT)
Dept: DERMATOLOGY | Facility: CLINIC | Age: 46
End: 2021-02-23
Payer: MEDICAID

## 2021-02-23 ENCOUNTER — LAB VISIT (OUTPATIENT)
Dept: LAB | Facility: HOSPITAL | Age: 46
End: 2021-02-23
Payer: MEDICAID

## 2021-02-23 DIAGNOSIS — Z79.899 ENCOUNTER FOR LONG-TERM (CURRENT) USE OF HIGH-RISK MEDICATION: ICD-10-CM

## 2021-02-23 DIAGNOSIS — Z79.899 ENCOUNTER FOR LONG-TERM (CURRENT) USE OF HIGH-RISK MEDICATION: Primary | ICD-10-CM

## 2021-02-23 DIAGNOSIS — L40.9 PSORIASIS: ICD-10-CM

## 2021-02-23 DIAGNOSIS — L40.50 PSORIATIC ARTHRITIS: ICD-10-CM

## 2021-02-23 LAB
ALBUMIN SERPL BCP-MCNC: 3.8 G/DL (ref 3.5–5.2)
ALP SERPL-CCNC: 65 U/L (ref 55–135)
ALT SERPL W/O P-5'-P-CCNC: 23 U/L (ref 10–44)
ANION GAP SERPL CALC-SCNC: 11 MMOL/L (ref 8–16)
AST SERPL-CCNC: 21 U/L (ref 10–40)
BASOPHILS # BLD AUTO: 0.04 K/UL (ref 0–0.2)
BASOPHILS NFR BLD: 0.4 % (ref 0–1.9)
BILIRUB SERPL-MCNC: 0.3 MG/DL (ref 0.1–1)
BUN SERPL-MCNC: 16 MG/DL (ref 6–20)
CALCIUM SERPL-MCNC: 9.4 MG/DL (ref 8.7–10.5)
CHLORIDE SERPL-SCNC: 105 MMOL/L (ref 95–110)
CO2 SERPL-SCNC: 23 MMOL/L (ref 23–29)
CREAT SERPL-MCNC: 0.9 MG/DL (ref 0.5–1.4)
CRP SERPL-MCNC: 5.9 MG/L (ref 0–8.2)
DIFFERENTIAL METHOD: ABNORMAL
EOSINOPHIL # BLD AUTO: 0.6 K/UL (ref 0–0.5)
EOSINOPHIL NFR BLD: 6.6 % (ref 0–8)
ERYTHROCYTE [DISTWIDTH] IN BLOOD BY AUTOMATED COUNT: 13.2 % (ref 11.5–14.5)
ERYTHROCYTE [SEDIMENTATION RATE] IN BLOOD BY WESTERGREN METHOD: 51 MM/HR (ref 0–23)
EST. GFR  (AFRICAN AMERICAN): >60 ML/MIN/1.73 M^2
EST. GFR  (NON AFRICAN AMERICAN): >60 ML/MIN/1.73 M^2
GLUCOSE SERPL-MCNC: 97 MG/DL (ref 70–110)
HCT VFR BLD AUTO: 50.1 % (ref 40–54)
HGB BLD-MCNC: 16.4 G/DL (ref 14–18)
IMM GRANULOCYTES # BLD AUTO: 0.05 K/UL (ref 0–0.04)
IMM GRANULOCYTES NFR BLD AUTO: 0.5 % (ref 0–0.5)
LYMPHOCYTES # BLD AUTO: 1.6 K/UL (ref 1–4.8)
LYMPHOCYTES NFR BLD: 17.3 % (ref 18–48)
MCH RBC QN AUTO: 30.7 PG (ref 27–31)
MCHC RBC AUTO-ENTMCNC: 32.7 G/DL (ref 32–36)
MCV RBC AUTO: 94 FL (ref 82–98)
MONOCYTES # BLD AUTO: 0.8 K/UL (ref 0.3–1)
MONOCYTES NFR BLD: 8.2 % (ref 4–15)
NEUTROPHILS # BLD AUTO: 6.3 K/UL (ref 1.8–7.7)
NEUTROPHILS NFR BLD: 67 % (ref 38–73)
NRBC BLD-RTO: 0 /100 WBC
PLATELET # BLD AUTO: 267 K/UL (ref 150–350)
PMV BLD AUTO: 11 FL (ref 9.2–12.9)
POTASSIUM SERPL-SCNC: 3.8 MMOL/L (ref 3.5–5.1)
PROT SERPL-MCNC: 7.4 G/DL (ref 6–8.4)
RBC # BLD AUTO: 5.34 M/UL (ref 4.6–6.2)
SODIUM SERPL-SCNC: 139 MMOL/L (ref 136–145)
URATE SERPL-MCNC: 5 MG/DL (ref 3.4–7)
WBC # BLD AUTO: 9.35 K/UL (ref 3.9–12.7)

## 2021-02-23 PROCEDURE — 11900 INJECT SKIN LESIONS </W 7: CPT | Mod: PBBFAC | Performed by: PATHOLOGY

## 2021-02-23 PROCEDURE — 11900 PR INJECTION INTO SKIN LESIONS, UP TO 7: ICD-10-PCS | Mod: S$PBB,,, | Performed by: PATHOLOGY

## 2021-02-23 PROCEDURE — 99214 OFFICE O/P EST MOD 30 MIN: CPT | Mod: 25,S$PBB,, | Performed by: PATHOLOGY

## 2021-02-23 PROCEDURE — 84550 ASSAY OF BLOOD/URIC ACID: CPT

## 2021-02-23 PROCEDURE — 80053 COMPREHEN METABOLIC PANEL: CPT

## 2021-02-23 PROCEDURE — 86140 C-REACTIVE PROTEIN: CPT

## 2021-02-23 PROCEDURE — 85025 COMPLETE CBC W/AUTO DIFF WBC: CPT

## 2021-02-23 PROCEDURE — 85652 RBC SED RATE AUTOMATED: CPT

## 2021-02-23 PROCEDURE — 11900 INJECT SKIN LESIONS </W 7: CPT | Mod: S$PBB,,, | Performed by: PATHOLOGY

## 2021-02-23 PROCEDURE — 99214 PR OFFICE/OUTPT VISIT, EST, LEVL IV, 30-39 MIN: ICD-10-PCS | Mod: 25,S$PBB,, | Performed by: PATHOLOGY

## 2021-02-25 ENCOUNTER — CLINICAL SUPPORT (OUTPATIENT)
Dept: DERMATOLOGY | Facility: CLINIC | Age: 46
End: 2021-02-25
Payer: MEDICAID

## 2021-02-25 ENCOUNTER — SPECIALTY PHARMACY (OUTPATIENT)
Dept: PHARMACY | Facility: CLINIC | Age: 46
End: 2021-02-25

## 2021-02-25 DIAGNOSIS — L40.9 PSORIASIS: Primary | ICD-10-CM

## 2021-02-25 PROCEDURE — 99999 PR PBB SHADOW E&M-EST. PATIENT-LVL III: CPT | Mod: PBBFAC,,,

## 2021-02-25 PROCEDURE — 99999 PR PBB SHADOW E&M-EST. PATIENT-LVL III: ICD-10-PCS | Mod: PBBFAC,,,

## 2021-02-25 PROCEDURE — 99213 OFFICE O/P EST LOW 20 MIN: CPT | Mod: PBBFAC

## 2021-03-10 ENCOUNTER — PATIENT MESSAGE (OUTPATIENT)
Dept: RHEUMATOLOGY | Facility: CLINIC | Age: 46
End: 2021-03-10

## 2021-03-10 ENCOUNTER — OFFICE VISIT (OUTPATIENT)
Dept: CARDIOLOGY | Facility: CLINIC | Age: 46
End: 2021-03-10
Payer: MEDICAID

## 2021-03-10 VITALS
HEART RATE: 86 BPM | HEIGHT: 70 IN | BODY MASS INDEX: 29.8 KG/M2 | RESPIRATION RATE: 15 BRPM | OXYGEN SATURATION: 95 % | SYSTOLIC BLOOD PRESSURE: 130 MMHG | WEIGHT: 208.13 LBS | DIASTOLIC BLOOD PRESSURE: 72 MMHG

## 2021-03-10 DIAGNOSIS — L40.9 PSORIASIS: ICD-10-CM

## 2021-03-10 DIAGNOSIS — I10 ESSENTIAL HYPERTENSION: ICD-10-CM

## 2021-03-10 DIAGNOSIS — R06.09 DOE (DYSPNEA ON EXERTION): ICD-10-CM

## 2021-03-10 DIAGNOSIS — L40.50 PSORIATIC ARTHROPATHY: ICD-10-CM

## 2021-03-10 DIAGNOSIS — I42.9 CARDIOMYOPATHY, IDIOPATHIC: Primary | ICD-10-CM

## 2021-03-10 DIAGNOSIS — R00.2 HEART PALPITATIONS: ICD-10-CM

## 2021-03-10 DIAGNOSIS — R94.31 ABNORMAL EKG: ICD-10-CM

## 2021-03-10 PROCEDURE — 99214 PR OFFICE/OUTPT VISIT, EST, LEVL IV, 30-39 MIN: ICD-10-PCS | Mod: S$PBB,,, | Performed by: INTERNAL MEDICINE

## 2021-03-10 PROCEDURE — 99215 OFFICE O/P EST HI 40 MIN: CPT | Mod: PBBFAC | Performed by: INTERNAL MEDICINE

## 2021-03-10 PROCEDURE — 93005 ELECTROCARDIOGRAM TRACING: CPT | Mod: PBBFAC | Performed by: INTERNAL MEDICINE

## 2021-03-10 PROCEDURE — 93010 ELECTROCARDIOGRAM REPORT: CPT | Mod: S$PBB,,, | Performed by: INTERNAL MEDICINE

## 2021-03-10 PROCEDURE — 99999 PR PBB SHADOW E&M-EST. PATIENT-LVL V: ICD-10-PCS | Mod: PBBFAC,,, | Performed by: INTERNAL MEDICINE

## 2021-03-10 PROCEDURE — 93010 EKG 12-LEAD: ICD-10-PCS | Mod: S$PBB,,, | Performed by: INTERNAL MEDICINE

## 2021-03-10 PROCEDURE — 99214 OFFICE O/P EST MOD 30 MIN: CPT | Mod: S$PBB,,, | Performed by: INTERNAL MEDICINE

## 2021-03-10 PROCEDURE — 99999 PR PBB SHADOW E&M-EST. PATIENT-LVL V: CPT | Mod: PBBFAC,,, | Performed by: INTERNAL MEDICINE

## 2021-03-16 ENCOUNTER — PATIENT MESSAGE (OUTPATIENT)
Dept: PHARMACY | Facility: CLINIC | Age: 46
End: 2021-03-16

## 2021-03-17 DIAGNOSIS — I42.9 CARDIOMYOPATHY, IDIOPATHIC: ICD-10-CM

## 2021-03-20 ENCOUNTER — PATIENT MESSAGE (OUTPATIENT)
Dept: DERMATOLOGY | Facility: CLINIC | Age: 46
End: 2021-03-20

## 2021-03-23 ENCOUNTER — HOSPITAL ENCOUNTER (OUTPATIENT)
Dept: CARDIOLOGY | Facility: HOSPITAL | Age: 46
Discharge: HOME | End: 2021-03-23
Attending: INTERNAL MEDICINE
Payer: MEDICAID

## 2021-03-23 ENCOUNTER — OFFICE VISIT (OUTPATIENT)
Dept: DERMATOLOGY | Facility: CLINIC | Age: 46
End: 2021-03-23
Payer: MEDICAID

## 2021-03-23 ENCOUNTER — SPECIALTY PHARMACY (OUTPATIENT)
Dept: PHARMACY | Facility: CLINIC | Age: 46
End: 2021-03-23

## 2021-03-23 DIAGNOSIS — L30.9 ECZEMA, UNSPECIFIED TYPE: Primary | ICD-10-CM

## 2021-03-23 DIAGNOSIS — R94.31 ABNORMAL EKG: ICD-10-CM

## 2021-03-23 DIAGNOSIS — R00.2 HEART PALPITATIONS: ICD-10-CM

## 2021-03-23 DIAGNOSIS — I42.9 CARDIOMYOPATHY, IDIOPATHIC: ICD-10-CM

## 2021-03-23 DIAGNOSIS — R06.09 DOE (DYSPNEA ON EXERTION): ICD-10-CM

## 2021-03-23 DIAGNOSIS — L40.9 PSORIASIS: ICD-10-CM

## 2021-03-23 LAB
AORTIC ROOT ANNULUS: 3.87 CM
AORTIC VALVE CUSP SEPERATION: 2.27 CM
ASCENDING AORTA: 3.5 CM
AV INDEX (PROSTH): 0.71
AV MEAN GRADIENT: 2 MMHG
AV PEAK GRADIENT: 3 MMHG
AV VALVE AREA: 3.85 CM2
AV VELOCITY RATIO: 0.74
CV ECHO LV RWT: 0.59 CM
DOP CALC AO PEAK VEL: 0.86 M/S
DOP CALC AO VTI: 15.24 CM
DOP CALC LVOT AREA: 5.4 CM2
DOP CALC LVOT DIAMETER: 2.62 CM
DOP CALC LVOT PEAK VEL: 0.64 M/S
DOP CALC LVOT STROKE VOLUME: 58.68 CM3
DOP CALCLVOT PEAK VEL VTI: 10.89 CM
E WAVE DECELERATION TIME: 244.28 MSEC
E/A RATIO: 1.13
E/E' RATIO: 11.78 M/S
ECHO LV POSTERIOR WALL: 1.4 CM (ref 0.6–1.1)
FRACTIONAL SHORTENING: 19 % (ref 28–44)
INTERVENTRICULAR SEPTUM: 1.23 CM (ref 0.6–1.1)
IVRT: 103.81 MSEC
LA MAJOR: 5.17 CM
LA MINOR: 4.46 CM
LA WIDTH: 3.47 CM
LEFT ATRIUM SIZE: 3.79 CM
LEFT ATRIUM VOLUME: 53.53 CM3
LEFT INTERNAL DIMENSION IN SYSTOLE: 3.86 CM (ref 2.1–4)
LEFT VENTRICLE DIASTOLIC VOLUME: 105.7 ML
LEFT VENTRICLE SYSTOLIC VOLUME: 64.48 ML
LEFT VENTRICULAR INTERNAL DIMENSION IN DIASTOLE: 4.77 CM (ref 3.5–6)
LEFT VENTRICULAR MASS: 247.45 G
LV LATERAL E/E' RATIO: 10.6 M/S
LV SEPTAL E/E' RATIO: 13.25 M/S
MV PEAK A VEL: 0.47 M/S
MV PEAK E VEL: 0.53 M/S
PISA TR MAX VEL: 2.33 M/S
PV PEAK VELOCITY: 0.76 CM/S
RA MAJOR: 4.68 CM
RA PRESSURE: 3 MMHG
RA WIDTH: 4.01 CM
RIGHT VENTRICULAR END-DIASTOLIC DIMENSION: 2.99 CM
RV TISSUE DOPPLER FREE WALL SYSTOLIC VELOCITY 1 (APICAL 4 CHAMBER VIEW): 6.68 CM/S
SINUS: 3.43 CM
STJ: 3.02 CM
TDI LATERAL: 0.05 M/S
TDI SEPTAL: 0.04 M/S
TDI: 0.05 M/S
TR MAX PG: 22 MMHG
TRICUSPID ANNULAR PLANE SYSTOLIC EXCURSION: 1.77 CM
TV REST PULMONARY ARTERY PRESSURE: 25 MMHG

## 2021-03-23 PROCEDURE — 99999 PR PBB SHADOW E&M-EST. PATIENT-LVL III: CPT | Mod: PBBFAC,,, | Performed by: PATHOLOGY

## 2021-03-23 PROCEDURE — 99213 OFFICE O/P EST LOW 20 MIN: CPT | Mod: S$PBB,,, | Performed by: PATHOLOGY

## 2021-03-23 PROCEDURE — 93225 XTRNL ECG REC<48 HRS REC: CPT

## 2021-03-23 PROCEDURE — 93306 ECHO (CUPID ONLY): ICD-10-PCS | Mod: 26,,, | Performed by: INTERNAL MEDICINE

## 2021-03-23 PROCEDURE — 99999 PR PBB SHADOW E&M-EST. PATIENT-LVL III: ICD-10-PCS | Mod: PBBFAC,,, | Performed by: PATHOLOGY

## 2021-03-23 PROCEDURE — 99213 OFFICE O/P EST LOW 20 MIN: CPT | Mod: PBBFAC,25 | Performed by: PATHOLOGY

## 2021-03-23 PROCEDURE — 93227 XTRNL ECG REC<48 HR R&I: CPT | Mod: ,,, | Performed by: INTERNAL MEDICINE

## 2021-03-23 PROCEDURE — 93306 TTE W/DOPPLER COMPLETE: CPT

## 2021-03-23 PROCEDURE — 99213 PR OFFICE/OUTPT VISIT, EST, LEVL III, 20-29 MIN: ICD-10-PCS | Mod: S$PBB,,, | Performed by: PATHOLOGY

## 2021-03-23 PROCEDURE — 93306 TTE W/DOPPLER COMPLETE: CPT | Mod: 26,,, | Performed by: INTERNAL MEDICINE

## 2021-03-23 PROCEDURE — 93227 HOLTER MONITOR - 48 HOUR (CUPID ONLY): ICD-10-PCS | Mod: ,,, | Performed by: INTERNAL MEDICINE

## 2021-03-23 RX ORDER — POTASSIUM CHLORIDE 20 MEQ/1
TABLET, EXTENDED RELEASE ORAL
Qty: 90 TABLET | Refills: 0 | Status: SHIPPED | OUTPATIENT
Start: 2021-03-23 | End: 2021-03-23 | Stop reason: SDUPTHER

## 2021-03-24 ENCOUNTER — OFFICE VISIT (OUTPATIENT)
Dept: RHEUMATOLOGY | Facility: CLINIC | Age: 46
End: 2021-03-24
Payer: MEDICAID

## 2021-03-24 ENCOUNTER — TELEPHONE (OUTPATIENT)
Dept: PODIATRY | Facility: CLINIC | Age: 46
End: 2021-03-24

## 2021-03-24 ENCOUNTER — HOSPITAL ENCOUNTER (OUTPATIENT)
Dept: RADIOLOGY | Facility: HOSPITAL | Age: 46
Discharge: HOME | End: 2021-03-24
Attending: INTERNAL MEDICINE
Payer: MEDICAID

## 2021-03-24 VITALS
HEART RATE: 86 BPM | WEIGHT: 208.13 LBS | SYSTOLIC BLOOD PRESSURE: 119 MMHG | BODY MASS INDEX: 29.86 KG/M2 | DIASTOLIC BLOOD PRESSURE: 89 MMHG

## 2021-03-24 DIAGNOSIS — L40.50 PSORIATIC ARTHROPATHY: ICD-10-CM

## 2021-03-24 DIAGNOSIS — M79.672 PAIN IN BOTH FEET: ICD-10-CM

## 2021-03-24 DIAGNOSIS — I42.9 CARDIOMYOPATHY, IDIOPATHIC: ICD-10-CM

## 2021-03-24 DIAGNOSIS — M1A.9XX1 GOUT WITH TOPHI: ICD-10-CM

## 2021-03-24 DIAGNOSIS — L40.50 PSORIATIC ARTHROPATHY: Primary | ICD-10-CM

## 2021-03-24 DIAGNOSIS — M79.671 PAIN IN BOTH FEET: ICD-10-CM

## 2021-03-24 PROCEDURE — 99999 PR PBB SHADOW E&M-EST. PATIENT-LVL IV: CPT | Mod: PBBFAC,,, | Performed by: INTERNAL MEDICINE

## 2021-03-24 PROCEDURE — 73630 X-RAY EXAM OF FOOT: CPT | Mod: 26,50,, | Performed by: RADIOLOGY

## 2021-03-24 PROCEDURE — 99999 PR PBB SHADOW E&M-EST. PATIENT-LVL IV: ICD-10-PCS | Mod: PBBFAC,,, | Performed by: INTERNAL MEDICINE

## 2021-03-24 PROCEDURE — 73630 X-RAY EXAM OF FOOT: CPT | Mod: TC,50

## 2021-03-24 PROCEDURE — 99214 PR OFFICE/OUTPT VISIT, EST, LEVL IV, 30-39 MIN: ICD-10-PCS | Mod: S$PBB,,, | Performed by: INTERNAL MEDICINE

## 2021-03-24 PROCEDURE — 99214 OFFICE O/P EST MOD 30 MIN: CPT | Mod: S$PBB,,, | Performed by: INTERNAL MEDICINE

## 2021-03-24 PROCEDURE — 73630 XR FOOT COMPLETE 3 VIEW BILATERAL: ICD-10-PCS | Mod: 26,50,, | Performed by: RADIOLOGY

## 2021-03-24 PROCEDURE — 99214 OFFICE O/P EST MOD 30 MIN: CPT | Mod: PBBFAC,25 | Performed by: INTERNAL MEDICINE

## 2021-03-24 RX ORDER — SULFASALAZINE 500 MG/1
1000 TABLET, DELAYED RELEASE ORAL 2 TIMES DAILY
Qty: 120 TABLET | Refills: 6 | Status: SHIPPED | OUTPATIENT
Start: 2021-03-24 | End: 2021-04-23

## 2021-03-24 RX ORDER — ALLOPURINOL 300 MG/1
450 TABLET ORAL DAILY
Qty: 45 TABLET | Refills: 4 | Status: SHIPPED | OUTPATIENT
Start: 2021-03-24 | End: 2021-12-04

## 2021-03-24 RX ORDER — NABUMETONE 750 MG/1
750 TABLET, FILM COATED ORAL 2 TIMES DAILY
Qty: 60 TABLET | Refills: 6 | Status: SHIPPED | OUTPATIENT
Start: 2021-03-24 | End: 2021-04-23

## 2021-03-24 ASSESSMENT — ROUTINE ASSESSMENT OF PATIENT INDEX DATA (RAPID3)
PAIN SCORE: 7.5
PATIENT GLOBAL ASSESSMENT SCORE: 7.5
TOTAL RAPID3 SCORE: 6.11
AM STIFFNESS SCORE: 1, YES
PSYCHOLOGICAL DISTRESS SCORE: 4.4
FATIGUE SCORE: 7
MDHAQ FUNCTION SCORE: 1

## 2021-03-26 LAB
OHS CV EVENT MONITOR DAY: 0
OHS CV HOLTER LENGTH DECIMAL HOURS: 47.98
OHS CV HOLTER LENGTH HOURS: 47
OHS CV HOLTER LENGTH MINUTES: 59

## 2021-03-26 RX ORDER — ALLOPURINOL 300 MG/1
450 TABLET ORAL DAILY
Qty: 45 TABLET | Refills: 0 | Status: SHIPPED | OUTPATIENT
Start: 2021-03-26 | End: 2021-04-08 | Stop reason: SDUPTHER

## 2021-04-08 ENCOUNTER — OFFICE VISIT (OUTPATIENT)
Dept: INTERNAL MEDICINE | Facility: CLINIC | Age: 46
End: 2021-04-08
Payer: MEDICAID

## 2021-04-08 ENCOUNTER — LAB VISIT (OUTPATIENT)
Dept: LAB | Facility: HOSPITAL | Age: 46
End: 2021-04-08
Attending: INTERNAL MEDICINE
Payer: MEDICAID

## 2021-04-08 VITALS
SYSTOLIC BLOOD PRESSURE: 116 MMHG | WEIGHT: 207.25 LBS | DIASTOLIC BLOOD PRESSURE: 70 MMHG | BODY MASS INDEX: 29.67 KG/M2 | HEART RATE: 75 BPM | OXYGEN SATURATION: 96 % | HEIGHT: 70 IN

## 2021-04-08 DIAGNOSIS — L40.50 PSORIATIC ARTHRITIS: ICD-10-CM

## 2021-04-08 DIAGNOSIS — Z20.822 SUSPECTED COVID-19 VIRUS INFECTION: ICD-10-CM

## 2021-04-08 DIAGNOSIS — I10 ESSENTIAL HYPERTENSION: ICD-10-CM

## 2021-04-08 DIAGNOSIS — Z00.00 ANNUAL PHYSICAL EXAM: Primary | ICD-10-CM

## 2021-04-08 DIAGNOSIS — Z00.00 ANNUAL PHYSICAL EXAM: ICD-10-CM

## 2021-04-08 DIAGNOSIS — I42.8 NICM (NONISCHEMIC CARDIOMYOPATHY): ICD-10-CM

## 2021-04-08 LAB
BACTERIA #/AREA URNS AUTO: ABNORMAL /HPF
BILIRUB UR QL STRIP: NEGATIVE
CHOLEST SERPL-MCNC: 250 MG/DL (ref 120–199)
CHOLEST/HDLC SERPL: 3.8 {RATIO} (ref 2–5)
CLARITY UR REFRACT.AUTO: CLEAR
COLOR UR AUTO: NORMAL
GLUCOSE UR QL STRIP: NEGATIVE
HDLC SERPL-MCNC: 66 MG/DL (ref 40–75)
HDLC SERPL: 26.4 % (ref 20–50)
HGB UR QL STRIP: NEGATIVE
HYALINE CASTS UR QL AUTO: 6 /LPF
KETONES UR QL STRIP: NEGATIVE
LDLC SERPL CALC-MCNC: 152.4 MG/DL (ref 63–159)
LEUKOCYTE ESTERASE UR QL STRIP: NEGATIVE
MICROSCOPIC COMMENT: ABNORMAL
NITRITE UR QL STRIP: NEGATIVE
NONHDLC SERPL-MCNC: 184 MG/DL
PH UR STRIP: 6 [PH] (ref 5–8)
PROT UR QL STRIP: NEGATIVE
RBC #/AREA URNS AUTO: 0 /HPF (ref 0–4)
SP GR UR STRIP: 1.01 (ref 1–1.03)
TRIGL SERPL-MCNC: 158 MG/DL (ref 30–150)
URN SPEC COLLECT METH UR: NORMAL
WBC #/AREA URNS AUTO: 0 /HPF (ref 0–5)

## 2021-04-08 PROCEDURE — 99214 OFFICE O/P EST MOD 30 MIN: CPT | Mod: S$PBB,,, | Performed by: INTERNAL MEDICINE

## 2021-04-08 PROCEDURE — 99999 PR PBB SHADOW E&M-EST. PATIENT-LVL IV: ICD-10-PCS | Mod: PBBFAC,,, | Performed by: INTERNAL MEDICINE

## 2021-04-08 PROCEDURE — 99999 PR PBB SHADOW E&M-EST. PATIENT-LVL IV: CPT | Mod: PBBFAC,,, | Performed by: INTERNAL MEDICINE

## 2021-04-08 PROCEDURE — 86769 SARS-COV-2 COVID-19 ANTIBODY: CPT | Performed by: INTERNAL MEDICINE

## 2021-04-08 PROCEDURE — 80061 LIPID PANEL: CPT | Performed by: INTERNAL MEDICINE

## 2021-04-08 PROCEDURE — 99214 OFFICE O/P EST MOD 30 MIN: CPT | Mod: PBBFAC | Performed by: INTERNAL MEDICINE

## 2021-04-08 PROCEDURE — 99214 PR OFFICE/OUTPT VISIT, EST, LEVL IV, 30-39 MIN: ICD-10-PCS | Mod: S$PBB,,, | Performed by: INTERNAL MEDICINE

## 2021-04-08 PROCEDURE — 81001 URINALYSIS AUTO W/SCOPE: CPT | Performed by: INTERNAL MEDICINE

## 2021-04-09 LAB — SARS-COV-2 IGG SERPLBLD QL IA.RAPID: NEGATIVE

## 2021-04-11 ENCOUNTER — PATIENT MESSAGE (OUTPATIENT)
Dept: INTERNAL MEDICINE | Facility: CLINIC | Age: 46
End: 2021-04-11

## 2021-04-12 ENCOUNTER — PATIENT MESSAGE (OUTPATIENT)
Dept: INTERNAL MEDICINE | Facility: CLINIC | Age: 46
End: 2021-04-12

## 2021-04-12 ENCOUNTER — TELEPHONE (OUTPATIENT)
Dept: INTERNAL MEDICINE | Facility: CLINIC | Age: 46
End: 2021-04-12

## 2021-04-12 ENCOUNTER — PATIENT OUTREACH (OUTPATIENT)
Dept: ADMINISTRATIVE | Facility: OTHER | Age: 46
End: 2021-04-12

## 2021-04-14 ENCOUNTER — PATIENT MESSAGE (OUTPATIENT)
Dept: CARDIOLOGY | Facility: CLINIC | Age: 46
End: 2021-04-14

## 2021-04-17 DIAGNOSIS — E78.5 HYPERLIPIDEMIA, UNSPECIFIED HYPERLIPIDEMIA TYPE: Primary | ICD-10-CM

## 2021-04-17 RX ORDER — ATORVASTATIN CALCIUM 10 MG/1
10 TABLET, FILM COATED ORAL DAILY
Qty: 90 TABLET | Refills: 3 | Status: SHIPPED | OUTPATIENT
Start: 2021-04-17 | End: 2022-11-18 | Stop reason: SDUPTHER

## 2021-04-22 ENCOUNTER — SPECIALTY PHARMACY (OUTPATIENT)
Dept: PHARMACY | Facility: CLINIC | Age: 46
End: 2021-04-22

## 2021-05-11 ENCOUNTER — PATIENT MESSAGE (OUTPATIENT)
Dept: DERMATOLOGY | Facility: CLINIC | Age: 46
End: 2021-05-11

## 2021-05-13 ENCOUNTER — SPECIALTY PHARMACY (OUTPATIENT)
Dept: PHARMACY | Facility: CLINIC | Age: 46
End: 2021-05-13

## 2021-05-13 DIAGNOSIS — L40.9 PSORIASIS: ICD-10-CM

## 2021-05-14 RX ORDER — APREMILAST 30 MG/1
TABLET, FILM COATED ORAL
Qty: 60 TABLET | Refills: 2 | Status: SHIPPED | OUTPATIENT
Start: 2021-05-14 | End: 2021-09-11 | Stop reason: SDUPTHER

## 2021-05-27 ENCOUNTER — TELEPHONE (OUTPATIENT)
Dept: PHARMACY | Facility: CLINIC | Age: 46
End: 2021-05-27

## 2021-05-28 ENCOUNTER — CLINICAL SUPPORT (OUTPATIENT)
Dept: DERMATOLOGY | Facility: CLINIC | Age: 46
End: 2021-05-28
Payer: MEDICAID

## 2021-05-28 DIAGNOSIS — Z71.89 ENCOUNTER FOR EDUCATION ABOUT INJECTION: Primary | ICD-10-CM

## 2021-05-28 PROCEDURE — 99499 UNLISTED E&M SERVICE: CPT | Mod: S$PBB,,, | Performed by: PATHOLOGY

## 2021-05-28 PROCEDURE — 99499 NO LOS: ICD-10-PCS | Mod: S$PBB,,, | Performed by: PATHOLOGY

## 2021-06-09 ENCOUNTER — OFFICE VISIT (OUTPATIENT)
Dept: PODIATRY | Facility: CLINIC | Age: 46
End: 2021-06-09
Payer: MEDICAID

## 2021-06-09 VITALS — WEIGHT: 207 LBS | HEIGHT: 70 IN | BODY MASS INDEX: 29.63 KG/M2

## 2021-06-09 DIAGNOSIS — M79.672 PAIN IN BOTH FEET: ICD-10-CM

## 2021-06-09 DIAGNOSIS — L40.50 PSORIATIC ARTHROPATHY: ICD-10-CM

## 2021-06-09 DIAGNOSIS — M20.5X1 HALLUX LIMITUS, ACQUIRED, RIGHT: ICD-10-CM

## 2021-06-09 DIAGNOSIS — M20.5X2 HALLUX LIMITUS, ACQUIRED, LEFT: ICD-10-CM

## 2021-06-09 DIAGNOSIS — M77.41 METATARSALGIA, RIGHT FOOT: Primary | ICD-10-CM

## 2021-06-09 DIAGNOSIS — M79.671 PAIN IN BOTH FEET: ICD-10-CM

## 2021-06-09 DIAGNOSIS — M21.6X2 ACQUIRED EQUINUS DEFORMITY OF BOTH FEET: ICD-10-CM

## 2021-06-09 DIAGNOSIS — M77.42 METATARSALGIA, LEFT FOOT: ICD-10-CM

## 2021-06-09 DIAGNOSIS — M21.6X1 ACQUIRED EQUINUS DEFORMITY OF BOTH FEET: ICD-10-CM

## 2021-06-09 DIAGNOSIS — M20.41 HAMMER TOES OF BOTH FEET: ICD-10-CM

## 2021-06-09 DIAGNOSIS — M20.42 HAMMER TOES OF BOTH FEET: ICD-10-CM

## 2021-06-09 PROCEDURE — 99203 OFFICE O/P NEW LOW 30 MIN: CPT | Mod: S$PBB,,, | Performed by: PODIATRIST

## 2021-06-09 PROCEDURE — 99999 PR PBB SHADOW E&M-EST. PATIENT-LVL III: ICD-10-PCS | Mod: PBBFAC,,, | Performed by: PODIATRIST

## 2021-06-09 PROCEDURE — 99203 PR OFFICE/OUTPT VISIT, NEW, LEVL III, 30-44 MIN: ICD-10-PCS | Mod: S$PBB,,, | Performed by: PODIATRIST

## 2021-06-09 PROCEDURE — 99213 OFFICE O/P EST LOW 20 MIN: CPT | Mod: PBBFAC,PO | Performed by: PODIATRIST

## 2021-06-09 PROCEDURE — 99999 PR PBB SHADOW E&M-EST. PATIENT-LVL III: CPT | Mod: PBBFAC,,, | Performed by: PODIATRIST

## 2021-06-09 RX ORDER — METHYLPREDNISOLONE 4 MG/1
TABLET ORAL
Qty: 21 TABLET | Refills: 0 | Status: SHIPPED | OUTPATIENT
Start: 2021-06-09 | End: 2022-11-17

## 2021-06-17 ENCOUNTER — OFFICE VISIT (OUTPATIENT)
Dept: CARDIOLOGY | Facility: CLINIC | Age: 46
End: 2021-06-17
Payer: MEDICAID

## 2021-06-17 VITALS
HEART RATE: 66 BPM | WEIGHT: 208.56 LBS | OXYGEN SATURATION: 98 % | SYSTOLIC BLOOD PRESSURE: 132 MMHG | BODY MASS INDEX: 29.86 KG/M2 | DIASTOLIC BLOOD PRESSURE: 80 MMHG | RESPIRATION RATE: 15 BRPM | HEIGHT: 70 IN

## 2021-06-17 DIAGNOSIS — I42.9 CARDIOMYOPATHY, IDIOPATHIC: Primary | ICD-10-CM

## 2021-06-17 DIAGNOSIS — L40.9 PSORIASIS: ICD-10-CM

## 2021-06-17 DIAGNOSIS — I10 ESSENTIAL HYPERTENSION: ICD-10-CM

## 2021-06-17 DIAGNOSIS — I63.89 CEREBRAL INFARCTION DUE TO OTHER MECHANISM: ICD-10-CM

## 2021-06-17 DIAGNOSIS — R07.9 CHEST PAIN, UNSPECIFIED TYPE: ICD-10-CM

## 2021-06-17 PROCEDURE — 99214 OFFICE O/P EST MOD 30 MIN: CPT | Mod: S$PBB,,, | Performed by: INTERNAL MEDICINE

## 2021-06-17 PROCEDURE — 99999 PR PBB SHADOW E&M-EST. PATIENT-LVL IV: CPT | Mod: PBBFAC,,, | Performed by: INTERNAL MEDICINE

## 2021-06-17 PROCEDURE — 99999 PR PBB SHADOW E&M-EST. PATIENT-LVL IV: ICD-10-PCS | Mod: PBBFAC,,, | Performed by: INTERNAL MEDICINE

## 2021-06-17 PROCEDURE — 99214 PR OFFICE/OUTPT VISIT, EST, LEVL IV, 30-39 MIN: ICD-10-PCS | Mod: S$PBB,,, | Performed by: INTERNAL MEDICINE

## 2021-06-17 PROCEDURE — 99214 OFFICE O/P EST MOD 30 MIN: CPT | Mod: PBBFAC | Performed by: INTERNAL MEDICINE

## 2021-06-28 ENCOUNTER — PATIENT MESSAGE (OUTPATIENT)
Dept: PHARMACY | Facility: CLINIC | Age: 46
End: 2021-06-28

## 2021-07-01 ENCOUNTER — SPECIALTY PHARMACY (OUTPATIENT)
Dept: PHARMACY | Facility: CLINIC | Age: 46
End: 2021-07-01

## 2021-07-15 ENCOUNTER — PATIENT MESSAGE (OUTPATIENT)
Dept: DERMATOLOGY | Facility: CLINIC | Age: 46
End: 2021-07-15

## 2021-07-16 DIAGNOSIS — L40.0 PSORIASIS VULGARIS: ICD-10-CM

## 2021-07-16 RX ORDER — HALOBETASOL PROPIONATE 0.5 MG/G
OINTMENT TOPICAL
Qty: 100 G | Refills: 0 | Status: SHIPPED | OUTPATIENT
Start: 2021-07-16 | End: 2021-08-16

## 2021-07-29 ENCOUNTER — PATIENT MESSAGE (OUTPATIENT)
Dept: PHARMACY | Facility: CLINIC | Age: 46
End: 2021-07-29

## 2021-08-04 ENCOUNTER — SPECIALTY PHARMACY (OUTPATIENT)
Dept: PHARMACY | Facility: CLINIC | Age: 46
End: 2021-08-04

## 2021-08-11 ENCOUNTER — TELEPHONE (OUTPATIENT)
Dept: PHARMACY | Facility: CLINIC | Age: 46
End: 2021-08-11

## 2021-08-24 ENCOUNTER — PATIENT MESSAGE (OUTPATIENT)
Dept: RHEUMATOLOGY | Facility: CLINIC | Age: 46
End: 2021-08-24

## 2021-08-24 ENCOUNTER — PATIENT OUTREACH (OUTPATIENT)
Dept: ADMINISTRATIVE | Facility: OTHER | Age: 46
End: 2021-08-24

## 2021-08-24 DIAGNOSIS — M10.9 GOUT, ARTHRITIS: ICD-10-CM

## 2021-08-24 RX ORDER — COLCHICINE 0.6 MG/1
CAPSULE ORAL
Qty: 70 CAPSULE | Refills: 2 | Status: SHIPPED | OUTPATIENT
Start: 2021-08-24 | End: 2023-03-20 | Stop reason: SDUPTHER

## 2021-08-26 ENCOUNTER — LAB VISIT (OUTPATIENT)
Dept: LAB | Facility: HOSPITAL | Age: 46
End: 2021-08-26
Payer: MEDICAID

## 2021-08-26 ENCOUNTER — OFFICE VISIT (OUTPATIENT)
Dept: DERMATOLOGY | Facility: CLINIC | Age: 46
End: 2021-08-26
Payer: MEDICAID

## 2021-08-26 DIAGNOSIS — Z79.899 ENCOUNTER FOR LONG-TERM (CURRENT) USE OF HIGH-RISK MEDICATION: ICD-10-CM

## 2021-08-26 DIAGNOSIS — L40.0 PSORIASIS VULGARIS: Primary | ICD-10-CM

## 2021-08-26 DIAGNOSIS — L40.50 PSORIATIC ARTHRITIS: ICD-10-CM

## 2021-08-26 LAB
ALBUMIN SERPL BCP-MCNC: 3.9 G/DL (ref 3.5–5.2)
ALP SERPL-CCNC: 57 U/L (ref 55–135)
ALT SERPL W/O P-5'-P-CCNC: 18 U/L (ref 10–44)
AST SERPL-CCNC: 15 U/L (ref 10–40)
BASOPHILS # BLD AUTO: 0.03 K/UL (ref 0–0.2)
BASOPHILS NFR BLD: 0.4 % (ref 0–1.9)
BILIRUB DIRECT SERPL-MCNC: 0.1 MG/DL (ref 0.1–0.3)
BILIRUB SERPL-MCNC: 0.4 MG/DL (ref 0.1–1)
DIFFERENTIAL METHOD: NORMAL
EOSINOPHIL # BLD AUTO: 0.2 K/UL (ref 0–0.5)
EOSINOPHIL NFR BLD: 2.2 % (ref 0–8)
ERYTHROCYTE [DISTWIDTH] IN BLOOD BY AUTOMATED COUNT: 12.7 % (ref 11.5–14.5)
HCT VFR BLD AUTO: 49.4 % (ref 40–54)
HGB BLD-MCNC: 16 G/DL (ref 14–18)
IMM GRANULOCYTES # BLD AUTO: 0.04 K/UL (ref 0–0.04)
IMM GRANULOCYTES NFR BLD AUTO: 0.5 % (ref 0–0.5)
LYMPHOCYTES # BLD AUTO: 1.8 K/UL (ref 1–4.8)
LYMPHOCYTES NFR BLD: 21.3 % (ref 18–48)
MCH RBC QN AUTO: 30.1 PG (ref 27–31)
MCHC RBC AUTO-ENTMCNC: 32.4 G/DL (ref 32–36)
MCV RBC AUTO: 93 FL (ref 82–98)
MONOCYTES # BLD AUTO: 0.5 K/UL (ref 0.3–1)
MONOCYTES NFR BLD: 5.8 % (ref 4–15)
NEUTROPHILS # BLD AUTO: 5.8 K/UL (ref 1.8–7.7)
NEUTROPHILS NFR BLD: 69.8 % (ref 38–73)
NRBC BLD-RTO: 0 /100 WBC
PLATELET # BLD AUTO: 235 K/UL (ref 150–450)
PMV BLD AUTO: 10.8 FL (ref 9.2–12.9)
PROT SERPL-MCNC: 8.1 G/DL (ref 6–8.4)
RBC # BLD AUTO: 5.32 M/UL (ref 4.6–6.2)
WBC # BLD AUTO: 8.27 K/UL (ref 3.9–12.7)

## 2021-08-26 PROCEDURE — 99211 OFF/OP EST MAY X REQ PHY/QHP: CPT | Mod: PBBFAC | Performed by: PATHOLOGY

## 2021-08-26 PROCEDURE — 99213 OFFICE O/P EST LOW 20 MIN: CPT | Mod: S$PBB,,, | Performed by: PATHOLOGY

## 2021-08-26 PROCEDURE — 99999 PR PBB SHADOW E&M-EST. PATIENT-LVL I: CPT | Mod: PBBFAC,,, | Performed by: PATHOLOGY

## 2021-08-26 PROCEDURE — 80076 HEPATIC FUNCTION PANEL: CPT | Performed by: PATHOLOGY

## 2021-08-26 PROCEDURE — 99213 PR OFFICE/OUTPT VISIT, EST, LEVL III, 20-29 MIN: ICD-10-PCS | Mod: S$PBB,,, | Performed by: PATHOLOGY

## 2021-08-26 PROCEDURE — 36415 COLL VENOUS BLD VENIPUNCTURE: CPT | Performed by: PATHOLOGY

## 2021-08-26 PROCEDURE — 99999 PR PBB SHADOW E&M-EST. PATIENT-LVL I: ICD-10-PCS | Mod: PBBFAC,,, | Performed by: PATHOLOGY

## 2021-08-26 PROCEDURE — 85025 COMPLETE CBC W/AUTO DIFF WBC: CPT | Performed by: PATHOLOGY

## 2021-09-11 ENCOUNTER — PATIENT MESSAGE (OUTPATIENT)
Dept: PHARMACY | Facility: CLINIC | Age: 46
End: 2021-09-11

## 2021-09-11 DIAGNOSIS — L40.9 PSORIASIS: ICD-10-CM

## 2021-09-13 RX ORDER — RISANKIZUMAB-RZAA 150 MG/ML
150 INJECTION SUBCUTANEOUS
Qty: 1 ML | Refills: 3 | Status: SHIPPED | OUTPATIENT
Start: 2021-09-13 | End: 2022-10-10 | Stop reason: SDUPTHER

## 2021-09-13 RX ORDER — APREMILAST 30 MG/1
TABLET, FILM COATED ORAL
Qty: 60 TABLET | Refills: 2 | Status: SHIPPED | OUTPATIENT
Start: 2021-09-13 | End: 2021-12-27 | Stop reason: SDUPTHER

## 2021-09-16 ENCOUNTER — SPECIALTY PHARMACY (OUTPATIENT)
Dept: PHARMACY | Facility: CLINIC | Age: 46
End: 2021-09-16

## 2021-09-24 DIAGNOSIS — I42.9 CARDIOMYOPATHY, IDIOPATHIC: ICD-10-CM

## 2021-09-28 RX ORDER — POTASSIUM CHLORIDE 20 MEQ/1
TABLET, EXTENDED RELEASE ORAL
Qty: 90 TABLET | Refills: 1 | Status: SHIPPED | OUTPATIENT
Start: 2021-09-28 | End: 2022-03-30

## 2021-10-18 ENCOUNTER — SPECIALTY PHARMACY (OUTPATIENT)
Dept: PHARMACY | Facility: CLINIC | Age: 46
End: 2021-10-18

## 2021-10-27 ENCOUNTER — SPECIALTY PHARMACY (OUTPATIENT)
Dept: PHARMACY | Facility: CLINIC | Age: 46
End: 2021-10-27
Payer: MEDICAID

## 2021-11-09 ENCOUNTER — PATIENT OUTREACH (OUTPATIENT)
Dept: ADMINISTRATIVE | Facility: OTHER | Age: 46
End: 2021-11-09
Payer: MEDICAID

## 2021-11-10 ENCOUNTER — OFFICE VISIT (OUTPATIENT)
Dept: RHEUMATOLOGY | Facility: CLINIC | Age: 46
End: 2021-11-10
Payer: MEDICAID

## 2021-11-10 ENCOUNTER — LAB VISIT (OUTPATIENT)
Dept: LAB | Facility: HOSPITAL | Age: 46
End: 2021-11-10
Attending: INTERNAL MEDICINE
Payer: MEDICAID

## 2021-11-10 VITALS
HEART RATE: 88 BPM | SYSTOLIC BLOOD PRESSURE: 143 MMHG | BODY MASS INDEX: 30.3 KG/M2 | DIASTOLIC BLOOD PRESSURE: 91 MMHG | HEIGHT: 70 IN | WEIGHT: 211.63 LBS

## 2021-11-10 DIAGNOSIS — M1A.9XX1 GOUT WITH TOPHI: ICD-10-CM

## 2021-11-10 DIAGNOSIS — M11.20 CHONDROCALCINOSIS: ICD-10-CM

## 2021-11-10 DIAGNOSIS — L40.50 PSORIATIC ARTHROPATHY: Primary | ICD-10-CM

## 2021-11-10 LAB — URATE SERPL-MCNC: 5.1 MG/DL (ref 3.4–7)

## 2021-11-10 PROCEDURE — 84550 ASSAY OF BLOOD/URIC ACID: CPT | Performed by: INTERNAL MEDICINE

## 2021-11-10 PROCEDURE — 99213 OFFICE O/P EST LOW 20 MIN: CPT | Mod: PBBFAC | Performed by: INTERNAL MEDICINE

## 2021-11-10 PROCEDURE — 99999 PR PBB SHADOW E&M-EST. PATIENT-LVL III: CPT | Mod: PBBFAC,,, | Performed by: INTERNAL MEDICINE

## 2021-11-10 PROCEDURE — 99214 PR OFFICE/OUTPT VISIT, EST, LEVL IV, 30-39 MIN: ICD-10-PCS | Mod: S$PBB,,, | Performed by: INTERNAL MEDICINE

## 2021-11-10 PROCEDURE — 99214 OFFICE O/P EST MOD 30 MIN: CPT | Mod: S$PBB,,, | Performed by: INTERNAL MEDICINE

## 2021-11-10 PROCEDURE — 36415 COLL VENOUS BLD VENIPUNCTURE: CPT | Performed by: INTERNAL MEDICINE

## 2021-11-10 PROCEDURE — 99999 PR PBB SHADOW E&M-EST. PATIENT-LVL III: ICD-10-PCS | Mod: PBBFAC,,, | Performed by: INTERNAL MEDICINE

## 2021-11-10 ASSESSMENT — ROUTINE ASSESSMENT OF PATIENT INDEX DATA (RAPID3)
MDHAQ FUNCTION SCORE: 1
TOTAL RAPID3 SCORE: 6.44
PATIENT GLOBAL ASSESSMENT SCORE: 8
FATIGUE SCORE: 8
AM STIFFNESS SCORE: 1, YES
PAIN SCORE: 8
PSYCHOLOGICAL DISTRESS SCORE: 4.4

## 2021-11-16 ENCOUNTER — SPECIALTY PHARMACY (OUTPATIENT)
Dept: PHARMACY | Facility: CLINIC | Age: 46
End: 2021-11-16
Payer: MEDICAID

## 2021-11-29 ENCOUNTER — SPECIALTY PHARMACY (OUTPATIENT)
Dept: PHARMACY | Facility: CLINIC | Age: 46
End: 2021-11-29
Payer: MEDICAID

## 2021-11-30 ENCOUNTER — TELEPHONE (OUTPATIENT)
Dept: PHARMACY | Facility: CLINIC | Age: 46
End: 2021-11-30
Payer: MEDICAID

## 2021-11-30 ENCOUNTER — OFFICE VISIT (OUTPATIENT)
Dept: DERMATOLOGY | Facility: CLINIC | Age: 46
End: 2021-11-30
Payer: MEDICAID

## 2021-11-30 DIAGNOSIS — Z79.899 ENCOUNTER FOR LONG-TERM (CURRENT) USE OF HIGH-RISK MEDICATION: ICD-10-CM

## 2021-11-30 DIAGNOSIS — L40.50 PSORIATIC ARTHRITIS: ICD-10-CM

## 2021-11-30 DIAGNOSIS — L40.9 PSORIASIS: Primary | ICD-10-CM

## 2021-11-30 PROCEDURE — 99214 PR OFFICE/OUTPT VISIT, EST, LEVL IV, 30-39 MIN: ICD-10-PCS | Mod: S$PBB,,, | Performed by: PATHOLOGY

## 2021-11-30 PROCEDURE — 99999 PR PBB SHADOW E&M-EST. PATIENT-LVL III: ICD-10-PCS | Mod: PBBFAC,,, | Performed by: PATHOLOGY

## 2021-11-30 PROCEDURE — 99214 OFFICE O/P EST MOD 30 MIN: CPT | Mod: S$PBB,,, | Performed by: PATHOLOGY

## 2021-11-30 PROCEDURE — 99213 OFFICE O/P EST LOW 20 MIN: CPT | Mod: PBBFAC | Performed by: PATHOLOGY

## 2021-11-30 PROCEDURE — 99999 PR PBB SHADOW E&M-EST. PATIENT-LVL III: CPT | Mod: PBBFAC,,, | Performed by: PATHOLOGY

## 2021-12-13 ENCOUNTER — PATIENT MESSAGE (OUTPATIENT)
Dept: PHARMACY | Facility: CLINIC | Age: 46
End: 2021-12-13
Payer: MEDICAID

## 2021-12-16 ENCOUNTER — PATIENT MESSAGE (OUTPATIENT)
Dept: PHARMACY | Facility: CLINIC | Age: 46
End: 2021-12-16
Payer: MEDICAID

## 2021-12-27 ENCOUNTER — SPECIALTY PHARMACY (OUTPATIENT)
Dept: PHARMACY | Facility: CLINIC | Age: 46
End: 2021-12-27
Payer: MEDICAID

## 2021-12-27 DIAGNOSIS — L40.9 PSORIASIS: ICD-10-CM

## 2021-12-27 RX ORDER — APREMILAST 30 MG/1
TABLET, FILM COATED ORAL
Qty: 60 TABLET | Refills: 2 | Status: SHIPPED | OUTPATIENT
Start: 2021-12-27 | End: 2022-04-04 | Stop reason: SDUPTHER

## 2022-01-11 ENCOUNTER — TELEPHONE (OUTPATIENT)
Dept: INTERNAL MEDICINE | Facility: CLINIC | Age: 47
End: 2022-01-11
Payer: MEDICAID

## 2022-01-11 ENCOUNTER — PATIENT MESSAGE (OUTPATIENT)
Dept: INTERNAL MEDICINE | Facility: CLINIC | Age: 47
End: 2022-01-11
Payer: MEDICAID

## 2022-01-11 NOTE — ASSESSMENT & PLAN NOTE
MRI show bilateral frontal CVA,will have ,MRA brain.carotid doppler show no significant stenosis.on ASA,Statin,consulted neurology,no need for ST since patient is eating well with no problems.possible thrombophilia is not exluded per history, Echo show no thrombosis, monitor in Telemetrty for cardiac arrhyhtmia,consulted hematology.     [address]   [facility] fax #[phone]

## 2022-01-12 ENCOUNTER — PATIENT MESSAGE (OUTPATIENT)
Dept: INTERNAL MEDICINE | Facility: CLINIC | Age: 47
End: 2022-01-12
Payer: MEDICAID

## 2022-01-24 ENCOUNTER — SPECIALTY PHARMACY (OUTPATIENT)
Dept: PHARMACY | Facility: CLINIC | Age: 47
End: 2022-01-24
Payer: MEDICAID

## 2022-01-24 NOTE — TELEPHONE ENCOUNTER
Called patient for Otezla refill. New PA needed. Patient reports about 4 days left on hand. Routing to [facility]. Patient is aware we will follow up once determination is made.

## 2022-01-31 NOTE — TELEPHONE ENCOUNTER
Otezla 30mg Prior Authorization faxed to [employer].    Lakshmi Steve, PharmD  Clinical Pharmacist  [facility]   [phone]

## 2022-02-14 ENCOUNTER — SPECIALTY PHARMACY (OUTPATIENT)
Dept: PHARMACY | Facility: CLINIC | Age: 47
End: 2022-02-14
Payer: MEDICAID

## 2022-02-14 NOTE — TELEPHONE ENCOUNTER
Specialty Pharmacy - Refill Coordination    Specialty Medication Orders Linked to Encounter    Flowsheet Row Most Recent Value   Medication #1 risankizumab-rzaa (SKYRIZI) 150 mg/mL Syrg (Order#[id number], Rx#[id number])   Medication #2 OTEZLA 30 mg Tab (Order#[id number], Rx#[id number])          Refill Questions - Documented Responses    Flowsheet Row Most Recent Value   Patient Availability and HIPAA Verification    Does patient want to proceed with activity? Yes   HIPAA/medical authority confirmed? Yes   Relationship to patient of person spoken to? Self   Refill Screening Questions    Changes to allergies? No   Changes to medications? No   New conditions since last clinic visit? No   Unplanned office visit, urgent care, ED, or hospital admission in the last 4 weeks? No   How does patient/caregiver feel medication is working? Good   Financial problems or insurance changes? No   How many doses of your specialty medications were missed in the last 4 weeks? 0   Would patient like to speak to a pharmacist? No   When does the patient need to receive the medication? 02/16/22   Refill Delivery Questions    How will the patient receive the medication?    When does the patient need to receive the medication? 02/16/22   Shipping Address Home   Address in [facility] confirmed and updated if neccessary? Yes   Expected Copay ($) 0   Is the patient able to afford the medication copay? Yes   Payment Method zero copay   Days supply of Refill 30   Supplies needed? No supplies needed   Refill activity completed? Yes   Refill activity plan Refill scheduled   Shipment/Pickup Date: 02/16/22          Current Outpatient Medications   Medication Sig    allopurinoL (ZYLOPRIM) 300 MG tablet TAKE 1 & 1/2 (ONE & ONE-HALF) TABLETS BY MOUTH ONCE DAILY    atorvastatin (LIPITOR) 10 MG tablet Take 1 tablet (10 mg total) by mouth once daily.    calcipotriene (DOVONOX) 0.005 % cream APPLY  CREAM TOPICALLY TO AFFECTED AREA TWICE DAILY     carvediloL (COREG) 25 MG tablet Take 1 tablet (25 mg total) by mouth 2 (two) times daily with meals.    clobetasoL (TEMOVATE) 0.05 % external solution APPLY  SOLUTION TOPICALLY TO SCALP  AS NEEDED FOR ITCHING OR SCALING.    colchicine (MITIGARE) 0.6 mg Cap TAKE 1 CAPSULE BY MOUTH TWICE DAILY TO PREVENT GOUT FLARES    COLCRYS 0.6 mg tablet TAKE ONE TABLET BY MOUTH TWICE DAILY    furosemide (LASIX) 40 MG tablet Take 1 tablet (40 mg total) by mouth 2 (two) times daily.    halobetasol (ULTRAVATE) 0.05 % ointment APPLY  OINTMENT TOPICALLY TO AFFECTED AREA TWICE DAILY    lisinopriL (PRINIVIL,ZESTRIL) 20 MG tablet Take 1 tablet (20 mg total) by mouth once daily.    methylPREDNISolone (MEDROL DOSEPACK) 4 mg tablet follow package directions    OTEZLA 30 mg Tab Take 1 tablet by mouth twice daily.    potassium chloride SA (K-DUR,KLOR-CON) 20 MEQ tablet Take 1 tablet by mouth once daily    risankizumab-rzaa (SKYRIZI) 150 mg/mL Syrg Inject 1 syringe (150 mg) into the skin every 12 weeks.    triamcinolone acetonide 0.1% (KENALOG) 0.1 % ointment APPLY  OINTMENT TOPICALLY TO AFFECTED AREA TWICE DAILY   Last reviewed on 11/30/2021  9:13 AM by Daryl Son MD    Review of patient's allergies indicates:  No Known Allergies Last reviewed on  11/30/2021 10:30 AM by Deyanira Potter      Tasks added this encounter   3/11/2022 - Refill Call (Auto Added)  2/14/2022 -  Setup Confirmation   Tasks due within next 3 months   1/22/2022 - Refill Call (Auto Added)     Haritha Kelly-José Miguel Lopez [facility]  [address] [place] [address]  Phone: [phone]  Fax: [phone]

## 2022-02-17 ENCOUNTER — TELEPHONE (OUTPATIENT)
Dept: PHARMACY | Facility: CLINIC | Age: 47
End: 2022-02-17
Payer: MEDICAID

## 2022-02-17 NOTE — TELEPHONE ENCOUNTER
Otezla 30mg Prior Authorization APPROVED through 1/30/2023.    Lakshmi Steve, PharmD  Clinical Pharmacist  [facility]   [phone]

## 2022-02-24 ENCOUNTER — LAB VISIT (OUTPATIENT)
Dept: LAB | Facility: HOSPITAL | Age: 47
End: 2022-02-24
Payer: MEDICAID

## 2022-02-24 ENCOUNTER — OFFICE VISIT (OUTPATIENT)
Dept: DERMATOLOGY | Facility: CLINIC | Age: 47
End: 2022-02-24
Payer: MEDICAID

## 2022-02-24 DIAGNOSIS — Z79.899 ENCOUNTER FOR LONG-TERM (CURRENT) USE OF HIGH-RISK MEDICATION: ICD-10-CM

## 2022-02-24 DIAGNOSIS — L40.50 PSORIATIC ARTHRITIS: ICD-10-CM

## 2022-02-24 DIAGNOSIS — L40.0 PSORIASIS VULGARIS: Primary | ICD-10-CM

## 2022-02-24 PROCEDURE — 11900 INJECT SKIN LESIONS </W 7: CPT | Mod: PBBFAC | Performed by: PATHOLOGY

## 2022-02-24 PROCEDURE — 11900 PR INJECTION INTO SKIN LESIONS, UP TO 7: ICD-10-PCS | Mod: S$PBB,,, | Performed by: PATHOLOGY

## 2022-02-24 PROCEDURE — 99999 PR PBB SHADOW E&M-EST. PATIENT-LVL III: CPT | Mod: PBBFAC,,, | Performed by: PATHOLOGY

## 2022-02-24 PROCEDURE — 36415 COLL VENOUS BLD VENIPUNCTURE: CPT | Performed by: PATHOLOGY

## 2022-02-24 PROCEDURE — 4010F ACE/ARB THERAPY RXD/TAKEN: CPT | Mod: CPTII,,, | Performed by: PATHOLOGY

## 2022-02-24 PROCEDURE — 1160F PR REVIEW ALL MEDS BY PRESCRIBER/CLIN PHARMACIST DOCUMENTED: ICD-10-PCS | Mod: CPTII,,, | Performed by: PATHOLOGY

## 2022-02-24 PROCEDURE — 86480 TB TEST CELL IMMUN MEASURE: CPT | Performed by: PATHOLOGY

## 2022-02-24 PROCEDURE — 99214 PR OFFICE/OUTPT VISIT, EST, LEVL IV, 30-39 MIN: ICD-10-PCS | Mod: 25,S$PBB,, | Performed by: PATHOLOGY

## 2022-02-24 PROCEDURE — 99214 OFFICE O/P EST MOD 30 MIN: CPT | Mod: 25,S$PBB,, | Performed by: PATHOLOGY

## 2022-02-24 PROCEDURE — 99999 PR PBB SHADOW E&M-EST. PATIENT-LVL III: ICD-10-PCS | Mod: PBBFAC,,, | Performed by: PATHOLOGY

## 2022-02-24 PROCEDURE — 1159F PR MEDICATION LIST DOCUMENTED IN MEDICAL RECORD: ICD-10-PCS | Mod: CPTII,,, | Performed by: PATHOLOGY

## 2022-02-24 PROCEDURE — 4010F PR ACE/ARB THEARPY RXD/TAKEN: ICD-10-PCS | Mod: CPTII,,, | Performed by: PATHOLOGY

## 2022-02-24 PROCEDURE — 99213 OFFICE O/P EST LOW 20 MIN: CPT | Mod: PBBFAC | Performed by: PATHOLOGY

## 2022-02-24 PROCEDURE — 1160F RVW MEDS BY RX/DR IN RCRD: CPT | Mod: CPTII,,, | Performed by: PATHOLOGY

## 2022-02-24 PROCEDURE — 1159F MED LIST DOCD IN RCRD: CPT | Mod: CPTII,,, | Performed by: PATHOLOGY

## 2022-02-24 PROCEDURE — 11900 INJECT SKIN LESIONS </W 7: CPT | Mod: S$PBB,,, | Performed by: PATHOLOGY

## 2022-02-24 NOTE — PROGRESS NOTES
Subjective:       Patient ID:  Adrien Panda is a 46 y.o. male who presents for   Chief Complaint   Patient presents with    Psoriasis     Psoriasis    Pt well known to me for psoriasis/ psoriatic arthritis - on skyrizi, otezla.  LV 11/30/21 - skyrizi injection.   Plaques on legs somewhat improved, but intermittently flare in 2-3 weeks leading up to next injection.  Got worse after Covid vaccine booster 10/31/21.  Arthritis and joint swelling in left 2nd digit of the hand and right 2nd digit of the foot since August and noted upon rheum f/u visit 11/10/21 - suspected psoriatic arthritis and not gout related - uric acid WNL.  On allopurinol 450 mg daily and colchicine prn flares.  These joints have remained swollen with impaired mobility.   Using topicals - clobetasol, lidex, dovonex.  Started 10/23/19.  Re-started Otezla about the same time.     Taltz initially worked best of all of the other systemic agents he has tried, but legs flared Fall 2018 with progressive new scaly plaques moving proximal on bilateral legs to thighs.  Most recently, was on Ilumya, and prior to that MTX and Tremfya, which were ineffective and discontinued.     Started Taltz 1/2018. States he was clear when he first started it, but psoriasis recurred on his legs. No infections.       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 Rubin at [facility], cutaneous and joint gout tx by Dr. Hermosillo (psoriasis flared on colchicine), stroke      PSORIASIS: failed tx with Enbrel, Humira, Soriatane, Taltz, Ilumya, tremfya, otezla, clobetasol topical, betamethasone topical, Lidex topical, elocon topical.    ILK helps temporarily. ultravate works best out of all the topicals he's tried. psorcon 0.05% oint helps.    On Humira from 6/2015 - 4/2016 until he stopped responding to it. Switched back to enbrel at that point which only helped temporarily.    Added Soriatane to Enbrel but ended up with a bad flare on lower  legs.  Not a good candidate for NBUVB as he lives on the [facility] and there is not light unit in this area.   Stelara 9/21/16 - 7/19/17 (stopped responding to it)  Taltz 1/2018 - 11/2018 (stopped responding to it)  Tremfya 11/2018 - 4/2019 - never achieved significant clearance  Ilumya 4/2019 - 8/ 2019 - no improvement and progression  Currently - Skyrizi (started Oct. 2019) and Otezla     TB gold: negative 02/23/2021 - repeat today     CBC, LFTs reviewed and WNL August 2021    Review of Systems   Constitutional: Negative for fever, chills, weight loss, weight gain, fatigue, night sweats and malaise.   Skin: Positive for activity-related sunscreen use. Negative for daily sunscreen use and recent sunburn.   Hematologic/Lymphatic: Does not bruise/bleed easily.        Objective:    Physical Exam   Constitutional: He appears well-developed and well-nourished.   Neurological: He is alert.   Skin:   Areas Examined (abnormalities noted in diagram):   Scalp / Hair Palpated and Inspected  Head / Face Inspection Performed  Chest / Axilla Inspection Performed  Abdomen Inspection Performed  Back Inspection Performed  RUE Inspected  LUE Inspection Performed  RLE Inspected  LLE Inspection Performed                  Diagram Legend     Erythematous scaling macule/papule c/w actinic keratosis       Vascular papule c/w angioma      Pigmented verrucoid papule/plaque c/w seborrheic keratosis      Yellow umbilicated papule c/w sebaceous hyperplasia      Irregularly shaped tan macule c/w lentigo     1-2 mm smooth white papules consistent with Milia      Movable subcutaneous cyst with punctum c/w epidermal inclusion cyst      Subcutaneous movable cyst c/w pilar cyst      Firm pink to brown papule c/w dermatofibroma      Pedunculated fleshy papule(s) c/w skin tag(s)      Evenly pigmented macule c/w junctional nevus     Mildly variegated pigmented, slightly irregular-bordered macule c/w mildly atypical nevus      Flesh colored to evenly  pigmented papule c/w intradermal nevus       Pink pearly papule/plaque c/w basal cell carcinoma      Erythematous hyperkeratotic cursted plaque c/w SCC      Surgical scar with no sign of skin cancer recurrence      Open and closed comedones      Inflammatory papules and pustules      Verrucoid papule consistent consistent with wart     Erythematous eczematous patches and plaques     Dystrophic onycholytic nail with subungual debris c/w onychomycosis     Umbilicated papule    Erythematous-base heme-crusted tan verrucoid plaque consistent with inflamed seborrheic keratosis     Erythematous Silvery Scaling Plaque c/w Psoriasis     See annotation      Assessment / Plan:        Psoriasis vulgaris - fairly well controlled with thinresidual plaques to bilateral shins, calves > elbows.  + psoriatic arthritis, as well - left index finger and right 2nd toe.  Continue Skyrizi - injection today by LPN without complications.  Continue Otezla.  Continue topical steroids and dovonex as needed.  Skyrizi - 150 mg into the skin every 12 weeks.  Keep f/u appt with rheum - consider resuming sulfasalzine for persistent arthritis    Intralesional Kenalog 10 mg/cc (3 cc total) injected into 5 lesions on the bilateral lower legs today after obtaining verbal consent including risk of surrounding hypopigmentation. Patient tolerated procedure well.    Units: 3  NDC for Kenalog 10mg/cc:  3250-6528-47      Psoriatic arthritis - see above    Encounter for long-term (current) use of high-risk medication - CBC, CMP, quant gold today             No follow-ups on file.

## 2022-02-26 LAB
GAMMA INTERFERON BACKGROUND BLD IA-ACNC: 0.02 IU/ML
M TB IFN-G CD4+ BCKGRND COR BLD-ACNC: 0.03 IU/ML
M TB IFN-G CD4+CD8+ BCKGRND COR BLD-ACNC: 0.04 IU/ML
MITOGEN IGNF BCKGRD COR BLD-ACNC: >10 IU/ML
TB GOLD PLUS: NEGATIVE

## 2022-02-28 NOTE — PROGRESS NOTES
After obtaining consent, and per orders of Dr. Son, injection of Skyrizi 150mg/mL given subcutaneously in the left posterior arm by Marii Rosales LPN. Patient instructed to remain in clinic for 20 minutes afterwards, and to report any adverse reaction to me immediately. NDC 2265-6104-84 Lot [id number] exp jan2023 PATIENT SUPPLIED.

## 2022-03-10 ENCOUNTER — SPECIALTY PHARMACY (OUTPATIENT)
Dept: PHARMACY | Facility: CLINIC | Age: 47
End: 2022-03-10
Payer: MEDICAID

## 2022-03-10 NOTE — TELEPHONE ENCOUNTER
Specialty Pharmacy - Refill Coordination    Specialty Medication Orders Linked to Encounter    Flowsheet Row Most Recent Value   Medication #1 OTEZLA 30 mg Tab (Order#[id number], Rx#[id number])          Refill Questions - Documented Responses    Flowsheet Row Most Recent Value   Patient Availability and HIPAA Verification    Does patient want to proceed with activity? Yes   HIPAA/medical authority confirmed? Yes   Relationship to patient of person spoken to? Self   Refill Screening Questions    Changes to allergies? No   Changes to medications? No   New conditions since last clinic visit? No   Unplanned office visit, urgent care, ED, or hospital admission in the last 4 weeks? No   How does patient/caregiver feel medication is working? Good   Financial problems or insurance changes? No   How many doses of your specialty medications were missed in the last 4 weeks? 0   Would patient like to speak to a pharmacist? No   When does the patient need to receive the medication? 03/16/22   Refill Delivery Questions    How will the patient receive the medication? Delivery [place]   When does the patient need to receive the medication? 03/16/22   Shipping Address Home   Address in [facility] confirmed and updated if neccessary? Yes   Expected Copay ($) 0   Is the patient able to afford the medication copay? Yes   Payment Method zero copay   Days supply of Refill 30   Supplies needed? No supplies needed   Refill activity completed? Yes   Refill activity plan Refill scheduled   Shipment/Pickup Date: 03/11/22          Current Outpatient Medications   Medication Sig    allopurinoL (ZYLOPRIM) 300 MG tablet TAKE 1 & 1/2 (ONE & ONE-HALF) TABLETS BY MOUTH ONCE DAILY    atorvastatin (LIPITOR) 10 MG tablet Take 1 tablet (10 mg total) by mouth once daily.    calcipotriene (DOVONOX) 0.005 % cream APPLY  CREAM TOPICALLY TO AFFECTED AREA TWICE DAILY    carvediloL (COREG) 25 MG tablet Take 1 tablet (25 mg total) by mouth 2 (two) times  daily with meals.    clobetasoL (TEMOVATE) 0.05 % external solution APPLY  SOLUTION TOPICALLY TO SCALP  AS NEEDED FOR ITCHING OR SCALING.    colchicine (MITIGARE) 0.6 mg Cap TAKE 1 CAPSULE BY MOUTH TWICE DAILY TO PREVENT GOUT FLARES    COLCRYS 0.6 mg tablet TAKE ONE TABLET BY MOUTH TWICE DAILY    furosemide (LASIX) 40 MG tablet Take 1 tablet (40 mg total) by mouth 2 (two) times daily.    halobetasol (ULTRAVATE) 0.05 % ointment APPLY  OINTMENT TOPICALLY TO AFFECTED AREA TWICE DAILY    lisinopriL (PRINIVIL,ZESTRIL) 20 MG tablet Take 1 tablet (20 mg total) by mouth once daily.    methylPREDNISolone (MEDROL DOSEPACK) 4 mg tablet follow package directions    OTEZLA 30 mg Tab Take 1 tablet by mouth twice daily. (Patient taking differently: Take 1 tablet by mouth twice daily.)    potassium chloride SA (K-DUR,KLOR-CON) 20 MEQ tablet Take 1 tablet by mouth once daily    risankizumab-rzaa (SKYRIZI) 150 mg/mL Syrg Inject 1 syringe (150 mg) into the skin every 12 weeks.    triamcinolone acetonide 0.1% (KENALOG) 0.1 % ointment APPLY  OINTMENT TOPICALLY TO AFFECTED AREA TWICE DAILY   Last reviewed on 2/24/2022 10:27 AM by Daryl Son MD    Review of patient's allergies indicates:  No Known Allergies Last reviewed on  2/28/2022 10:20 AM by Marii Rosales      Tasks added this encounter   4/4/2022 - Refill Call (Auto Added)  4/8/2022 - Refill Call (Auto Added)   Tasks due within next 3 months   5/4/2022 - Refill Call (Auto Added)     Haritha Lopez [facility]  [address] [place] [address]  Phone: [phone]  Fax: [phone]

## 2022-03-23 DIAGNOSIS — L40.9 PSORIASIS: ICD-10-CM

## 2022-03-23 DIAGNOSIS — L40.0 PSORIASIS VULGARIS: ICD-10-CM

## 2022-03-23 RX ORDER — TRIAMCINOLONE ACETONIDE 1 MG/G
OINTMENT TOPICAL
Qty: 454 G | Refills: 0 | Status: SHIPPED | OUTPATIENT
Start: 2022-03-23 | End: 2022-06-01

## 2022-03-23 RX ORDER — HALOBETASOL PROPIONATE 0.5 MG/G
OINTMENT TOPICAL
Qty: 100 G | Refills: 0 | Status: SHIPPED | OUTPATIENT
Start: 2022-03-23 | End: 2022-06-01

## 2022-03-29 DIAGNOSIS — I42.9 CARDIOMYOPATHY, IDIOPATHIC: ICD-10-CM

## 2022-03-29 NOTE — TELEPHONE ENCOUNTER
Care Due:                  Date            Visit Type   Department     Provider  --------------------------------------------------------------------------------                                EP -                              PRIMARY      [facility] INTERNAL  Last Visit: 04-      [facility] ([facility]   MEDICINE       DARIELA VILLAVICENCIO  Next Visit: None Scheduled  None         None Found                                                            Last  Test          Frequency    Reason                     Performed    Due Date  --------------------------------------------------------------------------------    Office Visit  12 months..  atorvastatin.............  04- 04-    Lipid Panel.  12 months..  atorvastatin.............  04- 04-    Powered by [employer]. Reference number: [id number].   3/29/2022 9:55:01 AM CDT

## 2022-03-30 RX ORDER — POTASSIUM CHLORIDE 20 MEQ/1
TABLET, EXTENDED RELEASE ORAL
Qty: 90 TABLET | Refills: 0 | Status: SHIPPED | OUTPATIENT
Start: 2022-03-30 | End: 2022-06-30

## 2022-04-04 ENCOUNTER — PATIENT MESSAGE (OUTPATIENT)
Dept: PHARMACY | Facility: CLINIC | Age: 47
End: 2022-04-04
Payer: MEDICAID

## 2022-04-04 DIAGNOSIS — L40.9 PSORIASIS: ICD-10-CM

## 2022-04-04 RX ORDER — APREMILAST 30 MG/1
TABLET, FILM COATED ORAL
Qty: 60 TABLET | Refills: 2 | Status: SHIPPED | OUTPATIENT
Start: 2022-04-04 | End: 2022-07-22 | Stop reason: SDUPTHER

## 2022-04-06 ENCOUNTER — SPECIALTY PHARMACY (OUTPATIENT)
Dept: PHARMACY | Facility: CLINIC | Age: 47
End: 2022-04-06
Payer: MEDICAID

## 2022-04-06 NOTE — TELEPHONE ENCOUNTER
Incoming call from pt inquiring about Otezla refill. New rx was needed and received     Test claim shows that a PA is required    Seems that a PA recently completed and approved through 1/30/23. Confirmed with pt no change of insurance and he still has medicaid with [employer]    Pt does have about 2 weeks supply left of medication. Assigned rph no longer at [facility]. Will route GI/Derm team for follow-up

## 2022-04-18 ENCOUNTER — PATIENT MESSAGE (OUTPATIENT)
Dept: ADMINISTRATIVE | Facility: OTHER | Age: 47
End: 2022-04-18
Payer: MEDICAID

## 2022-04-18 NOTE — TELEPHONE ENCOUNTER
Called patient's insurance ([phone]) and spoke with rep, Arlen VAUGHN who states that there is no active PA on file even though prior [facility] documentation indicates a reauth was done in January and a PA should be on file through Jan 2023. Arlen offered to complete a new PA over the phone -- answers to clinical questions were provided over the phone to rep for urgent review; anticipated response within 24 hours. Ref#: [id number]

## 2022-04-25 NOTE — TELEPHONE ENCOUNTER
Specialty Pharmacy - Refill Coordination    Specialty Medication Orders Linked to Encounter    Flowsheet Row Most Recent Value   Medication #1 OTEZLA 30 mg Tab (Order#[id number], Rx#)          Refill Questions - Documented Responses    Flowsheet Row Most Recent Value   Patient Availability and HIPAA Verification    Does patient want to proceed with activity? Yes   HIPAA/medical authority confirmed? Yes   Relationship to patient of person spoken to? Self   Refill Screening Questions    Changes to allergies? No   Changes to medications? No   New conditions since last clinic visit? No   Unplanned office visit, urgent care, ED, or hospital admission in the last 4 weeks? No   How does patient/caregiver feel medication is working? Good   Financial problems or insurance changes? No   How many doses of your specialty medications were missed in the last 4 weeks? 0   Would patient like to speak to a pharmacist? No   When does the patient need to receive the medication? 04/27/22   Refill Delivery Questions    How will the patient receive the medication? Delivery Ching   When does the patient need to receive the medication? 04/27/22   Shipping Address Home   Address in [facility] confirmed and updated if neccessary? Yes   Expected Copay ($) 0   Is the patient able to afford the medication copay? Yes   Payment Method zero copay   Days supply of Refill 30   Supplies needed? No supplies needed   Refill activity completed? Yes   Refill activity plan Refill scheduled   Shipment/Pickup Date: 04/26/22          Current Outpatient Medications   Medication Sig    allopurinoL (ZYLOPRIM) 300 MG tablet TAKE 1 & 1/2 (ONE & ONE-HALF) TABLETS BY MOUTH ONCE DAILY    atorvastatin (LIPITOR) 10 MG tablet Take 1 tablet (10 mg total) by mouth once daily.    calcipotriene (DOVONOX) 0.005 % cream APPLY  CREAM TOPICALLY TO AFFECTED AREA TWICE DAILY    carvediloL (COREG) 25 MG tablet Take 1 tablet (25 mg total) by mouth 2 (two) times daily with  meals.    clobetasoL (TEMOVATE) 0.05 % external solution APPLY  SOLUTION TOPICALLY TO SCALP  AS NEEDED FOR ITCHING OR SCALING.    colchicine (MITIGARE) 0.6 mg Cap TAKE 1 CAPSULE BY MOUTH TWICE DAILY TO PREVENT GOUT FLARES    COLCRYS 0.6 mg tablet TAKE ONE TABLET BY MOUTH TWICE DAILY    furosemide (LASIX) 40 MG tablet Take 1 tablet (40 mg total) by mouth 2 (two) times daily.    halobetasol (ULTRAVATE) 0.05 % ointment Apply to affected areas BID    lisinopriL (PRINIVIL,ZESTRIL) 20 MG tablet Take 1 tablet (20 mg total) by mouth once daily.    methylPREDNISolone (MEDROL DOSEPACK) 4 mg tablet follow package directions    OTEZLA 30 mg Tab Take 1 tablet by mouth twice daily.    potassium chloride SA (K-DUR,KLOR-CON) 20 MEQ tablet TAKE 1  BY MOUTH ONCE DAILY    risankizumab-rzaa (SKYRIZI) 150 mg/mL Syrg Inject 1 syringe (150 mg) into the skin every 12 weeks.    triamcinolone acetonide 0.1% (KENALOG) 0.1 % ointment Apply to affected areas BID   Last reviewed on 2/24/2022 10:27 AM by Daryl Son MD    Review of patient's allergies indicates:  No Known Allergies Last reviewed on  2/28/2022 10:20 AM by Marii Rosales      Tasks added this encounter   5/20/2022 - Refill Call (Auto Added)   Tasks due within next 3 months   5/4/2022 - Refill Call (Auto Added)     Haritha Kelly-[facility] Lopez [facility] - Specialty Pharmacy  [address] [place] [address]  Phone: [phone]  Fax: [phone]

## 2022-05-04 ENCOUNTER — SPECIALTY PHARMACY (OUTPATIENT)
Dept: PHARMACY | Facility: CLINIC | Age: 47
End: 2022-05-04
Payer: MEDICAID

## 2022-05-04 NOTE — TELEPHONE ENCOUNTER
Outgoing call to patient: the patient receives medication through provider, next appointment is 05/23/22 , will pend. The refill to 05/17/22

## 2022-05-17 ENCOUNTER — SPECIALTY PHARMACY (OUTPATIENT)
Dept: PHARMACY | Facility: CLINIC | Age: 47
End: 2022-05-17
Payer: MEDICAID

## 2022-05-17 NOTE — TELEPHONE ENCOUNTER
Outgoing call to patient: no answer, calling to initiate Skyrizi refill so it can be sent to Dr. Son's office.  The patient's injection appointment is scheduled for 5/23/22.

## 2022-05-17 NOTE — TELEPHONE ENCOUNTER
Specialty Pharmacy - Refill Coordination    Specialty Medication Orders Linked to Encounter    Flowsheet Row Most Recent Value   Medication #1 risankizumab-rzaa (SKYRIZI) 150 mg/mL Syrg (Order#[id number], Rx#[id number])          Refill Questions - Documented Responses    Flowsheet Row Most Recent Value   Patient Availability and HIPAA Verification    Does patient want to proceed with activity? Yes   HIPAA/medical authority confirmed? Yes   Relationship to patient of person spoken to? Self   Refill Screening Questions    Changes to allergies? No   Changes to medications? No   New conditions since last clinic visit? No   Unplanned office visit, urgent care, ED, or hospital admission in the last 4 weeks? No   How does patient/caregiver feel medication is working? Good   Financial problems or insurance changes? No   How many doses of your specialty medications were missed in the last 4 weeks? 0   Would patient like to speak to a pharmacist? No   When does the patient need to receive the medication? 05/23/22   Refill Delivery Questions    How will the patient receive the medication?    When does the patient need to receive the medication? 05/23/22   Shipping Address Prescription   Address in [facility] confirmed and updated if neccessary? Yes   Expected Copay ($) 0   Is the patient able to afford the medication copay? Yes   Payment Method zero copay   Days supply of Refill 84   Supplies needed? No supplies needed   Refill activity completed? Yes   Refill activity plan Refill scheduled   Shipment/Pickup Date: 05/18/22          Current Outpatient Medications   Medication Sig    allopurinoL (ZYLOPRIM) 300 MG tablet TAKE 1 & 1/2 (ONE & ONE-HALF) TABLETS BY MOUTH ONCE DAILY    atorvastatin (LIPITOR) 10 MG tablet Take 1 tablet (10 mg total) by mouth once daily.    calcipotriene (DOVONOX) 0.005 % cream APPLY  CREAM TOPICALLY TO AFFECTED AREA TWICE DAILY    carvediloL (COREG) 25 MG tablet Take 1 tablet (25 mg  total) by mouth 2 (two) times daily with meals.    clobetasoL (TEMOVATE) 0.05 % external solution APPLY  SOLUTION TOPICALLY TO SCALP  AS NEEDED FOR ITCHING OR SCALING.    colchicine (MITIGARE) 0.6 mg Cap TAKE 1 CAPSULE BY MOUTH TWICE DAILY TO PREVENT GOUT FLARES    COLCRYS 0.6 mg tablet TAKE ONE TABLET BY MOUTH TWICE DAILY    furosemide (LASIX) 40 MG tablet Take 1 tablet (40 mg total) by mouth 2 (two) times daily.    halobetasol (ULTRAVATE) 0.05 % ointment Apply to affected areas BID    lisinopriL (PRINIVIL,ZESTRIL) 20 MG tablet Take 1 tablet (20 mg total) by mouth once daily.    methylPREDNISolone (MEDROL DOSEPACK) 4 mg tablet follow package directions    OTEZLA 30 mg Tab Take 1 tablet by mouth twice daily.    potassium chloride SA (K-DUR,KLOR-CON) 20 MEQ tablet TAKE 1  BY MOUTH ONCE DAILY    risankizumab-rzaa (SKYRIZI) 150 mg/mL Syrg Inject 1 syringe (150 mg) into the skin every 12 weeks.    triamcinolone acetonide 0.1% (KENALOG) 0.1 % ointment Apply to affected areas BID   Last reviewed on 2/24/2022 10:27 AM by Daryl Son MD    Review of patient's allergies indicates:  No Known Allergies Last reviewed on  2/28/2022 10:20 AM by Marii Rosales      Tasks added this encounter   6/11/2022 - Refill Call (Auto Added)  8/8/2022 - Refill Call (Auto Added)  5/17/2022 -  Setup Confirmation   Tasks due within next 3 months   5/20/2022 - Refill Call (Auto Added)     Dione Padua Esguerra [facility] - Specialty Pharmacy  [address] [facility] [address]  Phone: [phone]  Fax: [phone]

## 2022-05-20 ENCOUNTER — SPECIALTY PHARMACY (OUTPATIENT)
Dept: PHARMACY | Facility: CLINIC | Age: 47
End: 2022-05-20
Payer: MEDICAID

## 2022-05-20 NOTE — TELEPHONE ENCOUNTER
Specialty Pharmacy - Refill Coordination    Specialty Medication Orders Linked to Encounter    Flowsheet Row Most Recent Value   Medication #1 OTEZLA 30 mg Tab (Order#[id number], Rx#[id number])          Refill Questions - Documented Responses    Flowsheet Row Most Recent Value   Patient Availability and HIPAA Verification    Does patient want to proceed with activity? Yes   HIPAA/medical authority confirmed? Yes   Relationship to patient of person spoken to? Self   Refill Screening Questions    Changes to allergies? No   Changes to medications? No   New conditions since last clinic visit? No   Unplanned office visit, urgent care, ED, or hospital admission in the last 4 weeks? No   How does patient/caregiver feel medication is working? Good   Financial problems or insurance changes? No   How many doses of your specialty medications were missed in the last 4 weeks? 0   Would patient like to speak to a pharmacist? No   When does the patient need to receive the medication? 05/25/22   Refill Delivery Questions    How will the patient receive the medication? Delivery Ching   When does the patient need to receive the medication? 05/25/22   Shipping Address Home   Address in [facility] confirmed and updated if neccessary? Yes   Expected Copay ($) 0   Is the patient able to afford the medication copay? Yes   Payment Method zero copay   Days supply of Refill 30   Supplies needed? No supplies needed   Refill activity completed? Yes   Refill activity plan Refill scheduled   Shipment/Pickup Date: 05/24/22          Current Outpatient Medications   Medication Sig    allopurinoL (ZYLOPRIM) 300 MG tablet TAKE 1 & 1/2 (ONE & ONE-HALF) TABLETS BY MOUTH ONCE DAILY    atorvastatin (LIPITOR) 10 MG tablet Take 1 tablet (10 mg total) by mouth once daily.    calcipotriene (DOVONOX) 0.005 % cream APPLY  CREAM TOPICALLY TO AFFECTED AREA TWICE DAILY    carvediloL (COREG) 25 MG tablet Take 1 tablet (25 mg total) by mouth 2 (two) times  daily with meals.    clobetasoL (TEMOVATE) 0.05 % external solution APPLY  SOLUTION TOPICALLY TO SCALP  AS NEEDED FOR ITCHING OR SCALING.    colchicine (MITIGARE) 0.6 mg Cap TAKE 1 CAPSULE BY MOUTH TWICE DAILY TO PREVENT GOUT FLARES    COLCRYS 0.6 mg tablet TAKE ONE TABLET BY MOUTH TWICE DAILY    furosemide (LASIX) 40 MG tablet Take 1 tablet (40 mg total) by mouth 2 (two) times daily.    halobetasol (ULTRAVATE) 0.05 % ointment Apply to affected areas BID    lisinopriL (PRINIVIL,ZESTRIL) 20 MG tablet Take 1 tablet (20 mg total) by mouth once daily.    methylPREDNISolone (MEDROL DOSEPACK) 4 mg tablet follow package directions    OTEZLA 30 mg Tab Take 1 tablet by mouth twice daily.    potassium chloride SA (K-DUR,KLOR-CON) 20 MEQ tablet TAKE 1  BY MOUTH ONCE DAILY    risankizumab-rzaa (SKYRIZI) 150 mg/mL Syrg Inject 1 syringe (150 mg) into the skin every 12 weeks.    triamcinolone acetonide 0.1% (KENALOG) 0.1 % ointment Apply to affected areas BID   Last reviewed on 2/24/2022 10:27 AM by Daryl Son MD    Review of patient's allergies indicates:  No Known Allergies Last reviewed on  2/28/2022 10:20 AM by Marii Rosales      Tasks added this encounter   No tasks added.   Tasks due within next 3 months   6/14/2022 - Refill Call (Auto Added)  8/16/2022 - Refill Call (Auto Added)     Evon Jaimes  [facility] - Specialty Pharmacy  [address] [place] [address]  Phone: [phone]  Fax: [phone]

## 2022-05-23 ENCOUNTER — OFFICE VISIT (OUTPATIENT)
Dept: DERMATOLOGY | Facility: CLINIC | Age: 47
End: 2022-05-23
Payer: MEDICAID

## 2022-05-23 DIAGNOSIS — L40.9 PSORIASIS: ICD-10-CM

## 2022-05-23 DIAGNOSIS — L73.9 FOLLICULITIS: Primary | ICD-10-CM

## 2022-05-23 DIAGNOSIS — Z79.899 ENCOUNTER FOR LONG-TERM (CURRENT) USE OF HIGH-RISK MEDICATION: ICD-10-CM

## 2022-05-23 PROCEDURE — 4010F ACE/ARB THERAPY RXD/TAKEN: CPT | Mod: CPTII,,, | Performed by: PATHOLOGY

## 2022-05-23 PROCEDURE — 99214 OFFICE O/P EST MOD 30 MIN: CPT | Mod: S$PBB,,, | Performed by: PATHOLOGY

## 2022-05-23 PROCEDURE — 4010F PR ACE/ARB THEARPY RXD/TAKEN: ICD-10-PCS | Mod: CPTII,,, | Performed by: PATHOLOGY

## 2022-05-23 PROCEDURE — 1159F MED LIST DOCD IN RCRD: CPT | Mod: CPTII,,, | Performed by: PATHOLOGY

## 2022-05-23 PROCEDURE — 99213 OFFICE O/P EST LOW 20 MIN: CPT | Mod: PBBFAC | Performed by: PATHOLOGY

## 2022-05-23 PROCEDURE — 99999 PR PBB SHADOW E&M-EST. PATIENT-LVL III: CPT | Mod: PBBFAC,,, | Performed by: PATHOLOGY

## 2022-05-23 PROCEDURE — 99214 PR OFFICE/OUTPT VISIT, EST, LEVL IV, 30-39 MIN: ICD-10-PCS | Mod: S$PBB,,, | Performed by: PATHOLOGY

## 2022-05-23 PROCEDURE — 1159F PR MEDICATION LIST DOCUMENTED IN MEDICAL RECORD: ICD-10-PCS | Mod: CPTII,,, | Performed by: PATHOLOGY

## 2022-05-23 PROCEDURE — 99999 PR PBB SHADOW E&M-EST. PATIENT-LVL III: ICD-10-PCS | Mod: PBBFAC,,, | Performed by: PATHOLOGY

## 2022-05-23 RX ORDER — DOXYCYCLINE HYCLATE 100 MG
100 TABLET ORAL DAILY
Qty: 30 TABLET | Refills: 3 | Status: SHIPPED | OUTPATIENT
Start: 2022-05-23 | End: 2022-11-17

## 2022-05-23 RX ORDER — HYDROXYZINE HYDROCHLORIDE 25 MG/1
25 TABLET, FILM COATED ORAL NIGHTLY
Qty: 30 TABLET | Refills: 0 | Status: SHIPPED | OUTPATIENT
Start: 2022-05-23 | End: 2022-06-22

## 2022-05-23 NOTE — PROGRESS NOTES
After obtaining consent, and per orders of Dr. Son, injection of Skyrizi 150mg/mL given subcutaneously in R posterior arm by Deyanira Pat LPN. Patient instructed to remain in clinic for 20 minutes afterwards, and to report any adverse reaction to me immediately. PT SUPPLIED. NDC: 5761-3553-92, LOT: [id number], EXP: 07/2023

## 2022-05-23 NOTE — PROGRESS NOTES
Subjective:       Patient ID:  Adrien Panda is a 46 y.o. male who presents for   Chief Complaint   Patient presents with    Psoriasis     F/u     HPI   Pt well known to me for psoriasis/ psoriatic arthritis - on skyrizi, otezla.  LV 11/30/21 - skyrizi injection.   Plaques on legs somewhat improved, but intermittently flare in 2-3 weeks leading up to next injection.  Got worse after Covid vaccine booster 10/31/21.  Arthritis and joint swelling in left 2nd digit of the hand and right 2nd digit of the foot since August and noted upon rheum f/u visit 11/10/21 - suspected psoriatic arthritis and not gout related - uric acid WNL.  On allopurinol 450 mg daily and colchicine prn flares.  These joints have remained swollen with impaired mobility - some days better than others.   Using topicals - clobetasol, lidex, dovonex.  Started 10/23/19.  Re-started Otezla about the same time.     Taltz initially worked best of all of the other systemic agents he has tried, but legs flared Fall 2018 with progressive new scaly plaques moving proximal on bilateral legs to thighs.  Most recently, was on Ilumya, and prior to that MTX and Tremfya, which were ineffective and discontinued.     Started Taltz 1/2018. States he was clear when he first started it, but psoriasis recurred on his legs. No infections.       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 Rubin at [facility], cutaneous and joint gout tx by Dr. Hermosillo (psoriasis flared on colchicine), stroke      PSORIASIS: failed tx with Enbrel, Humira, Soriatane, Taltz, Ilumya, tremfya, otezla, clobetasol topical, betamethasone topical, Lidex topical, elocon topical.    ILK helps temporarily. ultravate works best out of all the topicals he's tried. psorcon 0.05% oint helps.    On Humira from 6/2015 - 4/2016 until he stopped responding to it. Switched back to enbrel at that point which only helped temporarily.    Added Soriatane to Enbrel but ended up  with a bad flare on lower legs.  Not a good candidate for NBUVB as [place] [facility] and [place] not light unit in this area.   Stelara 9/21/16 - 7/19/17 (stopped responding to it)  Taltz 1/2018 - 11/2018 (stopped responding to it)  Tremfya 11/2018 - 4/2019 - never achieved significant clearance  Ilumya 4/2019 - 8/ 2019 - no improvement and progression  Currently - Skyrizi (started Oct. 2019) and Otezla     TB gold: negative 02/24/2022 - negative     CBC, LFTs reviewed and WNL August 2021    Also gets intermittent follicular eruption with some surrounding eczematous changes.  Uses topical steroids with mild relief.  + associated pruritus    Review of Systems   Constitutional: Negative for weight loss and weight gain.   HENT: Negative for mouth sores (no tongue whiteness).    Respiratory: Negative for shortness of breath.    Gastrointestinal: Negative for nausea, vomiting, abdominal pain and diarrhea.   Musculoskeletal: Positive for joint swelling and arthralgias.   Skin: Positive for itching and rash (but no rashes/itching in folds).        Injection site reaction - no   yes   Psychiatric/Behavioral: Negative for depressed mood.        Objective:    Physical Exam   Constitutional: He appears well-developed and well-nourished.   Neurological: He is alert.   Skin:   Areas Examined (abnormalities noted in diagram):   Scalp / Hair Palpated and Inspected  Head / Face Inspection Performed  Chest / Axilla Inspection Performed  Abdomen Inspection Performed  Back Inspection Performed  RUE Inspected  LUE Inspection Performed  RLE Inspected  LLE Inspection Performed                  Diagram Legend     Erythematous scaling macule/papule c/w actinic keratosis       Vascular papule c/w angioma      Pigmented verrucoid papule/plaque c/w seborrheic keratosis      Yellow umbilicated papule c/w sebaceous hyperplasia      Irregularly shaped tan macule c/w lentigo     1-2 mm smooth white papules consistent with Milia       Movable subcutaneous cyst with punctum c/w epidermal inclusion cyst      Subcutaneous movable cyst c/w pilar cyst      Firm pink to brown papule c/w dermatofibroma      Pedunculated fleshy papule(s) c/w skin tag(s)      Evenly pigmented macule c/w junctional nevus     Mildly variegated pigmented, slightly irregular-bordered macule c/w mildly atypical nevus      Flesh colored to evenly pigmented papule c/w intradermal nevus       Pink pearly papule/plaque c/w basal cell carcinoma      Erythematous hyperkeratotic cursted plaque c/w SCC      Surgical scar with no sign of skin cancer recurrence      Open and closed comedones      Inflammatory papules and pustules      Verrucoid papule consistent consistent with wart     Erythematous eczematous patches and plaques     Dystrophic onycholytic nail with subungual debris c/w onychomycosis     Umbilicated papule    Erythematous-base heme-crusted tan verrucoid plaque consistent with inflamed seborrheic keratosis     Erythematous Silvery Scaling Plaque c/w Psoriasis     See annotation      Assessment / Plan:        Folliculitis - mild with some generalized eczematous changes; intermittent  -     doxycycline (VIBRA-TABS) 100 MG tablet; Take 1 tablet (100 mg total) by mouth once daily. Take with food and do not take within 30-60 minutes of lying down.  Dispense: 30 tablet; Refill: 3  -     hydrOXYzine HCL (ATARAX) 25 MG tablet; Take 1 tablet (25 mg total) by mouth every evening. Prn pruritus. Do not drive or operate machinery while taking this medicine.  Dispense: 30 tablet; Refill: 0  - topical steroids prn    Psoriasis -  fairly well controlled with thinresidual plaques to bilateral shins, calves > elbows.  + psoriatic arthritis, as well - left index finger and right 2nd toe.  Continue Skyrizi - injection today by LPN without complications.  Continue Otezla.  Continue topical steroids and dovonex as needed.      Encounter for long-term (current) use of high-risk medication -  quant gold negative 2/24/22.  Repeat CBC, LFTs prior to next visit             Follow up in about 3 months (around 8/23/2022).

## 2022-06-03 RX ORDER — LISINOPRIL 20 MG/1
TABLET ORAL
Qty: 90 TABLET | Refills: 0 | Status: SHIPPED | OUTPATIENT
Start: 2022-06-03 | End: 2022-08-18

## 2022-06-07 ENCOUNTER — TELEPHONE (OUTPATIENT)
Dept: CARDIOLOGY | Facility: CLINIC | Age: 47
End: 2022-06-07
Payer: MEDICAID

## 2022-06-07 NOTE — TELEPHONE ENCOUNTER
I informed pt that Dr. Cunningham has refilled his medications and would like to see him back in the clinic. I stated that he can call us back for an appt.

## 2022-06-11 ENCOUNTER — SPECIALTY PHARMACY (OUTPATIENT)
Dept: PHARMACY | Facility: CLINIC | Age: 47
End: 2022-06-11
Payer: MEDICAID

## 2022-06-11 NOTE — TELEPHONE ENCOUNTER
Specialty Pharmacy - Clinical Reassessment    Specialty Medication Orders Linked to Encounter    Flowsheet Row Most Recent Value   Medication #1 risankizumab-rzaa (SKYRIZI) 150 mg/mL Syrg (Order#[id number], Rx#[id number])   Medication #2 OTEZLA 30 mg Tab (Order#[id number], Rx#[id number])        Patient Diagnosis   L40.9 - Psoriasis    Specialty clinical pharmacist review completed for an annual review of reassessment. Reviewed the following areas: current med list, reports of adverse effects, adherence and progress towards therapeutic goals.    Recommendations: none at this time.   Patient has been on dual for several years. Currently on Otezla + Skyrizi (administered in clinic every 3 months). Patient is doing well, no complications or gaps in therapy. Reill calls for both medications pended appropriately.      Tasks added this encounter   3/11/2023 - Clinical - Follow Up Assesement (Annual)   Tasks due within next 3 months   6/14/2022 - Refill Call (Auto Added)  8/16/2022 - Refill Call (Auto Added)     Mily Perla, PharmD  [facility] - Specialty Pharmacy  [address] [facility] [address]  Phone: [phone]  Fax: [phone]

## 2022-06-14 ENCOUNTER — PATIENT MESSAGE (OUTPATIENT)
Dept: PHARMACY | Facility: CLINIC | Age: 47
End: 2022-06-14
Payer: MEDICAID

## 2022-06-14 ENCOUNTER — PATIENT MESSAGE (OUTPATIENT)
Dept: RHEUMATOLOGY | Facility: CLINIC | Age: 47
End: 2022-06-14
Payer: MEDICAID

## 2022-06-15 ENCOUNTER — TELEPHONE (OUTPATIENT)
Dept: RHEUMATOLOGY | Facility: CLINIC | Age: 47
End: 2022-06-15
Payer: MEDICAID

## 2022-06-15 ENCOUNTER — PATIENT MESSAGE (OUTPATIENT)
Dept: RHEUMATOLOGY | Facility: CLINIC | Age: 47
End: 2022-06-15

## 2022-06-15 ENCOUNTER — HOSPITAL ENCOUNTER (OUTPATIENT)
Dept: RADIOLOGY | Facility: HOSPITAL | Age: 47
Discharge: HOME | End: 2022-06-15
Attending: INTERNAL MEDICINE
Payer: MEDICAID

## 2022-06-15 ENCOUNTER — OFFICE VISIT (OUTPATIENT)
Dept: RHEUMATOLOGY | Facility: CLINIC | Age: 47
End: 2022-06-15
Payer: MEDICAID

## 2022-06-15 VITALS
BODY MASS INDEX: 30.37 KG/M2 | DIASTOLIC BLOOD PRESSURE: 81 MMHG | HEART RATE: 76 BPM | WEIGHT: 211.63 LBS | SYSTOLIC BLOOD PRESSURE: 122 MMHG

## 2022-06-15 DIAGNOSIS — L40.50 PSORIATIC ARTHROPATHY: ICD-10-CM

## 2022-06-15 DIAGNOSIS — M25.572 ACUTE BILATERAL ANKLE PAIN: ICD-10-CM

## 2022-06-15 DIAGNOSIS — M1A.9XX1 GOUT WITH TOPHI: ICD-10-CM

## 2022-06-15 DIAGNOSIS — M25.571 ACUTE BILATERAL ANKLE PAIN: ICD-10-CM

## 2022-06-15 DIAGNOSIS — L40.50 PSORIATIC ARTHROPATHY: Primary | ICD-10-CM

## 2022-06-15 DIAGNOSIS — M11.20 CHONDROCALCINOSIS: ICD-10-CM

## 2022-06-15 PROCEDURE — 73630 X-RAY EXAM OF FOOT: CPT | Mod: TC,50

## 2022-06-15 PROCEDURE — 73610 XR ANKLE COMPLETE 3 VIEW BILATERAL: ICD-10-PCS | Mod: 26,50,, | Performed by: RADIOLOGY

## 2022-06-15 PROCEDURE — 4010F PR ACE/ARB THEARPY RXD/TAKEN: ICD-10-PCS | Mod: CPTII,,, | Performed by: INTERNAL MEDICINE

## 2022-06-15 PROCEDURE — 3008F BODY MASS INDEX DOCD: CPT | Mod: CPTII,,, | Performed by: INTERNAL MEDICINE

## 2022-06-15 PROCEDURE — 99214 PR OFFICE/OUTPT VISIT, EST, LEVL IV, 30-39 MIN: ICD-10-PCS | Mod: S$PBB,,, | Performed by: INTERNAL MEDICINE

## 2022-06-15 PROCEDURE — 1159F PR MEDICATION LIST DOCUMENTED IN MEDICAL RECORD: ICD-10-PCS | Mod: CPTII,,, | Performed by: INTERNAL MEDICINE

## 2022-06-15 PROCEDURE — 3008F PR BODY MASS INDEX (BMI) DOCUMENTED: ICD-10-PCS | Mod: CPTII,,, | Performed by: INTERNAL MEDICINE

## 2022-06-15 PROCEDURE — 99999 PR PBB SHADOW E&M-EST. PATIENT-LVL IV: ICD-10-PCS | Mod: PBBFAC,,, | Performed by: INTERNAL MEDICINE

## 2022-06-15 PROCEDURE — 73610 X-RAY EXAM OF ANKLE: CPT | Mod: 26,50,, | Performed by: RADIOLOGY

## 2022-06-15 PROCEDURE — 3074F PR MOST RECENT SYSTOLIC BLOOD PRESSURE < 130 MM HG: ICD-10-PCS | Mod: CPTII,,, | Performed by: INTERNAL MEDICINE

## 2022-06-15 PROCEDURE — 73130 XR HAND COMPLETE 3 VIEW LEFT: ICD-10-PCS | Mod: 26,LT,, | Performed by: RADIOLOGY

## 2022-06-15 PROCEDURE — 73610 X-RAY EXAM OF ANKLE: CPT | Mod: TC,50

## 2022-06-15 PROCEDURE — 73630 X-RAY EXAM OF FOOT: CPT | Mod: 26,50,, | Performed by: RADIOLOGY

## 2022-06-15 PROCEDURE — 3079F PR MOST RECENT DIASTOLIC BLOOD PRESSURE 80-89 MM HG: ICD-10-PCS | Mod: CPTII,,, | Performed by: INTERNAL MEDICINE

## 2022-06-15 PROCEDURE — 1159F MED LIST DOCD IN RCRD: CPT | Mod: CPTII,,, | Performed by: INTERNAL MEDICINE

## 2022-06-15 PROCEDURE — 3074F SYST BP LT 130 MM HG: CPT | Mod: CPTII,,, | Performed by: INTERNAL MEDICINE

## 2022-06-15 PROCEDURE — 4010F ACE/ARB THERAPY RXD/TAKEN: CPT | Mod: CPTII,,, | Performed by: INTERNAL MEDICINE

## 2022-06-15 PROCEDURE — 99214 OFFICE O/P EST MOD 30 MIN: CPT | Mod: S$PBB,,, | Performed by: INTERNAL MEDICINE

## 2022-06-15 PROCEDURE — 73130 X-RAY EXAM OF HAND: CPT | Mod: TC,LT

## 2022-06-15 PROCEDURE — 73130 X-RAY EXAM OF HAND: CPT | Mod: 26,LT,, | Performed by: RADIOLOGY

## 2022-06-15 PROCEDURE — 96372 THER/PROPH/DIAG INJ SC/IM: CPT | Mod: PBBFAC

## 2022-06-15 PROCEDURE — 99214 OFFICE O/P EST MOD 30 MIN: CPT | Mod: 25,PBBFAC | Performed by: INTERNAL MEDICINE

## 2022-06-15 PROCEDURE — 3079F DIAST BP 80-89 MM HG: CPT | Mod: CPTII,,, | Performed by: INTERNAL MEDICINE

## 2022-06-15 PROCEDURE — 99999 PR PBB SHADOW E&M-EST. PATIENT-LVL IV: CPT | Mod: PBBFAC,,, | Performed by: INTERNAL MEDICINE

## 2022-06-15 PROCEDURE — 1160F RVW MEDS BY RX/DR IN RCRD: CPT | Mod: CPTII,,, | Performed by: INTERNAL MEDICINE

## 2022-06-15 PROCEDURE — 73630 XR FOOT COMPLETE 3 VIEW BILATERAL: ICD-10-PCS | Mod: 26,50,, | Performed by: RADIOLOGY

## 2022-06-15 PROCEDURE — 1160F PR REVIEW ALL MEDS BY PRESCRIBER/CLIN PHARMACIST DOCUMENTED: ICD-10-PCS | Mod: CPTII,,, | Performed by: INTERNAL MEDICINE

## 2022-06-15 RX ORDER — TRIAMCINOLONE ACETONIDE 40 MG/ML
80 INJECTION, SUSPENSION INTRA-ARTICULAR; INTRAMUSCULAR
Status: COMPLETED | OUTPATIENT
Start: 2022-06-15 | End: 2022-06-15

## 2022-06-15 RX ADMIN — TRIAMCINOLONE ACETONIDE 80 MG: 40 INJECTION, SUSPENSION INTRA-ARTICULAR; INTRAMUSCULAR at 09:06

## 2022-06-16 NOTE — PROGRESS NOTES
History of present illness:  46-year-old gentleman with idiopathic cardiomyopathy.  He has had no recent evidence of congestive heart failure and sees his cardiologist infrequently.  He had had a previous CVA.  I am following him for several different problems.  He has chronic tophaceous gout.  He is on allopurinol 450 mg daily.  He has x-ray evidence of chondrocalcinosis.  His main problem has been psoriasis.  He also has psoriatic arthritis. He had been treated in the past with Enbrel, Humira, Taltz, Tremfya, MTX and Ilumya.  He is now on Skyrizi.  He was also was placed back on Otezla.    He was last seen in November.  He was having pain in his left hand and right foot.  He had dactylitis of the left 2nd finger and the right 2nd toe.  I felt he might be having a flare of his psoriatic arthritis.  I may no change in his medication at that time.    He is still having problem with that finger.  He has trouble bending it.  He now complains of pain in both feet.  It started 3 days ago.  It was of gradual onset.  He has had no swelling.  The pain is worse during the day.  It does wake him up at night.  He denies any increased morning pain or morning stiffness.  His other joints have been stable.    He has been taking Tylenol with some relief.  He has not tried heat, ice, or topical medications.    He remains on Otezla and Skyrizi.  His psoriasis is doing well.  He has had some shortness of breath for the past 2 months.  He has had no edema.    Physical examination:  Skin:  He has slight erythema of both legs.  Chest:  Clear to auscultation   Cardiac:  No murmurs, gallops, rubs  Extremities:  No pedal edema  Musculoskeletal:  Cervical spine, shoulders, elbows, wrists are unremarkable.  He has enlargement of the left 2nd finger but no erythema or increased warmth.  Lumbar spine has good range of motion without pain on range of motion.  He has good range of motion of the hips  He has a small effusion in the right knee.   There is no erythema or increased warmth.  There is no tenderness to palpation.  He has good range of motion of the knee.  He has soft tissue swelling of the ankles bilaterally, worse on the left than on the right.  There is slight increased warmth.  They are tender to palpation.  He has pain on range of motion of the left ankle.  He has tenderness in the dorsum of the foot and across the MTPs.  He has no swelling in these areas.    Assessment:  He has an acute inflammatory arthritis of both ankles.  This could be a gout flare, psoriatic arthritis, or pseudogout.    Plans:  1. Laboratory studies and x-rays are obtained  2. I gave him Kenalog 80 mg IM.  3.  I recommend the use of Voltaren gel  4. He is to continue his medications as before.  If he continues to have flares of his psoriatic arthritis, we may need to change his DMARD.  5. Return in 2 months  6. I recommend seeing his Cardiologist because of the shortness of breath.

## 2022-06-28 ENCOUNTER — SPECIALTY PHARMACY (OUTPATIENT)
Dept: PHARMACY | Facility: CLINIC | Age: 47
End: 2022-06-28
Payer: MEDICAID

## 2022-06-28 DIAGNOSIS — L40.9 PSORIASIS: Primary | ICD-10-CM

## 2022-06-28 NOTE — TELEPHONE ENCOUNTER
Specialty Pharmacy - Refill Coordination    Specialty Medication Orders Linked to Encounter    Flowsheet Row Most Recent Value   Medication #1 OTEZLA 30 mg Tab (Order#[id number], Rx#[id number])          Refill Questions - Documented Responses    Flowsheet Row Most Recent Value   Patient Availability and HIPAA Verification    Does patient want to proceed with activity? Yes   HIPAA/medical authority confirmed? Yes   Relationship to patient of person spoken to? Self   Refill Screening Questions    Changes to allergies? No   Changes to medications? No   New conditions since last clinic visit? No   Unplanned office visit, urgent care, ED, or hospital admission in the last 4 weeks? No   How does patient/caregiver feel medication is working? Good   Financial problems or insurance changes? No   How many doses of your specialty medications were missed in the last 4 weeks? 0   Would patient like to speak to a pharmacist? No   When does the patient need to receive the medication? 06/29/22   Refill Delivery Questions    How will the patient receive the medication? Delivery Ching   When does the patient need to receive the medication? 06/29/22   Shipping Address Home   Address in [facility] confirmed and updated if neccessary? Yes   Expected Copay ($) 0   Is the patient able to afford the medication copay? Yes   Payment Method zero copay   Days supply of Refill 30   Supplies needed? No supplies needed   Refill activity completed? Yes   Refill activity plan Refill scheduled   Shipment/Pickup Date: 06/29/22          Current Outpatient Medications   Medication Sig    allopurinoL (ZYLOPRIM) 300 MG tablet TAKE 1 & 1/2 (ONE & ONE-HALF) TABLETS BY MOUTH ONCE DAILY    atorvastatin (LIPITOR) 10 MG tablet Take 1 tablet (10 mg total) by mouth once daily.    calcipotriene (DOVONOX) 0.005 % cream APPLY  CREAM TOPICALLY TO AFFECTED AREA TWICE DAILY    carvediloL (COREG) 25 MG tablet Take 1 tablet (25 mg total) by mouth 2 (two) times  daily with meals.    clobetasoL (TEMOVATE) 0.05 % external solution APPLY  SOLUTION TOPICALLY TO SCALP  AS NEEDED FOR ITCHING OR SCALING.    colchicine (MITIGARE) 0.6 mg Cap TAKE 1 CAPSULE BY MOUTH TWICE DAILY TO PREVENT GOUT FLARES    COLCRYS 0.6 mg tablet TAKE ONE TABLET BY MOUTH TWICE DAILY    doxycycline (VIBRA-TABS) 100 MG tablet Take 1 tablet (100 mg total) by mouth once daily. Take with food and do not take within 30-60 minutes of lying down.    furosemide (LASIX) 40 MG tablet Take 1 tablet (40 mg total) by mouth 2 (two) times daily.    halobetasol (ULTRAVATE) 0.05 % ointment APPLY  OINTMENT TOPICALLY TO AFFECTED AREA TWICE DAILY    lisinopriL (PRINIVIL,ZESTRIL) 20 MG tablet Take 1 tablet by mouth once daily    methylPREDNISolone (MEDROL DOSEPACK) 4 mg tablet follow package directions    OTEZLA 30 mg Tab Take 1 tablet by mouth twice daily.    potassium chloride SA (K-DUR,KLOR-CON) 20 MEQ tablet TAKE 1  BY MOUTH ONCE DAILY    risankizumab-rzaa (SKYRIZI) 150 mg/mL Syrg Inject 1 syringe (150 mg) into the skin every 12 weeks.    triamcinolone acetonide 0.1% (KENALOG) 0.1 % ointment APPLY  OINTMENT TOPICALLY TO AFFECTED AREA TWICE DAILY   Last reviewed on 6/15/2022  9:45 AM by Devonte Hermosillo MD    Review of patient's allergies indicates:  No Known Allergies Last reviewed on  6/15/2022 9:45 AM by Devonte Hermosillo      Tasks added this encounter   7/22/2022 - Refill Call (Auto Added)  9/19/2022 - Refill Call (Auto Added)  6/28/2022 -  Setup Confirmation   Tasks due within next 3 months   8/16/2022 - Refill Call (Auto Added)     Fuentes Howe, PharmD  [facility] - [facility]  [address] [place] [address]  Phone: [phone]  Fax: [phone]

## 2022-06-28 NOTE — TELEPHONE ENCOUNTER
Specialty Pharmacy - Refill Coordination    Specialty Medication Orders Linked to Encounter    Flowsheet Row Most Recent Value   Medication #1 risankizumab-rzaa (SKYRIZI) 150 mg/mL Syrg (Order#[id number], Rx#[id number])   Medication #2 OTEZLA 30 mg Tab (Order#[id number], Rx#[id number])        Refill Questions - Documented Responses    Flowsheet Row Most Recent Value   Patient Availability and HIPAA Verification    Does patient want to proceed with activity? Unable to Reach        We have had multiple attempts to the patient and have been unsuccessful to reach the patient. We will stop reaching out to the patient but in the event that the patient needs the med and contacts us, we will communicate and begin dispensing for the patient. At your next visit with the patient, please review the importance of being in contact with our specialty pharmacy as a part of our care team.      [facility]  [address] [place] [id number]  Phone: [phone]  Fax: [phone]

## 2022-06-30 ENCOUNTER — PATIENT MESSAGE (OUTPATIENT)
Dept: CARDIOLOGY | Facility: CLINIC | Age: 47
End: 2022-06-30
Payer: MEDICAID

## 2022-06-30 DIAGNOSIS — I42.9 CARDIOMYOPATHY, IDIOPATHIC: ICD-10-CM

## 2022-06-30 RX ORDER — POTASSIUM CHLORIDE 20 MEQ/1
TABLET, EXTENDED RELEASE ORAL
Qty: 90 TABLET | Refills: 0 | Status: SHIPPED | OUTPATIENT
Start: 2022-06-30 | End: 2022-10-03

## 2022-06-30 NOTE — TELEPHONE ENCOUNTER
No new care gaps identified.  [facility] Embedded Care Gaps. Reference number: [id number]. 6/30/2022   4:52:15 PM CDT

## 2022-07-01 NOTE — TELEPHONE ENCOUNTER
Refill Decision Note   Adrien Panda  is requesting a refill authorization.  Brief Assessment and Rationale for Refill:  Approve     Medication Therapy Plan:       Medication Reconciliation Completed: No   Comments:     No Care Gaps recommended.     Note composed:7:30 PM 06/30/2022

## 2022-07-22 ENCOUNTER — PATIENT MESSAGE (OUTPATIENT)
Dept: PHARMACY | Facility: CLINIC | Age: 47
End: 2022-07-22
Payer: MEDICAID

## 2022-07-22 DIAGNOSIS — L40.9 PSORIASIS: ICD-10-CM

## 2022-07-22 RX ORDER — APREMILAST 30 MG/1
TABLET, FILM COATED ORAL
Qty: 60 TABLET | Refills: 2 | Status: SHIPPED | OUTPATIENT
Start: 2022-07-22 | End: 2022-11-07 | Stop reason: SDUPTHER

## 2022-07-26 ENCOUNTER — SPECIALTY PHARMACY (OUTPATIENT)
Dept: PHARMACY | Facility: CLINIC | Age: 47
End: 2022-07-26
Payer: MEDICAID

## 2022-07-26 NOTE — TELEPHONE ENCOUNTER
Outgoing call regarding otezla/skyrizi refill, pt reports over week on hand otezla (unsure skyrizi), [place] required, routing to [facility], will pend call for a week to follow up

## 2022-08-02 NOTE — TELEPHONE ENCOUNTER
Spoke w pt regarding refills. Pt stated he found a bottle of Otezla and has about a week on hand. Informed pt that [facility] was due to call him on 8/8 for Savita and he was okay with setting up both refills at this time. Will follow up on 8/8.

## 2022-08-08 ENCOUNTER — PATIENT MESSAGE (OUTPATIENT)
Dept: CARDIOLOGY | Facility: CLINIC | Age: 47
End: 2022-08-08
Payer: MEDICAID

## 2022-08-08 ENCOUNTER — PATIENT MESSAGE (OUTPATIENT)
Dept: DERMATOLOGY | Facility: CLINIC | Age: 47
End: 2022-08-08
Payer: MEDICAID

## 2022-08-08 NOTE — TELEPHONE ENCOUNTER
Specialty Pharmacy - Refill Coordination    Specialty Medication Orders Linked to Encounter    Flowsheet Row Most Recent Value   Medication #1 OTEZLA 30 mg Tab (Order#[id number], Rx#[id number])          Refill Questions - Documented Responses    Flowsheet Row Most Recent Value   Patient Availability and HIPAA Verification    Does patient want to proceed with activity? Yes   HIPAA/medical authority confirmed? Yes   Relationship to patient of person spoken to? Self   Refill Screening Questions    Changes to allergies? No   Changes to medications? No   New conditions since last clinic visit? No   Unplanned office visit, urgent care, ED, or hospital admission in the last 4 weeks? No   How does patient/caregiver feel medication is working? Good   Financial problems or insurance changes? No   How many doses of your specialty medications were missed in the last 4 weeks? 0   Would patient like to speak to a pharmacist? No   When does the patient need to receive the medication? 08/15/22   Refill Delivery Questions    How will the patient receive the medication? Delivery Ching   When does the patient need to receive the medication? 08/15/22   Shipping Address Home   Address in [facility] confirmed and updated if neccessary? Yes   Expected Copay ($) 0   Is the patient able to afford the medication copay? Yes   Payment Method zero copay   Days supply of Refill 30   Supplies needed? No supplies needed   Refill activity completed? Yes   Refill activity plan Refill scheduled   Shipment/Pickup Date: 08/09/22          Current Outpatient Medications   Medication Sig    allopurinoL (ZYLOPRIM) 300 MG tablet TAKE 1 & 1/2 (ONE & ONE-HALF) TABLETS BY MOUTH ONCE DAILY    atorvastatin (LIPITOR) 10 MG tablet Take 1 tablet (10 mg total) by mouth once daily.    calcipotriene (DOVONOX) 0.005 % cream APPLY  CREAM TOPICALLY TO AFFECTED AREA TWICE DAILY    carvediloL (COREG) 25 MG tablet Take 1 tablet (25 mg total) by mouth 2 (two) times  daily with meals.    clobetasoL (TEMOVATE) 0.05 % external solution APPLY  SOLUTION TOPICALLY TO SCALP  AS NEEDED FOR ITCHING OR SCALING.    colchicine (MITIGARE) 0.6 mg Cap TAKE 1 CAPSULE BY MOUTH TWICE DAILY TO PREVENT GOUT FLARES    COLCRYS 0.6 mg tablet TAKE ONE TABLET BY MOUTH TWICE DAILY    doxycycline (VIBRA-TABS) 100 MG tablet Take 1 tablet (100 mg total) by mouth once daily. Take with food and do not take within 30-60 minutes of lying down.    furosemide (LASIX) 40 MG tablet Take 1 tablet (40 mg total) by mouth 2 (two) times daily.    halobetasol (ULTRAVATE) 0.05 % ointment APPLY  OINTMENT TOPICALLY TO AFFECTED AREA TWICE DAILY    lisinopriL (PRINIVIL,ZESTRIL) 20 MG tablet Take 1 tablet by mouth once daily    methylPREDNISolone (MEDROL DOSEPACK) 4 mg tablet follow package directions    OTEZLA 30 mg Tab Take 1 tablet by mouth twice daily.    potassium chloride SA (K-DUR,KLOR-CON) 20 MEQ tablet TAKE 1  BY MOUTH ONCE DAILY    risankizumab-rzaa (SKYRIZI) 150 mg/mL Syrg Inject 1 syringe (150 mg) into the skin every 12 weeks.    triamcinolone acetonide 0.1% (KENALOG) 0.1 % ointment APPLY  OINTMENT TOPICALLY TO AFFECTED AREA TWICE DAILY   Last reviewed on 6/15/2022  9:45 AM by Devonte Hermosillo MD    Review of patient's allergies indicates:  No Known Allergies Last reviewed on  6/15/2022 9:45 AM by Devonte Hermosillo      Tasks added this encounter   9/7/2022 - Refill Call (Auto Added)  10/30/2022 - Refill Call (Auto Added)  8/8/2022 -  Setup Confirmation   Tasks due within next 3 months   8/8/2022 - Refill Call (Auto Added)     Janee Whitney  [facility] - [facility]  [address] [place] [id number]  Phone: [phone]  Fax: [phone]

## 2022-08-11 ENCOUNTER — PATIENT MESSAGE (OUTPATIENT)
Dept: PHARMACY | Facility: CLINIC | Age: 47
End: 2022-08-11
Payer: MEDICAID

## 2022-08-11 ENCOUNTER — SPECIALTY PHARMACY (OUTPATIENT)
Dept: PHARMACY | Facility: CLINIC | Age: 47
End: 2022-08-11
Payer: MEDICAID

## 2022-08-11 NOTE — TELEPHONE ENCOUNTER
Specialty Pharmacy - Refill Coordination    Specialty Medication Orders Linked to Encounter    Flowsheet Row Most Recent Value   Medication #1 risankizumab-rzaa (SKYRIZI) 150 mg/mL Syrg (Order#[id number], Rx#[id number])        Delivery on 8/22 confirmed with the provider's office.      Refill Questions - Documented Responses    Flowsheet Row Most Recent Value   Patient Availability and HIPAA Verification    Does patient want to proceed with activity? Yes   HIPAA/medical authority confirmed? Yes   Relationship to patient of person spoken to? Self   Refill Screening Questions    Changes to allergies? No   Changes to medications? No   New conditions since last clinic visit? No   Unplanned office visit, urgent care, ED, or hospital admission in the last 4 weeks? No   How does patient/caregiver feel medication is working? Excellent   Financial problems or insurance changes? No   How many doses of your specialty medications were missed in the last 4 weeks? 0   Would patient like to speak to a pharmacist? No   When does the patient need to receive the medication? 08/23/22   Refill Delivery Questions    How will the patient receive the medication?    When does the patient need to receive the medication? 08/23/22   Shipping Address Prescription   Address in [facility] confirmed and updated if neccessary? Yes   Expected Copay ($) 0   Is the patient able to afford the medication copay? Yes   Payment Method zero copay   Days supply of Refill 84   Supplies needed? No supplies needed   Refill activity completed? Yes   Refill activity plan Refill scheduled   Shipment/Pickup Date: 08/22/22          Current Outpatient Medications   Medication Sig    allopurinoL (ZYLOPRIM) 300 MG tablet TAKE 1 & 1/2 (ONE & ONE-HALF) TABLETS BY MOUTH ONCE DAILY    atorvastatin (LIPITOR) 10 MG tablet Take 1 tablet (10 mg total) by mouth once daily.    calcipotriene (DOVONOX) 0.005 % cream APPLY  CREAM TOPICALLY TO AFFECTED AREA TWICE  DAILY    carvediloL (COREG) 25 MG tablet Take 1 tablet (25 mg total) by mouth 2 (two) times daily with meals.    clobetasoL (TEMOVATE) 0.05 % external solution APPLY  SOLUTION TOPICALLY TO SCALP  AS NEEDED FOR ITCHING OR SCALING.    colchicine (MITIGARE) 0.6 mg Cap TAKE 1 CAPSULE BY MOUTH TWICE DAILY TO PREVENT GOUT FLARES    COLCRYS 0.6 mg tablet TAKE ONE TABLET BY MOUTH TWICE DAILY    doxycycline (VIBRA-TABS) 100 MG tablet Take 1 tablet (100 mg total) by mouth once daily. Take with food and do not take within 30-60 minutes of lying down.    furosemide (LASIX) 40 MG tablet Take 1 tablet (40 mg total) by mouth 2 (two) times daily.    halobetasol (ULTRAVATE) 0.05 % ointment APPLY  OINTMENT TOPICALLY TO AFFECTED AREA TWICE DAILY    lisinopriL (PRINIVIL,ZESTRIL) 20 MG tablet Take 1 tablet by mouth once daily    methylPREDNISolone (MEDROL DOSEPACK) 4 mg tablet follow package directions    OTEZLA 30 mg Tab Take 1 tablet by mouth twice daily.    potassium chloride SA (K-DUR,KLOR-CON) 20 MEQ tablet TAKE 1  BY MOUTH ONCE DAILY    risankizumab-rzaa (SKYRIZI) 150 mg/mL Syrg Inject 1 syringe (150 mg) into the skin every 12 weeks.    triamcinolone acetonide 0.1% (KENALOG) 0.1 % ointment APPLY  OINTMENT TOPICALLY TO AFFECTED AREA TWICE DAILY   Last reviewed on 6/15/2022  9:45 AM by Devonte Hermosillo MD    Review of patient's allergies indicates:  No Known Allergies Last reviewed on  6/15/2022 9:45 AM by Devonte Hermosillo      Tasks added this encounter   No tasks added.   Tasks due within next 3 months   8/8/2022 - Refill Call (Auto Added)  9/7/2022 - Refill Call (Auto Added)  10/30/2022 - Refill Call (Auto Added)  8/8/2022 -  Setup Confirmation     [facility]  [address] [place] [id number]  Phone: [phone]  Fax: [phone]

## 2022-08-12 ENCOUNTER — PATIENT MESSAGE (OUTPATIENT)
Dept: CARDIOLOGY | Facility: CLINIC | Age: 47
End: 2022-08-12
Payer: MEDICAID

## 2022-08-19 RX ORDER — LISINOPRIL 20 MG/1
20 TABLET ORAL DAILY
Qty: 90 TABLET | Refills: 0 | OUTPATIENT
Start: 2022-08-19

## 2022-08-22 ENCOUNTER — TELEPHONE (OUTPATIENT)
Dept: PHARMACY | Facility: CLINIC | Age: 47
End: 2022-08-22
Payer: MEDICAID

## 2022-08-23 ENCOUNTER — OFFICE VISIT (OUTPATIENT)
Dept: DERMATOLOGY | Facility: CLINIC | Age: 47
End: 2022-08-23
Payer: MEDICAID

## 2022-08-23 DIAGNOSIS — Z79.899 ENCOUNTER FOR LONG-TERM (CURRENT) USE OF HIGH-RISK MEDICATION: ICD-10-CM

## 2022-08-23 DIAGNOSIS — L40.0 PSORIASIS VULGARIS: Primary | ICD-10-CM

## 2022-08-23 DIAGNOSIS — L30.9 ECZEMA, UNSPECIFIED TYPE: ICD-10-CM

## 2022-08-23 DIAGNOSIS — L40.50 PSORIATIC ARTHRITIS: ICD-10-CM

## 2022-08-23 PROCEDURE — 1160F PR REVIEW ALL MEDS BY PRESCRIBER/CLIN PHARMACIST DOCUMENTED: ICD-10-PCS | Mod: CPTII,,, | Performed by: PATHOLOGY

## 2022-08-23 PROCEDURE — 99999 PR PBB SHADOW E&M-EST. PATIENT-LVL III: CPT | Mod: PBBFAC,,, | Performed by: PATHOLOGY

## 2022-08-23 PROCEDURE — 99999 PR PBB SHADOW E&M-EST. PATIENT-LVL III: ICD-10-PCS | Mod: PBBFAC,,, | Performed by: PATHOLOGY

## 2022-08-23 PROCEDURE — 1159F PR MEDICATION LIST DOCUMENTED IN MEDICAL RECORD: ICD-10-PCS | Mod: CPTII,,, | Performed by: PATHOLOGY

## 2022-08-23 PROCEDURE — 99213 OFFICE O/P EST LOW 20 MIN: CPT | Mod: PBBFAC | Performed by: PATHOLOGY

## 2022-08-23 PROCEDURE — 4010F ACE/ARB THERAPY RXD/TAKEN: CPT | Mod: CPTII,,, | Performed by: PATHOLOGY

## 2022-08-23 PROCEDURE — 1160F RVW MEDS BY RX/DR IN RCRD: CPT | Mod: CPTII,,, | Performed by: PATHOLOGY

## 2022-08-23 PROCEDURE — 1159F MED LIST DOCD IN RCRD: CPT | Mod: CPTII,,, | Performed by: PATHOLOGY

## 2022-08-23 PROCEDURE — 99214 OFFICE O/P EST MOD 30 MIN: CPT | Mod: S$PBB,,, | Performed by: PATHOLOGY

## 2022-08-23 PROCEDURE — 4010F PR ACE/ARB THEARPY RXD/TAKEN: ICD-10-PCS | Mod: CPTII,,, | Performed by: PATHOLOGY

## 2022-08-23 PROCEDURE — 99214 PR OFFICE/OUTPT VISIT, EST, LEVL IV, 30-39 MIN: ICD-10-PCS | Mod: S$PBB,,, | Performed by: PATHOLOGY

## 2022-08-23 NOTE — PROGRESS NOTES
After obtaining consent, and per orders of Dr. Son, injection of Skyrizi 150mg subcutaneous in the left posterior arm given by Marii Rosales LPN. Patient instructed to remain in clinic for 20 minutes afterwards, and to report any adverse reaction to me immediately. EXP:Jan 2024 LOT: [id number] NDC: [id number]

## 2022-08-23 NOTE — PROGRESS NOTES
Subjective:       Patient ID:  Adrien Panda is a 46 y.o. male who presents for   Chief Complaint   Patient presents with    Psoriasis     HPI  Pt well known to me for psoriasis/ psoriatic arthritis - on skyrizi, otezla.  LV 5/23/22 - skyrizi injection.   Plaques on legs somewhat improved, but intermittently flare in 2-3 weeks leading up to next injection.  Got worse after Covid vaccine booster 10/31/21.  Arthritis and joint swelling in left 2nd digit of the hand and right 2nd digit of the foot since August and noted upon rheum f/u visit 11/10/21 - suspected psoriatic arthritis and not gout related - uric acid WNL.  On allopurinol 450 mg daily and colchicine prn flares.  Joint pain is actually improved over the last few weeks.  Using topicals - clobetasol, lidex, dovonex.  Started 10/23/19.  Re-started Otezla about the same time.     Taltz initially worked best of all of the other systemic agents he has tried, but legs flared Fall 2018 with progressive new scaly plaques moving proximal on bilateral legs to thighs.  Most recently, was on Ilumya, and prior to that MTX and Tremfya, which were ineffective and discontinued.     Started Taltz 1/2018. States he was clear when he first started it, but psoriasis recurred on his legs. No infections.       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 Rubin at [facility], cutaneous and joint gout tx by Dr. Hermosillo (psoriasis flared on colchicine), stroke      PSORIASIS: failed tx with Enbrel, Humira, Soriatane, Taltz, Ilumya, tremfya, otezla, clobetasol topical, betamethasone topical, Lidex topical, elocon topical.    ILK helps temporarily. ultravate works best out of all the topicals he's tried. psorcon 0.05% oint helps.    On Humira from 6/2015 - 4/2016 until he stopped responding to it. Switched back to enbrel at that point which only helped temporarily.    Added Soriatane to Enbrel but ended up with a  bad flare on lower legs.  Not a good candidate for NBUVB as he lives on the [facility] and there is not light unit in this area.   Stelara 9/21/16 - 7/19/17 (stopped responding to it)  Taltz 1/2018 - 11/2018 (stopped responding to it)  Tremfya 11/2018 - 4/2019 - never achieved significant clearance  Ilumya 4/2019 - 8/ 2019 - no improvement and progression  Currently - Skyrizi (started Oct. 2019) and Otezla     TB gold: negative 02/24/2022 - negative     CBC, LFTs reviewed and WNL June 2022; sed rate and CRP elevated at that time, as well.  X-ray:  EXAMINATION:  XR FOOT COMPLETE 3 VIEW BILATERAL     CLINICAL HISTORY:  Arthropathic psoriasis, unspecified     FINDINGS:  Foot complete three views bilateral.     Three views right foot: There are erosive changes of the 4th and 5th metatarsal heads.  No acute fracture dislocation bone destruction seen.  There is baseline DJD.     Left: There is baseline DJD.  There are erosive changes of the 5th metatarsal head.  There is also a hallux valgus deformity.     Impression:     Metatarsal head changes could be a sign of psoriatic arthritis versus rheumatoid arthritis.     Degenerative change as above and hallux valgus deformity of the left foot.        Also gets intermittent follicular eruption with some surrounding eczematous changes to buttocks, lateral thighs abdomen.  Uses topical steroids with mild relief.  + associated pruritus.      Review of Systems   Constitutional: Negative for fever, chills, weight loss, weight gain, fatigue and malaise.   HENT: Negative for mouth sores (no tongue whiteness).    Respiratory: Negative for shortness of breath.    Gastrointestinal: Negative for nausea, vomiting, abdominal pain and diarrhea.   Musculoskeletal: Positive for joint swelling and arthralgias.   Skin: Positive for itching and rash (but no rashes/itching in folds).        Injection site reaction - no   yes   Psychiatric/Behavioral: Negative for depressed mood.        Objective:     Physical Exam   Constitutional: He appears well-developed and well-nourished.   Neurological: He is alert.   Skin:   Areas Examined (abnormalities noted in diagram):   Scalp / Hair Palpated and Inspected  Head / Face Inspection Performed  Neck Inspection Performed  Chest / Axilla Inspection Performed  Abdomen Inspection Performed  Genitals / Buttocks / Groin Inspection Performed  Back Inspection Performed  RUE Inspected  LUE Inspection Performed  RLE Inspected  LLE Inspection Performed  Nails and Digits Inspection Performed                  Diagram Legend     Erythematous scaling macule/papule c/w actinic keratosis       Vascular papule c/w angioma      Pigmented verrucoid papule/plaque c/w seborrheic keratosis      Yellow umbilicated papule c/w sebaceous hyperplasia      Irregularly shaped tan macule c/w lentigo     1-2 mm smooth white papules consistent with Milia      Movable subcutaneous cyst with punctum c/w epidermal inclusion cyst      Subcutaneous movable cyst c/w pilar cyst      Firm pink to brown papule c/w dermatofibroma      Pedunculated fleshy papule(s) c/w skin tag(s)      Evenly pigmented macule c/w junctional nevus     Mildly variegated pigmented, slightly irregular-bordered macule c/w mildly atypical nevus      Flesh colored to evenly pigmented papule c/w intradermal nevus       Pink pearly papule/plaque c/w basal cell carcinoma      Erythematous hyperkeratotic cursted plaque c/w SCC      Surgical scar with no sign of skin cancer recurrence      Open and closed comedones      Inflammatory papules and pustules      Verrucoid papule consistent consistent with wart     Erythematous eczematous patches and plaques     Dystrophic onycholytic nail with subungual debris c/w onychomycosis     Umbilicated papule    Erythematous-base heme-crusted tan verrucoid plaque consistent with inflamed seborrheic keratosis     Erythematous Silvery Scaling Plaque c/w Psoriasis     See annotation      Assessment / Plan:         Psoriasis vulgaris - fairly well controlled with thin residual plaques to bilateral shins, calves.  + psoriatic arthritis, as well - currently fairly well controlled.  Defer to Dr. Hermosillo for additional management as needed.  Continue Skyrizi - injection today by LPN without complications.  Continue Otezla.  Continue topical steroids and dovonex as needed.    Psoriatic arthritis - see above    Encounter for long-term (current) use of high-risk medication - TB gold: negative 02/24/2022 - negative     CBC, LFTs reviewed and WNL June 2022; sed rate and CRP elevated at that time, as well.    Eczema, unspecified type - topical steroids as needed.  Antihistamines prn itching.  Trial of Dermeleve cream.             No follow-ups on file.

## 2022-08-26 ENCOUNTER — PATIENT MESSAGE (OUTPATIENT)
Dept: RHEUMATOLOGY | Facility: CLINIC | Age: 47
End: 2022-08-26
Payer: MEDICAID

## 2022-08-26 ENCOUNTER — TELEPHONE (OUTPATIENT)
Dept: CARDIOLOGY | Facility: CLINIC | Age: 47
End: 2022-08-26
Payer: MEDICAID

## 2022-08-26 NOTE — TELEPHONE ENCOUNTER
Spoke with pt appt r/s to 9/29/22 @ 3pm    ----- Message from Melissa Daly sent at 8/18/2022  9:47 AM CDT -----  Regarding: self  .Type:  Sooner Appointment Request    Patient is requesting a sooner appointment.  Patient declined first available appointment listed as well as another facility and provider .  Patient will not accept being placed on the waitlist and is requesting a message be sent to doctor.    Name of Caller:  self     When is the first available appointment?  Nov     Symptoms:  follow up     Would the patient rather a call back or a response via [facility]? Call     Best Call Back Number:  [phone]      Additional Information:  had appt tomorrow and rescheduled due to out of town and would like to come in sometime in sept after the first two weeks.

## 2022-08-31 ENCOUNTER — PATIENT MESSAGE (OUTPATIENT)
Dept: DERMATOLOGY | Facility: CLINIC | Age: 47
End: 2022-08-31
Payer: MEDICAID

## 2022-09-01 ENCOUNTER — OFFICE VISIT (OUTPATIENT)
Dept: RHEUMATOLOGY | Facility: CLINIC | Age: 47
End: 2022-09-01
Payer: MEDICAID

## 2022-09-01 VITALS
HEIGHT: 70 IN | BODY MASS INDEX: 30.17 KG/M2 | WEIGHT: 210.75 LBS | SYSTOLIC BLOOD PRESSURE: 98 MMHG | HEART RATE: 111 BPM | DIASTOLIC BLOOD PRESSURE: 65 MMHG

## 2022-09-01 DIAGNOSIS — M1A.9XX1 GOUT WITH TOPHI: ICD-10-CM

## 2022-09-01 DIAGNOSIS — L40.50 PSORIATIC ARTHROPATHY: ICD-10-CM

## 2022-09-01 DIAGNOSIS — M11.20 CHONDROCALCINOSIS: ICD-10-CM

## 2022-09-01 DIAGNOSIS — M54.32 SCIATICA OF LEFT SIDE: Primary | ICD-10-CM

## 2022-09-01 PROCEDURE — 1160F RVW MEDS BY RX/DR IN RCRD: CPT | Mod: CPTII,,, | Performed by: INTERNAL MEDICINE

## 2022-09-01 PROCEDURE — 99999 PR PBB SHADOW E&M-EST. PATIENT-LVL III: CPT | Mod: PBBFAC,,, | Performed by: INTERNAL MEDICINE

## 2022-09-01 PROCEDURE — 99213 PR OFFICE/OUTPT VISIT, EST, LEVL III, 20-29 MIN: ICD-10-PCS | Mod: S$PBB,,, | Performed by: INTERNAL MEDICINE

## 2022-09-01 PROCEDURE — 3078F PR MOST RECENT DIASTOLIC BLOOD PRESSURE < 80 MM HG: ICD-10-PCS | Mod: CPTII,,, | Performed by: INTERNAL MEDICINE

## 2022-09-01 PROCEDURE — 3074F PR MOST RECENT SYSTOLIC BLOOD PRESSURE < 130 MM HG: ICD-10-PCS | Mod: CPTII,,, | Performed by: INTERNAL MEDICINE

## 2022-09-01 PROCEDURE — 4010F ACE/ARB THERAPY RXD/TAKEN: CPT | Mod: CPTII,,, | Performed by: INTERNAL MEDICINE

## 2022-09-01 PROCEDURE — 3008F BODY MASS INDEX DOCD: CPT | Mod: CPTII,,, | Performed by: INTERNAL MEDICINE

## 2022-09-01 PROCEDURE — 3074F SYST BP LT 130 MM HG: CPT | Mod: CPTII,,, | Performed by: INTERNAL MEDICINE

## 2022-09-01 PROCEDURE — 99999 PR PBB SHADOW E&M-EST. PATIENT-LVL III: ICD-10-PCS | Mod: PBBFAC,,, | Performed by: INTERNAL MEDICINE

## 2022-09-01 PROCEDURE — 1159F MED LIST DOCD IN RCRD: CPT | Mod: CPTII,,, | Performed by: INTERNAL MEDICINE

## 2022-09-01 PROCEDURE — 1159F PR MEDICATION LIST DOCUMENTED IN MEDICAL RECORD: ICD-10-PCS | Mod: CPTII,,, | Performed by: INTERNAL MEDICINE

## 2022-09-01 PROCEDURE — 1160F PR REVIEW ALL MEDS BY PRESCRIBER/CLIN PHARMACIST DOCUMENTED: ICD-10-PCS | Mod: CPTII,,, | Performed by: INTERNAL MEDICINE

## 2022-09-01 PROCEDURE — 3008F PR BODY MASS INDEX (BMI) DOCUMENTED: ICD-10-PCS | Mod: CPTII,,, | Performed by: INTERNAL MEDICINE

## 2022-09-01 PROCEDURE — 99213 OFFICE O/P EST LOW 20 MIN: CPT | Mod: PBBFAC | Performed by: INTERNAL MEDICINE

## 2022-09-01 PROCEDURE — 3078F DIAST BP <80 MM HG: CPT | Mod: CPTII,,, | Performed by: INTERNAL MEDICINE

## 2022-09-01 PROCEDURE — 4010F PR ACE/ARB THEARPY RXD/TAKEN: ICD-10-PCS | Mod: CPTII,,, | Performed by: INTERNAL MEDICINE

## 2022-09-01 PROCEDURE — 99213 OFFICE O/P EST LOW 20 MIN: CPT | Mod: S$PBB,,, | Performed by: INTERNAL MEDICINE

## 2022-09-01 RX ORDER — METHYLPREDNISOLONE 4 MG/1
TABLET ORAL
Qty: 21 TABLET | Refills: 0 | Status: SHIPPED | OUTPATIENT
Start: 2022-09-01 | End: 2022-11-17

## 2022-09-01 RX ORDER — METHOCARBAMOL 750 MG/1
750 TABLET, FILM COATED ORAL 3 TIMES DAILY
Qty: 30 TABLET | Refills: 1 | Status: SHIPPED | OUTPATIENT
Start: 2022-09-01 | End: 2022-09-11

## 2022-09-05 NOTE — PROGRESS NOTES
History of present illness:  47-year-old gentleman with idiopathic cardiomyopathy.  He has had no recent evidence of congestive heart failure and sees his cardiologist infrequently.  He had had a previous CVA.  I am following him for several different problems.  He has chronic tophaceous gout.  He is on allopurinol 450 mg daily.  He has x-ray evidence of chondrocalcinosis.  His main problem has been psoriasis.  He also has psoriatic arthritis. He had been treated in the past with Enbrel, Humira, Taltz, Tremfya, MTX and Ilumya.  He is now on Skyrizi.  He was also was placed back on Otezla.  When he was seen last in June, he had inflammatory arthritis in both ankles.  I did not aspirate the joint.  I gave him a Kenalog injection in his ankles are doing better.      He comes in now because of a sharp pain in the left buttock.  It radiates down the leg.  It is been present for 2 weeks.  He had no history of antecedent trauma.  He woke up 1 morning with the pain.  He had had no similar pain in the past.  He has no similar pain on the right side.  The pain is constant.  It is worse with prolonged standing.  It does wake him up at night.  It is bad in the morning.  He has no numbness in his leg but he states his leg is giving way with walking.  His other joints have been stable.  His psoriasis is under control.      Topical medications has given him some relief, as has ice.  He is taking no new medication for the pain.  He has been less active.  His pain is actually slightly better than it was 2 weeks ago.      Physical examination:  Musculoskeletal:  Cervical spine upper extremities are unremarkable.    He has tenderness of the left buttock.  He has pain on range of motion lumbar spine except with flexion.  The pain is referred to the left side.  He has positive straight leg raising on the left.    He has full range of motion of the hips without pain on range of motion.  Knees, ankles, small joints the feet are  unremarkable.      Assessment:  He appears to have sciatica.  This may be due to ischial bursitis.  It may be an IT band syndrome.  It could be lumbar disc disease.      Plans:  1. I gave him a Medrol Dosepak  2.  I placed him on Robaxin up to 3 times daily  3.  If he does not improve, I will refer him for physical therapy  4.  Continue other medications as before  5.  Return in 4 months

## 2022-09-07 ENCOUNTER — SPECIALTY PHARMACY (OUTPATIENT)
Dept: PHARMACY | Facility: CLINIC | Age: 47
End: 2022-09-07
Payer: MEDICAID

## 2022-09-07 NOTE — TELEPHONE ENCOUNTER
Outgoing call for Otezla refill. Pt was busy at time of call while at work and states he will call [facility] back. Will follow up.

## 2022-09-08 NOTE — TELEPHONE ENCOUNTER
Specialty Pharmacy - Refill Coordination    Specialty Medication Orders Linked to Encounter      Flowsheet Row Most Recent Value   Medication #1 OTEZLA 30 mg Tab (Order#[id number], Rx#[id number])            Refill Questions - Documented Responses      Flowsheet Row Most Recent Value   Patient Availability and HIPAA Verification    Does patient want to proceed with activity? Yes   HIPAA/medical authority confirmed? Yes   Relationship to patient of person spoken to? Self   Refill Screening Questions    Changes to allergies? No   Changes to medications? No   New conditions since last clinic visit? No   Unplanned office visit, urgent care, ED, or hospital admission in the last 4 weeks? No   How does patient/caregiver feel medication is working? Good   Financial problems or insurance changes? No   How many doses of your specialty medications were missed in the last 4 weeks? 0   Would patient like to speak to a pharmacist? No   When does the patient need to receive the medication? 09/15/22   Refill Delivery Questions    How will the patient receive the medication? Delivery Ching   When does the patient need to receive the medication? 09/15/22   Shipping Address Home   Address in [facility] confirmed and updated if neccessary? Yes   Expected Copay ($) 0   Is the patient able to afford the medication copay? Yes   Payment Method zero copay   Days supply of Refill 30   Supplies needed? No supplies needed   Refill activity completed? Yes   Refill activity plan Refill scheduled   Shipment/Pickup Date: 09/12/22            Current Outpatient Medications   Medication Sig    allopurinoL (ZYLOPRIM) 300 MG tablet TAKE 1 & 1/2 (ONE & ONE-HALF) TABLETS BY MOUTH ONCE DAILY    atorvastatin (LIPITOR) 10 MG tablet Take 1 tablet (10 mg total) by mouth once daily.    calcipotriene (DOVONOX) 0.005 % cream APPLY  CREAM TOPICALLY TO AFFECTED AREA TWICE DAILY    carvediloL (COREG) 25 MG tablet Take 1 tablet (25 mg total) by mouth 2 (two)  times daily with meals.    clobetasoL (TEMOVATE) 0.05 % external solution APPLY  SOLUTION TOPICALLY TO SCALP  AS NEEDED FOR ITCHING OR SCALING.    colchicine (COLCRYS) 0.6 mg tablet Take 1 tablet by mouth twice daily    colchicine (MITIGARE) 0.6 mg Cap TAKE 1 CAPSULE BY MOUTH TWICE DAILY TO PREVENT GOUT FLARES    doxycycline (VIBRA-TABS) 100 MG tablet Take 1 tablet (100 mg total) by mouth once daily. Take with food and do not take within 30-60 minutes of lying down.    furosemide (LASIX) 40 MG tablet Take 1 tablet by mouth twice daily    halobetasol (ULTRAVATE) 0.05 % ointment APPLY  OINTMENT TOPICALLY TO AFFECTED AREA TWICE DAILY    lisinopriL (PRINIVIL,ZESTRIL) 20 MG tablet Take 1 tablet by mouth once daily    methocarbamoL (ROBAXIN) 750 MG Tab Take 1 tablet (750 mg total) by mouth 3 (three) times daily. for 10 days    methylPREDNISolone (MEDROL DOSEPACK) 4 mg tablet follow package directions    methylPREDNISolone (MEDROL DOSEPACK) 4 mg tablet use as directed    OTEZLA 30 mg Tab Take 1 tablet by mouth twice daily.    potassium chloride SA (K-DUR,KLOR-CON) 20 MEQ tablet TAKE 1  BY MOUTH ONCE DAILY    risankizumab-rzaa (SKYRIZI) 150 mg/mL Syrg Inject 1 syringe (150 mg) into the skin every 12 weeks.    triamcinolone acetonide 0.1% (KENALOG) 0.1 % ointment APPLY  OINTMENT TOPICALLY TO AFFECTED AREA TWICE DAILY   Last reviewed on 9/1/2022  2:25 PM by Devonte Hermosillo MD    Review of patient's allergies indicates:  No Known Allergies Last reviewed on  9/1/2022 2:25 PM by Devonte Hermosillo      Tasks added this encounter   10/8/2022 - Refill Call (Auto Added)   Tasks due within next 3 months   11/8/2022 - Refill Call (Auto Added)     Monica Dietz, PharmD  [facility]  [address] [place] [id number]  Phone: [phone]  Fax: [phone]

## 2022-09-14 ENCOUNTER — PATIENT MESSAGE (OUTPATIENT)
Dept: RHEUMATOLOGY | Facility: CLINIC | Age: 47
End: 2022-09-14
Payer: MEDICAID

## 2022-09-14 DIAGNOSIS — M54.32 SCIATICA OF LEFT SIDE: Primary | ICD-10-CM

## 2022-09-29 ENCOUNTER — OFFICE VISIT (OUTPATIENT)
Dept: CARDIOLOGY | Facility: CLINIC | Age: 47
End: 2022-09-29
Payer: MEDICAID

## 2022-09-29 VITALS
RESPIRATION RATE: 18 BRPM | OXYGEN SATURATION: 96 % | DIASTOLIC BLOOD PRESSURE: 73 MMHG | SYSTOLIC BLOOD PRESSURE: 118 MMHG | BODY MASS INDEX: 35.55 KG/M2 | WEIGHT: 208.25 LBS | HEART RATE: 82 BPM | HEIGHT: 64 IN

## 2022-09-29 DIAGNOSIS — I42.9 CARDIOMYOPATHY, IDIOPATHIC: Primary | ICD-10-CM

## 2022-09-29 DIAGNOSIS — R94.31 ABNORMAL EKG: ICD-10-CM

## 2022-09-29 DIAGNOSIS — I10 ESSENTIAL HYPERTENSION: ICD-10-CM

## 2022-09-29 DIAGNOSIS — L40.9 PSORIASIS: ICD-10-CM

## 2022-09-29 PROCEDURE — 3078F PR MOST RECENT DIASTOLIC BLOOD PRESSURE < 80 MM HG: ICD-10-PCS | Mod: CPTII,,, | Performed by: INTERNAL MEDICINE

## 2022-09-29 PROCEDURE — 4010F PR ACE/ARB THEARPY RXD/TAKEN: ICD-10-PCS | Mod: CPTII,,, | Performed by: INTERNAL MEDICINE

## 2022-09-29 PROCEDURE — 99214 OFFICE O/P EST MOD 30 MIN: CPT | Mod: S$PBB,,, | Performed by: INTERNAL MEDICINE

## 2022-09-29 PROCEDURE — 93010 EKG 12-LEAD: ICD-10-PCS | Mod: S$PBB,,, | Performed by: INTERNAL MEDICINE

## 2022-09-29 PROCEDURE — 99999 PR PBB SHADOW E&M-EST. PATIENT-LVL IV: CPT | Mod: PBBFAC,,, | Performed by: INTERNAL MEDICINE

## 2022-09-29 PROCEDURE — 3074F PR MOST RECENT SYSTOLIC BLOOD PRESSURE < 130 MM HG: ICD-10-PCS | Mod: CPTII,,, | Performed by: INTERNAL MEDICINE

## 2022-09-29 PROCEDURE — 3074F SYST BP LT 130 MM HG: CPT | Mod: CPTII,,, | Performed by: INTERNAL MEDICINE

## 2022-09-29 PROCEDURE — 99999 PR PBB SHADOW E&M-EST. PATIENT-LVL IV: ICD-10-PCS | Mod: PBBFAC,,, | Performed by: INTERNAL MEDICINE

## 2022-09-29 PROCEDURE — 93005 ELECTROCARDIOGRAM TRACING: CPT | Mod: PBBFAC | Performed by: INTERNAL MEDICINE

## 2022-09-29 PROCEDURE — 3008F PR BODY MASS INDEX (BMI) DOCUMENTED: ICD-10-PCS | Mod: CPTII,,, | Performed by: INTERNAL MEDICINE

## 2022-09-29 PROCEDURE — 1160F RVW MEDS BY RX/DR IN RCRD: CPT | Mod: CPTII,,, | Performed by: INTERNAL MEDICINE

## 2022-09-29 PROCEDURE — 99214 PR OFFICE/OUTPT VISIT, EST, LEVL IV, 30-39 MIN: ICD-10-PCS | Mod: S$PBB,,, | Performed by: INTERNAL MEDICINE

## 2022-09-29 PROCEDURE — 4010F ACE/ARB THERAPY RXD/TAKEN: CPT | Mod: CPTII,,, | Performed by: INTERNAL MEDICINE

## 2022-09-29 PROCEDURE — 3078F DIAST BP <80 MM HG: CPT | Mod: CPTII,,, | Performed by: INTERNAL MEDICINE

## 2022-09-29 PROCEDURE — 1159F PR MEDICATION LIST DOCUMENTED IN MEDICAL RECORD: ICD-10-PCS | Mod: CPTII,,, | Performed by: INTERNAL MEDICINE

## 2022-09-29 PROCEDURE — 1159F MED LIST DOCD IN RCRD: CPT | Mod: CPTII,,, | Performed by: INTERNAL MEDICINE

## 2022-09-29 PROCEDURE — 99214 OFFICE O/P EST MOD 30 MIN: CPT | Mod: PBBFAC | Performed by: INTERNAL MEDICINE

## 2022-09-29 PROCEDURE — 3008F BODY MASS INDEX DOCD: CPT | Mod: CPTII,,, | Performed by: INTERNAL MEDICINE

## 2022-09-29 PROCEDURE — 93010 ELECTROCARDIOGRAM REPORT: CPT | Mod: S$PBB,,, | Performed by: INTERNAL MEDICINE

## 2022-09-29 PROCEDURE — 1160F PR REVIEW ALL MEDS BY PRESCRIBER/CLIN PHARMACIST DOCUMENTED: ICD-10-PCS | Mod: CPTII,,, | Performed by: INTERNAL MEDICINE

## 2022-09-29 RX ORDER — FUROSEMIDE 40 MG/1
40 TABLET ORAL DAILY
Qty: 90 TABLET | Refills: 1 | Status: SHIPPED | OUTPATIENT
Start: 2022-09-29 | End: 2023-04-05

## 2022-09-29 NOTE — PROGRESS NOTES
CARDIOVASCULAR PROGRESS NOTE    REASON FOR CONSULT:   Adrien Panda is a 47 y.o. male who presents for follow up of NIC.    PCP:  Sima  Rheum: Jaimee/Huy  HISTORY OF PRESENT ILLNESS:   Last seen June 2021.      Patient returns for follow-up after greater than 1 year hiatus.  He reports generally stable cardiovascular status without angina, dyspnea, palpitations or syncope.  He denies PND, orthopnea wife, or lower extremity edema.  There has been no melena, hematuria, or claudicant symptoms.  His main complaint today appears to be related to sciatica.    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    Cath 8/20/12 with nonobst CAD ([facility] records, Media tab 11/30/18).  Hx LV apical thrombus ([facility] records, Media tab 11/30/18).    PAST MEDICAL HISTORY:     Past Medical History:   Diagnosis Date    Arthritis     Blood clotting tendency     Congestive heart failure     High cholesterol     Hyperlipemia     Hypertension     Joint pain     Psoriasis        PAST SURGICAL HISTORY:   History reviewed. No pertinent surgical history.    ALLERGIES AND MEDICATION:   Review of patient's allergies indicates:  No Known Allergies     Medication List            Accurate as of September 29, 2022  3:22 PM. If you have any questions, ask your nurse or doctor.                CONTINUE taking these medications      allopurinoL 300 MG tablet  Commonly known as: ZYLOPRIM  TAKE 1 & 1/2 (ONE & ONE-HALF) TABLETS BY MOUTH ONCE DAILY     atorvastatin 10 MG tablet  Commonly known as: LIPITOR  Take 1 tablet (10 mg total) by mouth once daily.     calcipotriene 0.005 % cream  Commonly known as: DOVONOX  APPLY  CREAM TOPICALLY TO AFFECTED AREA TWICE DAILY     carvediloL 25 MG tablet  Commonly known as: COREG  Take 1 tablet (25 mg total) by mouth 2 (two) times daily with meals.     clobetasoL 0.05 % external solution  Commonly known as: TEMOVATE  APPLY  SOLUTION TOPICALLY TO  SCALP  AS NEEDED FOR ITCHING OR SCALING.     * colchicine 0.6 mg Cap  Commonly known as: MITIGARE  TAKE 1 CAPSULE BY MOUTH TWICE DAILY TO PREVENT GOUT FLARES     * colchicine 0.6 mg tablet  Commonly known as: COLCRYS  Take 1 tablet by mouth twice daily     doxycycline 100 MG tablet  Commonly known as: VIBRA-TABS  Take 1 tablet (100 mg total) by mouth once daily. Take with food and do not take within 30-60 minutes of lying down.     furosemide 40 MG tablet  Commonly known as: LASIX  Take 1 tablet by mouth twice daily     halobetasol 0.05 % ointment  Commonly known as: ULTRAVATE  APPLY  OINTMENT TOPICALLY TO AFFECTED AREA TWICE DAILY     lisinopriL 20 MG tablet  Commonly known as: PRINIVIL,ZESTRIL  Take 1 tablet by mouth once daily     * methylPREDNISolone 4 mg tablet  Commonly known as: MEDROL DOSEPACK  follow package directions     * methylPREDNISolone 4 mg tablet  Commonly known as: MEDROL DOSEPACK  use as directed     OTEZLA 30 mg Tab  Generic drug: apremilast  Take 1 tablet by mouth twice daily.     potassium chloride SA 20 MEQ tablet  Commonly known as: K-DUR,KLOR-CON  TAKE 1  BY MOUTH ONCE DAILY     SKYRIZI 150 mg/mL Syrg  Generic drug: risankizumab-rzaa  Inject 1 syringe (150 mg) into the skin every 12 weeks.     triamcinolone acetonide 0.1% 0.1 % ointment  Commonly known as: KENALOG  APPLY  OINTMENT TOPICALLY TO AFFECTED AREA TWICE DAILY           * This list has 4 medication(s) that are the same as other medications prescribed for you. Read the directions carefully, and ask your doctor or other care provider to review them with you.                  SOCIAL HISTORY:     Social History     Socioeconomic History    Marital status: Single   Tobacco Use    Smoking status: Former     Packs/day: 0.25     Types: Cigarettes    Smokeless tobacco: Former   Substance and Sexual Activity    Alcohol use: Yes     Comment: Social    Drug use: No       FAMILY HISTORY:     Family History   Problem Relation Age of Onset     "Coronary artery disease Mother     Melanoma Neg Hx     Psoriasis Neg Hx     Lupus Neg Hx        REVIEW OF SYSTEMS:   Review of Systems   Constitutional:  Negative for chills, diaphoresis and fever.   HENT:  Negative for nosebleeds.    Eyes:  Negative for blurred vision, double vision and photophobia.   Respiratory:  Negative for hemoptysis, shortness of breath and wheezing.    Cardiovascular:  Negative for chest pain, palpitations, orthopnea, claudication, leg swelling and PND.   Gastrointestinal:  Negative for abdominal pain, blood in stool, heartburn, melena, nausea and vomiting.   Genitourinary:  Negative for flank pain and hematuria.   Musculoskeletal:  Negative for falls, myalgias and neck pain.   Skin:  Positive for rash (psoriatic).   Neurological:  Negative for dizziness, seizures, loss of consciousness, weakness and headaches.   Endo/Heme/Allergies:  Negative for polydipsia. Does not bruise/bleed easily.   Psychiatric/Behavioral:  Negative for depression and memory loss. The patient is not nervous/anxious.      PHYSICAL EXAM:     Vitals:    09/29/22 1503   BP: 118/73   Pulse: 82   Resp: 18    Body mass index is 35.74 kg/m².  Weight: 94.4 kg (208 lb 3.6 oz)   Height: 5' 4" (162.6 cm)     Physical Exam  Vitals reviewed.   Constitutional:       General: He is not in acute distress.     Appearance: He is well-developed. He is obese. He is not ill-appearing, toxic-appearing or diaphoretic.   HENT:      Head: Normocephalic and atraumatic.   Eyes:      General: No scleral icterus.     Conjunctiva/sclera: Conjunctivae normal.      Pupils: Pupils are equal, round, and reactive to light.   Neck:      Thyroid: No thyromegaly.      Vascular: Normal carotid pulses. No carotid bruit or JVD.      Trachea: Trachea normal. No tracheal deviation.   Cardiovascular:      Rate and Rhythm: Normal rate and regular rhythm.      Pulses:           Carotid pulses are 2+ on the right side and 2+ on the left side.     Heart sounds: S1 " normal and S2 normal. No murmur heard.    No friction rub. No gallop.   Pulmonary:      Effort: Pulmonary effort is normal. No respiratory distress.      Breath sounds: Normal breath sounds. No stridor. No wheezing, rhonchi or rales.   Chest:      Chest wall: No tenderness.   Abdominal:      General: There is no distension.      Palpations: Abdomen is soft.   Musculoskeletal:         General: No swelling or tenderness. Normal range of motion.      Cervical back: Normal range of motion and neck supple. No edema or rigidity.      Right lower leg: No edema.      Left lower leg: No edema.   Feet:      Right foot:      Skin integrity: No ulcer.      Left foot:      Skin integrity: No ulcer.   Skin:     General: Skin is warm and dry.      Coloration: Skin is not jaundiced.      Findings: Rash (psoriatic) present. No erythema.   Neurological:      General: No focal deficit present.      Mental Status: He is alert and oriented to person, place, and time.      Cranial Nerves: No cranial nerve deficit.   Psychiatric:         Mood and Affect: Mood normal.         Speech: Speech normal.         Behavior: Behavior normal. Behavior is cooperative.       DATA:   EKG: (personally reviewed tracing(s))  9/29/22 SR 87, IRBBB/LAD    Laboratory:  CBC:  Recent Labs   Lab 02/23/21  1357 08/26/21  0959 06/15/22  1026   WBC 9.35 8.27 9.76   Hemoglobin 16.4 16.0 15.0   Hematocrit 50.1 49.4 46.3   Platelets 267 235 220         CHEMISTRIES:  Recent Labs   Lab 05/21/20  0957 02/23/21  1357 06/15/22  1026   Glucose 111 H 97 92   Sodium 138 139 138   Potassium 3.6 3.8 4.1   BUN 15 16 23 H   Creatinine 0.9 0.9 0.8   eGFR if African American >60.0 >60.0 >60.0   eGFR if non African American >60.0 >60.0 >60.0   Calcium 9.3 9.4 9.2         CARDIAC BIOMARKERS:        COAGS:        LIPIDS/LFTS:  Recent Labs   Lab 02/23/21  1357 04/08/21  1308 08/26/21  0959 06/15/22  1026   Cholesterol  --  250 H  --   --    Triglycerides  --  158 H  --   --    HDL  --   66  --   --    LDL Cholesterol  --  152.4  --   --    Non-HDL Cholesterol  --  184  --   --    AST 21  --  15 13   ALT 23  --  18 15       The 10-year ASCVD risk score (Johann BLANCO, et al., 2019) is: 2.1%    Values used to calculate the score:      Age: 47 years      Sex: Male      Is Non- : No      Diabetic: No      Tobacco smoker: No      Systolic Blood Pressure: 118 mmHg      Is BP treated: Yes      HDL Cholesterol: 66 mg/dL      Total Cholesterol: 250 mg/dL      Cardiovascular Testing:  Echo 3/23/21  The left ventricle is normal in size with concentric hypertrophy and normal systolic function. The estimated ejection fraction is 55%  Normal left ventricular diastolic function.  Normal right ventricular size with normal right ventricular systolic function.  Normal central venous pressure (3 mmHg).  The estimated PA systolic pressure is 25 mmHg.    Holter 3/23/21  The diary was properly completed  Sinus rhythm with heart rates varying between 60 and 148 bpm with an average of 95 bpm  There were very rare PVCs  There were frequent PACs totalling 1458 and averaging 30.39 per hour  Symptoms associated with sinus tachycardia.    Carotid US 1/5/18   Less than 50% stenosis of the internal carotid arteries bilaterally  Antegrade vertebral arteries bilateral    ASSESSMENT:   # CP, resolved  # hx NICM, ?psoriatic myocarditis 2012, LVEF normalized.  Currently euvolemic.  # WEBSTER/palps, resolved.  Holter/echo 3/2021 neg  # HTN, controlled  # ?hx CVA r/t LV apical thrombus.  # dyslipidemia, on atorva 10mg   # psoriasis/gout, followed by Rheum  # BMI 36, up 6 unit(s) vs last OV    PLAN:   Cont med rx/GDMT  Decrease lasix to 40mg qd  Diet/exercise/weight loss  RTC 6 months, consider stopping lasix at follow up.      Flako Cunningham MD, FACC

## 2022-10-10 RX ORDER — RISANKIZUMAB-RZAA 150 MG/ML
150 INJECTION SUBCUTANEOUS
Qty: 1 ML | Refills: 3 | Status: SHIPPED | OUTPATIENT
Start: 2022-10-10 | End: 2023-09-25 | Stop reason: SDUPTHER

## 2022-10-13 ENCOUNTER — SPECIALTY PHARMACY (OUTPATIENT)
Dept: PHARMACY | Facility: CLINIC | Age: 47
End: 2022-10-13
Payer: MEDICAID

## 2022-10-13 NOTE — TELEPHONE ENCOUNTER
Specialty Pharmacy - Refill Coordination    Specialty Medication Orders Linked to Encounter      Flowsheet Row Most Recent Value   Medication #1 OTEZLA 30 mg Tab (Order#[id number], Rx#[id number])            Refill Questions - Documented Responses      Flowsheet Row Most Recent Value   Patient Availability and HIPAA Verification    Does patient want to proceed with activity? Yes   HIPAA/medical authority confirmed? Yes   Relationship to patient of person spoken to? Self   Refill Screening Questions    Changes to allergies? No   Changes to medications? Yes  [Robaxin due to sciatica- no DDI with Otezla]   New conditions since last clinic visit? Yes  [Sciatica]   Unplanned office visit, urgent care, ED, or hospital admission in the last 4 weeks? No   How does patient/caregiver feel medication is working? Good   Financial problems or insurance changes? No   How many doses of your specialty medications were missed in the last 4 weeks? 0   Would patient like to speak to a pharmacist? No   When does the patient need to receive the medication? 10/14/22   Refill Delivery Questions    How will the patient receive the medication? MEDRx   When does the patient need to receive the medication? 10/14/22   Shipping Address Home   Address in [facility] confirmed and updated if neccessary? Yes   Expected Copay ($) 0   Is the patient able to afford the medication copay? Yes   Payment Method zero copay   Days supply of Refill 30   Supplies needed? No supplies needed   Refill activity completed? Yes   Refill activity plan Refill scheduled   Shipment/Pickup Date: 10/13/22            Current Outpatient Medications   Medication Sig    allopurinoL (ZYLOPRIM) 300 MG tablet TAKE 1 & 1/2 (ONE & ONE-HALF) TABLETS BY MOUTH ONCE DAILY    atorvastatin (LIPITOR) 10 MG tablet Take 1 tablet (10 mg total) by mouth once daily.    calcipotriene (DOVONOX) 0.005 % cream APPLY  CREAM TOPICALLY TO AFFECTED AREA TWICE DAILY    carvediloL (COREG) 25 MG  tablet Take 1 tablet (25 mg total) by mouth 2 (two) times daily with meals.    clobetasoL (TEMOVATE) 0.05 % external solution APPLY  SOLUTION TOPICALLY TO SCALP  AS NEEDED FOR ITCHING OR SCALING.    colchicine (COLCRYS) 0.6 mg tablet Take 1 tablet by mouth twice daily    colchicine (MITIGARE) 0.6 mg Cap TAKE 1 CAPSULE BY MOUTH TWICE DAILY TO PREVENT GOUT FLARES    doxycycline (VIBRA-TABS) 100 MG tablet Take 1 tablet (100 mg total) by mouth once daily. Take with food and do not take within 30-60 minutes of lying down.    furosemide (LASIX) 40 MG tablet Take 1 tablet (40 mg total) by mouth once daily.    halobetasol (ULTRAVATE) 0.05 % ointment APPLY  OINTMENT TOPICALLY TO AFFECTED AREA TWICE DAILY    lisinopriL (PRINIVIL,ZESTRIL) 20 MG tablet Take 1 tablet by mouth once daily    methylPREDNISolone (MEDROL DOSEPACK) 4 mg tablet follow package directions    methylPREDNISolone (MEDROL DOSEPACK) 4 mg tablet use as directed    OTEZLA 30 mg Tab Take 1 tablet by mouth twice daily.    potassium chloride SA (K-DUR,KLOR-CON) 20 MEQ tablet Take 1 tablet (20 mEq total) by mouth once daily.    risankizumab-rzaa (SKYRIZI) 150 mg/mL Syrg Inject 1 syringe (150 mg) into the skin every 12 weeks.    triamcinolone acetonide 0.1% (KENALOG) 0.1 % ointment APPLY  OINTMENT TOPICALLY TO AFFECTED AREA TWICE DAILY   Last reviewed on 9/29/2022  3:31 PM by Flako Cunningham MD    Review of patient's allergies indicates:  No Known Allergies Last reviewed on  9/29/2022 3:31 PM by Flako Cunningham      Tasks added this encounter   11/6/2022 - Refill Call (Auto Added)  10/13/2022 -  Setup Confirmation   Tasks due within next 3 months   11/8/2022 - Refill Call (Auto Added)     Salina Pickering, PharmD  [facility]  [address] [place] [id number]  Phone: [phone]  Fax: [phone]

## 2022-11-02 ENCOUNTER — CLINICAL SUPPORT (OUTPATIENT)
Dept: REHABILITATION | Facility: HOSPITAL | Age: 47
End: 2022-11-02
Attending: INTERNAL MEDICINE
Payer: MEDICAID

## 2022-11-02 DIAGNOSIS — M53.86 DECREASED RANGE OF MOTION OF LUMBAR SPINE: ICD-10-CM

## 2022-11-02 DIAGNOSIS — M62.81 WEAKNESS OF TRUNK MUSCULATURE: Primary | ICD-10-CM

## 2022-11-02 DIAGNOSIS — M54.32 SCIATICA OF LEFT SIDE: ICD-10-CM

## 2022-11-02 PROCEDURE — 97161 PT EVAL LOW COMPLEX 20 MIN: CPT | Mod: PN

## 2022-11-02 PROCEDURE — 97110 THERAPEUTIC EXERCISES: CPT | Mod: PN

## 2022-11-02 NOTE — PLAN OF CARE
"[facility]  Physical Therapy Initial Evaluation    Name: Adrien Panda  Clinic Number: [id number]    Therapy Diagnosis:   Encounter Diagnoses   Name Primary?    Sciatica of left side     Decreased range of motion of lumbar spine     Weakness of trunk musculature Yes     Physician: Devonte Hermosillo MD    Physician Orders: PT Eval and Treat   Medical Diagnosis from Referral: M54.32 (ICD-10-CM) - Sciatica of left side  Evaluation Date: 11/2/2022  Plan of Care Expiration: 2/2/23    Authorization Period Expiration: 9/14/23  Visit # / Visits authorized: 1/ 1      Time In: 1200  Time Out: 1255    Total Billable Time: 55 minutes    Precautions: Standard    Subjective   Date of onset: 2 months    History of current condition:   Adrien  is a 47 year old male presenting with c/o left sided low back and left glute pain radiating to the back of the thigh.  He reports an insidious onset 2 months ago.   He reports a history of low back pain of 5 years ago but states this is different.  He denies any recent diagnostics or injections.  He states he is not working. His goal is to "stop as much pain as possible".      Medical History:   Past Medical History:   Diagnosis Date    Arthritis     Blood clotting tendency     Congestive heart failure     High cholesterol     Hyperlipemia     Hypertension     Joint pain     Psoriasis        Surgical History:   Adrien Panda  has no past surgical history on file.    Medications:   Adrien has a current medication list which includes the following prescription(s): allopurinol, atorvastatin, calcipotriene, carvedilol, clobetasol, colchicine, colchicine, doxycycline, furosemide, halobetasol, lisinopril, methylprednisolone, methylprednisolone, otezla, potassium chloride sa, skyrizi, triamcinolone acetonide 0.1%, and [DISCONTINUED] divalproex.    Allergies:   Review of patient's allergies indicates:  No Known Allergies     Imaging: Lumbar x-ray 5 years " "ago reveals:  Overall alignment is well maintained.  No evidence of spondylolysis or spondylolisthesis. Moderate facet arthropathy is seen at L5/S1 bilaterally. Mild facet arthropathy at L4/5 bilaterally. . Disk and vertebral body heights are normal.       Extraosseous structures are intact.  IMPRESSION:     See above.          Electronically signed by: HERIBERTO TRONCOSO MD    Prior Therapy: 5 years ago, lumbar  Social History:  unknown  Occupation: not working  Prior Level of Function: independent  Current Level of Function: independent    Pain:  Current 5/10, worst 10/10, best 4/10   Location: left lumbar, left glutes     Aggravating Factors:  picking up something heavy, prolonged sitting, prolonged standing, walking long distance, bending  Easing Factors: coldpac, exercise    Pts goals: His goal is to "stop as much pain as possible".       Objective     Observation:  no sign of antalgia with gait. Stands with a flat back posture. Mild paraspinal and glute atrophy bilateral.   Palpation: mild tenderness L3-S1 interspinous space, left QL, piriformis      Range of Motion/Strength:      Lumbar AROM: Degrees   Flexion 35   Extension 5   Right side bending 10   Left side bending 10   Lumbar quadrant reveals:  left quadrant positive    AROM: Bilateral LE: Grossly WFL  MMT:  Right LE: 4   Left LE: 4-  Abdominal Strength: 3+/5    Mobility: L/S p/a grade 2  Flexibility: hip flexor length: fair      Hamstring Length: fair    Bed Mobility:Independent  Transfers: Independent    Special Tests:   -LLD:right > left 2 cm  -Slump: Negative  -SLR: negative  -Hip scour: negative  -Crispin's: Negative  -SIJ gapping and compression: Negative    CMS Impairment/Limitation/Restriction for FOTO Hip Survey  Status Limitation G-Code CMS Severity Modifier  Intake 31% 69% Current Status CL - At least 60 percent but less than 80 percent  Predicted 55% 45% Goal Status+ CK - At least 40 percent but less than 60 percent    TREATMENT     Treatment Time " In: 1230  Treatment Time Out: 1255    Total Treatment time separate from Evaluation: 25 minutes    Adrien received therapeutic exercises to develop strength, endurance, ROM, flexibility, posture and core stabilization for 20 minutes including:     LTR x 20  Piriformis stretch 30 sec x 4  QL, leg over stretch 30 sec x 4  PPT x 20   TrA recruitment 2 x 10  Prone alt arm/alt leg 2 x 10    Adrien received the following manual therapy techniques:  were applied to the: pelvic for 5 minutes, including:  Right innominate posterior: MET      Education provided:   - HEP compliance    Written Home Exercises Provided: yes.    Exercises were reviewed and Adrien was able to demonstrate them prior to the end of the session.  Adrien demonstrated good  understanding of the education provided.     See EMR under Patient Instructions for exercises provided 11/2/2022.    Assessment     Pt presents with signs and symptoms consistent with referring diagnosis. Evaluation has determined a decrease in functional status and subjective and objective deficits that can be addressed by physical therapy intervention. Pt demonstrates pain limiting functional activities. Decreased flexibility and strength limiting normal movement patterns. Decreased segmental motion. Decreased postural strength and awareness. Positive special testing. Decreased participation in functional and recreational activities. Subjective and objective measures are addressed by goals in the plan of care.  Patient/family are involved in the development of these goals. Patient/family are educated about current injury and treatment.       Plan of care was dicussed with patient. Pt will benefit from skilled outpatient Physical Therapy to address the deficits stated above and in the chart below, provide pt/family education, and to maximize pt's level of independence. Pt's spiritual, cultural and educational needs considered and patient is agreeable to the plan of care and goals  as stated below:     Pt prognosis is Good.  Anticipated Barriers for therapy: none    Medical Necessity is demonstrated by the following  History  Co-morbidities and personal factors that may impact the plan of care Co-morbidities:   Arthritis   Blood clotting tendency   Congestive heart failure   High cholesterol   Hyperlipemia   Hypertension   Joint pain   Psoriasis       Personal Factors:   no deficits     low   Examination  Body Structures and Functions, activity limitations and participation restrictions that may impact the plan of care Body Regions:   back  lower extremities    Body Systems:    gross symmetry  ROM  strength  transitions    Participation Restrictions:   Housework, yardwork     Activity limitations:   Learning and applying knowledge  no deficits    General Tasks and Commands  no deficits    Communication  no deficits    Mobility  lifting and carrying objects  walking    Self care  no deficits    Domestic Life  shopping  cooking  doing house work (cleaning house, washing dishes, laundry)  assisting others    Interactions/Relationships  no deficits    Life Areas  no deficits    Community and Social Life  community life  recreation and leisure         low   Clinical Presentation stable and uncomplicated low   Decision Making/ Complexity Score: low     Goals:    Short Term Goals (4 Weeks):     1.Pt to increase strength by a 1/2 grade of muscles test to allow for improvement in functional activities such as performing chores.  2.Pt to improve range of motion by 25% to allow for improved functional mobility to allow for improvement in IADLs.   3.Pt to report compliance with HEP and demonstrate proper exercise technique to PT to show competence with self management of condition.  4.Decrease pain by 25% during functional activities.    Long Term Goals (12 Weeks):     1. Increase ROM to allow improved joint biomechanics during functional activities.   2.Increase trunk and lower extremity strength to  within normal limits during functional activities.   3. Independent with home exercise program.   4. Full return to functional activities with manageable complaints.  5. Patient to demonstrate improved posture and body mechanics.  6. Decrease pain by 75% during functional activities.    Plan     Plan of care Certification: 11/2/2022 to 2/2/23.    Recommended Treatment Plan: 2 times per week for 12 weeks with treatments to consist of:  Neuromuscular and postural re-education,  training, therapeutic exercise, therapeutic activities,balance training, gait training, manual therapy, soft tissue mobilization, ROM exercises, Cardiovascular,  Postural stabilization, manual traction, spinal mobilization, moist heat, cryotherapy, electrical stimulation, ultrasound, home exercise education and planning.    David Rudd, PT

## 2022-11-07 ENCOUNTER — PATIENT MESSAGE (OUTPATIENT)
Dept: PHARMACY | Facility: CLINIC | Age: 47
End: 2022-11-07
Payer: MEDICAID

## 2022-11-07 DIAGNOSIS — L40.9 PSORIASIS: ICD-10-CM

## 2022-11-07 RX ORDER — APREMILAST 30 MG/1
TABLET, FILM COATED ORAL
Qty: 60 TABLET | Refills: 2 | Status: SHIPPED | OUTPATIENT
Start: 2022-11-07 | End: 2023-02-06 | Stop reason: SDUPTHER

## 2022-11-09 ENCOUNTER — PATIENT MESSAGE (OUTPATIENT)
Dept: DERMATOLOGY | Facility: CLINIC | Age: 47
End: 2022-11-09
Payer: MEDICAID

## 2022-11-11 ENCOUNTER — SPECIALTY PHARMACY (OUTPATIENT)
Dept: PHARMACY | Facility: CLINIC | Age: 47
End: 2022-11-11
Payer: MEDICAID

## 2022-11-11 NOTE — TELEPHONE ENCOUNTER
Specialty Pharmacy - Refill Coordination    Specialty Medication Orders Linked to Encounter      Flowsheet Row Most Recent Value   Medication #1 OTEZLA 30 mg Tab (Order#[id number], Rx#[id number])            Refill Questions - Documented Responses      Flowsheet Row Most Recent Value   Patient Availability and HIPAA Verification    Does patient want to proceed with activity? Yes   HIPAA/medical authority confirmed? Yes   Relationship to patient of person spoken to? Self   Refill Screening Questions    Changes to allergies? No   Changes to medications? No   New conditions since last clinic visit? No   Unplanned office visit, urgent care, ED, or hospital admission in the last 4 weeks? No   How does patient/caregiver feel medication is working? Excellent   Financial problems or insurance changes? No   How many doses of your specialty medications were missed in the last 4 weeks? 0   Would patient like to speak to a pharmacist? No   When does the patient need to receive the medication? 11/15/22   Refill Delivery Questions    How will the patient receive the medication? [facility]   When does the patient need to receive the medication? 11/15/22   Shipping Address Home   Address in [facility] confirmed and updated if neccessary? Yes   Expected Copay ($) 0   Is the patient able to afford the medication copay? Yes   Payment Method zero copay   Days supply of Refill 30   Supplies needed? No supplies needed   Refill activity completed? Yes   Refill activity plan Refill scheduled   Shipment/Pickup Date: 11/14/22            Current Outpatient Medications   Medication Sig    allopurinoL (ZYLOPRIM) 300 MG tablet TAKE 1 & 1/2 (ONE & ONE-HALF) TABLETS BY MOUTH ONCE DAILY    atorvastatin (LIPITOR) 10 MG tablet Take 1 tablet (10 mg total) by mouth once daily.    calcipotriene (DOVONOX) 0.005 % cream APPLY  CREAM TOPICALLY TO AFFECTED AREA TWICE DAILY    carvediloL (COREG) 25 MG tablet Take 1 tablet (25 mg total) by mouth 2 (two)  times daily with meals.    clobetasoL (TEMOVATE) 0.05 % external solution APPLY  SOLUTION TOPICALLY TO SCALP  AS NEEDED FOR ITCHING OR SCALING.    colchicine (COLCRYS) 0.6 mg tablet Take 1 tablet by mouth twice daily    colchicine (MITIGARE) 0.6 mg Cap TAKE 1 CAPSULE BY MOUTH TWICE DAILY TO PREVENT GOUT FLARES    doxycycline (VIBRA-TABS) 100 MG tablet Take 1 tablet (100 mg total) by mouth once daily. Take with food and do not take within 30-60 minutes of lying down.    furosemide (LASIX) 40 MG tablet Take 1 tablet (40 mg total) by mouth once daily.    halobetasol (ULTRAVATE) 0.05 % ointment APPLY  OINTMENT TOPICALLY TO AFFECTED AREA TWICE DAILY    lisinopriL (PRINIVIL,ZESTRIL) 20 MG tablet Take 1 tablet by mouth once daily    methylPREDNISolone (MEDROL DOSEPACK) 4 mg tablet follow package directions    methylPREDNISolone (MEDROL DOSEPACK) 4 mg tablet use as directed    OTEZLA 30 mg Tab Take 1 tablet by mouth twice daily.    potassium chloride SA (K-DUR,KLOR-CON) 20 MEQ tablet Take 1 tablet (20 mEq total) by mouth once daily.    risankizumab-rzaa (SKYRIZI) 150 mg/mL Syrg Inject 1 syringe (150 mg) into the skin every 12 weeks.    triamcinolone acetonide 0.1% (KENALOG) 0.1 % ointment APPLY  OINTMENT TOPICALLY TO AFFECTED AREA TWICE DAILY   Last reviewed on 9/29/2022  3:31 PM by Flako Cunningham MD    Review of patient's allergies indicates:  No Known Allergies Last reviewed on  9/29/2022 3:31 PM by Flako Cunningham      Tasks added this encounter   12/8/2022 - Refill Call (Auto Added)   Tasks due within next 3 months   11/8/2022 - Refill Call (Auto Added)     Triston Hwang, PharmD  [facility]  [address] [place] [id number]  Phone: [phone]  Fax: [phone]

## 2022-11-14 ENCOUNTER — SPECIALTY PHARMACY (OUTPATIENT)
Dept: PHARMACY | Facility: CLINIC | Age: 47
End: 2022-11-14
Payer: MEDICAID

## 2022-11-14 NOTE — TELEPHONE ENCOUNTER
Specialty Pharmacy - Refill Coordination    Specialty Medication Orders Linked to Encounter      Flowsheet Row Most Recent Value   Medication #1 risankizumab-rzaa (SKYRIZI) 150 mg/mL Syrg (Order#[id number], Rx#[id number])            Refill Questions - Documented Responses      Flowsheet Row Most Recent Value   Patient Availability and HIPAA Verification    Does patient want to proceed with activity? Yes   HIPAA/medical authority confirmed? Yes   Relationship to patient of person spoken to? Self   Refill Screening Questions    Changes to allergies? No   Changes to medications? No   New conditions since last clinic visit? No   Unplanned office visit, urgent care, ED, or hospital admission in the last 4 weeks? No   How does patient/caregiver feel medication is working? Good   Financial problems or insurance changes? No   How many doses of your specialty medications were missed in the last 4 weeks? 0   Would patient like to speak to a pharmacist? No   When does the patient need to receive the medication? 11/15/22   Refill Delivery Questions    How will the patient receive the medication?    When does the patient need to receive the medication? 11/15/22   Shipping Address Prescription   Address in [facility] confirmed and updated if neccessary? Yes   Expected Copay ($) 0   Is the patient able to afford the medication copay? Yes   Payment Method zero copay   Days supply of Refill 84   Supplies needed? No supplies needed   Refill activity completed? Yes   Refill activity plan Refill scheduled            Current Outpatient Medications   Medication Sig    allopurinoL (ZYLOPRIM) 300 MG tablet TAKE 1 & 1/2 (ONE & ONE-HALF) TABLETS BY MOUTH ONCE DAILY    atorvastatin (LIPITOR) 10 MG tablet Take 1 tablet (10 mg total) by mouth once daily.    calcipotriene (DOVONOX) 0.005 % cream APPLY  CREAM TOPICALLY TO AFFECTED AREA TWICE DAILY    carvediloL (COREG) 25 MG tablet Take 1 tablet (25 mg total) by mouth 2 (two) times  daily with meals.    clobetasoL (TEMOVATE) 0.05 % external solution APPLY  SOLUTION TOPICALLY TO SCALP  AS NEEDED FOR ITCHING OR SCALING.    colchicine (COLCRYS) 0.6 mg tablet Take 1 tablet by mouth twice daily    colchicine (MITIGARE) 0.6 mg Cap TAKE 1 CAPSULE BY MOUTH TWICE DAILY TO PREVENT GOUT FLARES    doxycycline (VIBRA-TABS) 100 MG tablet Take 1 tablet (100 mg total) by mouth once daily. Take with food and do not take within 30-60 minutes of lying down.    furosemide (LASIX) 40 MG tablet Take 1 tablet (40 mg total) by mouth once daily.    halobetasol (ULTRAVATE) 0.05 % ointment APPLY  OINTMENT TOPICALLY TO AFFECTED AREA TWICE DAILY    lisinopriL (PRINIVIL,ZESTRIL) 20 MG tablet Take 1 tablet by mouth once daily    methylPREDNISolone (MEDROL DOSEPACK) 4 mg tablet follow package directions    methylPREDNISolone (MEDROL DOSEPACK) 4 mg tablet use as directed    OTEZLA 30 mg Tab Take 1 tablet by mouth twice daily.    potassium chloride SA (K-DUR,KLOR-CON) 20 MEQ tablet Take 1 tablet (20 mEq total) by mouth once daily.    risankizumab-rzaa (SKYRIZI) 150 mg/mL Syrg Inject 1 syringe (150 mg) into the skin every 12 weeks.    triamcinolone acetonide 0.1% (KENALOG) 0.1 % ointment APPLY  OINTMENT TOPICALLY TO AFFECTED AREA TWICE DAILY   Last reviewed on 9/29/2022  3:31 PM by Flako Cunningham MD    Review of patient's allergies indicates:  No Known Allergies Last reviewed on  9/29/2022 3:31 PM by Flako Cunningham      Tasks added this encounter   2/5/2023 - Refill Call (Auto Added)  11/14/2022 -  Setup Confirmation   Tasks due within next 3 months   12/8/2022 - Refill Call (Auto Added)     Evon Jaimes  [facility]  [address] [place] [id number]  Phone: [phone]  Fax: [phone]

## 2022-11-16 ENCOUNTER — TELEPHONE (OUTPATIENT)
Dept: PHARMACY | Facility: CLINIC | Age: 47
End: 2022-11-16
Payer: MEDICAID

## 2022-11-17 ENCOUNTER — LAB VISIT (OUTPATIENT)
Dept: LAB | Facility: HOSPITAL | Age: 47
End: 2022-11-17
Attending: INTERNAL MEDICINE
Payer: MEDICAID

## 2022-11-17 ENCOUNTER — OFFICE VISIT (OUTPATIENT)
Dept: INTERNAL MEDICINE | Facility: CLINIC | Age: 47
End: 2022-11-17
Payer: MEDICAID

## 2022-11-17 ENCOUNTER — CLINICAL SUPPORT (OUTPATIENT)
Dept: REHABILITATION | Facility: HOSPITAL | Age: 47
End: 2022-11-17
Payer: MEDICAID

## 2022-11-17 VITALS
WEIGHT: 202.81 LBS | BODY MASS INDEX: 34.62 KG/M2 | DIASTOLIC BLOOD PRESSURE: 76 MMHG | OXYGEN SATURATION: 97 % | HEIGHT: 64 IN | HEART RATE: 91 BPM | RESPIRATION RATE: 18 BRPM | SYSTOLIC BLOOD PRESSURE: 122 MMHG

## 2022-11-17 DIAGNOSIS — E78.5 HYPERLIPIDEMIA, UNSPECIFIED HYPERLIPIDEMIA TYPE: ICD-10-CM

## 2022-11-17 DIAGNOSIS — Z00.00 ANNUAL PHYSICAL EXAM: Primary | ICD-10-CM

## 2022-11-17 DIAGNOSIS — L40.50 PSORIATIC ARTHRITIS: ICD-10-CM

## 2022-11-17 DIAGNOSIS — I10 ESSENTIAL HYPERTENSION: ICD-10-CM

## 2022-11-17 DIAGNOSIS — I42.8 NICM (NONISCHEMIC CARDIOMYOPATHY): ICD-10-CM

## 2022-11-17 DIAGNOSIS — M53.86 DECREASED RANGE OF MOTION OF LUMBAR SPINE: Primary | ICD-10-CM

## 2022-11-17 LAB
ALBUMIN SERPL BCP-MCNC: 3.9 G/DL (ref 3.5–5.2)
ALP SERPL-CCNC: 61 U/L (ref 55–135)
ALT SERPL W/O P-5'-P-CCNC: 25 U/L (ref 10–44)
AST SERPL-CCNC: 17 U/L (ref 10–40)
BILIRUB DIRECT SERPL-MCNC: 0.2 MG/DL (ref 0.1–0.3)
BILIRUB SERPL-MCNC: 0.4 MG/DL (ref 0.1–1)
CHOLEST SERPL-MCNC: 203 MG/DL (ref 120–199)
CHOLEST/HDLC SERPL: 4.4 {RATIO} (ref 2–5)
HDLC SERPL-MCNC: 46 MG/DL (ref 40–75)
HDLC SERPL: 22.7 % (ref 20–50)
LDLC SERPL CALC-MCNC: 130.4 MG/DL (ref 63–159)
NONHDLC SERPL-MCNC: 157 MG/DL
PROT SERPL-MCNC: 8 G/DL (ref 6–8.4)
TRIGL SERPL-MCNC: 133 MG/DL (ref 30–150)

## 2022-11-17 PROCEDURE — 99214 OFFICE O/P EST MOD 30 MIN: CPT | Mod: PBBFAC | Performed by: INTERNAL MEDICINE

## 2022-11-17 PROCEDURE — 99214 PR OFFICE/OUTPT VISIT, EST, LEVL IV, 30-39 MIN: ICD-10-PCS | Mod: S$PBB,,, | Performed by: INTERNAL MEDICINE

## 2022-11-17 PROCEDURE — 99999 PR PBB SHADOW E&M-EST. PATIENT-LVL IV: CPT | Mod: PBBFAC,,, | Performed by: INTERNAL MEDICINE

## 2022-11-17 PROCEDURE — 3008F PR BODY MASS INDEX (BMI) DOCUMENTED: ICD-10-PCS | Mod: CPTII,,, | Performed by: INTERNAL MEDICINE

## 2022-11-17 PROCEDURE — 3074F PR MOST RECENT SYSTOLIC BLOOD PRESSURE < 130 MM HG: ICD-10-PCS | Mod: CPTII,,, | Performed by: INTERNAL MEDICINE

## 2022-11-17 PROCEDURE — 99214 OFFICE O/P EST MOD 30 MIN: CPT | Mod: S$PBB,,, | Performed by: INTERNAL MEDICINE

## 2022-11-17 PROCEDURE — 97110 THERAPEUTIC EXERCISES: CPT | Mod: PN

## 2022-11-17 PROCEDURE — 3008F BODY MASS INDEX DOCD: CPT | Mod: CPTII,,, | Performed by: INTERNAL MEDICINE

## 2022-11-17 PROCEDURE — 4010F PR ACE/ARB THEARPY RXD/TAKEN: ICD-10-PCS | Mod: CPTII,,, | Performed by: INTERNAL MEDICINE

## 2022-11-17 PROCEDURE — 1159F PR MEDICATION LIST DOCUMENTED IN MEDICAL RECORD: ICD-10-PCS | Mod: CPTII,,, | Performed by: INTERNAL MEDICINE

## 2022-11-17 PROCEDURE — 3074F SYST BP LT 130 MM HG: CPT | Mod: CPTII,,, | Performed by: INTERNAL MEDICINE

## 2022-11-17 PROCEDURE — 1160F RVW MEDS BY RX/DR IN RCRD: CPT | Mod: CPTII,,, | Performed by: INTERNAL MEDICINE

## 2022-11-17 PROCEDURE — 4010F ACE/ARB THERAPY RXD/TAKEN: CPT | Mod: CPTII,,, | Performed by: INTERNAL MEDICINE

## 2022-11-17 PROCEDURE — 3078F PR MOST RECENT DIASTOLIC BLOOD PRESSURE < 80 MM HG: ICD-10-PCS | Mod: CPTII,,, | Performed by: INTERNAL MEDICINE

## 2022-11-17 PROCEDURE — 1159F MED LIST DOCD IN RCRD: CPT | Mod: CPTII,,, | Performed by: INTERNAL MEDICINE

## 2022-11-17 PROCEDURE — 99999 PR PBB SHADOW E&M-EST. PATIENT-LVL IV: ICD-10-PCS | Mod: PBBFAC,,, | Performed by: INTERNAL MEDICINE

## 2022-11-17 PROCEDURE — 36415 COLL VENOUS BLD VENIPUNCTURE: CPT | Performed by: INTERNAL MEDICINE

## 2022-11-17 PROCEDURE — 3078F DIAST BP <80 MM HG: CPT | Mod: CPTII,,, | Performed by: INTERNAL MEDICINE

## 2022-11-17 PROCEDURE — 80076 HEPATIC FUNCTION PANEL: CPT | Performed by: INTERNAL MEDICINE

## 2022-11-17 PROCEDURE — 1160F PR REVIEW ALL MEDS BY PRESCRIBER/CLIN PHARMACIST DOCUMENTED: ICD-10-PCS | Mod: CPTII,,, | Performed by: INTERNAL MEDICINE

## 2022-11-17 PROCEDURE — 80061 LIPID PANEL: CPT | Performed by: INTERNAL MEDICINE

## 2022-11-17 NOTE — PROGRESS NOTES
Physical Therapy Daily Treatment Note     Name: Adrien Arias   Clinic Number: [id number]    Therapy Diagnosis:   Encounter Diagnosis   Name Primary?    Decreased range of motion of lumbar spine Yes     Physician: Devonte Hermosillo MD    Visit Date: 11/17/2022  Physician Orders: PT Eval and Treat     Medical Diagnosis from Referral: M54.32 (ICD-10-CM) - Sciatica of left side  Evaluation Date: 11/2/2022  Plan of Care Expiration: 2/2/23     Authorization Period Expiration: 9/14/23  Visit # / Visits authorized: 1/ 1       Time In: 1500  Time Out: 1555    Total Billable Time: 30 minutes    Precautions: Standard    Subjective     Pt reports: pt returns after a couple of weeks due to being ill. Reports no significant changes since initial visit.   He was compliant with home exercise program.  Response to previous treatment: good  Functional change: none    Pain: 6/10  Location: lumbar      Objective     Adrien received therapeutic exercises to develop strength, endurance, ROM, flexibility, posture and core stabilization for 55  minutes including:      Upright bike x 6 min  LTR x 20  Piriformis stretch 30 sec x 4  QL, leg over stretch 30 sec x 4  PPT x 20   TrA recruitment 2 x 10  TrA recruitment 2 x 10 with hip flex/ext  Hamstring stretch 30 sec x 4  Prone alt arm/alt leg 2 x 10  Anti rotation RTB 3 x 10  Standing bilateral shoulder ext 3 x 10 RTB  Matrix hip ab 40lbs 3 x 10     Adrien received the following manual therapy techniques:  were applied to the: pelvic for 5 minutes, including: NP  Right innominate posterior: MET       Written Home Exercises Provided: Patient instructed to cont prior HEP.  Exercises were reviewed and Adrien was able to demonstrate them prior to the end of the session.  Adrien demonstrated good  understanding of the education provided.     See EMR under Patient Instructions for exercises provided 11/17/2022.    Assessment     Pt continues to ambulate with a guarded gait, decreased  lumbopelvic mobility.  Requires min cueing with pelvic mobility and abdominal response with supine therex. No c/o increased discomfort with prescribed activities.  Good response to exercise progression.      Adrien is progressing well towards his goals.   Pt prognosis is Good.     Pt will continue to benefit from skilled outpatient physical therapy to address the deficits listed in the problem list box on initial evaluation, provide pt/family education and to maximize pt's level of independence in the home and community environment.     Pt's spiritual, cultural and educational needs considered and pt agreeable to plan of care and goals.     Anticipated barriers to physical therapy: none    Short Term Goals (4 Weeks):     1.Pt to increase strength by a 1/2 grade of muscles test to allow for improvement in functional activities such as performing chores.  2.Pt to improve range of motion by 25% to allow for improved functional mobility to allow for improvement in IADLs.   3.Pt to report compliance with HEP and demonstrate proper exercise technique to PT to show competence with self management of condition.  4.Decrease pain by 25% during functional activities.    Long Term Goals (12 Weeks):     1. Increase ROM to allow improved joint biomechanics during functional activities.   2.Increase trunk and lower extremity strength to within normal limits during functional activities.   3. Independent with home exercise program.   4. Full return to functional activities with manageable complaints.  5. Patient to demonstrate improved posture and body mechanics.  6. Decrease pain by 75% during functional activities.       Plan     Progress lumbar stabilization next visit.       David Rudd, PT

## 2022-11-17 NOTE — PROGRESS NOTES
CC:  Annual exam     HPI: The patient is a 47-year-old male with hypertension, blood clotting tendencies, nonischemic cardiomyopathy and psoriatic arthritis who presents today for annual exam.  The patient was recently treated for sciatica.  He is seeing Dermatology as well for psoriatic arthritis.  The patient reports his skin is doing well with Skyrizi.    ROS:  Weight is staying stable.  He has eyes checked last year.  No auditory changes.  No dysphagia.  No chest pain.  No shortness of breath.  He does try to walk on occasion.  No nausea vomiting.  No abdominal pain.  He has a bowel movement daily.  No bladder changes.  No weakness in arms or does report having some swelling involving the right index finger which was related to psoriatic arthritis.      Physical exam:   General appearance:  No acute distress   HEENT: Conjunctiva is clear.  Pupils equal.  TMs are clear.  Nasal septum is midline without discharge.  Oropharynx without erythema.  Trachea is midline without JVD or thyromegaly.    Pulmonary:  Good inspiratory, expiratory breath sounds are heard.  Lungs are clear auscultation.    Cardiovascular:  S1-S2, rhythm is normal.  2+ carotid pulse of bruits.  Extremities without edema.    GI: Abdomen is nontender, nondistended without hepatosplenomegaly   Lymphatics:  No cervical adenopathy     Assessment:  1. Nonischemic cardiomyopathy  2.  Psoriatic arthritis  3.  Hypertension  4.  Hyperlipidemia     Plan:  1.  The patient's chart was reviewed.  The patient does not appear to be taking atorvastatin 10 mg once a day   2. Will check an LFT and lipid panel.  Pending test results will restart atorvastatin 10 mg once a day.

## 2022-11-18 DIAGNOSIS — E78.5 HYPERLIPIDEMIA, UNSPECIFIED HYPERLIPIDEMIA TYPE: ICD-10-CM

## 2022-11-18 RX ORDER — ATORVASTATIN CALCIUM 10 MG/1
10 TABLET, FILM COATED ORAL DAILY
Qty: 90 TABLET | Refills: 3 | Status: SHIPPED | OUTPATIENT
Start: 2022-11-18 | End: 2024-02-07 | Stop reason: SDUPTHER

## 2022-11-22 ENCOUNTER — PATIENT MESSAGE (OUTPATIENT)
Dept: ADMINISTRATIVE | Facility: HOSPITAL | Age: 47
End: 2022-11-22
Payer: MEDICAID

## 2022-11-29 ENCOUNTER — OFFICE VISIT (OUTPATIENT)
Dept: DERMATOLOGY | Facility: CLINIC | Age: 47
End: 2022-11-29
Payer: MEDICAID

## 2022-11-29 DIAGNOSIS — L40.0 PSORIASIS VULGARIS: Primary | ICD-10-CM

## 2022-11-29 DIAGNOSIS — L40.50 PSORIATIC ARTHRITIS: ICD-10-CM

## 2022-11-29 DIAGNOSIS — Z79.899 ENCOUNTER FOR LONG-TERM (CURRENT) USE OF HIGH-RISK MEDICATION: ICD-10-CM

## 2022-11-29 PROCEDURE — 1160F RVW MEDS BY RX/DR IN RCRD: CPT | Mod: CPTII,,, | Performed by: PATHOLOGY

## 2022-11-29 PROCEDURE — 11901 INJECT SKIN LESIONS >7: CPT | Mod: PBBFAC | Performed by: PATHOLOGY

## 2022-11-29 PROCEDURE — 11901 INJECT SKIN LESIONS >7: CPT | Mod: S$PBB,,, | Performed by: PATHOLOGY

## 2022-11-29 PROCEDURE — 99213 OFFICE O/P EST LOW 20 MIN: CPT | Mod: PBBFAC | Performed by: PATHOLOGY

## 2022-11-29 PROCEDURE — 99999 PR PBB SHADOW E&M-EST. PATIENT-LVL III: ICD-10-PCS | Mod: PBBFAC,,, | Performed by: PATHOLOGY

## 2022-11-29 PROCEDURE — 99214 PR OFFICE/OUTPT VISIT, EST, LEVL IV, 30-39 MIN: ICD-10-PCS | Mod: 25,S$PBB,, | Performed by: PATHOLOGY

## 2022-11-29 PROCEDURE — 11901 PR INJECTION, SKIN LESIONS, 8 OR MORE: ICD-10-PCS | Mod: S$PBB,,, | Performed by: PATHOLOGY

## 2022-11-29 PROCEDURE — 4010F PR ACE/ARB THEARPY RXD/TAKEN: ICD-10-PCS | Mod: CPTII,,, | Performed by: PATHOLOGY

## 2022-11-29 PROCEDURE — 1159F MED LIST DOCD IN RCRD: CPT | Mod: CPTII,,, | Performed by: PATHOLOGY

## 2022-11-29 PROCEDURE — 1160F PR REVIEW ALL MEDS BY PRESCRIBER/CLIN PHARMACIST DOCUMENTED: ICD-10-PCS | Mod: CPTII,,, | Performed by: PATHOLOGY

## 2022-11-29 PROCEDURE — 4010F ACE/ARB THERAPY RXD/TAKEN: CPT | Mod: CPTII,,, | Performed by: PATHOLOGY

## 2022-11-29 PROCEDURE — 99214 OFFICE O/P EST MOD 30 MIN: CPT | Mod: 25,S$PBB,, | Performed by: PATHOLOGY

## 2022-11-29 PROCEDURE — 99999 PR PBB SHADOW E&M-EST. PATIENT-LVL III: CPT | Mod: PBBFAC,,, | Performed by: PATHOLOGY

## 2022-11-29 PROCEDURE — 1159F PR MEDICATION LIST DOCUMENTED IN MEDICAL RECORD: ICD-10-PCS | Mod: CPTII,,, | Performed by: PATHOLOGY

## 2022-11-29 NOTE — PROGRESS NOTES
Subjective:       Patient ID:  Adrien Panda is a 47 y.o. male who presents for   Chief Complaint   Patient presents with    Psoriasis     legs     HPI  Pt well known to me for psoriasis/ psoriatic arthritis - on skyrizi, otezla, topical steroids.  LV 8/23/22 - skyrizi injection.   Plaques on legs somewhat improved, but intermittently flare in 2-3 weeks leading up to next injection.  Got worse after Covid vaccine booster 10/31/21 and also a few weeks ago after viral illness.  Arthritis and joint swelling in left 2nd digit of the hand worse but right 2nd digit of the foot somewhat improved.  Using topicals - clobetasol, lidex, dovonex.  Started 10/23/19.  Re-started Otezla about the same time.     Taltz initially worked best of all of the other systemic agents he has tried, but legs flared Fall 2018 with progressive new scaly plaques moving proximal on bilateral legs to thighs.  Most recently, was on Ilumya, and prior to that MTX and Tremfya, which were ineffective and discontinued.     Started Taltz 1/2018. States he was clear when he first started it, but psoriasis recurred on his legs. No infections.       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 Rubin at [facility], cutaneous and joint gout tx by Dr. Hermosillo (psoriasis flared on colchicine), stroke      PSORIASIS: failed tx with Enbrel, Humira, Soriatane, Taltz, Ilumya, tremfya, otezla, clobetasol topical, betamethasone topical, Lidex topical, elocon topical.    ILK helps temporarily. ultravate works best out of all the topicals he's tried. psorcon 0.05% oint helps.    On Humira from 6/2015 - 4/2016 until he stopped responding to it. Switched back to enbrel at that point which only helped temporarily.    Added Soriatane to Enbrel but ended up with a bad flare on lower legs.  Not a good candidate for NBUVB as he lives on the [facility] and there is not light unit in this area.   Stelara 9/21/16 - 7/19/17 (stopped responding to  it)  Taltz 1/2018 - 11/2018 (stopped responding to it)  Tremfya 11/2018 - 4/2019 - never achieved significant clearance  Ilumya 4/2019 - 8/ 2019 - no improvement and progression  Currently - Savita (started Oct. 2019) and Otezla     TB gold: negative 02/24/2022 - negative     CBC, LFTs reviewed and WNL June 2022 and Nov 2022, respectively; due for quant gold Feb 2023        Review of Systems   Constitutional:  Negative for fever, fatigue and malaise.   Respiratory:  Negative for cough and shortness of breath.    Gastrointestinal:  Negative for nausea, vomiting and diarrhea.   Musculoskeletal:  Positive for joint swelling and arthralgias.   Skin:  Positive for itching and rash. Negative for abscesses.      Objective:    Physical Exam   Constitutional: He appears well-developed and well-nourished.   Neurological: He is alert.   Skin:   Areas Examined (abnormalities noted in diagram):   Scalp / Hair Palpated and Inspected  Head / Face Inspection Performed  Neck Inspection Performed  Chest / Axilla Inspection Performed  Abdomen Inspection Performed  Genitals / Buttocks / Groin Inspection Performed  Back Inspection Performed  RUE Inspected  LUE Inspection Performed  RLE Inspected  LLE Inspection Performed  Nails and Digits Inspection Performed                Diagram Legend     Erythematous scaling macule/papule c/w actinic keratosis       Vascular papule c/w angioma      Pigmented verrucoid papule/plaque c/w seborrheic keratosis      Yellow umbilicated papule c/w sebaceous hyperplasia      Irregularly shaped tan macule c/w lentigo     1-2 mm smooth white papules consistent with Milia      Movable subcutaneous cyst with punctum c/w epidermal inclusion cyst      Subcutaneous movable cyst c/w pilar cyst      Firm pink to brown papule c/w dermatofibroma      Pedunculated fleshy papule(s) c/w skin tag(s)      Evenly pigmented macule c/w junctional nevus     Mildly variegated pigmented, slightly irregular-bordered macule c/w  mildly atypical nevus      Flesh colored to evenly pigmented papule c/w intradermal nevus       Pink pearly papule/plaque c/w basal cell carcinoma      Erythematous hyperkeratotic cursted plaque c/w SCC      Surgical scar with no sign of skin cancer recurrence      Open and closed comedones      Inflammatory papules and pustules      Verrucoid papule consistent consistent with wart     Erythematous eczematous patches and plaques     Dystrophic onycholytic nail with subungual debris c/w onychomycosis     Umbilicated papule    Erythematous-base heme-crusted tan verrucoid plaque consistent with inflamed seborrheic keratosis     Erythematous Silvery Scaling Plaque c/w Psoriasis     See annotation      Assessment / Plan:        Psoriasis vulgaris - fairly well controlled with thin residual plaques to bilateral shins, calves.  + psoriatic arthritis, as well - currently fairly well controlled.  Continue Skyrizi - injection today by LPN without complications.  Continue Otezla.  Continue topical steroids and dovonex as needed.    Intralesional Kenalog 10 mg/cc (3 cc total) injected into 8 lesions on the bilateral shins and right calf3 today after obtaining verbal consent including risk of surrounding hypopigmentation. Patient tolerated procedure well.    Units: 3  NDC for Kenalog 10mg/cc:  3517-4684-69      Psoriatic arthritis - as above    Encounter for long-term (current) use of high-risk medication - reviewed CBC June 2022; reviewed LFTs Nov 2022 - WNL; due for quant gold Feb. 2023           No follow-ups on file.

## 2022-12-01 ENCOUNTER — CLINICAL SUPPORT (OUTPATIENT)
Dept: REHABILITATION | Facility: HOSPITAL | Age: 47
End: 2022-12-01
Payer: MEDICAID

## 2022-12-01 DIAGNOSIS — M53.86 DECREASED RANGE OF MOTION OF LUMBAR SPINE: Primary | ICD-10-CM

## 2022-12-01 PROCEDURE — 97110 THERAPEUTIC EXERCISES: CPT | Mod: PN

## 2022-12-01 NOTE — PROGRESS NOTES
Physical Therapy Daily Treatment Note     Name: Adrien Arias   Clinic Number: [id number]    Therapy Diagnosis:   Encounter Diagnosis   Name Primary?    Decreased range of motion of lumbar spine Yes     Physician: Devonte Hermosillo MD    Visit Date: 12/1/2022  Physician Orders: PT Eval and Treat     Medical Diagnosis from Referral: M54.32 (ICD-10-CM) - Sciatica of left side  Evaluation Date: 11/2/2022  Plan of Care Expiration: 2/2/23     Authorization Period Expiration: 9/14/23  Visit # / Visits authorized: 1/ 1       Time In: 1230  Time Out: 1325    Total Billable Time: 55  minutes    Precautions: Standard    Subjective     Pt reports: the back might be doing a little better.   He was compliant with home exercise program.  Response to previous treatment: good  Functional change: none    Pain: 6/10  Location: lumbar      Objective     Adrien received therapeutic exercises to develop strength, endurance, ROM, flexibility, posture and core stabilization for 55  minutes including:      Upright bike x 6 min  LTR x 20  Piriformis stretch 30 sec x 4  QL, leg over stretch 30 sec x 4  PPT x 20   TrA recruitment 2 x 10  TrA recruitment 2 x 10 with hip flex/ext  Hamstring stretch 30 sec x 4  Prone alt arm/alt leg 2 x 10  Anti rotation RTB 3 x 10  Standing bilateral shoulder ext 3 x 10 RTB  Matrix hip ab 40lbs 3 x 10     Adrien received the following manual therapy techniques:  were applied to the: pelvic for 5 minutes, including: NP  Right innominate posterior: MET       Written Home Exercises Provided: Patient instructed to cont prior HEP.  Exercises were reviewed and Adrien was able to demonstrate them prior to the end of the session.  Adrien demonstrated good  understanding of the education provided.     See EMR under Patient Instructions for exercises provided 12/1/2022.    Assessment     Pt continues to ambulate with a guarded gait, decreased lumbopelvic mobility. Obliquity corrected with MET.  Requires  decreased cueing with pelvic mobility and abdominal response with supine therex. No c/o increased discomfort with prescribed activities.  Good response to exercise progression.      Adrien is progressing well towards his goals.   Pt prognosis is Good.     Pt will continue to benefit from skilled outpatient physical therapy to address the deficits listed in the problem list box on initial evaluation, provide pt/family education and to maximize pt's level of independence in the home and community environment.     Pt's spiritual, cultural and educational needs considered and pt agreeable to plan of care and goals.     Anticipated barriers to physical therapy: none    Short Term Goals (4 Weeks):     1.Pt to increase strength by a 1/2 grade of muscles test to allow for improvement in functional activities such as performing chores.  2.Pt to improve range of motion by 25% to allow for improved functional mobility to allow for improvement in IADLs.   3.Pt to report compliance with HEP and demonstrate proper exercise technique to PT to show competence with self management of condition.  4.Decrease pain by 25% during functional activities.    Long Term Goals (12 Weeks):     1. Increase ROM to allow improved joint biomechanics during functional activities.   2.Increase trunk and lower extremity strength to within normal limits during functional activities.   3. Independent with home exercise program.   4. Full return to functional activities with manageable complaints.  5. Patient to demonstrate improved posture and body mechanics.  6. Decrease pain by 75% during functional activities.       Plan     Progress lumbar stabilization next visit.       David Rudd, PT

## 2022-12-02 NOTE — PROGRESS NOTES
After obtaining consent, and per orders of Dr. Son, injection of Skyrizi 150mg/mL given subcutaneously in the right posterior arm by Marii Rosales LPN. Patient instructed to remain in clinic for 20 minutes afterwards, and to report any adverse reaction to me immediately.  LOT# [id number]  NDC# [id number]  EXP:[id number]       No

## 2022-12-08 ENCOUNTER — CLINICAL SUPPORT (OUTPATIENT)
Dept: REHABILITATION | Facility: HOSPITAL | Age: 47
End: 2022-12-08
Payer: MEDICAID

## 2022-12-08 ENCOUNTER — SPECIALTY PHARMACY (OUTPATIENT)
Dept: PHARMACY | Facility: CLINIC | Age: 47
End: 2022-12-08
Payer: MEDICAID

## 2022-12-08 DIAGNOSIS — M53.86 DECREASED RANGE OF MOTION OF LUMBAR SPINE: Primary | ICD-10-CM

## 2022-12-08 PROCEDURE — 97110 THERAPEUTIC EXERCISES: CPT | Mod: PN

## 2022-12-08 NOTE — PROGRESS NOTES
Physical Therapy Daily Treatment Note     Name: Adrien Arias   Clinic Number: [id number]    Therapy Diagnosis:   Encounter Diagnosis   Name Primary?    Decreased range of motion of lumbar spine Yes     Physician: Devonte Hermosillo MD    Visit Date: 12/8/2022  Physician Orders: PT Eval and Treat     Medical Diagnosis from Referral: M54.32 (ICD-10-CM) - Sciatica of left side  Evaluation Date: 11/2/2022  Plan of Care Expiration: 2/2/23     Authorization Period Expiration: 9/14/23  Visit # / Visits authorized: 1/ 1       Time In: 1230  Time Out: 1325    Total Billable Time: 55  minutes    Precautions: Standard    Subjective     Pt reports: the back is doing a little better with therapy.   He was compliant with home exercise program.  Response to previous treatment: good  Functional change: none    Pain: 4/10  Location: lumbar      Objective       Palpation:  Slight tenderness L3-S1 interspinous space, left QL, piriformis        Range of Motion/Strength:       Lumbar AROM: Degrees   Flexion 42   Extension 5   Right side bending 10   Left side bending 10     Adrien received therapeutic exercises to develop strength, endurance, ROM, flexibility, posture and core stabilization for 55  minutes including:      Upright bike x 6 min  LTR x 20  Piriformis stretch 30 sec x 4  QL, leg over stretch 30 sec x 4  PPT x 20   TrA recruitment 2 x 10  TrA recruitment 2 x 10 with hip flex/ext  Hamstring stretch 30 sec x 4  Prone alt arm/alt leg 2 x 10  Anti rotation RTB 3 x 10  Standing bilateral shoulder ext 3 x 10 RTB  Matrix hip ab 40lbs 3 x 10     Adrien received the following manual therapy techniques:  were applied to the: pelvic for 5 minutes, including: NP  Right innominate posterior: MET       Written Home Exercises Provided: Patient instructed to cont prior HEP.  Exercises were reviewed and Adrien was able to demonstrate them prior to the end of the session.  Adrien demonstrated good  understanding of the education  provided.     See EMR under Patient Instructions for exercises provided 12/8/2022.    Assessment     Pt continues to ambulate with a guarded gait, decreased lumbopelvic mobility.  Obliquity corrected with MET.  Improved performance with pelvic mobility and abdominal response with supine therex.  No c/o increased discomfort with prescribed activities.  Good response to exercise progression.      Adrien is progressing well towards his goals.   Pt prognosis is Good.     Pt will continue to benefit from skilled outpatient physical therapy to address the deficits listed in the problem list box on initial evaluation, provide pt/family education and to maximize pt's level of independence in the home and community environment.     Pt's spiritual, cultural and educational needs considered and pt agreeable to plan of care and goals.     Anticipated barriers to physical therapy: none    Short Term Goals (4 Weeks):   Updated 12/8/22  MET    1.Pt to increase strength by a 1/2 grade of muscles test to allow for improvement in functional activities such as performing chores.  2.Pt to improve range of motion by 25% to allow for improved functional mobility to allow for improvement in IADLs.   3.Pt to report compliance with HEP and demonstrate proper exercise technique to PT to show competence with self management of condition.  4.Decrease pain by 25% during functional activities.    Long Term Goals (12 Weeks):     1. Increase ROM to allow improved joint biomechanics during functional activities.   2.Increase trunk and lower extremity strength to within normal limits during functional activities.   3. Independent with home exercise program.   4. Full return to functional activities with manageable complaints.  5. Patient to demonstrate improved posture and body mechanics.  6. Decrease pain by 75% during functional activities.     Plan     Progress lumbar stabilization next visit.       David Rudd, PT

## 2022-12-08 NOTE — TELEPHONE ENCOUNTER
Specialty Pharmacy - Refill Coordination    Specialty Medication Orders Linked to Encounter      Flowsheet Row Most Recent Value   Medication #1 OTEZLA 30 mg Tab (Order#[id number], Rx#[id number])            Refill Questions - Documented Responses      Flowsheet Row Most Recent Value   Patient Availability and HIPAA Verification    Does patient want to proceed with activity? Yes   HIPAA/medical authority confirmed? Yes   Relationship to patient of person spoken to? Self   Refill Screening Questions    Changes to allergies? No   Changes to medications? No   New conditions since last clinic visit? No   Unplanned office visit, urgent care, ED, or hospital admission in the last 4 weeks? No   How does patient/caregiver feel medication is working? Good   Financial problems or insurance changes? No   How many doses of your specialty medications were missed in the last 4 weeks? 0   Would patient like to speak to a pharmacist? No   When does the patient need to receive the medication? 12/18/22   Refill Delivery Questions    How will the patient receive the medication? [facility]   When does the patient need to receive the medication? 12/18/22   Shipping Address Home   Address in [facility] confirmed and updated if neccessary? Yes   Expected Copay ($) 0   Is the patient able to afford the medication copay? Yes   Payment Method zero copay   Days supply of Refill 30   Supplies needed? No supplies needed   Refill activity completed? Yes   Refill activity plan Refill scheduled   Shipment/Pickup Date: 12/12/22            Current Outpatient Medications   Medication Sig    allopurinoL (ZYLOPRIM) 300 MG tablet TAKE 1 & 1/2 (ONE & ONE-HALF) TABLETS BY MOUTH ONCE DAILY    atorvastatin (LIPITOR) 10 MG tablet Take 1 tablet (10 mg total) by mouth once daily.    calcipotriene (DOVONOX) 0.005 % cream APPLY  CREAM TOPICALLY TO AFFECTED AREA TWICE DAILY    carvediloL (COREG) 25 MG tablet Take 1 tablet (25 mg total) by mouth 2 (two) times  daily with meals.    clobetasoL (TEMOVATE) 0.05 % external solution APPLY  SOLUTION TOPICALLY TO SCALP  AS NEEDED FOR ITCHING OR SCALING.    colchicine (COLCRYS) 0.6 mg tablet Take 1 tablet by mouth twice daily    colchicine (MITIGARE) 0.6 mg Cap TAKE 1 CAPSULE BY MOUTH TWICE DAILY TO PREVENT GOUT FLARES    furosemide (LASIX) 40 MG tablet Take 1 tablet (40 mg total) by mouth once daily. (Patient not taking: Reported on 11/29/2022)    halobetasol (ULTRAVATE) 0.05 % ointment APPLY  OINTMENT TOPICALLY TO AFFECTED AREA TWICE DAILY    lisinopriL (PRINIVIL,ZESTRIL) 20 MG tablet Take 1 tablet by mouth once daily    OTEZLA 30 mg Tab Take 1 tablet by mouth twice daily.    potassium chloride SA (K-DUR,KLOR-CON) 20 MEQ tablet Take 1 tablet (20 mEq total) by mouth once daily.    risankizumab-rzaa (SKYRIZI) 150 mg/mL Syrg Inject 1 syringe (150 mg) into the skin every 12 weeks.    triamcinolone acetonide 0.1% (KENALOG) 0.1 % ointment APPLY  OINTMENT TOPICALLY TO AFFECTED AREA TWICE DAILY   Last reviewed on 11/29/2022  1:37 PM by Daryl Son MD    Review of patient's allergies indicates:  No Known Allergies Last reviewed on  12/2/2022 3:24 PM by Marii Rosales      Tasks added this encounter   1/10/2023 - Refill Call (Auto Added)  12/8/2022 -  Setup Confirmation   Tasks due within next 3 months   2/5/2023 - Refill Call (Auto Added)     Ernestina Davidson, Patient Care Assistant  [facility]  [address] [place] [id number]  Phone: [phone]  Fax: [phone]

## 2022-12-22 ENCOUNTER — CLINICAL SUPPORT (OUTPATIENT)
Dept: REHABILITATION | Facility: HOSPITAL | Age: 47
End: 2022-12-22
Payer: MEDICAID

## 2022-12-22 DIAGNOSIS — M53.86 DECREASED RANGE OF MOTION OF LUMBAR SPINE: Primary | ICD-10-CM

## 2022-12-22 PROCEDURE — 97110 THERAPEUTIC EXERCISES: CPT | Mod: PN

## 2022-12-22 NOTE — PROGRESS NOTES
Physical Therapy Daily Treatment Note     Name: Adrien Arias   Clinic Number: [id number]    Therapy Diagnosis:   Encounter Diagnosis   Name Primary?    Decreased range of motion of lumbar spine Yes     Physician: Devonte Hermosillo MD    Visit Date: 12/22/2022  Physician Orders: PT Eval and Treat     Medical Diagnosis from Referral: M54.32 (ICD-10-CM) - Sciatica of left side  Evaluation Date: 11/2/2022  Plan of Care Expiration: 2/2/23     Authorization Period Expiration: 9/14/23  Visit # / Visits authorized: 1/ 1       Time In: 1340  Time Out: 1435    Total Billable Time: 40  minutes    Precautions: Standard    Subjective     Pt reports: therapy is helping some.   He was compliant with home exercise program.  Response to previous treatment: good  Functional change: none    Pain: 4/10  Location: lumbar      Objective       Palpation:  Slight tenderness L3-S1 interspinous space, left QL, piriformis       Adrien received therapeutic exercises to develop strength, endurance, ROM, flexibility, posture and core stabilization for 55  minutes including:      Upright bike x 6 min  LTR x 20  Piriformis stretch 30 sec x 4  QL, leg over stretch 30 sec x 4  PPT x 20   TrA recruitment 2 x 10  TrA recruitment 2 x 10 with hip flex/ext  Hamstring stretch 30 sec x 4  Prone alt arm/alt leg 2 x 10  Anti rotation RTB 3 x 10  Standing bilateral shoulder ext 3 x 10 RTB  Matrix hip ab 40lbs 3 x 10     Adrien received the following manual therapy techniques:  were applied to the: pelvic for 5 minutes, including:  Right innominate posterior: MET, shotgun       Written Home Exercises Provided: Patient instructed to cont prior HEP.  Exercises were reviewed and Adrien was able to demonstrate them prior to the end of the session.  Adrien demonstrated good  understanding of the education provided.     See EMR under Patient Instructions for exercises provided 12/22/2022.    Assessment     Pt continues to ambulate with a guarded gait,  decreased lumbopelvic mobility.  Moderate obliquity today corrected with MET.  Good performance with pelvic mobility and abdominal response with supine therex.  No c/o increased discomfort with prescribed activities.  Good response to exercise progression.      Adrien is progressing well towards his goals.   Pt prognosis is Good.     Pt will continue to benefit from skilled outpatient physical therapy to address the deficits listed in the problem list box on initial evaluation, provide pt/family education and to maximize pt's level of independence in the home and community environment.     Pt's spiritual, cultural and educational needs considered and pt agreeable to plan of care and goals.     Anticipated barriers to physical therapy: none    Short Term Goals (4 Weeks):   Updated 12/8/22  MET    1.Pt to increase strength by a 1/2 grade of muscles test to allow for improvement in functional activities such as performing chores.  2.Pt to improve range of motion by 25% to allow for improved functional mobility to allow for improvement in IADLs.   3.Pt to report compliance with HEP and demonstrate proper exercise technique to PT to show competence with self management of condition.  4.Decrease pain by 25% during functional activities.    Long Term Goals (12 Weeks):     1. Increase ROM to allow improved joint biomechanics during functional activities.   2.Increase trunk and lower extremity strength to within normal limits during functional activities.   3. Independent with home exercise program.   4. Full return to functional activities with manageable complaints.  5. Patient to demonstrate improved posture and body mechanics.  6. Decrease pain by 75% during functional activities.     Plan     Progress lumbar stabilization next visit.       David Rudd, PT

## 2023-01-12 ENCOUNTER — CLINICAL SUPPORT (OUTPATIENT)
Dept: REHABILITATION | Facility: HOSPITAL | Age: 48
End: 2023-01-12
Payer: MEDICAID

## 2023-01-12 DIAGNOSIS — M53.86 DECREASED RANGE OF MOTION OF LUMBAR SPINE: Primary | ICD-10-CM

## 2023-01-12 PROCEDURE — 97110 THERAPEUTIC EXERCISES: CPT | Mod: PN

## 2023-01-12 NOTE — PROGRESS NOTES
Physical Therapy Daily Treatment Note     Name: Adrien Arias   Clinic Number: [id number]    Therapy Diagnosis:   Encounter Diagnosis   Name Primary?    Decreased range of motion of lumbar spine Yes     Physician: Devonte Hermosillo MD    Visit Date: 1/12/2023  Physician Orders: PT Eval and Treat     Medical Diagnosis from Referral: M54.32 (ICD-10-CM) - Sciatica of left side  Evaluation Date: 11/2/2022  Plan of Care Expiration: 2/2/23     Authorization Period Expiration: 9/14/23  Visit # / Visits authorized: 1/ 1       Time In: 1715  Time Out: 1810    Total Billable Time: 55  minutes    Precautions: Standard    Subjective     Pt reports: he has been moderately sore of late. States doing better overall.   He was compliant with home exercise program.  Response to previous treatment: good  Functional change: none    Pain: 4/10  Location: lumbar      Objective         Palpation:  Slight tenderness L3-S1 interspinous space, left QL, piriformis        Range of Motion/Strength:       Lumbar AROM: Degrees   Flexion 44   Extension 5   Right side bending 12   Left side bending 12        Adrien received therapeutic exercises to develop strength, endurance, ROM, flexibility, posture and core stabilization for 55  minutes including:      Upright bike x 6 min  LTR x 20  Piriformis stretch 30 sec x 4  QL, leg over stretch 30 sec x 4  PPT x 20   TrA recruitment 2 x 10  TrA recruitment 2 x 10 with hip flex/ext  Hamstring stretch 30 sec x 4  Prone alt arm/alt leg 2 x 10  Anti rotation RTB 3 x 10  Standing bilateral shoulder ext 3 x 10 RTB  Matrix hip ab 40lbs 3 x 10  Sideling clam GTB x 20  Matrix trunk rotation 20lbs 1 x 20     Adrien received the following manual therapy techniques:  were applied to the: pelvic for 5 minutes, including:  Right innominate posterior: MET, shotgun       Written Home Exercises Provided: Patient instructed to cont prior HEP.  Exercises were reviewed and Adrien was able to demonstrate them prior  to the end of the session.  Adrien demonstrated good  understanding of the education provided.     See EMR under Patient Instructions for exercises provided 1/12/2023.    Assessment     Mild obliquity today corrected with MET.  Good performance with pelvic mobility and abdominal response with supine therex.  No c/o increased discomfort with prescribed activities.  Good response to exercise progression.      Adrien is progressing well towards his goals.   Pt prognosis is Good.     Pt will continue to benefit from skilled outpatient physical therapy to address the deficits listed in the problem list box on initial evaluation, provide pt/family education and to maximize pt's level of independence in the home and community environment.     Pt's spiritual, cultural and educational needs considered and pt agreeable to plan of care and goals.     Anticipated barriers to physical therapy: none    Short Term Goals (8 Weeks):   Updated 1/12/22  Partially MET    1.Pt to increase strength by a 1/2 grade of muscles test to allow for improvement in functional activities such as performing chores.  2.Pt to improve range of motion by 50% to allow for improved functional mobility to allow for improvement in IADLs.   3.Pt to report compliance with HEP and demonstrate proper exercise technique to PT to show competence with self management of condition. MET  4.Decrease pain by 50% during functional activities. Not MET    Long Term Goals (12 Weeks):     1. Increase ROM to allow improved joint biomechanics during functional activities.   2.Increase trunk and lower extremity strength to within normal limits during functional activities.   3. Independent with home exercise program.   4. Full return to functional activities with manageable complaints.  5. Patient to demonstrate improved posture and body mechanics.  6. Decrease pain by 75% during functional activities.     Plan     Progress lumbar stabilization next visit.       David CHAN  Tobin, PT

## 2023-01-13 ENCOUNTER — PATIENT MESSAGE (OUTPATIENT)
Dept: PHARMACY | Facility: CLINIC | Age: 48
End: 2023-01-13
Payer: MEDICAID

## 2023-01-19 ENCOUNTER — CLINICAL SUPPORT (OUTPATIENT)
Dept: REHABILITATION | Facility: HOSPITAL | Age: 48
End: 2023-01-19
Payer: MEDICAID

## 2023-01-19 ENCOUNTER — SPECIALTY PHARMACY (OUTPATIENT)
Dept: PHARMACY | Facility: CLINIC | Age: 48
End: 2023-01-19
Payer: MEDICAID

## 2023-01-19 DIAGNOSIS — M53.86 DECREASED RANGE OF MOTION OF LUMBAR SPINE: Primary | ICD-10-CM

## 2023-01-19 PROCEDURE — 97110 THERAPEUTIC EXERCISES: CPT | Mod: PN

## 2023-01-19 NOTE — TELEPHONE ENCOUNTER
Specialty Pharmacy - Refill Coordination    Specialty Medication Orders Linked to Encounter      Flowsheet Row Most Recent Value   Medication #1 OTEZLA 30 mg Tab (Order#[id number], Rx#[id number])            Refill Questions - Documented Responses      Flowsheet Row Most Recent Value   Patient Availability and HIPAA Verification    Does patient want to proceed with activity? Yes   HIPAA/medical authority confirmed? Yes   Relationship to patient of person spoken to? Self   Refill Screening Questions    Changes to allergies? No   Changes to medications? No   New conditions since last clinic visit? No   Unplanned office visit, urgent care, ED, or hospital admission in the last 4 weeks? No   How does patient/caregiver feel medication is working? Good   Financial problems or insurance changes? No   How many doses of your specialty medications were missed in the last 4 weeks? 1   Would patient like to speak to a pharmacist? No   When does the patient need to receive the medication? 01/20/23   Refill Delivery Questions    How will the patient receive the medication? MEDRx   When does the patient need to receive the medication? 01/20/23   Shipping Address Home   Address in [facility] confirmed and updated if neccessary? Yes   Expected Copay ($) 0   Is the patient able to afford the medication copay? Yes   Payment Method zero copay   Days supply of Refill 30   Supplies needed? No supplies needed   Refill activity completed? Yes   Refill activity plan Refill scheduled   Shipment/Pickup Date: 01/19/23            Current Outpatient Medications   Medication Sig    allopurinoL (ZYLOPRIM) 300 MG tablet TAKE 1 & 1/2 (ONE & ONE-HALF) TABLETS BY MOUTH ONCE DAILY    atorvastatin (LIPITOR) 10 MG tablet Take 1 tablet (10 mg total) by mouth once daily.    calcipotriene (DOVONOX) 0.005 % cream APPLY  CREAM TOPICALLY TO AFFECTED AREA TWICE DAILY    carvediloL (COREG) 25 MG tablet Take 1 tablet (25 mg total) by mouth 2 (two) times  daily with meals.    clobetasoL (TEMOVATE) 0.05 % external solution APPLY  SOLUTION TOPICALLY TO SCALP  AS NEEDED FOR ITCHING OR SCALING.    colchicine (COLCRYS) 0.6 mg tablet Take 1 tablet by mouth twice daily    colchicine (MITIGARE) 0.6 mg Cap TAKE 1 CAPSULE BY MOUTH TWICE DAILY TO PREVENT GOUT FLARES    furosemide (LASIX) 40 MG tablet Take 1 tablet (40 mg total) by mouth once daily. (Patient not taking: Reported on 11/29/2022)    halobetasol (ULTRAVATE) 0.05 % ointment APPLY  OINTMENT TOPICALLY TO AFFECTED AREA TWICE DAILY    lisinopriL (PRINIVIL,ZESTRIL) 20 MG tablet Take 1 tablet by mouth once daily    OTEZLA 30 mg Tab Take 1 tablet by mouth twice daily.    potassium chloride SA (K-DUR,KLOR-CON) 20 MEQ tablet Take 1 tablet (20 mEq total) by mouth once daily.    risankizumab-rzaa (SKYRIZI) 150 mg/mL Syrg Inject 1 syringe (150 mg) into the skin every 12 weeks.    triamcinolone acetonide 0.1% (KENALOG) 0.1 % ointment APPLY  OINTMENT TOPICALLY TO AFFECTED AREA TWICE DAILY   Last reviewed on 11/29/2022  1:37 PM by Daryl Son MD    Review of patient's allergies indicates:  No Known Allergies Last reviewed on  12/2/2022 3:24 PM by Marii Rosales      Tasks added this encounter   2/12/2023 - Refill Call (Auto Added)  1/19/2023 -  Setup Confirmation   Tasks due within next 3 months   3/11/2023 - Clinical - Follow Up Assesement (Annual)  2/5/2023 - Refill Call (Auto Added)     Ernestina Davidson, Patient Care Assistant  [facility]  [address] [place] [address]  Phone: [phone]  Fax: [phone]

## 2023-01-20 NOTE — PROGRESS NOTES
Physical Therapy Daily Treatment Note     Name: Adrien Arias   Clinic Number: [id number]    Therapy Diagnosis:   Encounter Diagnosis   Name Primary?    Decreased range of motion of lumbar spine Yes     Physician: Devonte Hermosillo MD    Visit Date: 1/19/2023  Physician Orders: PT Eval and Treat     Medical Diagnosis from Referral: M54.32 (ICD-10-CM) - Sciatica of left side  Evaluation Date: 11/2/2022  Plan of Care Expiration: 2/2/23     Authorization Period Expiration: 9/14/23  Visit # / Visits authorized: 1/ 1       Time In: 1730  Time Out: 1925    Total Billable Time: 55  minutes    Precautions: Standard    Subjective     Pt reports: he was quite sore after last visit. Doing better today.   He was compliant with home exercise program.  Response to previous treatment: good  Functional change: none    Pain: 4/10  Location: lumbar      Objective     Adrien received therapeutic exercises to develop strength, endurance, ROM, flexibility, posture and core stabilization for 55  minutes including:      Upright bike x 6 min  LTR x 20  Piriformis stretch 30 sec x 4  QL, leg over stretch 30 sec x 4  PPT x 20   TrA recruitment 2 x 10  TrA recruitment 2 x 10 with hip flex/ext  Hamstring stretch 30 sec x 4  Prone alt arm/alt leg 2 x 10  Anti rotation RTB 3 x 10  Standing bilateral shoulder ext 3 x 10 RTB  Matrix hip ab 40lbs 3 x 10  Sideling clam GTB x 20  Matrix trunk rotation 20lbs 1 x 20     Adrien received the following manual therapy techniques:  were applied to the: pelvic for 5 minutes, including:  Right innominate posterior: MET, shotgun       Written Home Exercises Provided: Patient instructed to cont prior HEP.  Exercises were reviewed and Adrien was able to demonstrate them prior to the end of the session.  Adrien demonstrated good  understanding of the education provided.     See EMR under Patient Instructions for exercises provided 1/19/2023.    Assessment     Mild obliquity today corrected with MET.   Good performance with pelvic mobility and abdominal response with supine therex.  No c/o increased discomfort with prescribed activities.  Good response to exercise progression.      Adrien is progressing well towards his goals.   Pt prognosis is Good.     Pt will continue to benefit from skilled outpatient physical therapy to address the deficits listed in the problem list box on initial evaluation, provide pt/family education and to maximize pt's level of independence in the home and community environment.     Pt's spiritual, cultural and educational needs considered and pt agreeable to plan of care and goals.     Anticipated barriers to physical therapy: none    Short Term Goals (8 Weeks):   Updated 1/12/22  Partially MET    1.Pt to increase strength by a 1/2 grade of muscles test to allow for improvement in functional activities such as performing chores.  2.Pt to improve range of motion by 50% to allow for improved functional mobility to allow for improvement in IADLs.   3.Pt to report compliance with HEP and demonstrate proper exercise technique to PT to show competence with self management of condition. MET  4.Decrease pain by 50% during functional activities. Not MET    Long Term Goals (12 Weeks):     1. Increase ROM to allow improved joint biomechanics during functional activities.   2.Increase trunk and lower extremity strength to within normal limits during functional activities.   3. Independent with home exercise program.   4. Full return to functional activities with manageable complaints.  5. Patient to demonstrate improved posture and body mechanics.  6. Decrease pain by 75% during functional activities.     Plan     Progress lumbar stabilization next visit.       David Rudd, PT

## 2023-02-06 DIAGNOSIS — L40.9 PSORIASIS: ICD-10-CM

## 2023-02-08 RX ORDER — APREMILAST 30 MG/1
TABLET, FILM COATED ORAL
Qty: 60 TABLET | Refills: 2 | Status: SHIPPED | OUTPATIENT
Start: 2023-02-08 | End: 2023-05-17 | Stop reason: SDUPTHER

## 2023-02-14 ENCOUNTER — SPECIALTY PHARMACY (OUTPATIENT)
Dept: PHARMACY | Facility: CLINIC | Age: 48
End: 2023-02-14
Payer: MEDICAID

## 2023-02-14 ENCOUNTER — PATIENT MESSAGE (OUTPATIENT)
Dept: PHARMACY | Facility: CLINIC | Age: 48
End: 2023-02-14
Payer: MEDICAID

## 2023-02-14 DIAGNOSIS — L40.9 PSORIASIS: Primary | ICD-10-CM

## 2023-02-14 NOTE — TELEPHONE ENCOUNTER
Incoming call from patient for refill of Skyrizi but patient stated that he does not have an appt with his provider to receive Skyrizi yet.  Informed patient that he will need to schedule this before [facility] is able to set up  to providers office.  Patient verbalized understanding of this.  Patient unable to provider on hand count for Otezla at time as well.  Patient to schedule provider appt and call back [facility] with Otezla on hand count.

## 2023-02-14 NOTE — TELEPHONE ENCOUNTER
Specialty Pharmacy - Refill Coordination    Specialty Medication Orders Linked to Encounter      Flowsheet Row Most Recent Value   Medication #1 risankizumab-rzaa (SKYRIZI) 150 mg/mL Syrg (Order#[id number], Rx#[id number])   Medication #2 OTEZLA 30 mg Tab (Order#[id number], Rx#[id number])          Refill Questions - Documented Responses      Flowsheet Row Most Recent Value   Patient Availability and HIPAA Verification    Does patient want to proceed with activity? Unable to Reach          We have made multiple attempts to the patient and have been unsuccessful to reach the patient. We will stop reaching out to the patient but in the event that the patient needs the med and contacts us, we will communicate and begin dispensing for the patient. At your next visit with the patient, please review the importance of being in contact with our specialty pharmacy as a part of our care team.    Interventions added this encounter   Closed: [facility] Provider Intervention - Drug therapy adherence: OTEZLA 30 mg Tab     Viet Boswell, [facility]  [address]  [facility] [address]  Phone: [phone]  Fax: [phone]

## 2023-02-17 ENCOUNTER — PATIENT MESSAGE (OUTPATIENT)
Dept: DERMATOLOGY | Facility: CLINIC | Age: 48
End: 2023-02-17
Payer: MEDICAID

## 2023-02-17 ENCOUNTER — SPECIALTY PHARMACY (OUTPATIENT)
Dept: PHARMACY | Facility: CLINIC | Age: 48
End: 2023-02-17
Payer: MEDICAID

## 2023-02-17 NOTE — TELEPHONE ENCOUNTER
Specialty Pharmacy - Refill Coordination    Specialty Medication Orders Linked to Encounter      Flowsheet Row Most Recent Value   Medication #1 OTEZLA 30 mg Tab (Order#[id number], Rx#[id number])   Medication #2 OTEZLA 30 mg Tab (Order#[id number], Rx#[id number])          Refill Questions - Documented Responses      Flowsheet Row Most Recent Value   Patient Availability and HIPAA Verification    Does patient want to proceed with activity? Yes   HIPAA/medical authority confirmed? Yes   Relationship to patient of person spoken to? Self   Refill Screening Questions    Changes to allergies? No   Changes to medications? No   New conditions since last clinic visit? No   Unplanned office visit, urgent care, ED, or hospital admission in the last 4 weeks? No   How does patient/caregiver feel medication is working? Good   Financial problems or insurance changes? No   How many doses of your specialty medications were missed in the last 4 weeks? 0   Would patient like to speak to a pharmacist? No   When does the patient need to receive the medication? 02/24/23   Refill Delivery Questions    How will the patient receive the medication? MEDRx   When does the patient need to receive the medication? 02/24/23   Shipping Address Home   Address in [facility] confirmed and updated if neccessary? Yes   Expected Copay ($) 0   Is the patient able to afford the medication copay? Yes   Payment Method zero copay   Days supply of Refill 30   Supplies needed? No supplies needed   Refill activity completed? Yes   Refill activity plan Refill scheduled   Shipment/Pickup Date: 02/22/23            Current Outpatient Medications   Medication Sig    allopurinoL (ZYLOPRIM) 300 MG tablet TAKE 1 & 1/2 (ONE & ONE-HALF) TABLETS BY MOUTH ONCE DAILY    atorvastatin (LIPITOR) 10 MG tablet Take 1 tablet (10 mg total) by mouth once daily.    calcipotriene (DOVONOX) 0.005 % cream APPLY  CREAM TOPICALLY TO AFFECTED AREA TWICE DAILY    carvediloL (COREG) 25  MG tablet Take 1 tablet (25 mg total) by mouth 2 (two) times daily with meals.    clobetasoL (TEMOVATE) 0.05 % external solution APPLY  SOLUTION TOPICALLY TO SCALP  AS NEEDED FOR ITCHING OR SCALING.    colchicine (COLCRYS) 0.6 mg tablet Take 1 tablet by mouth twice daily    colchicine (MITIGARE) 0.6 mg Cap TAKE 1 CAPSULE BY MOUTH TWICE DAILY TO PREVENT GOUT FLARES    furosemide (LASIX) 40 MG tablet Take 1 tablet (40 mg total) by mouth once daily. (Patient not taking: Reported on 11/29/2022)    halobetasol (ULTRAVATE) 0.05 % ointment APPLY  OINTMENT TOPICALLY TO AFFECTED AREA TWICE DAILY    lisinopriL (PRINIVIL,ZESTRIL) 20 MG tablet Take 1 tablet by mouth once daily    OTEZLA 30 mg Tab Take 1 tablet by mouth twice daily.    potassium chloride SA (K-DUR,KLOR-CON) 20 MEQ tablet Take 1 tablet (20 mEq total) by mouth once daily.    risankizumab-rzaa (SKYRIZI) 150 mg/mL Syrg Inject 1 syringe (150 mg) into the skin every 12 weeks.    triamcinolone acetonide 0.1% (KENALOG) 0.1 % ointment APPLY  OINTMENT TOPICALLY TO AFFECTED AREA TWICE DAILY   Last reviewed on 11/29/2022  1:37 PM by Daryl Son MD    Review of patient's allergies indicates:  No Known Allergies Last reviewed on  12/2/2022 3:24 PM by Marii Rosales      Tasks added this encounter   3/19/2023 - Refill Call (Auto Added)  2/17/2023 -  Setup Confirmation   Tasks due within next 3 months   3/11/2023 - Clinical - Follow Up Assesement (Annual)  2/5/2023 - Refill Call (Auto Added)     Kranthi Jaimes, PharmD  [facility]  [address] [place] [address]  Phone: [phone]  Fax: [phone]

## 2023-02-17 NOTE — TELEPHONE ENCOUNTER
Specialty Pharmacy - Refill Coordination    Specialty Medication Orders Linked to Encounter      Flowsheet Row Most Recent Value   Medication #1 risankizumab-rzaa (SKYRIZI) 150 mg/mL Syrg (Order#[id number], Rx#[id number])          Confirmed  with Marii Rosales LPN for Wed 2/22    Refill Questions - Documented Responses      Flowsheet Row Most Recent Value   Patient Availability and HIPAA Verification    Does patient want to proceed with activity? Yes   HIPAA/medical authority confirmed? Yes   Relationship to patient of person spoken to? Self   Refill Screening Questions    Changes to allergies? No   Changes to medications? No   New conditions since last clinic visit? No   Unplanned office visit, urgent care, ED, or hospital admission in the last 4 weeks? No   How does patient/caregiver feel medication is working? Good   Financial problems or insurance changes? No   How many doses of your specialty medications were missed in the last 4 weeks? 0   Would patient like to speak to a pharmacist? No   When does the patient need to receive the medication? 03/01/23   Refill Delivery Questions    How will the patient receive the medication?    When does the patient need to receive the medication? 03/01/23   Shipping Address Prescription   Address in [facility] confirmed and updated if neccessary? Yes   Expected Copay ($) 0   Payment Method zero copay   Days supply of Refill 84   Supplies needed? Injection Device   Refill activity completed? Yes   Refill activity plan Refill scheduled   Shipment/Pickup Date: 02/22/23            Current Outpatient Medications   Medication Sig    allopurinoL (ZYLOPRIM) 300 MG tablet TAKE 1 & 1/2 (ONE & ONE-HALF) TABLETS BY MOUTH ONCE DAILY    atorvastatin (LIPITOR) 10 MG tablet Take 1 tablet (10 mg total) by mouth once daily.    calcipotriene (DOVONOX) 0.005 % cream APPLY  CREAM TOPICALLY TO AFFECTED AREA TWICE DAILY    carvediloL (COREG) 25 MG tablet Take 1 tablet (25  mg total) by mouth 2 (two) times daily with meals.    clobetasoL (TEMOVATE) 0.05 % external solution APPLY  SOLUTION TOPICALLY TO SCALP  AS NEEDED FOR ITCHING OR SCALING.    colchicine (COLCRYS) 0.6 mg tablet Take 1 tablet by mouth twice daily    colchicine (MITIGARE) 0.6 mg Cap TAKE 1 CAPSULE BY MOUTH TWICE DAILY TO PREVENT GOUT FLARES    furosemide (LASIX) 40 MG tablet Take 1 tablet (40 mg total) by mouth once daily. (Patient not taking: Reported on 11/29/2022)    halobetasol (ULTRAVATE) 0.05 % ointment APPLY  OINTMENT TOPICALLY TO AFFECTED AREA TWICE DAILY    lisinopriL (PRINIVIL,ZESTRIL) 20 MG tablet Take 1 tablet by mouth once daily    OTEZLA 30 mg Tab Take 1 tablet by mouth twice daily.    potassium chloride SA (K-DUR,KLOR-CON) 20 MEQ tablet Take 1 tablet (20 mEq total) by mouth once daily.    risankizumab-rzaa (SKYRIZI) 150 mg/mL Syrg Inject 1 syringe (150 mg) into the skin every 12 weeks.    triamcinolone acetonide 0.1% (KENALOG) 0.1 % ointment APPLY  OINTMENT TOPICALLY TO AFFECTED AREA TWICE DAILY   Last reviewed on 11/29/2022  1:37 PM by Daryl Son MD    Review of patient's allergies indicates:  No Known Allergies Last reviewed on  12/2/2022 3:24 PM by Marii Rosales      Tasks added this encounter   5/17/2023 - Refill Call (Auto Added)   Tasks due within next 3 months   3/11/2023 - Clinical - Follow Up Assesement (Annual)  3/19/2023 - Refill Call (Auto Added)     Kranthi Jaimes, PharmD  [facility]  [address] [place] [address]  Phone: [phone]  Fax: [phone]

## 2023-02-20 RX ORDER — ALLOPURINOL 300 MG/1
450 TABLET ORAL DAILY
Qty: 45 TABLET | Refills: 12 | Status: SHIPPED | OUTPATIENT
Start: 2023-02-20 | End: 2024-02-28

## 2023-02-22 ENCOUNTER — TELEPHONE (OUTPATIENT)
Dept: PHARMACY | Facility: CLINIC | Age: 48
End: 2023-02-22
Payer: MEDICAID

## 2023-02-22 NOTE — TELEPHONE ENCOUNTER
Patients medication Skyrizi was delivered to nurse GLORIA Payton at [facility] 11th floor Dermatology today 2/22/2023.    Valentina Aceves  [facility]  Phone: [phone]

## 2023-03-01 ENCOUNTER — OFFICE VISIT (OUTPATIENT)
Dept: DERMATOLOGY | Facility: CLINIC | Age: 48
End: 2023-03-01
Payer: MEDICAID

## 2023-03-01 ENCOUNTER — LAB VISIT (OUTPATIENT)
Dept: LAB | Facility: HOSPITAL | Age: 48
End: 2023-03-01
Payer: MEDICAID

## 2023-03-01 DIAGNOSIS — L40.9 PSORIASIS: ICD-10-CM

## 2023-03-01 DIAGNOSIS — Z79.899 ENCOUNTER FOR LONG-TERM (CURRENT) USE OF HIGH-RISK MEDICATION: ICD-10-CM

## 2023-03-01 DIAGNOSIS — L40.0 PSORIASIS VULGARIS: Primary | ICD-10-CM

## 2023-03-01 DIAGNOSIS — L40.50 PSORIATIC ARTHRITIS: ICD-10-CM

## 2023-03-01 LAB
ALBUMIN SERPL BCP-MCNC: 3.6 G/DL (ref 3.5–5.2)
ALP SERPL-CCNC: 64 U/L (ref 55–135)
ALT SERPL W/O P-5'-P-CCNC: 18 U/L (ref 10–44)
AST SERPL-CCNC: 15 U/L (ref 10–40)
BASOPHILS # BLD AUTO: 0.04 K/UL (ref 0–0.2)
BASOPHILS NFR BLD: 0.5 % (ref 0–1.9)
BILIRUB DIRECT SERPL-MCNC: 0.2 MG/DL (ref 0.1–0.3)
BILIRUB SERPL-MCNC: 0.4 MG/DL (ref 0.1–1)
DIFFERENTIAL METHOD: ABNORMAL
EOSINOPHIL # BLD AUTO: 0.2 K/UL (ref 0–0.5)
EOSINOPHIL NFR BLD: 2.4 % (ref 0–8)
ERYTHROCYTE [DISTWIDTH] IN BLOOD BY AUTOMATED COUNT: 13.2 % (ref 11.5–14.5)
HCT VFR BLD AUTO: 53.7 % (ref 40–54)
HGB BLD-MCNC: 17.4 G/DL (ref 14–18)
IMM GRANULOCYTES # BLD AUTO: 0.05 K/UL (ref 0–0.04)
IMM GRANULOCYTES NFR BLD AUTO: 0.6 % (ref 0–0.5)
LYMPHOCYTES # BLD AUTO: 1.4 K/UL (ref 1–4.8)
LYMPHOCYTES NFR BLD: 16.1 % (ref 18–48)
MCH RBC QN AUTO: 30.6 PG (ref 27–31)
MCHC RBC AUTO-ENTMCNC: 32.4 G/DL (ref 32–36)
MCV RBC AUTO: 94 FL (ref 82–98)
MONOCYTES # BLD AUTO: 0.6 K/UL (ref 0.3–1)
MONOCYTES NFR BLD: 7.2 % (ref 4–15)
NEUTROPHILS # BLD AUTO: 6.2 K/UL (ref 1.8–7.7)
NEUTROPHILS NFR BLD: 73.2 % (ref 38–73)
NRBC BLD-RTO: 0 /100 WBC
PLATELET # BLD AUTO: 207 K/UL (ref 150–450)
PMV BLD AUTO: 10.5 FL (ref 9.2–12.9)
PROT SERPL-MCNC: 7.3 G/DL (ref 6–8.4)
RBC # BLD AUTO: 5.69 M/UL (ref 4.6–6.2)
WBC # BLD AUTO: 8.51 K/UL (ref 3.9–12.7)

## 2023-03-01 PROCEDURE — 1160F PR REVIEW ALL MEDS BY PRESCRIBER/CLIN PHARMACIST DOCUMENTED: ICD-10-PCS | Mod: CPTII,,, | Performed by: PATHOLOGY

## 2023-03-01 PROCEDURE — 85025 COMPLETE CBC W/AUTO DIFF WBC: CPT | Performed by: PATHOLOGY

## 2023-03-01 PROCEDURE — 36415 COLL VENOUS BLD VENIPUNCTURE: CPT | Performed by: PATHOLOGY

## 2023-03-01 PROCEDURE — 99213 OFFICE O/P EST LOW 20 MIN: CPT | Mod: PBBFAC | Performed by: PATHOLOGY

## 2023-03-01 PROCEDURE — 99999 PR PBB SHADOW E&M-EST. PATIENT-LVL III: CPT | Mod: PBBFAC,,, | Performed by: PATHOLOGY

## 2023-03-01 PROCEDURE — 11901 INJECT SKIN LESIONS >7: CPT | Mod: PBBFAC | Performed by: PATHOLOGY

## 2023-03-01 PROCEDURE — 99214 PR OFFICE/OUTPT VISIT, EST, LEVL IV, 30-39 MIN: ICD-10-PCS | Mod: 25,S$PBB,, | Performed by: PATHOLOGY

## 2023-03-01 PROCEDURE — 4010F PR ACE/ARB THEARPY RXD/TAKEN: ICD-10-PCS | Mod: CPTII,,, | Performed by: PATHOLOGY

## 2023-03-01 PROCEDURE — 80076 HEPATIC FUNCTION PANEL: CPT | Performed by: PATHOLOGY

## 2023-03-01 PROCEDURE — 4010F ACE/ARB THERAPY RXD/TAKEN: CPT | Mod: CPTII,,, | Performed by: PATHOLOGY

## 2023-03-01 PROCEDURE — 99999 PR PBB SHADOW E&M-EST. PATIENT-LVL III: ICD-10-PCS | Mod: PBBFAC,,, | Performed by: PATHOLOGY

## 2023-03-01 PROCEDURE — 11901 PR INJECTION, SKIN LESIONS, 8 OR MORE: ICD-10-PCS | Mod: S$PBB,,, | Performed by: PATHOLOGY

## 2023-03-01 PROCEDURE — 99214 OFFICE O/P EST MOD 30 MIN: CPT | Mod: 25,S$PBB,, | Performed by: PATHOLOGY

## 2023-03-01 PROCEDURE — 1159F PR MEDICATION LIST DOCUMENTED IN MEDICAL RECORD: ICD-10-PCS | Mod: CPTII,,, | Performed by: PATHOLOGY

## 2023-03-01 PROCEDURE — 1160F RVW MEDS BY RX/DR IN RCRD: CPT | Mod: CPTII,,, | Performed by: PATHOLOGY

## 2023-03-01 PROCEDURE — 1159F MED LIST DOCD IN RCRD: CPT | Mod: CPTII,,, | Performed by: PATHOLOGY

## 2023-03-01 PROCEDURE — 11901 INJECT SKIN LESIONS >7: CPT | Mod: S$PBB,,, | Performed by: PATHOLOGY

## 2023-03-01 RX ORDER — CALCIPOTRIENE 50 UG/G
CREAM TOPICAL
Qty: 120 G | Refills: 0 | Status: SHIPPED | OUTPATIENT
Start: 2023-03-01

## 2023-03-01 NOTE — PROGRESS NOTES
Subjective:       Patient ID:  Adrien Panda is a 47 y.o. male who presents for   Chief Complaint   Patient presents with    Follow-up     psoriasis     HPI  Pt well known to me for psoriasis/ psoriatic arthritis - on skyrizi, otezla, topical steroids.  LV 8/23/22 - skyrizi injection.   Plaques on legs somewhat improved, but intermittently flare in 2-3 weeks leading up to next injection.  Currently overdue for Skyrizi and flaring to lower legs.  Got worse after Covid vaccine booster 10/31/21 and also a few weeks ago after viral illness.  Arthritis and joint swelling in left 2nd digit of the hand worse but right 2nd digit of the foot somewhat improved.  Using topicals - clobetasol, lidex, dovonex.  Started 10/23/19.  Re-started Otezla about the same time.     Taltz initially worked best of all of the other systemic agents he has tried, but legs flared Fall 2018 with progressive new scaly plaques moving proximal on bilateral legs to thighs.  Most recently, was on Ilumya, and prior to that MTX and Tremfya, which were ineffective and discontinued.     Started Taltz 1/2018. States he was clear when he first started it, but psoriasis recurred on his legs. No infections.       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 Rubin at [facility], cutaneous and joint gout tx by Dr. Hermosillo (psoriasis flared on colchicine), stroke      PSORIASIS: failed tx with Enbrel, Humira, Soriatane, Taltz, Ilumya, tremfya, otezla, clobetasol topical, betamethasone topical, Lidex topical, elocon topical.    ILK helps temporarily. ultravate works best out of all the topicals he's tried. psorcon 0.05% oint helps.    On Humira from 6/2015 - 4/2016 until he stopped responding to it. Switched back to enbrel at that point which only helped temporarily.    Added Soriatane to Enbrel but ended up with a bad flare on lower legs.  Not a good candidate for NBUVB as he lives on the [facility] and there is not light unit in  this area.   Stelara 9/21/16 - 7/19/17 (stopped responding to it)  Taltz 1/2018 - 11/2018 (stopped responding to it)  Tremfya 11/2018 - 4/2019 - never achieved significant clearance  Ilumya 4/2019 - 8/ 2019 - no improvement and progression  Currently - Joslyni (started Oct. 2019) and Otezla     TB gold: negative 03/01/2023 - negative     CBC, LFTs reviewed and WNL 3/1/2023    Review of Systems   Constitutional:  Negative for fever, chills, fatigue and malaise.   Respiratory:  Negative for cough and shortness of breath.    Gastrointestinal:  Negative for nausea, vomiting and diarrhea.   Musculoskeletal:  Positive for joint swelling and arthralgias.   Skin:  Positive for itching and rash. Negative for abscesses.      Objective:    Physical Exam   Constitutional: He appears well-developed and well-nourished.   Neurological: He is alert.   Skin:   Areas Examined (abnormalities noted in diagram):   Scalp / Hair Palpated and Inspected  Head / Face Inspection Performed  Neck Inspection Performed  Chest / Axilla Inspection Performed  Abdomen Inspection Performed  Back Inspection Performed  RUE Inspected  LUE Inspection Performed  RLE Inspected  LLE Inspection Performed  Nails and Digits Inspection Performed                Diagram Legend     Erythematous scaling macule/papule c/w actinic keratosis       Vascular papule c/w angioma      Pigmented verrucoid papule/plaque c/w seborrheic keratosis      Yellow umbilicated papule c/w sebaceous hyperplasia      Irregularly shaped tan macule c/w lentigo     1-2 mm smooth white papules consistent with Milia      Movable subcutaneous cyst with punctum c/w epidermal inclusion cyst      Subcutaneous movable cyst c/w pilar cyst      Firm pink to brown papule c/w dermatofibroma      Pedunculated fleshy papule(s) c/w skin tag(s)      Evenly pigmented macule c/w junctional nevus     Mildly variegated pigmented, slightly irregular-bordered macule c/w mildly atypical nevus      Flesh colored  to evenly pigmented papule c/w intradermal nevus       Pink pearly papule/plaque c/w basal cell carcinoma      Erythematous hyperkeratotic cursted plaque c/w SCC      Surgical scar with no sign of skin cancer recurrence      Open and closed comedones      Inflammatory papules and pustules      Verrucoid papule consistent consistent with wart     Erythematous eczematous patches and plaques     Dystrophic onycholytic nail with subungual debris c/w onychomycosis     Umbilicated papule    Erythematous-base heme-crusted tan verrucoid plaque consistent with inflamed seborrheic keratosis     Erythematous Silvery Scaling Plaque c/w Psoriasis     See annotation      Assessment / Plan:        Psoriasis vulgaris - fairly well controlled with residual plaques to bilateral shins, calves.  + psoriatic arthritis, as well - currently fairly well controlled.  Continue Skyrizi - injection today by LPN without complications.  Continue Otezla.  Continue topical steroids and dovonex as needed.     Intralesional Kenalog 10 mg/cc (4 cc total) injected into 12 lesions on the bilateral shins and right calf3 today after obtaining verbal consent including risk of surrounding hypopigmentation. Patient tolerated procedure well.     Units: 4  NDC for Kenalog 10mg/cc:  3562-6573-20       Psoriatic arthritis - as above    Encounter for long-term (current) use of high-risk medication - quant gold negative today, and CBC, LFTs WNL  -     Quantiferon Gold TB; Future; Expected date: 03/01/2023  -     Hepatic Function Panel; Future; Expected date: 03/01/2023  -     CBC Auto Differential; Future; Expected date: 03/01/2023               No follow-ups on file.

## 2023-03-03 NOTE — PROGRESS NOTES
After obtaining consent, and per orders of Dr. Daryl Son, injection of Skyrizi 150mg/mL subcutaneously in the left posterior arm given by Marii Rosales LPN. Patient instructed to remain in clinic for 20 minutes afterwards, and to report any adverse reaction to me immediately.  NDC: 9054-7437-81  LOT: [id number]  EXP: May 2024

## 2023-03-20 ENCOUNTER — SPECIALTY PHARMACY (OUTPATIENT)
Dept: PHARMACY | Facility: CLINIC | Age: 48
End: 2023-03-20
Payer: MEDICAID

## 2023-03-20 NOTE — TELEPHONE ENCOUNTER
Specialty Pharmacy - Clinical Reassessment    Specialty Medication Orders Linked to Encounter      Flowsheet Row Most Recent Value   Medication #1 risankizumab-rzaa (SKYRIZI) 150 mg/mL Syrg (Order#[id number], Rx#[id number])   Medication #2 OTEZLA 30 mg Tab (Order#[id number], Rx#[id number])          Patient Diagnosis   L40.9 - Psoriasis    Adrien Panda is a 47 y.o. male, who is followed by the specialty pharmacy service for management and education of his Skyrizi and Otezla.  He has been on therapy with Skyrizi and Otezla for over 3 years.  I have reviewed his electronic medical record and current medication list and determined that specialty medication adjustment Is not needed at this time.    Patient has not experienced adverse events.  He Is adherent reporting 1 missed doses since last review.  Adherence has been encouraged with the following mechanism(s): proactive refill calls.  He is meeting goals of therapy and will continue treatment.        2/17/2023 1/19/2023 12/8/2022 11/14/2022 11/11/2022 10/13/2022 9/8/2022   Follow Up Review   # of missed doses 0    0 1 0 0 0 0    0 0   New Medications? No    No No No No No Yes    No No   New Conditions? No    No No No No No Yes No   New Allergies? No    No No No No No No No   Med Effective? Good    Good Good Good Good Excellent Good    Good Good   Urgent Care? No    No No No No No No    No No   Requested Pharmacist? No    No No No No No No No       Multiple values from one day are sorted in reverse-chronological order         Therapy is appropriate to continue.    Therapy is effective: Yes  On scale of 1 to 10, how does patient rank quality of life? (10 - Best): Unable to Assess  Recommendations: none at this time.  Review Method: Chart Review    Tasks added this encounter   12/20/2023 - Clinical - Follow Up Assesement (Annual)   Tasks due within next 3 months   3/19/2023 - Refill Call (Auto Added)  5/17/2023 - Refill Call (Auto Added)     Viet Boswell  PharmD  [facility]  [address]  [facility] [address]  Phone: [phone]  Fax: [phone]

## 2023-03-23 ENCOUNTER — SPECIALTY PHARMACY (OUTPATIENT)
Dept: PHARMACY | Facility: CLINIC | Age: 48
End: 2023-03-23
Payer: MEDICAID

## 2023-03-23 NOTE — TELEPHONE ENCOUNTER
Specialty Pharmacy - Refill Coordination    Specialty Medication Orders Linked to Encounter      Flowsheet Row Most Recent Value   Medication #1 OTEZLA 30 mg Tab (Order#[id number], Rx#[id number])            Refill Questions - Documented Responses      Flowsheet Row Most Recent Value   Patient Availability and HIPAA Verification    Does patient want to proceed with activity? Yes   HIPAA/medical authority confirmed? Yes   Relationship to patient of person spoken to? Self   Refill Screening Questions    Changes to allergies? No   Changes to medications? No   New conditions since last clinic visit? No   Unplanned office visit, urgent care, ED, or hospital admission in the last 4 weeks? No   How does patient/caregiver feel medication is working? Excellent   Financial problems or insurance changes? No   How many doses of your specialty medications were missed in the last 4 weeks? 0   Would patient like to speak to a pharmacist? No   When does the patient need to receive the medication? 03/28/23   Refill Delivery Questions    How will the patient receive the medication? MEDRx   When does the patient need to receive the medication? 03/28/23   Shipping Address Home   Address in [facility] confirmed and updated if neccessary? Yes   Expected Copay ($) 0   Is the patient able to afford the medication copay? Yes   Payment Method zero copay   Days supply of Refill 30   Supplies needed? No supplies needed   Refill activity completed? Yes   Refill activity plan Refill scheduled   Shipment/Pickup Date: 03/24/23            Current Outpatient Medications   Medication Sig    allopurinoL (ZYLOPRIM) 300 MG tablet Take 1.5 tablets (450 mg total) by mouth once daily.    atorvastatin (LIPITOR) 10 MG tablet Take 1 tablet (10 mg total) by mouth once daily.    calcipotriene (DOVONOX) 0.005 % cream APPLY  CREAM TOPICALLY TO AFFECTED AREA TWICE DAILY    carvediloL (COREG) 25 MG tablet Take 1 tablet (25 mg total) by mouth 2 (two) times  daily with meals.    clobetasoL (TEMOVATE) 0.05 % external solution APPLY  SOLUTION TOPICALLY TO SCALP  AS NEEDED FOR ITCHING OR SCALING.    colchicine (COLCRYS) 0.6 mg tablet Take 1 tablet by mouth twice daily    furosemide (LASIX) 40 MG tablet Take 1 tablet (40 mg total) by mouth once daily. (Patient not taking: Reported on 11/29/2022)    halobetasol (ULTRAVATE) 0.05 % ointment APPLY  OINTMENT TOPICALLY TO AFFECTED AREA TWICE DAILY    lisinopriL (PRINIVIL,ZESTRIL) 20 MG tablet Take 1 tablet by mouth once daily    OTEZLA 30 mg Tab Take 1 tablet by mouth twice daily.    potassium chloride SA (K-DUR,KLOR-CON) 20 MEQ tablet Take 1 tablet (20 mEq total) by mouth once daily.    risankizumab-rzaa (SKYRIZI) 150 mg/mL Syrg Inject 1 syringe (150 mg) into the skin every 12 weeks.    triamcinolone acetonide 0.1% (KENALOG) 0.1 % ointment APPLY  OINTMENT TOPICALLY TO AFFECTED AREA TWICE DAILY   Last reviewed on 3/20/2023  3:31 PM by Viet Boswell PharmD    Review of patient's allergies indicates:  No Known Allergies Last reviewed on  3/20/2023 3:31 PM by Viet Boswell      Tasks added this encounter   4/20/2023 - Refill Call (Auto Added)   Tasks due within next 3 months   5/17/2023 - Refill Call (Auto Added)     [facility]  [address] [place] [address]  Phone: [phone]  Fax: [phone]

## 2023-04-11 ENCOUNTER — DOCUMENTATION ONLY (OUTPATIENT)
Dept: DERMATOLOGY | Facility: CLINIC | Age: 48
End: 2023-04-11
Payer: MEDICAID

## 2023-04-11 ENCOUNTER — PATIENT MESSAGE (OUTPATIENT)
Dept: DERMATOLOGY | Facility: CLINIC | Age: 48
End: 2023-04-11
Payer: MEDICAID

## 2023-04-25 ENCOUNTER — SPECIALTY PHARMACY (OUTPATIENT)
Dept: PHARMACY | Facility: CLINIC | Age: 48
End: 2023-04-25
Payer: MEDICAID

## 2023-04-25 NOTE — TELEPHONE ENCOUNTER
Specialty Pharmacy - Refill Coordination    Specialty Medication Orders Linked to Encounter      Flowsheet Row Most Recent Value   Medication #1 OTEZLA 30 mg Tab (Order#[id number], Rx#[id number])            Refill Questions - Documented Responses      Flowsheet Row Most Recent Value   Patient Availability and HIPAA Verification    Does patient want to proceed with activity? Yes   HIPAA/medical authority confirmed? Yes   Relationship to patient of person spoken to? Self   Refill Screening Questions    Changes to allergies? No   Changes to medications? No   New conditions since last clinic visit? No   Unplanned office visit, urgent care, ED, or hospital admission in the last 4 weeks? No   How does patient/caregiver feel medication is working? Good   Financial problems or insurance changes? No   How many doses of your specialty medications were missed in the last 4 weeks? 0   Would patient like to speak to a pharmacist? No   When does the patient need to receive the medication? 04/29/23   Refill Delivery Questions    How will the patient receive the medication? MEDRx   When does the patient need to receive the medication? 04/29/23   Shipping Address Home   Address in [facility] confirmed and updated if neccessary? Yes   Expected Copay ($) 0   Is the patient able to afford the medication copay? Yes   Payment Method zero copay   Days supply of Refill 30   Supplies needed? No supplies needed   Refill activity completed? Yes   Refill activity plan Refill scheduled   Shipment/Pickup Date: 04/27/23            Current Outpatient Medications   Medication Sig    allopurinoL (ZYLOPRIM) 300 MG tablet Take 1.5 tablets (450 mg total) by mouth once daily.    atorvastatin (LIPITOR) 10 MG tablet Take 1 tablet (10 mg total) by mouth once daily.    calcipotriene (DOVONOX) 0.005 % cream APPLY  CREAM TOPICALLY TO AFFECTED AREA TWICE DAILY    carvediloL (COREG) 25 MG tablet Take 1 tablet (25 mg total) by mouth 2 (two) times daily  with meals.    clobetasoL (TEMOVATE) 0.05 % external solution APPLY  SOLUTION TOPICALLY TO SCALP  AS NEEDED FOR ITCHING OR SCALING.    colchicine (COLCRYS) 0.6 mg tablet Take 1 tablet by mouth twice daily    furosemide (LASIX) 40 MG tablet Take 1 tablet by mouth twice daily    halobetasol (ULTRAVATE) 0.05 % ointment APPLY  OINTMENT TOPICALLY TO AFFECTED AREA TWICE DAILY    lisinopriL (PRINIVIL,ZESTRIL) 20 MG tablet Take 1 tablet by mouth once daily    OTEZLA 30 mg Tab Take 1 tablet by mouth twice daily.    potassium chloride SA (K-DUR,KLOR-CON) 20 MEQ tablet Take 1 tablet (20 mEq total) by mouth once daily.    risankizumab-rzaa (SKYRIZI) 150 mg/mL Syrg Inject 1 syringe (150 mg) into the skin every 12 weeks.    triamcinolone acetonide 0.1% (KENALOG) 0.1 % ointment APPLY  OINTMENT TOPICALLY TO AFFECTED AREA TWICE DAILY   Last reviewed on 3/20/2023  3:31 PM by Viet Boswell, PharmD    Review of patient's allergies indicates:  No Known Allergies Last reviewed on  3/20/2023 3:31 PM by Viet Boswell      Tasks added this encounter   No tasks added.   Tasks due within next 3 months   5/17/2023 - Refill Coordination Outreach (1 time occurrence)  4/25/2023 - Refill Coordination Outreach (1 time occurrence)     [facility]  [address] [place] [address]  Phone: [phone]  Fax: [phone]

## 2023-05-04 DIAGNOSIS — I42.9 CARDIOMYOPATHY, IDIOPATHIC: ICD-10-CM

## 2023-05-04 NOTE — TELEPHONE ENCOUNTER
Care Due:                  Date            Visit Type   Department     Provider  --------------------------------------------------------------------------------                                EP -                              PRIMARY      [facility] INTERNAL  Last Visit: 11-      CARE ([facility])   MEDICINE       DARIELA VILLAVICENCIO  Next Visit: None Scheduled  None         None Found                                                            Last  Test          Frequency    Reason                     Performed    Due Date  --------------------------------------------------------------------------------    CMP.........  12 months..  potassium................  06-   06-    [facility] Embedded Care Due Messages. Reference number: [id number].   5/04/2023 5:19:21 PM CDT

## 2023-05-05 RX ORDER — POTASSIUM CHLORIDE 20 MEQ/1
TABLET, EXTENDED RELEASE ORAL
Qty: 90 TABLET | Refills: 0 | Status: SHIPPED | OUTPATIENT
Start: 2023-05-05 | End: 2023-08-29

## 2023-05-05 NOTE — TELEPHONE ENCOUNTER
Provider Staff:  Action required for this patient     Please see care gap opportunities below in Care Due Message.    Thanks!  [facility]     Appointments      Date Provider   Last Visit   11/17/2022 Imtiaz Gao MD   Next Visit   Visit date not found Imtiaz Gao MD     Refill Decision Note   Adrien Panda  is requesting a refill authorization.  Brief Assessment and Rationale for Refill:  Approve     Medication Therapy Plan:         Comments:     Note composed:9:23 AM 05/05/2023             Appointments     Last Visit   11/17/2022 Imtiaz Gao MD   Next Visit   Visit date not found Imtiaz Gao MD

## 2023-05-17 ENCOUNTER — SPECIALTY PHARMACY (OUTPATIENT)
Dept: PHARMACY | Facility: CLINIC | Age: 48
End: 2023-05-17
Payer: MEDICAID

## 2023-05-17 DIAGNOSIS — L40.9 PSORIASIS: ICD-10-CM

## 2023-05-17 RX ORDER — APREMILAST 30 MG/1
TABLET, FILM COATED ORAL
Qty: 60 TABLET | Refills: 2 | Status: ACTIVE | OUTPATIENT
Start: 2023-05-17 | End: 2023-08-21 | Stop reason: SDUPTHER

## 2023-05-17 NOTE — TELEPHONE ENCOUNTER
Specialty Pharmacy - Refill Coordination    Specialty Medication Orders Linked to Encounter      Flowsheet Row Most Recent Value   Medication #1 risankizumab-rzaa (SKYRIZI) 150 mg/mL Syrg (Order#[id number], Rx#[id number])            Refill Questions - Documented Responses      Flowsheet Row Most Recent Value   Patient Availability and HIPAA Verification    Does patient want to proceed with activity? Yes   HIPAA/medical authority confirmed? Yes   Relationship to patient of person spoken to? Self   Refill Screening Questions    Changes to allergies? No   Changes to medications? No   New conditions since last clinic visit? No   Unplanned office visit, urgent care, ED, or hospital admission in the last 4 weeks? No   How does patient/caregiver feel medication is working? Good   Financial problems or insurance changes? No   How many doses of your specialty medications were missed in the last 4 weeks? 0   Would patient like to speak to a pharmacist? No   When does the patient need to receive the medication? 05/25/23   Refill Delivery Questions    How will the patient receive the medication?    When does the patient need to receive the medication? 05/25/23   Shipping Address Prescription   Address in [facility] confirmed and updated if neccessary? Yes   Expected Copay ($) 0   Is the patient able to afford the medication copay? Yes   Payment Method zero copay   Days supply of Refill 84   Supplies needed? No supplies needed   Refill activity completed? Yes   Refill activity plan Refill scheduled   Shipment/Pickup Date: 05/24/23            Current Outpatient Medications   Medication Sig    potassium chloride SA (K-DUR,KLOR-CON) 20 MEQ tablet Take 1 tablet by mouth once daily    allopurinoL (ZYLOPRIM) 300 MG tablet Take 1.5 tablets (450 mg total) by mouth once daily.    atorvastatin (LIPITOR) 10 MG tablet Take 1 tablet (10 mg total) by mouth once daily.    calcipotriene (DOVONOX) 0.005 % cream APPLY  CREAM  TOPICALLY TO AFFECTED AREA TWICE DAILY    carvediloL (COREG) 25 MG tablet Take 1 tablet (25 mg total) by mouth 2 (two) times daily with meals.    clobetasoL (TEMOVATE) 0.05 % external solution APPLY  SOLUTION TOPICALLY TO SCALP  AS NEEDED FOR ITCHING OR SCALING.    colchicine (COLCRYS) 0.6 mg tablet Take 1 tablet by mouth twice daily    furosemide (LASIX) 40 MG tablet Take 1 tablet by mouth twice daily    halobetasol (ULTRAVATE) 0.05 % ointment APPLY  OINTMENT TOPICALLY TO AFFECTED AREA TWICE DAILY    lisinopriL (PRINIVIL,ZESTRIL) 20 MG tablet Take 1 tablet by mouth once daily    OTEZLA 30 mg Tab Take 1 tablet by mouth twice daily.    risankizumab-rzaa (SKYRIZI) 150 mg/mL Syrg Inject 1 syringe (150 mg) into the skin every 12 weeks.    triamcinolone acetonide 0.1% (KENALOG) 0.1 % ointment APPLY  OINTMENT TOPICALLY TO AFFECTED AREA TWICE DAILY   Last reviewed on 3/20/2023  3:31 PM by Viet Boswell, PharmD    Review of patient's allergies indicates:  No Known Allergies Last reviewed on  3/20/2023 3:31 PM by Viet Boswell      Tasks added this encounter   No tasks added.   Tasks due within next 3 months   5/17/2023 - Refill Coordination Outreach (1 time occurrence)  5/20/2023 - Refill Coordination Outreach (1 time occurrence)     [facility]  [address] [place] [address]  Phone: [phone]  Fax: [phone]

## 2023-05-18 ENCOUNTER — SPECIALTY PHARMACY (OUTPATIENT)
Dept: PHARMACY | Facility: CLINIC | Age: 48
End: 2023-05-18
Payer: MEDICAID

## 2023-05-18 DIAGNOSIS — L40.9 PSORIASIS: ICD-10-CM

## 2023-05-18 NOTE — TELEPHONE ENCOUNTER
Specialty Pharmacy - Refill Coordination    Specialty Medication Orders Linked to Encounter      Flowsheet Row Most Recent Value   Medication #1 OTEZLA 30 mg Tab (Order#[id number], Rx#[id number])            Refill Questions - Documented Responses      Flowsheet Row Most Recent Value   Patient Availability and HIPAA Verification    Does patient want to proceed with activity? Yes   HIPAA/medical authority confirmed? Yes   Relationship to patient of person spoken to? Self   Refill Screening Questions    Changes to allergies? No   Changes to medications? No   New conditions since last clinic visit? No   Unplanned office visit, urgent care, ED, or hospital admission in the last 4 weeks? No   How does patient/caregiver feel medication is working? Very good   Financial problems or insurance changes? No   How many doses of your specialty medications were missed in the last 4 weeks? 0   Would patient like to speak to a pharmacist? No   When does the patient need to receive the medication? 05/25/23   Refill Delivery Questions    How will the patient receive the medication? MEDRx   When does the patient need to receive the medication? 05/25/23   Shipping Address Home   Address in [facility] confirmed and updated if neccessary? Yes   Expected Copay ($) 0   Is the patient able to afford the medication copay? Yes   Payment Method zero copay   Days supply of Refill 30   Supplies needed? No supplies needed   Refill activity completed? Yes   Refill activity plan Refill scheduled   Shipment/Pickup Date: 05/22/23            Current Outpatient Medications   Medication Sig    potassium chloride SA (K-DUR,KLOR-CON) 20 MEQ tablet Take 1 tablet by mouth once daily    allopurinoL (ZYLOPRIM) 300 MG tablet Take 1.5 tablets (450 mg total) by mouth once daily.    atorvastatin (LIPITOR) 10 MG tablet Take 1 tablet (10 mg total) by mouth once daily.    calcipotriene (DOVONOX) 0.005 % cream APPLY  CREAM TOPICALLY TO AFFECTED AREA TWICE DAILY     carvediloL (COREG) 25 MG tablet Take 1 tablet (25 mg total) by mouth 2 (two) times daily with meals.    clobetasoL (TEMOVATE) 0.05 % external solution APPLY  SOLUTION TOPICALLY TO SCALP  AS NEEDED FOR ITCHING OR SCALING.    colchicine (COLCRYS) 0.6 mg tablet Take 1 tablet by mouth twice daily    furosemide (LASIX) 40 MG tablet Take 1 tablet by mouth twice daily    halobetasol (ULTRAVATE) 0.05 % ointment APPLY  OINTMENT TOPICALLY TO AFFECTED AREA TWICE DAILY    lisinopriL (PRINIVIL,ZESTRIL) 20 MG tablet Take 1 tablet by mouth once daily    OTEZLA 30 mg Tab Take 1 tablet by mouth twice daily.    risankizumab-rzaa (SKYRIZI) 150 mg/mL Syrg Inject 1 syringe (150 mg) into the skin every 12 weeks.    triamcinolone acetonide 0.1% (KENALOG) 0.1 % ointment APPLY  OINTMENT TOPICALLY TO AFFECTED AREA TWICE DAILY   Last reviewed on 3/20/2023  3:31 PM by Viet Boswell PharmD    Review of patient's allergies indicates:  No Known Allergies Last reviewed on  3/20/2023 3:31 PM by Viet Boswell      Tasks added this encounter   No tasks added.   Tasks due within next 3 months   5/17/2023 -  Setup Confirmation     [facility]  [address] [place] [address]  Phone: [phone]  Fax: [phone]

## 2023-05-18 NOTE — TELEPHONE ENCOUNTER
Incoming call from April at providers office to verify  for 05/24/23.  Informed her that it was verified by Leyla Caba LPN.  Confirmed with April that  is to arrive for 05/24/23.

## 2023-05-19 RX ORDER — APREMILAST 30 MG/1
TABLET, FILM COATED ORAL
Qty: 60 TABLET | Refills: 2 | OUTPATIENT
Start: 2023-05-19

## 2023-05-23 ENCOUNTER — TELEPHONE (OUTPATIENT)
Dept: DERMATOLOGY | Facility: CLINIC | Age: 48
End: 2023-05-23
Payer: MEDICAID

## 2023-05-23 NOTE — TELEPHONE ENCOUNTER
Called pt to let him know that his biologic should be mailed and his nurses appt could be cancelled.

## 2023-05-24 ENCOUNTER — TELEPHONE (OUTPATIENT)
Dept: PHARMACY | Facility: CLINIC | Age: 48
End: 2023-05-24
Payer: MEDICAID

## 2023-05-25 ENCOUNTER — CLINICAL SUPPORT (OUTPATIENT)
Dept: DERMATOLOGY | Facility: CLINIC | Age: 48
End: 2023-05-25
Payer: MEDICAID

## 2023-05-25 NOTE — PROGRESS NOTES
After obtaining consent, and per orders of Dr. Son, injection of Skyrizi 150mg/mL given in posterior L arm by Deyanira Pat LPN on 05/25/2023. Patient instructed to remain in clinic for 20 minutes afterwards, and to report any adverse reaction to me immediately.   NDC: 5725-2813-59  EXP: AUG 2024  LOT: [id number]

## 2023-06-01 DIAGNOSIS — L40.0 PSORIASIS VULGARIS: ICD-10-CM

## 2023-06-01 DIAGNOSIS — L40.9 PSORIASIS: ICD-10-CM

## 2023-06-06 DIAGNOSIS — L40.0 PSORIASIS VULGARIS: ICD-10-CM

## 2023-06-06 DIAGNOSIS — L40.9 PSORIASIS: ICD-10-CM

## 2023-06-08 DIAGNOSIS — L40.9 PSORIASIS: ICD-10-CM

## 2023-06-08 DIAGNOSIS — L40.0 PSORIASIS VULGARIS: ICD-10-CM

## 2023-06-14 ENCOUNTER — SPECIALTY PHARMACY (OUTPATIENT)
Dept: PHARMACY | Facility: CLINIC | Age: 48
End: 2023-06-14
Payer: MEDICAID

## 2023-06-14 NOTE — TELEPHONE ENCOUNTER
Specialty Pharmacy - Refill Coordination    Specialty Medication Orders Linked to Encounter      Flowsheet Row Most Recent Value   Medication #1 OTEZLA 30 mg Tab (Order#[id number], Rx#[id number])            Refill Questions - Documented Responses      Flowsheet Row Most Recent Value   Patient Availability and HIPAA Verification    Does patient want to proceed with activity? Yes   HIPAA/medical authority confirmed? Yes   Relationship to patient of person spoken to? Self   Refill Screening Questions    Changes to allergies? No   Changes to medications? No   New conditions since last clinic visit? No   Unplanned office visit, urgent care, ED, or hospital admission in the last 4 weeks? No   How does patient/caregiver feel medication is working? Good   Financial problems or insurance changes? No   How many doses of your specialty medications were missed in the last 4 weeks? 0   Would patient like to speak to a pharmacist? No   When does the patient need to receive the medication? 06/21/23   Refill Delivery Questions    How will the patient receive the medication? MEDRx   When does the patient need to receive the medication? 06/21/23   Shipping Address Home   Address in [facility] confirmed and updated if neccessary? Yes   Expected Copay ($) 0   Is the patient able to afford the medication copay? Yes   Payment Method zero copay   Days supply of Refill 30   Supplies needed? No supplies needed   Refill activity completed? Yes   Refill activity plan Refill scheduled   Shipment/Pickup Date: 06/20/23            Current Outpatient Medications   Medication Sig    potassium chloride SA (K-DUR,KLOR-CON) 20 MEQ tablet Take 1 tablet by mouth once daily    allopurinoL (ZYLOPRIM) 300 MG tablet Take 1.5 tablets (450 mg total) by mouth once daily.    atorvastatin (LIPITOR) 10 MG tablet Take 1 tablet (10 mg total) by mouth once daily.    calcipotriene (DOVONOX) 0.005 % cream APPLY  CREAM TOPICALLY TO AFFECTED AREA TWICE DAILY     carvediloL (COREG) 25 MG tablet Take 1 tablet (25 mg total) by mouth 2 (two) times daily with meals.    clobetasoL (TEMOVATE) 0.05 % external solution APPLY  SOLUTION TOPICALLY TO SCALP  AS NEEDED FOR ITCHING OR SCALING.    colchicine (COLCRYS) 0.6 mg tablet Take 1 tablet by mouth twice daily    furosemide (LASIX) 40 MG tablet Take 1 tablet by mouth twice daily    halobetasol (ULTRAVATE) 0.05 % ointment APPLY  OINTMENT TOPICALLY TO AFFECTED AREA TWICE DAILY    lisinopriL (PRINIVIL,ZESTRIL) 20 MG tablet Take 1 tablet by mouth once daily    OTEZLA 30 mg Tab Take 1 tablet by mouth twice daily.    risankizumab-rzaa (SKYRIZI) 150 mg/mL Syrg Inject 1 syringe (150 mg) into the skin every 12 weeks.    triamcinolone acetonide 0.1% (KENALOG) 0.1 % ointment APPLY  OINTMENT TOPICALLY TO AFFECTED AREA TWICE DAILY   Last reviewed on 3/20/2023  3:31 PM by Viet Boswell, PharmD    Review of patient's allergies indicates:  No Known Allergies Last reviewed on  5/29/2023 9:37 AM by Deyanira Pat      Tasks added this encounter   No tasks added.   Tasks due within next 3 months   6/14/2023 - Refill Coordination Outreach (1 time occurrence)  8/9/2023 - Refill Coordination Outreach (1 time occurrence)     Ernestina Davidson, Patient Care Assistant  [facility]  [address] [place], [place] [address]  [facility] [address]  Phone: [phone]  Fax: [phone]

## 2023-06-21 DIAGNOSIS — I10 ESSENTIAL HYPERTENSION: ICD-10-CM

## 2023-06-21 DIAGNOSIS — L40.0 PSORIASIS VULGARIS: ICD-10-CM

## 2023-06-21 RX ORDER — CLOBETASOL PROPIONATE 0.46 MG/ML
SOLUTION TOPICAL
Qty: 50 ML | Refills: 0 | Status: SHIPPED | OUTPATIENT
Start: 2023-06-21 | End: 2023-11-02

## 2023-06-21 RX ORDER — TRIAMCINOLONE ACETONIDE 1 MG/G
OINTMENT TOPICAL
Qty: 454 G | Refills: 0 | Status: SHIPPED | OUTPATIENT
Start: 2023-06-21 | End: 2024-01-02 | Stop reason: SDUPTHER

## 2023-06-21 RX ORDER — HALOBETASOL PROPIONATE 0.5 MG/G
OINTMENT TOPICAL
Qty: 100 G | Refills: 0 | Status: SHIPPED | OUTPATIENT
Start: 2023-06-21 | End: 2024-03-19 | Stop reason: SDUPTHER

## 2023-06-21 RX ORDER — HALOBETASOL PROPIONATE 0.5 MG/G
OINTMENT TOPICAL
Qty: 100 G | Refills: 0 | Status: SHIPPED | OUTPATIENT
Start: 2023-06-21 | End: 2024-01-02 | Stop reason: SDUPTHER

## 2023-06-21 RX ORDER — TRIAMCINOLONE ACETONIDE 1 MG/G
OINTMENT TOPICAL
Qty: 454 G | Refills: 0 | Status: SHIPPED | OUTPATIENT
Start: 2023-06-21 | End: 2023-12-08

## 2023-07-13 ENCOUNTER — PATIENT MESSAGE (OUTPATIENT)
Dept: PHARMACY | Facility: CLINIC | Age: 48
End: 2023-07-13
Payer: MEDICAID

## 2023-07-27 ENCOUNTER — SPECIALTY PHARMACY (OUTPATIENT)
Dept: PHARMACY | Facility: CLINIC | Age: 48
End: 2023-07-27
Payer: MEDICAID

## 2023-07-27 ENCOUNTER — PATIENT MESSAGE (OUTPATIENT)
Dept: PHARMACY | Facility: CLINIC | Age: 48
End: 2023-07-27
Payer: MEDICAID

## 2023-07-27 NOTE — TELEPHONE ENCOUNTER
Specialty Pharmacy - Refill Coordination    Specialty Medication Orders Linked to Encounter      Flowsheet Row Most Recent Value   Medication #1 OTEZLA 30 mg Tab (Order#[id number], Rx#[id number])          Patient will monitor for side effects from Otezla.    Refill Questions - Documented Responses      Flowsheet Row Most Recent Value   Patient Availability and HIPAA Verification    Does patient want to proceed with activity? Yes   HIPAA/medical authority confirmed? Yes   Relationship to patient of person spoken to? Self   Refill Screening Questions    Changes to allergies? No   Changes to medications? No   New conditions since last clinic visit? No   Unplanned office visit, urgent care, ED, or hospital admission in the last 4 weeks? No   How does patient/caregiver feel medication is working? Good   Financial problems or insurance changes? No   How many doses of your specialty medications were missed in the last 4 weeks? > 5   Why were doses missed? Other (comment)  [Patient had family issues recently and has missed about a week of medication. Educated the patient on the importance of remaining adherent to the medication, he verbalized understanding.]   Would patient like to speak to a pharmacist? No   When does the patient need to receive the medication? 07/28/23   Refill Delivery Questions    How will the patient receive the medication? MEDRx   When does the patient need to receive the medication? 07/28/23   Shipping Address Home   Address in [facility] confirmed and updated if neccessary? Yes   Expected Copay ($) 0   Is the patient able to afford the medication copay? Yes   Payment Method zero copay   Days supply of Refill 30   Supplies needed? No supplies needed   Refill activity completed? Yes   Refill activity plan Refill scheduled   Shipment/Pickup Date: 07/27/23            Current Outpatient Medications   Medication Sig    potassium chloride SA (K-DUR,KLOR-CON) 20 MEQ tablet Take 1 tablet by mouth once  daily    allopurinoL (ZYLOPRIM) 300 MG tablet Take 1.5 tablets (450 mg total) by mouth once daily.    atorvastatin (LIPITOR) 10 MG tablet Take 1 tablet (10 mg total) by mouth once daily.    calcipotriene (DOVONOX) 0.005 % cream APPLY  CREAM TOPICALLY TO AFFECTED AREA TWICE DAILY    carvediloL (COREG) 25 MG tablet Take 1 tablet (25 mg total) by mouth 2 (two) times daily with meals.    clobetasoL (TEMOVATE) 0.05 % external solution APPLY  SOLUTION TOPICALLY TO SCALP  AS NEEDED FOR ITCHING OR SCALING. Strength: 0.05 %    colchicine (COLCRYS) 0.6 mg tablet Take 1 tablet by mouth twice daily    furosemide (LASIX) 40 MG tablet Take 1 tablet by mouth twice daily    halobetasol (ULTRAVATE) 0.05 % ointment APPLY  OINTMENT TOPICALLY TO AFFECTED AREA TWICE DAILY    halobetasol (ULTRAVATE) 0.05 % ointment APPLY  OINTMENT TOPICALLY TO AFFECTED AREA TWICE DAILY    halobetasol (ULTRAVATE) 0.05 % ointment APPLY  OINTMENT TOPICALLY TO AFFECTED AREA TWICE DAILY Strength: 0.05 %    halobetasol (ULTRAVATE) 0.05 % ointment APPLY  OINTMENT TOPICALLY TO AFFECTED AREA TWICE DAILY Strength: 0.05 %    lisinopriL (PRINIVIL,ZESTRIL) 20 MG tablet Take 1 tablet by mouth once daily    OTEZLA 30 mg Tab Take 1 tablet by mouth twice daily.    risankizumab-rzaa (SKYRIZI) 150 mg/mL Syrg Inject 1 syringe (150 mg) into the skin every 12 weeks.    triamcinolone acetonide 0.1% (KENALOG) 0.1 % ointment APPLY  OINTMENT TOPICALLY TO AFFECTED AREA TWICE DAILY    triamcinolone acetonide 0.1% (KENALOG) 0.1 % ointment Apply to affected areas bid    triamcinolone acetonide 0.1% (KENALOG) 0.1 % ointment APPLY  OINTMENT TOPICALLY TO AFFECTED AREA TWICE DAILY Strength: 0.1 %   Last reviewed on 3/20/2023  3:31 PM by Viet Boswell, PharmD    Review of patient's allergies indicates:  No Known Allergies Last reviewed on  5/29/2023 9:37 AM by Deyanira Pat      Tasks added this encounter   No tasks added.   Tasks due within next 3 months   8/10/2023 - Refill  Coordination Outreach (1 time occurrence)  8/10/2023 - Refill Coordination Outreach (1 time occurrence)     Adrienne Perez, PharmD  [facility]  [address]  [facility] [address]  Phone: [phone]  Fax: [phone]

## 2023-08-10 ENCOUNTER — PATIENT MESSAGE (OUTPATIENT)
Dept: DERMATOLOGY | Facility: CLINIC | Age: 48
End: 2023-08-10
Payer: MEDICAID

## 2023-08-10 ENCOUNTER — SPECIALTY PHARMACY (OUTPATIENT)
Dept: PHARMACY | Facility: CLINIC | Age: 48
End: 2023-08-10
Payer: MEDICAID

## 2023-08-10 NOTE — TELEPHONE ENCOUNTER
Outgoing call: Patient is due this month to inject but waiting on an  MD appointment to get scheduled for injection.Pt stated he will message MD for an appointment and will follow up. [facility] will follow up.

## 2023-08-14 NOTE — TELEPHONE ENCOUNTER
Incoming call regarding Skyrizi refill. Patient due to inject on 8/31. PA required. Routing [facility].

## 2023-08-16 NOTE — TELEPHONE ENCOUNTER
Skyrizi is approved through 8/15/2024. Per chart review, patient has an appt on 8/31 for injection in the clinic. Pending out refill call to 8/21.

## 2023-08-21 DIAGNOSIS — L40.9 PSORIASIS: ICD-10-CM

## 2023-08-21 RX ORDER — APREMILAST 30 MG/1
TABLET, FILM COATED ORAL
Qty: 60 TABLET | Refills: 2 | Status: SHIPPED | OUTPATIENT
Start: 2023-08-21 | End: 2023-08-22 | Stop reason: SDUPTHER

## 2023-08-21 RX ORDER — APREMILAST 30 MG/1
TABLET, FILM COATED ORAL
Qty: 60 TABLET | Refills: 2 | Status: CANCELLED | OUTPATIENT
Start: 2023-08-21

## 2023-08-21 RX ORDER — APREMILAST 30 MG/1
TABLET, FILM COATED ORAL
Qty: 60 TABLET | Refills: 2 | OUTPATIENT
Start: 2023-08-21

## 2023-08-21 NOTE — TELEPHONE ENCOUNTER
Otezla is pending refill authorization. Rx was ordered as a print. Messaged provider to send an electronic Rx to [facility].

## 2023-08-22 DIAGNOSIS — L40.9 PSORIASIS: ICD-10-CM

## 2023-08-22 RX ORDER — APREMILAST 30 MG/1
TABLET, FILM COATED ORAL
Qty: 60 TABLET | Refills: 2 | Status: ACTIVE | OUTPATIENT
Start: 2023-08-22 | End: 2023-11-27 | Stop reason: SDUPTHER

## 2023-08-22 NOTE — TELEPHONE ENCOUNTER
Savita is still pending  confirmation from [facility]. Messaged staff earlier this morning; just forwarded message to provider. Awaiting response.

## 2023-08-23 NOTE — TELEPHONE ENCOUNTER
Specialty Pharmacy - Refill Coordination  Specialty Pharmacy - Medication/Referral Authorization    Specialty Medication Orders Linked to Encounter      Flowsheet Row Most Recent Value   Medication #1 risankizumab-rzaa (SKYRIZI) 150 mg/mL Syrg (Order#[id number], Rx#[id number])      confirmed by Dr. Daryl Son via staff message.        Refill Questions - Documented Responses      Flowsheet Row Most Recent Value   Patient Availability and HIPAA Verification    Does patient want to proceed with activity? Yes   HIPAA/medical authority confirmed? Yes   Relationship to patient of person spoken to? Self   Refill Screening Questions    Changes to allergies? No   Changes to medications? No   New conditions since last clinic visit? No   Unplanned office visit, urgent care, ED, or hospital admission in the last 4 weeks? No   How does patient/caregiver feel medication is working? Very good   Financial problems or insurance changes? No   How many doses of your specialty medications were missed in the last 4 weeks? 0   Would patient like to speak to a pharmacist? No   When does the patient need to receive the medication? 08/31/23   Refill Delivery Questions    How will the patient receive the medication?    When does the patient need to receive the medication? 08/31/23   Shipping Address Prescription   Address in [facility] confirmed and updated if neccessary? Yes   Expected Copay ($) 0   Is the patient able to afford the medication copay? Yes   Payment Method zero copay   Days supply of Refill 84   Supplies needed? No supplies needed   Refill activity completed? Yes   Refill activity plan Refill scheduled   Shipment/Pickup Date: 08/30/23            Current Outpatient Medications   Medication Sig    potassium chloride SA (K-DUR,KLOR-CON) 20 MEQ tablet Take 1 tablet by mouth once daily    allopurinoL (ZYLOPRIM) 300 MG tablet Take 1.5 tablets (450 mg total) by mouth once daily.    atorvastatin (LIPITOR)  10 MG tablet Take 1 tablet (10 mg total) by mouth once daily.    calcipotriene (DOVONOX) 0.005 % cream APPLY  CREAM TOPICALLY TO AFFECTED AREA TWICE DAILY    carvediloL (COREG) 25 MG tablet Take 1 tablet (25 mg total) by mouth 2 (two) times daily with meals.    clobetasoL (TEMOVATE) 0.05 % external solution APPLY  SOLUTION TOPICALLY TO SCALP  AS NEEDED FOR ITCHING OR SCALING. Strength: 0.05 %    colchicine (COLCRYS) 0.6 mg tablet Take 1 tablet by mouth twice daily    furosemide (LASIX) 40 MG tablet Take 1 tablet by mouth twice daily    halobetasol (ULTRAVATE) 0.05 % ointment APPLY  OINTMENT TOPICALLY TO AFFECTED AREA TWICE DAILY    halobetasol (ULTRAVATE) 0.05 % ointment APPLY  OINTMENT TOPICALLY TO AFFECTED AREA TWICE DAILY    halobetasol (ULTRAVATE) 0.05 % ointment APPLY  OINTMENT TOPICALLY TO AFFECTED AREA TWICE DAILY Strength: 0.05 %    halobetasol (ULTRAVATE) 0.05 % ointment APPLY  OINTMENT TOPICALLY TO AFFECTED AREA TWICE DAILY Strength: 0.05 %    lisinopriL (PRINIVIL,ZESTRIL) 20 MG tablet Take 1 tablet by mouth once daily    OTEZLA 30 mg Tab Take 1 tablet by mouth twice daily.    risankizumab-rzaa (SKYRIZI) 150 mg/mL Syrg Inject 1 syringe (150 mg) into the skin every 12 weeks.    triamcinolone acetonide 0.1% (KENALOG) 0.1 % ointment APPLY  OINTMENT TOPICALLY TO AFFECTED AREA TWICE DAILY    triamcinolone acetonide 0.1% (KENALOG) 0.1 % ointment Apply to affected areas bid    triamcinolone acetonide 0.1% (KENALOG) 0.1 % ointment APPLY  OINTMENT TOPICALLY TO AFFECTED AREA TWICE DAILY Strength: 0.1 %   Last reviewed on 3/20/2023  3:31 PM by Viet Boswell, PharmD    Review of patient's allergies indicates:  No Known Allergies Last reviewed on  8/22/2023 9:37 AM by Daryl Son      Tasks added this encounter   8/16/2024 - Benefits Review (Annual recurrence)   Tasks due within next 3 months   8/29/2023 - Refill Coordination Outreach (1 time occurrence)     Viet Boswell, PharmD  [facility]  [address] [facility] [address]  Phone: [phone]  Fax: [phone]

## 2023-08-31 ENCOUNTER — OFFICE VISIT (OUTPATIENT)
Dept: DERMATOLOGY | Facility: CLINIC | Age: 48
End: 2023-08-31
Payer: MEDICAID

## 2023-08-31 DIAGNOSIS — Z79.899 ENCOUNTER FOR LONG-TERM (CURRENT) USE OF HIGH-RISK MEDICATION: ICD-10-CM

## 2023-08-31 DIAGNOSIS — L40.50 PSORIATIC ARTHRITIS: ICD-10-CM

## 2023-08-31 DIAGNOSIS — L40.9 PSORIASIS: Primary | ICD-10-CM

## 2023-08-31 PROCEDURE — 1159F PR MEDICATION LIST DOCUMENTED IN MEDICAL RECORD: ICD-10-PCS | Mod: CPTII,,, | Performed by: PATHOLOGY

## 2023-08-31 PROCEDURE — 99214 OFFICE O/P EST MOD 30 MIN: CPT | Mod: S$PBB,,, | Performed by: PATHOLOGY

## 2023-08-31 PROCEDURE — 1160F RVW MEDS BY RX/DR IN RCRD: CPT | Mod: CPTII,,, | Performed by: PATHOLOGY

## 2023-08-31 PROCEDURE — 1159F MED LIST DOCD IN RCRD: CPT | Mod: CPTII,,, | Performed by: PATHOLOGY

## 2023-08-31 PROCEDURE — 99214 PR OFFICE/OUTPT VISIT, EST, LEVL IV, 30-39 MIN: ICD-10-PCS | Mod: S$PBB,,, | Performed by: PATHOLOGY

## 2023-08-31 PROCEDURE — 4010F ACE/ARB THERAPY RXD/TAKEN: CPT | Mod: CPTII,,, | Performed by: PATHOLOGY

## 2023-08-31 PROCEDURE — 1160F PR REVIEW ALL MEDS BY PRESCRIBER/CLIN PHARMACIST DOCUMENTED: ICD-10-PCS | Mod: CPTII,,, | Performed by: PATHOLOGY

## 2023-08-31 PROCEDURE — 4010F PR ACE/ARB THEARPY RXD/TAKEN: ICD-10-PCS | Mod: CPTII,,, | Performed by: PATHOLOGY

## 2023-08-31 RX ORDER — TAPINAROF 10 MG/1000MG
1 CREAM TOPICAL DAILY
Qty: 60 G | Refills: 6 | Status: ACTIVE | OUTPATIENT
Start: 2023-08-31 | End: 2023-11-02

## 2023-08-31 NOTE — PROGRESS NOTES
Subjective:      Patient ID:  Adrien Panda is a 47 y.o. male who presents for No chief complaint on file.    HPI  Pt well known to me for psoriasis/ psoriatic arthritis - on skyrizi, otezla, topical steroids.  Last skyrizi injection 5/25/23.   Plaques on legs have flared in recent weeks - increased life stressors, not sleeping well.  Arthritis in hands and feet has improved dramatically - no joint pain for he past few months.Using topicals - halobetasol, lidex, dovonex.  Started Skyrizi 10/23/19.  Re-started Otezla about the same time.     Taltz initially worked best of all of the other systemic agents he has tried, but legs flared Fall 2018 with progressive new scaly plaques moving proximal on bilateral legs to thighs.  Most recently, was on Ilumya, and prior to that MTX and Tremfya, which were ineffective and discontinued.     Started Taltz 1/2018. States he was clear when he first started it, but psoriasis recurred on his legs. No infections.       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 Ruibn at [facility], cutaneous and joint gout tx by Dr. Hermosillo (psoriasis flared on colchicine), stroke      PSORIASIS: failed tx with Enbrel, Humira, Soriatane, Taltz, Ilumya, tremfya, otezla, clobetasol topical, betamethasone topical, Lidex topical, elocon topical.    ILK helps temporarily. ultravate works best out of all the topicals he's tried. psorcon 0.05% oint helps.    On Humira from 6/2015 - 4/2016 until he stopped responding to it. Switched back to enbrel at that point which only helped temporarily.    Added Soriatane to Enbrel but ended up with a bad flare on lower legs.  Not a good candidate for NBUVB as he lives on the [facility] and there is not light unit in this area.   Stelara 9/21/16 - 7/19/17 (stopped responding to it)  Taltz 1/2018 - 11/2018 (stopped responding to it)  Tremfya 11/2018 - 4/2019 - never achieved significant clearance  Ilumya 4/2019 - 8/ 2019 - no improvement  and progression  Currently - Virginiaizi (started Oct. 2019) and Otezla     TB gold: negative 03/01/2023 - negative     CBC, LFTs reviewed and WNL 3/1/2023    Review of Systems   Constitutional:  Negative for fever, chills, weight loss, weight gain, fatigue and malaise.   HENT:  Negative for mouth sores (no tongue whiteness).    Respiratory:  Negative for shortness of breath.    Gastrointestinal:  Negative for nausea, vomiting, abdominal pain and diarrhea.   Musculoskeletal:  Negative for joint swelling and arthralgias.   Skin:  Positive for itching and rash. Negative for sun sensitivity and daily sunscreen use.        Injection site reaction - no   yes   Psychiatric/Behavioral:  Negative for depressed mood.        Objective:   Physical Exam   Constitutional: He appears well-developed and well-nourished.   Neurological: He is alert.   Skin:   Areas Examined (abnormalities noted in diagram):   Scalp / Hair Palpated and Inspected  Head / Face Inspection Performed  Neck Inspection Performed  Chest / Axilla Inspection Performed  Abdomen Inspection Performed  Back Inspection Performed  RUE Inspected  LUE Inspection Performed  RLE Inspected  LLE Inspection Performed  Nails and Digits Inspection Performed            Diagram Legend     Erythematous scaling macule/papule c/w actinic keratosis       Vascular papule c/w angioma      Pigmented verrucoid papule/plaque c/w seborrheic keratosis      Yellow umbilicated papule c/w sebaceous hyperplasia      Irregularly shaped tan macule c/w lentigo     1-2 mm smooth white papules consistent with Milia      Movable subcutaneous cyst with punctum c/w epidermal inclusion cyst      Subcutaneous movable cyst c/w pilar cyst      Firm pink to brown papule c/w dermatofibroma      Pedunculated fleshy papule(s) c/w skin tag(s)      Evenly pigmented macule c/w junctional nevus     Mildly variegated pigmented, slightly irregular-bordered macule c/w mildly atypical nevus      Flesh colored to evenly  pigmented papule c/w intradermal nevus       Pink pearly papule/plaque c/w basal cell carcinoma      Erythematous hyperkeratotic cursted plaque c/w SCC      Surgical scar with no sign of skin cancer recurrence      Open and closed comedones      Inflammatory papules and pustules      Verrucoid papule consistent consistent with wart     Erythematous eczematous patches and plaques     Dystrophic onycholytic nail with subungual debris c/w onychomycosis     Umbilicated papule    Erythematous-base heme-crusted tan verrucoid plaque consistent with inflamed seborrheic keratosis     Erythematous Silvery Scaling Plaque c/w Psoriasis     See annotation      Assessment / Plan:        Psoriasis - fairly well controlled but with residual/ flaring plaques to bilateral shins, calves, abdomen, back.  + psoriatic arthritis, as well - currently well controlled.  Continue Skyrizi - injection today by LPN without complications.  Continue Otezla.  Continue topical steroids and dovonex as needed.  Add Vtama to all residual plaques qAM.  Inadequately controlled currently.  -     tapinarof (VTAMA) 1 % Crea; Apply 1 application  topically once daily. aaa qd  Dispense: 60 g; Refill: 6    Psoriatic arthritis - as above; well controlled    Encounter for long-term (current) use of high-risk medication - labs from March 2023 reviewed.  CBC, CMP WNL and quant gold negative; repeat CBC, LFTs in 12 weeks             No follow-ups on file.

## 2023-09-25 DIAGNOSIS — L40.9 PSORIASIS: Primary | ICD-10-CM

## 2023-09-25 RX ORDER — RISANKIZUMAB-RZAA 150 MG/ML
150 INJECTION SUBCUTANEOUS
Qty: 1 ML | Refills: 3 | Status: SHIPPED | OUTPATIENT
Start: 2023-09-25 | End: 2023-09-28 | Stop reason: SDUPTHER

## 2023-09-26 RX ORDER — FUROSEMIDE 40 MG/1
TABLET ORAL
Qty: 180 TABLET | Refills: 0 | Status: SHIPPED | OUTPATIENT
Start: 2023-09-26 | End: 2023-10-23

## 2023-10-23 RX ORDER — CARVEDILOL 25 MG/1
25 TABLET ORAL 2 TIMES DAILY WITH MEALS
Qty: 180 TABLET | Refills: 0 | Status: SHIPPED | OUTPATIENT
Start: 2023-10-23 | End: 2024-03-28

## 2023-10-23 RX ORDER — FUROSEMIDE 40 MG/1
TABLET ORAL
Qty: 180 TABLET | Refills: 0 | Status: SHIPPED | OUTPATIENT
Start: 2023-10-23

## 2023-11-02 ENCOUNTER — TELEPHONE (OUTPATIENT)
Dept: CARDIOLOGY | Facility: CLINIC | Age: 48
End: 2023-11-02

## 2023-11-02 ENCOUNTER — OFFICE VISIT (OUTPATIENT)
Dept: CARDIOLOGY | Facility: CLINIC | Age: 48
End: 2023-11-02
Payer: MEDICAID

## 2023-11-02 VITALS
BODY MASS INDEX: 36.17 KG/M2 | HEIGHT: 64 IN | HEART RATE: 78 BPM | RESPIRATION RATE: 17 BRPM | DIASTOLIC BLOOD PRESSURE: 87 MMHG | SYSTOLIC BLOOD PRESSURE: 125 MMHG | WEIGHT: 211.88 LBS

## 2023-11-02 DIAGNOSIS — I10 ESSENTIAL HYPERTENSION: ICD-10-CM

## 2023-11-02 DIAGNOSIS — E66.9 NON MORBID OBESITY, UNSPECIFIED OBESITY TYPE: ICD-10-CM

## 2023-11-02 DIAGNOSIS — R94.31 ABNORMAL EKG: ICD-10-CM

## 2023-11-02 DIAGNOSIS — L40.9 PSORIASIS: ICD-10-CM

## 2023-11-02 DIAGNOSIS — I42.9 CARDIOMYOPATHY, IDIOPATHIC: Primary | ICD-10-CM

## 2023-11-02 PROCEDURE — 1159F MED LIST DOCD IN RCRD: CPT | Mod: CPTII,,, | Performed by: INTERNAL MEDICINE

## 2023-11-02 PROCEDURE — 3079F PR MOST RECENT DIASTOLIC BLOOD PRESSURE 80-89 MM HG: ICD-10-PCS | Mod: CPTII,,, | Performed by: INTERNAL MEDICINE

## 2023-11-02 PROCEDURE — 3008F BODY MASS INDEX DOCD: CPT | Mod: CPTII,,, | Performed by: INTERNAL MEDICINE

## 2023-11-02 PROCEDURE — 4010F PR ACE/ARB THEARPY RXD/TAKEN: ICD-10-PCS | Mod: CPTII,,, | Performed by: INTERNAL MEDICINE

## 2023-11-02 PROCEDURE — 1160F RVW MEDS BY RX/DR IN RCRD: CPT | Mod: CPTII,,, | Performed by: INTERNAL MEDICINE

## 2023-11-02 PROCEDURE — 3079F DIAST BP 80-89 MM HG: CPT | Mod: CPTII,,, | Performed by: INTERNAL MEDICINE

## 2023-11-02 PROCEDURE — 93010 EKG 12-LEAD: ICD-10-PCS | Mod: S$PBB,,, | Performed by: INTERNAL MEDICINE

## 2023-11-02 PROCEDURE — 3008F PR BODY MASS INDEX (BMI) DOCUMENTED: ICD-10-PCS | Mod: CPTII,,, | Performed by: INTERNAL MEDICINE

## 2023-11-02 PROCEDURE — 1159F PR MEDICATION LIST DOCUMENTED IN MEDICAL RECORD: ICD-10-PCS | Mod: CPTII,,, | Performed by: INTERNAL MEDICINE

## 2023-11-02 PROCEDURE — 99214 OFFICE O/P EST MOD 30 MIN: CPT | Mod: S$PBB,,, | Performed by: INTERNAL MEDICINE

## 2023-11-02 PROCEDURE — 99214 OFFICE O/P EST MOD 30 MIN: CPT | Mod: PBBFAC | Performed by: INTERNAL MEDICINE

## 2023-11-02 PROCEDURE — 93010 ELECTROCARDIOGRAM REPORT: CPT | Mod: S$PBB,,, | Performed by: INTERNAL MEDICINE

## 2023-11-02 PROCEDURE — 3074F SYST BP LT 130 MM HG: CPT | Mod: CPTII,,, | Performed by: INTERNAL MEDICINE

## 2023-11-02 PROCEDURE — 4010F ACE/ARB THERAPY RXD/TAKEN: CPT | Mod: CPTII,,, | Performed by: INTERNAL MEDICINE

## 2023-11-02 PROCEDURE — 93005 ELECTROCARDIOGRAM TRACING: CPT | Mod: PBBFAC | Performed by: INTERNAL MEDICINE

## 2023-11-02 PROCEDURE — 99214 PR OFFICE/OUTPT VISIT, EST, LEVL IV, 30-39 MIN: ICD-10-PCS | Mod: S$PBB,,, | Performed by: INTERNAL MEDICINE

## 2023-11-02 PROCEDURE — 99999 PR PBB SHADOW E&M-EST. PATIENT-LVL IV: ICD-10-PCS | Mod: PBBFAC,,, | Performed by: INTERNAL MEDICINE

## 2023-11-02 PROCEDURE — 99999 PR PBB SHADOW E&M-EST. PATIENT-LVL IV: CPT | Mod: PBBFAC,,, | Performed by: INTERNAL MEDICINE

## 2023-11-02 PROCEDURE — 1160F PR REVIEW ALL MEDS BY PRESCRIBER/CLIN PHARMACIST DOCUMENTED: ICD-10-PCS | Mod: CPTII,,, | Performed by: INTERNAL MEDICINE

## 2023-11-02 PROCEDURE — 3074F PR MOST RECENT SYSTOLIC BLOOD PRESSURE < 130 MM HG: ICD-10-PCS | Mod: CPTII,,, | Performed by: INTERNAL MEDICINE

## 2023-11-02 NOTE — PROGRESS NOTES
CARDIOVASCULAR PROGRESS NOTE    REASON FOR CONSULT:   Adrien Panad is a 48 y.o. male who presents for follow up of [facility].    PCP:  Sima  Rheum: Jaimee/Huy  HISTORY OF PRESENT ILLNESS:   Last seen September 2022.      Patient returns for follow-up after greater than 1 year hiatus.  He reports generally stable cardiovascular status without angina, dyspnea, palpitations or syncope.  He denies PND, orthopnea wife, or lower extremity edema.  There has been no melena, hematuria, or claudicant symptoms.  His main complaint today continue to be related to sciatica.  He tells me he is only taking Lasix once a day.  I plan to discontinue this now.    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    Cath 8/20/12 with nonobst CAD ([facility] records, Media tab 11/30/18).  Hx LV apical thrombus ([facility] records, Media tab 11/30/18).    PAST MEDICAL HISTORY:     Past Medical History:   Diagnosis Date    Arthritis     Blood clotting tendency     Congestive heart failure     High cholesterol     Hyperlipemia     Hypertension     Joint pain     Psoriasis        PAST SURGICAL HISTORY:   History reviewed. No pertinent surgical history.    ALLERGIES AND MEDICATION:   Review of patient's allergies indicates:  No Known Allergies     Medication List            Accurate as of November 2, 2023 10:20 AM. If you have any questions, ask your nurse or doctor.                CONTINUE taking these medications      allopurinoL 300 MG tablet  Commonly known as: ZYLOPRIM  Take 1.5 tablets (450 mg total) by mouth once daily.     atorvastatin 10 MG tablet  Commonly known as: LIPITOR  Take 1 tablet (10 mg total) by mouth once daily.     calcipotriene 0.005 % cream  Commonly known as: DOVONOX  APPLY  CREAM TOPICALLY TO AFFECTED AREA TWICE DAILY     carvediloL 25 MG tablet  Commonly known as: COREG  TAKE 1 TABLET BY MOUTH TWICE DAILY WITH MEALS     colchicine (gout) 0.6 mg  tablet  Commonly known as: COLCRYS  Take 1 tablet by mouth twice daily     furosemide 40 MG tablet  Commonly known as: LASIX  Take 1 tablet by mouth twice daily     * halobetasol 0.05 % ointment  Commonly known as: ULTRAVATE  APPLY  OINTMENT TOPICALLY TO AFFECTED AREA TWICE DAILY     * halobetasol 0.05 % ointment  Commonly known as: ULTRAVATE  APPLY  OINTMENT TOPICALLY TO AFFECTED AREA TWICE DAILY     * halobetasol 0.05 % ointment  Commonly known as: ULTRAVATE  APPLY  OINTMENT TOPICALLY TO AFFECTED AREA TWICE DAILY Strength: 0.05 %     * halobetasol 0.05 % ointment  Commonly known as: ULTRAVATE  APPLY  OINTMENT TOPICALLY TO AFFECTED AREA TWICE DAILY Strength: 0.05 %     lisinopriL 20 MG tablet  Commonly known as: PRINIVIL,ZESTRIL  Take 1 tablet by mouth once daily     OTEZLA 30 mg Tab  Generic drug: apremilast  Take 1 tablet by mouth twice daily.     potassium chloride SA 20 MEQ tablet  Commonly known as: K-DUR,KLOR-CON  Take 1 tablet (20 mEq total) by mouth once daily. Due for annual with PCP, Labs     risankizumab-rzaa 150 mg/mL Pnij  Inject 150 mg into the skin every 12 weeks.     * triamcinolone acetonide 0.1% 0.1 % ointment  Commonly known as: KENALOG  APPLY  OINTMENT TOPICALLY TO AFFECTED AREA TWICE DAILY     * triamcinolone acetonide 0.1% 0.1 % ointment  Commonly known as: KENALOG  Apply to affected areas bid     * triamcinolone acetonide 0.1% 0.1 % ointment  Commonly known as: KENALOG  APPLY  OINTMENT TOPICALLY TO AFFECTED AREA TWICE DAILY Strength: 0.1 %           * This list has 7 medication(s) that are the same as other medications prescribed for you. Read the directions carefully, and ask your doctor or other care provider to review them with you.                STOP taking these medications      clobetasoL 0.05 % external solution  Commonly known as: TEMOVATE  Stopped by: Flako Cunningham MD     VTAMA 1 % Crea  Generic drug: tapinarof  Stopped by: Flako Cunningham MD              SOCIAL HISTORY:  "    Social History     Socioeconomic History    Marital status: Single   Tobacco Use    Smoking status: Former     Current packs/day: 0.25     Types: Cigarettes    Smokeless tobacco: Former   Substance and Sexual Activity    Alcohol use: Yes     Comment: Social    Drug use: No       FAMILY HISTORY:     Family History   Problem Relation Age of Onset    Coronary artery disease Mother     Melanoma Neg Hx     Psoriasis Neg Hx     Lupus Neg Hx        REVIEW OF SYSTEMS:   Review of Systems   Constitutional:  Negative for chills, diaphoresis and fever.   HENT:  Negative for nosebleeds.    Eyes:  Negative for blurred vision, double vision and photophobia.   Respiratory:  Negative for hemoptysis, shortness of breath and wheezing.    Cardiovascular:  Negative for chest pain, palpitations, orthopnea, claudication, leg swelling and PND.   Gastrointestinal:  Negative for abdominal pain, blood in stool, heartburn, melena, nausea and vomiting.   Genitourinary:  Negative for flank pain and hematuria.   Musculoskeletal:  Negative for falls, myalgias and neck pain.        L sciatica   Skin:  Positive for rash (psoriatic).   Neurological:  Negative for dizziness, seizures, loss of consciousness, weakness and headaches.   Endo/Heme/Allergies:  Negative for polydipsia. Does not bruise/bleed easily.   Psychiatric/Behavioral:  Negative for depression and memory loss. The patient is not nervous/anxious.        PHYSICAL EXAM:     Vitals:    11/02/23 0955   BP: 125/87   Pulse: 78   Resp: 17    Body mass index is 36.37 kg/m².  Weight: 96.1 kg (211 lb 13.8 oz)   Height: 5' 4" (162.6 cm)     Physical Exam  Vitals reviewed.   Constitutional:       General: He is not in acute distress.     Appearance: He is well-developed. He is obese. He is not ill-appearing, toxic-appearing or diaphoretic.   HENT:      Head: Normocephalic and atraumatic.   Eyes:      General: No scleral icterus.     Conjunctiva/sclera: Conjunctivae normal.      Pupils: Pupils " are equal, round, and reactive to light.   Neck:      Thyroid: No thyromegaly.      Vascular: Normal carotid pulses. No carotid bruit or JVD.      Trachea: Trachea normal. No tracheal deviation.   Cardiovascular:      Rate and Rhythm: Normal rate and regular rhythm.      Pulses:           Carotid pulses are 2+ on the right side and 2+ on the left side.     Heart sounds: S1 normal and S2 normal. No murmur heard.     No friction rub. No gallop.   Pulmonary:      Effort: Pulmonary effort is normal. No respiratory distress.      Breath sounds: Normal breath sounds. No stridor. No wheezing, rhonchi or rales.   Chest:      Chest wall: No tenderness.   Abdominal:      General: There is no distension.      Palpations: Abdomen is soft.   Musculoskeletal:         General: No swelling or tenderness. Normal range of motion.      Cervical back: Normal range of motion and neck supple. No edema or rigidity.      Right lower leg: No edema.      Left lower leg: No edema.   Feet:      Right foot:      Skin integrity: No ulcer.      Left foot:      Skin integrity: No ulcer.   Skin:     General: Skin is warm and dry.      Coloration: Skin is not jaundiced.      Findings: Rash (psoriatic) present. No erythema.   Neurological:      General: No focal deficit present.      Mental Status: He is alert and oriented to person, place, and time.      Cranial Nerves: No cranial nerve deficit.   Psychiatric:         Mood and Affect: Mood normal.         Speech: Speech normal.         Behavior: Behavior normal. Behavior is cooperative.         DATA:   EKG: (personally reviewed tracing(s))  11/2/23 SR 73, IRBBB, similar to 9/29/22    Laboratory:  CBC:  Recent Labs   Lab 08/26/21  0959 06/15/22  1026 03/01/23  1111   WBC 8.27 9.76 8.51   Hemoglobin 16.0 15.0 17.4   Hematocrit 49.4 46.3 53.7   Platelets 235 220 207         CHEMISTRIES:  Recent Labs   Lab 02/23/21  1357 06/15/22  1026   Glucose 97 92   Sodium 139 138   Potassium 3.8 4.1   BUN 16 23 H    Creatinine 0.9 0.8   eGFR if African American >60.0 >60.0   eGFR if non African American >60.0 >60.0   Calcium 9.4 9.2         CARDIAC BIOMARKERS:        COAGS:        LIPIDS/LFTS:  Recent Labs   Lab 04/08/21  1308 08/26/21  0959 06/15/22  1026 11/17/22  1140 03/01/23  1111   Cholesterol 250 H  --   --  203 H  --    Triglycerides 158 H  --   --  133  --    HDL 66  --   --  46  --    LDL Cholesterol 152.4  --   --  130.4  --    Non-HDL Cholesterol 184  --   --  157  --    AST  --    < > 13 17 15   ALT  --    < > 15 25 18    < > = values in this interval not displayed.       The 10-year ASCVD risk score (Johann BLANCO, et al., 2019) is: 2.9%    Values used to calculate the score:      Age: 48 years      Sex: Male      Is Non- : No      Diabetic: No      Tobacco smoker: No      Systolic Blood Pressure: 125 mmHg      Is BP treated: Yes      HDL Cholesterol: 46 mg/dL      Total Cholesterol: 203 mg/dL      Cardiovascular Testing:  Echo 3/23/21  The left ventricle is normal in size with concentric hypertrophy and normal systolic function. The estimated ejection fraction is 55%  Normal left ventricular diastolic function.  Normal right ventricular size with normal right ventricular systolic function.  Normal central venous pressure (3 mmHg).  The estimated PA systolic pressure is 25 mmHg.    Holter 3/23/21  The diary was properly completed  Sinus rhythm with heart rates varying between 60 and 148 bpm with an average of 95 bpm  There were very rare PVCs  There were frequent PACs totalling 1458 and averaging 30.39 per hour  Symptoms associated with sinus tachycardia.    Carotid US 1/5/18   Less than 50% stenosis of the internal carotid arteries bilaterally  Antegrade vertebral arteries bilateral    ASSESSMENT:   # hx NICM, ?psoriatic myocarditis 2012, LVEF normalized.  Currently euvolemic.  # WEBSTER/palps, resolved.  Holter/echo 3/2021 neg  # HTN, controlled  # ?hx CVA r/t LV apical thrombus.  #  dyslipidemia, on atorva 10mg   # psoriasis/gout, followed by Rheum  # BMI 36, stable vs last OV    PLAN:   Cont med rx/GDMT  Stop lasix  Diet/exercise/weight loss  RTC 1 year with echo (Nov 2024)      Flako Cunningham MD, FACC

## 2023-11-20 ENCOUNTER — PATIENT MESSAGE (OUTPATIENT)
Dept: DERMATOLOGY | Facility: CLINIC | Age: 48
End: 2023-11-20
Payer: MEDICAID

## 2023-11-22 DIAGNOSIS — I42.9 CARDIOMYOPATHY, IDIOPATHIC: ICD-10-CM

## 2023-11-22 RX ORDER — POTASSIUM CHLORIDE 20 MEQ/1
20 TABLET, EXTENDED RELEASE ORAL
Qty: 90 TABLET | Refills: 0 | Status: SHIPPED | OUTPATIENT
Start: 2023-11-22 | End: 2024-02-19

## 2023-11-22 NOTE — TELEPHONE ENCOUNTER
Refill Routing Note   Medication(s) are not appropriate for processing by [facility] for the following reason(s):        Required labs outdated    [facility] action(s):  Defer   Requires appointment : Yes     Requires labs : Yes             Appointments  past 12m or future 3m with PCP    Date Provider   Last Visit   11/17/2022 Imtiaz Gao MD   Next Visit   Visit date not found Imtiaz Gao MD   ED visits in past 90 days: 0        Note composed:6:47 AM 11/22/2023

## 2023-11-22 NOTE — TELEPHONE ENCOUNTER
Care Due:                  Date            Visit Type   Department     Provider  --------------------------------------------------------------------------------                                EP -                              PRIMARY      [facility]  Last Visit: 11-      CARE ([facility])   MEDICINE       DARIELA VILLAVICENCIO  Next Visit: None Scheduled  None         None Found                                                            Last  Test          Frequency    Reason                     Performed    Due Date  --------------------------------------------------------------------------------    Office Visit  15 months..  atorvastatin, potassium..  11-   02-    CMP.........  12 months..  potassium................  06-   06-    Lipid Panel.  12 months..  atorvastatin.............  11- 11-    [employer] Embedded Care Due Messages. Reference number: [id number].   11/22/2023 6:40:40 AM CST

## 2023-11-25 RX ORDER — LISINOPRIL 20 MG/1
TABLET ORAL
Qty: 90 TABLET | Refills: 3 | Status: SHIPPED | OUTPATIENT
Start: 2023-11-25

## 2023-11-27 DIAGNOSIS — L40.9 PSORIASIS: ICD-10-CM

## 2023-11-27 RX ORDER — APREMILAST 30 MG/1
TABLET, FILM COATED ORAL
Qty: 60 TABLET | Refills: 2 | Status: ACTIVE | OUTPATIENT
Start: 2023-11-27 | End: 2024-02-21 | Stop reason: SDUPTHER

## 2023-11-30 ENCOUNTER — PATIENT MESSAGE (OUTPATIENT)
Dept: DERMATOLOGY | Facility: CLINIC | Age: 48
End: 2023-11-30
Payer: MEDICAID

## 2023-12-07 ENCOUNTER — OFFICE VISIT (OUTPATIENT)
Dept: DERMATOLOGY | Facility: CLINIC | Age: 48
End: 2023-12-07
Payer: MEDICAID

## 2023-12-07 DIAGNOSIS — L40.9 PSORIASIS: ICD-10-CM

## 2023-12-07 DIAGNOSIS — Z79.899 ENCOUNTER FOR LONG-TERM (CURRENT) USE OF HIGH-RISK MEDICATION: ICD-10-CM

## 2023-12-07 DIAGNOSIS — L40.9 PSORIASIS: Primary | ICD-10-CM

## 2023-12-07 PROCEDURE — 4010F ACE/ARB THERAPY RXD/TAKEN: CPT | Mod: CPTII,,, | Performed by: PATHOLOGY

## 2023-12-07 PROCEDURE — 1160F RVW MEDS BY RX/DR IN RCRD: CPT | Mod: CPTII,,, | Performed by: PATHOLOGY

## 2023-12-07 PROCEDURE — 1159F MED LIST DOCD IN RCRD: CPT | Mod: CPTII,,, | Performed by: PATHOLOGY

## 2023-12-07 PROCEDURE — 1160F PR REVIEW ALL MEDS BY PRESCRIBER/CLIN PHARMACIST DOCUMENTED: ICD-10-PCS | Mod: CPTII,,, | Performed by: PATHOLOGY

## 2023-12-07 PROCEDURE — 99213 OFFICE O/P EST LOW 20 MIN: CPT | Mod: PBBFAC | Performed by: PATHOLOGY

## 2023-12-07 PROCEDURE — 99213 PR OFFICE/OUTPT VISIT, EST, LEVL III, 20-29 MIN: ICD-10-PCS | Mod: S$PBB,,, | Performed by: PATHOLOGY

## 2023-12-07 PROCEDURE — 99999 PR PBB SHADOW E&M-EST. PATIENT-LVL III: CPT | Mod: PBBFAC,,, | Performed by: PATHOLOGY

## 2023-12-07 PROCEDURE — 4010F PR ACE/ARB THEARPY RXD/TAKEN: ICD-10-PCS | Mod: CPTII,,, | Performed by: PATHOLOGY

## 2023-12-07 PROCEDURE — 99999 PR PBB SHADOW E&M-EST. PATIENT-LVL III: ICD-10-PCS | Mod: PBBFAC,,, | Performed by: PATHOLOGY

## 2023-12-07 PROCEDURE — 99213 OFFICE O/P EST LOW 20 MIN: CPT | Mod: S$PBB,,, | Performed by: PATHOLOGY

## 2023-12-07 PROCEDURE — 1159F PR MEDICATION LIST DOCUMENTED IN MEDICAL RECORD: ICD-10-PCS | Mod: CPTII,,, | Performed by: PATHOLOGY

## 2023-12-07 RX ORDER — TAPINAROF 10 MG/1000MG
1 CREAM TOPICAL DAILY
Qty: 60 G | Refills: 6 | Status: SHIPPED | OUTPATIENT
Start: 2023-12-07

## 2023-12-07 NOTE — PROGRESS NOTES
Subjective:      Patient ID:  Adrien Panda is a 48 y.o. male who presents for   Chief Complaint   Patient presents with    Psoriasis     HPI  Pt well known to me for psoriasis/ psoriatic arthritis - on skyrizi, otezla, topical steroids.  Last skyrizi injection 8/31/23 - he is now a few weeks overdue.   Plaques on legs have flared in recent weeks.  Arthritis in hands and feet has improved dramatically - no joint pain for he past few months. Using topicals - halobetasol, lidex, dovonex but did not  new Vtama topical cream prescribed last visit.  Started Skyrizi 10/23/19.  Re-started Otezla about the same time.     Taltz initially worked best of all of the other systemic agents he has tried, but legs flared Fall 2018 with progressive new scaly plaques moving proximal on bilateral legs to thighs.  Most recently, was on Ilumya, and prior to that MTX and Tremfya, which were ineffective and discontinued.     Started Taltz 1/2018. States he was clear when he first started it, but psoriasis recurred on his legs. No infections.       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 Rubin at [facility], cutaneous and joint gout tx by Dr. Hermosillo (psoriasis flared on colchicine), stroke      PSORIASIS: failed tx with Enbrel, Humira, Soriatane, Taltz, Ilumya, tremfya, otezla, clobetasol topical, betamethasone topical, Lidex topical, elocon topical.    ILK helps temporarily. ultravate works best out of all the topicals he's tried. psorcon 0.05% oint helps.    On Humira from 6/2015 - 4/2016 until he stopped responding to it. Switched back to enbrel at that point which only helped temporarily.    Added Soriatane to Enbrel but ended up with a bad flare on lower legs.  Not a good candidate for NBUVB as he lives on the [facility] and there is not light unit in this area.   Stelara 9/21/16 - 7/19/17 (stopped responding to it)  Taltz 1/2018 - 11/2018 (stopped responding to it)  Tremfya 11/2018 - 4/2019  Consulted with adolescent medicine regarding pt's RPR results.  Although nontreponemal was positive, treponemal resulted as negative.  As such, [facility] does not recommend any treatment as nontreponemal was likely a false positive.     - never achieved significant clearance  Ilumya 4/2019 - 8/ 2019 - no improvement and progression  Currently - Savita (started Oct. 2019) and Otezla        Review of Systems   Constitutional:  Negative for weight loss and weight gain.   HENT:  Negative for mouth sores (no tongue whiteness).    Respiratory:  Negative for shortness of breath.    Gastrointestinal:  Negative for nausea, vomiting, abdominal pain and diarrhea.   Musculoskeletal:  Negative for joint swelling and arthralgias.   Skin:  Positive for itching and rash (but no rashes/itching in folds). Negative for recent sunburn.        Injection site reaction - no   yes   Psychiatric/Behavioral:  Negative for depressed mood.        Objective:   Physical Exam   Constitutional: He appears well-developed and well-nourished.   Neurological: He is alert.   Skin:   Areas Examined (abnormalities noted in diagram):   Scalp / Hair Palpated and Inspected  Head / Face Inspection Performed  Neck Inspection Performed  Chest / Axilla Inspection Performed  Abdomen Inspection Performed  Back Inspection Performed  RUE Inspected  LUE Inspection Performed  RLE Inspected  LLE Inspection Performed  Nails and Digits Inspection Performed            Diagram Legend     Erythematous scaling macule/papule c/w actinic keratosis       Vascular papule c/w angioma      Pigmented verrucoid papule/plaque c/w seborrheic keratosis      Yellow umbilicated papule c/w sebaceous hyperplasia      Irregularly shaped tan macule c/w lentigo     1-2 mm smooth white papules consistent with Milia      Movable subcutaneous cyst with punctum c/w epidermal inclusion cyst      Subcutaneous movable cyst c/w pilar cyst      Firm pink to brown papule c/w dermatofibroma      Pedunculated fleshy papule(s) c/w skin tag(s)      Evenly pigmented macule c/w junctional nevus     Mildly variegated pigmented, slightly irregular-bordered macule c/w mildly atypical nevus      Flesh colored to evenly pigmented papule c/w  intradermal nevus       Pink pearly papule/plaque c/w basal cell carcinoma      Erythematous hyperkeratotic cursted plaque c/w SCC      Surgical scar with no sign of skin cancer recurrence      Open and closed comedones      Inflammatory papules and pustules      Verrucoid papule consistent consistent with wart     Erythematous eczematous patches and plaques     Dystrophic onycholytic nail with subungual debris c/w onychomycosis     Umbilicated papule    Erythematous-base heme-crusted tan verrucoid plaque consistent with inflamed seborrheic keratosis     Erythematous Silvery Scaling Plaque c/w Psoriasis     See annotation      Assessment / Plan:        Psoriasis - fairly well controlled but with residual/ flaring plaques to bilateral shins, calves, abdomen, back.  + psoriatic arthritis, as well - currently well controlled.  Continue Skyrizi - injection today by LPN without complications.  Continue Otezla.  Continue topical steroids and dovonex as needed.  Add Vtama to all residual plaques qAM.  Inadequately controlled currently.   -     tapinarof (VTAMA) 1 % Crea; Apply 1 application  topically once daily. aaa qd  Dispense: 60 g; Refill: 6    Encounter for long-term (current) use of high-risk medication  -     CBC Auto Differential; Future; Expected date: 03/07/2024  -     Comprehensive Metabolic Panel; Future; Expected date: 03/07/2024  -     Quantiferon Gold TB; Future; Expected date: 03/07/2024             No follow-ups on file.

## 2023-12-07 NOTE — PROGRESS NOTES
After obtaining consent, and per orders of Dr. Son, injection of Gabegun097at/ml given subcutaneously in the right upper posterior armby Leyla Caba. Patient instructed to remain in clinic for 20 minutes afterwards, and to report any adverse reaction to me immediately.  Lot#[id number], Exp. 11/2024

## 2023-12-08 RX ORDER — TRIAMCINOLONE ACETONIDE 1 MG/G
OINTMENT TOPICAL
Qty: 454 G | Refills: 0 | Status: SHIPPED | OUTPATIENT
Start: 2023-12-08 | End: 2023-12-18

## 2023-12-08 RX ORDER — CLOBETASOL PROPIONATE 0.46 MG/ML
SOLUTION TOPICAL
Qty: 50 ML | Refills: 0 | Status: SHIPPED | OUTPATIENT
Start: 2023-12-08 | End: 2024-03-19 | Stop reason: SDUPTHER

## 2023-12-17 DIAGNOSIS — L40.9 PSORIASIS: ICD-10-CM

## 2023-12-18 RX ORDER — TRIAMCINOLONE ACETONIDE 1 MG/G
OINTMENT TOPICAL
Qty: 454 G | Refills: 0 | Status: SHIPPED | OUTPATIENT
Start: 2023-12-18 | End: 2024-03-25

## 2024-01-03 ENCOUNTER — LAB VISIT (OUTPATIENT)
Dept: LAB | Facility: HOSPITAL | Age: 49
End: 2024-01-03
Attending: INTERNAL MEDICINE
Payer: MEDICAID

## 2024-01-03 ENCOUNTER — OFFICE VISIT (OUTPATIENT)
Dept: RHEUMATOLOGY | Facility: CLINIC | Age: 49
End: 2024-01-03
Payer: MEDICAID

## 2024-01-03 VITALS
BODY MASS INDEX: 37.98 KG/M2 | SYSTOLIC BLOOD PRESSURE: 123 MMHG | HEIGHT: 64 IN | WEIGHT: 222.44 LBS | HEART RATE: 80 BPM | DIASTOLIC BLOOD PRESSURE: 88 MMHG

## 2024-01-03 DIAGNOSIS — L40.50 PSORIATIC ARTHROPATHY: ICD-10-CM

## 2024-01-03 DIAGNOSIS — M25.532 PAIN IN BOTH WRISTS: ICD-10-CM

## 2024-01-03 DIAGNOSIS — M1A.9XX1 GOUT WITH TOPHI: ICD-10-CM

## 2024-01-03 DIAGNOSIS — M25.531 PAIN IN BOTH WRISTS: ICD-10-CM

## 2024-01-03 DIAGNOSIS — L40.50 PSORIATIC ARTHROPATHY: Primary | ICD-10-CM

## 2024-01-03 DIAGNOSIS — M79.18 CHRONIC BUTTOCK PAIN: ICD-10-CM

## 2024-01-03 DIAGNOSIS — G89.29 CHRONIC BUTTOCK PAIN: ICD-10-CM

## 2024-01-03 LAB
CRP SERPL-MCNC: 10.5 MG/L (ref 0–8.2)
ERYTHROCYTE [SEDIMENTATION RATE] IN BLOOD BY PHOTOMETRIC METHOD: 28 MM/HR (ref 0–23)
URATE SERPL-MCNC: 7.1 MG/DL (ref 3.4–7)

## 2024-01-03 PROCEDURE — 3008F BODY MASS INDEX DOCD: CPT | Mod: CPTII,,, | Performed by: INTERNAL MEDICINE

## 2024-01-03 PROCEDURE — 1159F MED LIST DOCD IN RCRD: CPT | Mod: CPTII,,, | Performed by: INTERNAL MEDICINE

## 2024-01-03 PROCEDURE — 99213 OFFICE O/P EST LOW 20 MIN: CPT | Mod: PBBFAC | Performed by: INTERNAL MEDICINE

## 2024-01-03 PROCEDURE — 99213 OFFICE O/P EST LOW 20 MIN: CPT | Mod: S$PBB,,, | Performed by: INTERNAL MEDICINE

## 2024-01-03 PROCEDURE — 36415 COLL VENOUS BLD VENIPUNCTURE: CPT | Performed by: INTERNAL MEDICINE

## 2024-01-03 PROCEDURE — 85652 RBC SED RATE AUTOMATED: CPT | Performed by: INTERNAL MEDICINE

## 2024-01-03 PROCEDURE — 84550 ASSAY OF BLOOD/URIC ACID: CPT | Performed by: INTERNAL MEDICINE

## 2024-01-03 PROCEDURE — 99999 PR PBB SHADOW E&M-EST. PATIENT-LVL III: CPT | Mod: PBBFAC,,, | Performed by: INTERNAL MEDICINE

## 2024-01-03 PROCEDURE — 3074F SYST BP LT 130 MM HG: CPT | Mod: CPTII,,, | Performed by: INTERNAL MEDICINE

## 2024-01-03 PROCEDURE — 86140 C-REACTIVE PROTEIN: CPT | Performed by: INTERNAL MEDICINE

## 2024-01-03 PROCEDURE — 1160F RVW MEDS BY RX/DR IN RCRD: CPT | Mod: CPTII,,, | Performed by: INTERNAL MEDICINE

## 2024-01-03 PROCEDURE — 3079F DIAST BP 80-89 MM HG: CPT | Mod: CPTII,,, | Performed by: INTERNAL MEDICINE

## 2024-01-03 NOTE — PROGRESS NOTES
History of present illness:  48-year-old gentleman with idiopathic cardiomyopathy.  He has had no recent evidence of congestive heart failure and sees his cardiologist infrequently.  He had had a previous CVA.  I am following him for several different problems.  He has chronic tophaceous gout.  He is on allopurinol 450 mg daily.  He has x-ray evidence of chondrocalcinosis.  His main problem has been psoriasis.  He also has psoriatic arthritis. He had been treated in the past with Enbrel, Humira, Taltz, Tremfya, MTX and Ilumya.  He is now on Skyrizi.  He was also was placed back on Otezla.     He was last seen 15 months ago.  At that time he was having pain in the left buttock that radiated down the leg.  He has a history of chronic back problems.  I felt he most likely had sciatica.  I sent him to physical therapy.  This did help.  He still has often on pain.  He does his exercises when his pain recurs.    He has been complaining of pain in his wrists for the past several months.  The pain is intermittent.  He has had no swelling in the wrists.  It is worse with activity.  It occasionally wakes him up at night.  He has had no swelling in the wrists.  He has had no numbness in his hands.    He has had no recent gout attacks.  Continues on allopurinol 450 mg daily.  He was taken off Lasix.  He notices increased rash right before getting his infusion but he is otherwise stable.  He has had no other recent medical problems.    Physical examination:  Musculoskeletal:  Cervical spine has good range of motion without pain on range of motion.    Shoulders and elbows are unremarkable.    He has pain on range of motion of the right wrist.  He has full range of motion.  He has no swelling.  He has no tenderness to palpation.  Left wrist is unremarkable.  Small joints of the hands are within normal limits.    He has pain on lateral bending of the back referred to the left buttock.  He is tender in the area.  He has negative  straight leg raising.  He has full range of motion of the back.  Hips, knees, ankles are unremarkable.  He is tender across the left MTP.    Assessment:  1.  His wrists pain is most likely tendinitis.  I do not think it is an enthesitis related to his psoriatic arthritis   2. He has chronic pain in the left buttock.  Previous x-rays have shown no evidence of sacroiliitis  3. No evidence of active gout.  His tophi have resolved.    Plans:  1.  Laboratory studies obtained  2. I recommend the use of Voltaren gel to his wrists  3. I recommend the use of Theraworx on his back  4.  Continue allopurinol as before  5. Return to see me in 12 months.

## 2024-01-09 ENCOUNTER — PATIENT MESSAGE (OUTPATIENT)
Dept: RHEUMATOLOGY | Facility: CLINIC | Age: 49
End: 2024-01-09
Payer: MEDICAID

## 2024-01-09 DIAGNOSIS — M1A.9XX1 GOUT WITH TOPHI: Primary | ICD-10-CM

## 2024-02-07 ENCOUNTER — LAB VISIT (OUTPATIENT)
Dept: LAB | Facility: HOSPITAL | Age: 49
End: 2024-02-07
Payer: MEDICAID

## 2024-02-07 ENCOUNTER — OFFICE VISIT (OUTPATIENT)
Dept: INTERNAL MEDICINE | Facility: CLINIC | Age: 49
End: 2024-02-07
Payer: MEDICAID

## 2024-02-07 VITALS
HEART RATE: 81 BPM | BODY MASS INDEX: 31.64 KG/M2 | SYSTOLIC BLOOD PRESSURE: 110 MMHG | OXYGEN SATURATION: 97 % | WEIGHT: 208.75 LBS | HEIGHT: 68 IN | DIASTOLIC BLOOD PRESSURE: 88 MMHG

## 2024-02-07 DIAGNOSIS — I42.9 CARDIOMYOPATHY, IDIOPATHIC: ICD-10-CM

## 2024-02-07 DIAGNOSIS — I10 ESSENTIAL HYPERTENSION: ICD-10-CM

## 2024-02-07 DIAGNOSIS — Z00.00 ANNUAL PHYSICAL EXAM: ICD-10-CM

## 2024-02-07 DIAGNOSIS — Z00.00 ANNUAL PHYSICAL EXAM: Primary | ICD-10-CM

## 2024-02-07 DIAGNOSIS — E78.5 HYPERLIPIDEMIA, UNSPECIFIED HYPERLIPIDEMIA TYPE: ICD-10-CM

## 2024-02-07 LAB
CHOLEST SERPL-MCNC: 189 MG/DL (ref 120–199)
CHOLEST/HDLC SERPL: 3.7 {RATIO} (ref 2–5)
HDLC SERPL-MCNC: 51 MG/DL (ref 40–75)
HDLC SERPL: 27 % (ref 20–50)
LDLC SERPL CALC-MCNC: 108.6 MG/DL (ref 63–159)
NONHDLC SERPL-MCNC: 138 MG/DL
TRIGL SERPL-MCNC: 147 MG/DL (ref 30–150)

## 2024-02-07 PROCEDURE — 99396 PREV VISIT EST AGE 40-64: CPT | Mod: S$PBB,,,

## 2024-02-07 PROCEDURE — 3074F SYST BP LT 130 MM HG: CPT | Mod: CPTII,,,

## 2024-02-07 PROCEDURE — 3079F DIAST BP 80-89 MM HG: CPT | Mod: CPTII,,,

## 2024-02-07 PROCEDURE — 99999 PR PBB SHADOW E&M-EST. PATIENT-LVL III: CPT | Mod: PBBFAC,,,

## 2024-02-07 PROCEDURE — 80061 LIPID PANEL: CPT

## 2024-02-07 PROCEDURE — 83036 HEMOGLOBIN GLYCOSYLATED A1C: CPT

## 2024-02-07 PROCEDURE — 99999PBSHW TDAP VACCINE GREATER THAN OR EQUAL TO 7YO IM: Mod: PBBFAC,,,

## 2024-02-07 PROCEDURE — 90471 IMMUNIZATION ADMIN: CPT | Mod: PBBFAC

## 2024-02-07 PROCEDURE — 99213 OFFICE O/P EST LOW 20 MIN: CPT | Mod: PBBFAC,25

## 2024-02-07 PROCEDURE — 36415 COLL VENOUS BLD VENIPUNCTURE: CPT

## 2024-02-07 PROCEDURE — 3008F BODY MASS INDEX DOCD: CPT | Mod: CPTII,,,

## 2024-02-07 PROCEDURE — 3044F HG A1C LEVEL LT 7.0%: CPT | Mod: CPTII,,,

## 2024-02-07 RX ORDER — ATORVASTATIN CALCIUM 10 MG/1
10 TABLET, FILM COATED ORAL DAILY
Qty: 90 TABLET | Refills: 3 | Status: SHIPPED | OUTPATIENT
Start: 2024-02-07 | End: 2025-02-06

## 2024-02-07 NOTE — PROGRESS NOTES
Two pt identifier verified. Pt tolerated well. Advised pt to wait 15 min post immunization. Pt verbalized understanding.    Zeeshan PAREKH

## 2024-02-07 NOTE — PROGRESS NOTES
Clinic Note  02/08/2024      Subjective:           Chief Complaint: Annual Exam    Patient ID: Adrien Panda is a 48 y.o. male being seen for an established visit.    48 yom with pmh of psoriatic arthritis, gout, NICM, CHF improved (EF 55%), cerebral infarct, prior cocaine use clean 15 years presenting for an annual visit. Pt is concerned about his cholesterol and A1c as well as checking his BP. Pt's main complaint is his right wrist is swollen and he is concerned about his gout/psoriatic arthritis flaring up. Pt saw Dr. Hermosillo recently who is aware of this. Pt follows with cardiology for his cardiac issues and is doing well from this aspect too. Denies any problems current chest pain, leg swelling, SOB, PND, and is complaint with all his medications, except had discussed taking a statin from his PCP Dr. Gao, but decided he did not want to deal with the side effects at that time. He is will now to take.     Review of Systems   Constitutional:  Positive for fatigue. Negative for chills and fever.   HENT:  Positive for congestion and sinus pressure.    Eyes:  Negative for photophobia and visual disturbance.   Respiratory:  Negative for cough and shortness of breath.    Cardiovascular:  Negative for chest pain and leg swelling.   Gastrointestinal:  Negative for abdominal distention, abdominal pain, constipation and diarrhea.   Genitourinary:  Negative for dysuria and flank pain.   Musculoskeletal:  Positive for arthralgias and joint swelling. Negative for gait problem and neck pain.   Neurological:  Negative for dizziness, weakness and light-headedness.   Psychiatric/Behavioral:  Negative for confusion and decreased concentration.      Patient's Medications   New Prescriptions    No medications on file   Previous Medications    ALLOPURINOL (ZYLOPRIM) 300 MG TABLET    Take 1.5 tablets (450 mg total) by mouth once daily.    CALCIPOTRIENE (DOVONOX) 0.005 % CREAM    APPLY  CREAM TOPICALLY TO AFFECTED AREA TWICE  DAILY    CARVEDILOL (COREG) 25 MG TABLET    TAKE 1 TABLET BY MOUTH TWICE DAILY WITH MEALS    CLOBETASOL (TEMOVATE) 0.05 % EXTERNAL SOLUTION    APPLY TOPICALLY TO SCALP AS NEEDED FOR ITCHING OR SCALING    COLCHICINE (COLCRYS) 0.6 MG TABLET    Take 1 tablet by mouth twice daily    FUROSEMIDE (LASIX) 40 MG TABLET    Take 1 tablet by mouth twice daily    HALOBETASOL (ULTRAVATE) 0.05 % OINTMENT    APPLY  OINTMENT TOPICALLY TO AFFECTED AREA TWICE DAILY Strength: 0.05 %    LISINOPRIL (PRINIVIL,ZESTRIL) 20 MG TABLET    Take 1 tablet by mouth once daily    OTEZLA 30 MG TAB    Take 1 tablet by mouth twice daily.    POTASSIUM CHLORIDE SA (K-DUR,KLOR-CON) 20 MEQ TABLET    Take 1 tablet by mouth once daily    RISANKIZUMAB-RZAA 150 MG/ML PNIJ    Inject 150 mg into the skin every 12 weeks.    TAPINAROF (VTAMA) 1 % CREA    Apply 1 application  topically once daily. aaa qd    TRIAMCINOLONE ACETONIDE 0.1% (KENALOG) 0.1 % OINTMENT    APPLY  OINTMENT TOPICALLY TO AFFECTED AREA TWICE DAILY   Modified Medications    Modified Medication Previous Medication    ATORVASTATIN (LIPITOR) 10 MG TABLET atorvastatin (LIPITOR) 10 MG tablet       Take 1 tablet (10 mg total) by mouth once daily.    Take 1 tablet (10 mg total) by mouth once daily.   Discontinued Medications    No medications on file       Patient Active Problem List    Diagnosis Date Noted    Decreased range of motion of lumbar spine 11/02/2022    Psoriatic arthropathy 12/12/2019    Cerebral infarction 01/05/2018    Essential hypertension 01/05/2018    Chronic midline low back pain without sciatica 05/10/2017    Weakness of trunk musculature 05/10/2017    Muscle tightness 05/10/2017    Decreased ROM of lumbar spine 05/10/2017    Gout with tophi 03/06/2015    Visit for suture removal 12/02/2014    Chondrocalcinosis 07/15/2014    Cardiomyopathy, idiopathic 12/12/2012    Psoriasis 12/12/2012     Since ~age 17    PMH: h/o dilated cardiomyopathy tx by Dr. Rubin at [facility], cutaneous and  "joint gout tx by Dr. Hermosillo (psoriasis flared on colchicine).       PSORIASIS: failed tx with Enbrel, Humira, Soriatane, clobetasol topical, betamethasone topical, Lidex topical, elocon topical.    ILK helps temporarily. ultravate works best out of all the topicals he's tried. psorcon 0.05% oint helps.    On Humira from 6/2015 - 4/2016 until he stopped responding to it. Switched back to enbrel at that point which only helped temporarily.    Added Soriatane to Enbrel but ended up with a bad flare on lower legs.  Not a good candidate for NBUVB as he lives on the [facility] and there is not light unit in this area.   Stelara 9/21/16 - 7/19/17 (stopped responding to it)             Objective:      /88 (BP Location: Left arm, Patient Position: Sitting, BP Method: Medium (Manual))   Pulse 81   Ht 5' 8" (1.727 m)   Wt 94.7 kg (208 lb 12.4 oz)   SpO2 97%   BMI 31.74 kg/m²   Estimated body mass index is 31.74 kg/m² as calculated from the following:    Height as of this encounter: 5' 8" (1.727 m).    Weight as of this encounter: 94.7 kg (208 lb 12.4 oz).    Physical Exam  Constitutional:       General: He is not in acute distress.     Appearance: Normal appearance. He is not ill-appearing.   HENT:      Head: Normocephalic and atraumatic.      Nose: Congestion and rhinorrhea present.      Mouth/Throat:      Mouth: Mucous membranes are moist.      Pharynx: Oropharynx is clear.   Eyes:      General: No scleral icterus.     Extraocular Movements: Extraocular movements intact.   Cardiovascular:      Rate and Rhythm: Normal rate and regular rhythm.   Pulmonary:      Effort: Pulmonary effort is normal. No respiratory distress.      Breath sounds: No wheezing or rales.   Abdominal:      General: Abdomen is flat. There is no distension.      Palpations: Abdomen is soft.      Tenderness: There is no abdominal tenderness.   Musculoskeletal:         General: Swelling and tenderness present.      Right wrist: Swelling, tenderness " and bony tenderness present. Decreased range of motion.      Right lower leg: No edema.      Left lower leg: No edema.      Comments: Pt with pain/tenderness/swelling of his R wrist joint, no pain or swelling of his MCP, PIP, DIP joints. Pain with movement   Skin:     General: Skin is warm and dry.   Neurological:      General: No focal deficit present.      Mental Status: He is alert and oriented to person, place, and time.      Motor: Weakness (due to pain) present.      Gait: Gait normal.   Psychiatric:         Mood and Affect: Mood normal.         Behavior: Behavior normal.       Assessment & Plan:   Adrien was seen today for annual exam.    Diagnoses and all orders for this visit:    Annual physical exam  -     HEMOGLOBIN A1C; Future  -     (In Office Administered) Tdap Vaccine  -     LIPID PANEL; Future  -     Ambulatory referral/consult to Endo Procedure ; Future    Cardiomyopathy, idiopathic  -     HEMOGLOBIN A1C; Future  -     (In Office Administered) Tdap Vaccine  -     LIPID PANEL; Future  -     Ambulatory referral/consult to Sleep Disorders; Future    Essential hypertension  -     HEMOGLOBIN A1C; Future  -     (In Office Administered) Tdap Vaccine  -     LIPID PANEL; Future  -     Ambulatory referral/consult to Sleep Disorders; Future    Hyperlipidemia, unspecified hyperlipidemia type  -     atorvastatin (LIPITOR) 10 MG tablet; Take 1 tablet (10 mg total) by mouth once daily.          Patient seen and plan of care discussed with Dr. Dawit Nguyen DO, PGY2  [facility]

## 2024-02-08 ENCOUNTER — PATIENT MESSAGE (OUTPATIENT)
Dept: INTERNAL MEDICINE | Facility: CLINIC | Age: 49
End: 2024-02-08
Payer: MEDICAID

## 2024-02-08 LAB
ESTIMATED AVG GLUCOSE: 108 MG/DL (ref 68–131)
HBA1C MFR BLD: 5.4 % (ref 4–5.6)

## 2024-02-19 DIAGNOSIS — I42.9 CARDIOMYOPATHY, IDIOPATHIC: ICD-10-CM

## 2024-02-19 RX ORDER — POTASSIUM CHLORIDE 20 MEQ/1
20 TABLET, EXTENDED RELEASE ORAL
Qty: 90 TABLET | Refills: 0 | Status: SHIPPED | OUTPATIENT
Start: 2024-02-19 | End: 2024-05-27

## 2024-02-19 NOTE — TELEPHONE ENCOUNTER
Pt was seen for annual 02/07/2024 and need a refill of maintenance medication.    Refill Request.

## 2024-02-19 NOTE — TELEPHONE ENCOUNTER
Care Due:                  Date            Visit Type   Department     Provider  --------------------------------------------------------------------------------                                EP -                              PRIMARY      [facility]  Last Visit: 11-      CARE ([facility])   MEDICINE       DARIELA VILLAVICENCIO  Next Visit: None Scheduled  None         None Found                                                            Last  Test          Frequency    Reason                     Performed    Due Date  --------------------------------------------------------------------------------    Office Visit  15 months..  potassium................  11-   02-    CMP.........  12 months..  potassium................  06-   06-    [employer] Embedded Care Due Messages. Reference number: [id number].   2/19/2024 6:40:53 AM CST

## 2024-02-19 NOTE — TELEPHONE ENCOUNTER
Refill Routing Note   Medication(s) are not appropriate for processing by [facility] for the following reason(s):             [facility] action(s):  Defer   Requires appointment : Yes        Medication Therapy Plan: FLOS      Appointments  past 12m or future 3m with PCP    Date Provider   Last Visit   11/17/2022 Imtiaz Gao MD   Next Visit   Visit date not found Imtiaz Gao MD   ED visits in past 90 days: 0        Note composed:7:31 AM 02/19/2024

## 2024-02-21 DIAGNOSIS — L40.9 PSORIASIS: ICD-10-CM

## 2024-02-22 RX ORDER — APREMILAST 30 MG/1
TABLET, FILM COATED ORAL
Qty: 60 TABLET | Refills: 2 | Status: ACTIVE | OUTPATIENT
Start: 2024-02-22 | End: 2024-06-03 | Stop reason: SDUPTHER

## 2024-02-27 ENCOUNTER — PATIENT MESSAGE (OUTPATIENT)
Dept: DERMATOLOGY | Facility: CLINIC | Age: 49
End: 2024-02-27
Payer: MEDICAID

## 2024-02-28 DIAGNOSIS — L40.0 PSORIASIS VULGARIS: ICD-10-CM

## 2024-02-28 DIAGNOSIS — L40.9 PSORIASIS: ICD-10-CM

## 2024-02-28 RX ORDER — ALLOPURINOL 300 MG/1
450 TABLET ORAL
Qty: 45 TABLET | Refills: 12 | Status: SHIPPED | OUTPATIENT
Start: 2024-02-28

## 2024-02-29 NOTE — TELEPHONE ENCOUNTER
Encounter Date: 12/07/2023    Psoriasis - fairly well controlled but with residual/ flaring plaques to bilateral shins, calves, abdomen, back.  + psoriatic arthritis, as well - currently well controlled.  Continue Skyrizi - injection today by LPN without complications.  Continue Otezla.  Continue topical steroids and dovonex as needed.  Add Vtama to all residual plaques qAM.  Inadequately controlled currently.   -     tapinarof (VTAMA) 1 % Crea; Apply 1 application  topically once daily. aaa qd  Dispense: 60 g; Refill: 6     Encounter for long-term (current) use of high-risk medication  -     CBC Auto Differential; Future; Expected date: 03/07/2024  -     Comprehensive Metabolic Panel; Future; Expected date: 03/07/2024  -     Quantiferon Gold TB; Future; Expected date: 03/07/2024

## 2024-03-05 ENCOUNTER — HOSPITAL ENCOUNTER (EMERGENCY)
Facility: HOSPITAL | Age: 49
Discharge: HOME | End: 2024-03-05
Attending: EMERGENCY MEDICINE
Payer: MEDICAID

## 2024-03-05 VITALS
WEIGHT: 202 LBS | DIASTOLIC BLOOD PRESSURE: 94 MMHG | RESPIRATION RATE: 20 BRPM | HEIGHT: 69 IN | SYSTOLIC BLOOD PRESSURE: 124 MMHG | OXYGEN SATURATION: 98 % | HEART RATE: 89 BPM | TEMPERATURE: 98 F | BODY MASS INDEX: 29.92 KG/M2

## 2024-03-05 DIAGNOSIS — S61.512A LACERATION OF LEFT WRIST: Primary | ICD-10-CM

## 2024-03-05 DIAGNOSIS — S61.219A: ICD-10-CM

## 2024-03-05 DIAGNOSIS — S61.411A: ICD-10-CM

## 2024-03-05 PROCEDURE — 25000003 PHARM REV CODE 250: Performed by: EMERGENCY MEDICINE

## 2024-03-05 PROCEDURE — 99284 EMERGENCY DEPT VISIT MOD MDM: CPT | Mod: 25

## 2024-03-05 PROCEDURE — 12002 RPR S/N/AX/GEN/TRNK2.6-7.5CM: CPT

## 2024-03-05 RX ORDER — MUPIROCIN 20 MG/G
OINTMENT TOPICAL 2 TIMES DAILY
Qty: 22 G | Refills: 0 | Status: SHIPPED | OUTPATIENT
Start: 2024-03-05 | End: 2024-03-15

## 2024-03-05 RX ORDER — LIDOCAINE HYDROCHLORIDE 10 MG/ML
10 INJECTION INFILTRATION; PERINEURAL
Status: COMPLETED | OUTPATIENT
Start: 2024-03-05 | End: 2024-03-05

## 2024-03-05 RX ADMIN — BACITRACIN ZINC, NEOMYCIN, POLYMYXIN B 1 EACH: 400; 3.5; 5 OINTMENT TOPICAL at 10:03

## 2024-03-05 RX ADMIN — LIDOCAINE HYDROCHLORIDE 10 ML: 10 INJECTION, SOLUTION INFILTRATION; PERINEURAL at 10:03

## 2024-03-05 NOTE — Clinical Note
"Adrien Cisseace" Vo was seen and treated in our emergency department on 3/5/2024.  He may return to work on 03/10/2024.       If you have any questions or concerns, please don't hesitate to call.      Baljinder Paige PA-C" Isaac Tran(PA)
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
## 2024-03-05 NOTE — DISCHARGE INSTRUCTIONS
Thank you for coming to our Emergency Department today. It is important to remember that some problems or medical conditions are difficult to diagnose and may not be found or addressed during your Emergency Department visit.  These conditions often start with non-specific symptoms and can only be diagnosed on follow up visits with your primary care physician or specialist when the symptoms continue or change. Please remember that all medical conditions can change, and we cannot predict how you will be feeling tomorrow or the next day. Return to the ER with any questions/concerns, new/concerning symptoms, worsening or failure to improve.       Be sure to follow up with your primary care doctor and review all labs/imaging/tests that were performed during your ER visit with them. It is very common for us to identify non-emergent incidental findings which must be followed up with your primary care physician.  Some labs/imaging/tests may be outside of the normal range, and require non-emergent follow-up and/or further investigation/treatment/procedures/testing to help diagnose/exclude/prevent complications or other potentially serious medical conditions. Some abnormalities may not have been discussed or addressed during your ER visit.     An ER visit does not replace a primary care visit, and many screening tests or follow-up tests cannot be ordered by an ER doctor or performed by the ER. Some tests may even require pre-approval.    If you do not have a primary care doctor, you may contact the one listed on your discharge paperwork or you may also call the [facility] Appointment Desk at [phone] , or [address] [place] [address] at  [phone] to schedule an appointment, or establish care with a primary care doctor or even a specialist and to obtain information about local resources. It is important to your health that you have a primary care doctor.    Please take all medications as directed. We have done our best to select  a medication for you that will treat your condition however, all medications may potentially have side-effects and it is impossible to predict which medications may give you side-effects or what those side-effects (if any) those medications may give you.  If you feel that you are having a negative effect or side-effect of any medication you should stop taking those medications immediately and seek medical attention. If you feel that you are having a life-threatening reaction call 911.        Do not drive, swim, climb to height, take a bath, operate heavy machinery, drink alcohol or take potentially sedating medications, sign any legal documents or make any important decisions for 24 hours if you have received any pain medications, sedatives or mood altering drugs during your ER visit or within 24 hours of taking them if they have been prescribed to you.     You can find additional resources for Dentists, hearing aids, durable medical equipment, low cost pharmacies and other resources at [email or web address]

## 2024-03-07 ENCOUNTER — OFFICE VISIT (OUTPATIENT)
Dept: DERMATOLOGY | Facility: CLINIC | Age: 49
End: 2024-03-07
Payer: MEDICAID

## 2024-03-07 ENCOUNTER — LAB VISIT (OUTPATIENT)
Dept: LAB | Facility: HOSPITAL | Age: 49
End: 2024-03-07
Payer: MEDICAID

## 2024-03-07 DIAGNOSIS — Z79.899 ENCOUNTER FOR LONG-TERM (CURRENT) USE OF HIGH-RISK MEDICATION: ICD-10-CM

## 2024-03-07 DIAGNOSIS — L40.50 PSORIATIC ARTHRITIS: ICD-10-CM

## 2024-03-07 DIAGNOSIS — L40.9 PSORIASIS: Primary | ICD-10-CM

## 2024-03-07 PROCEDURE — 99999 PR PBB SHADOW E&M-EST. PATIENT-LVL I: CPT | Mod: PBBFAC,,, | Performed by: PATHOLOGY

## 2024-03-07 PROCEDURE — 4010F ACE/ARB THERAPY RXD/TAKEN: CPT | Mod: CPTII,,, | Performed by: PATHOLOGY

## 2024-03-07 PROCEDURE — 99211 OFF/OP EST MAY X REQ PHY/QHP: CPT | Mod: PBBFAC | Performed by: PATHOLOGY

## 2024-03-07 PROCEDURE — 86480 TB TEST CELL IMMUN MEASURE: CPT | Performed by: PATHOLOGY

## 2024-03-07 PROCEDURE — 99214 OFFICE O/P EST MOD 30 MIN: CPT | Mod: S$PBB,,, | Performed by: PATHOLOGY

## 2024-03-07 PROCEDURE — 3044F HG A1C LEVEL LT 7.0%: CPT | Mod: CPTII,,, | Performed by: PATHOLOGY

## 2024-03-07 RX ORDER — BIMEKIZUMAB 160 MG/ML
320 INJECTION, SOLUTION SUBCUTANEOUS
Qty: 320 ML | Refills: 11 | Status: ACTIVE | OUTPATIENT
Start: 2024-03-07 | End: 2024-03-26 | Stop reason: ALTCHOICE

## 2024-03-07 NOTE — PROGRESS NOTES
After obtaining consent, and per orders of Dr. Son, injection of Skyrizi given subcutaneously in the left upper posterior arm by Leyla Caba. Patient instructed to remain in clinic for 20 minutes afterwards, and to report any adverse reaction to me immediately.  Lot#[id number], Exp. 01/2025.

## 2024-03-07 NOTE — PROGRESS NOTES
Subjective:      Patient ID:  Adrien Panda is a 48 y.o. male who presents for   Chief Complaint   Patient presents with    Psoriasis     F/u     HPI  Pt well known to me for psoriasis/ psoriatic arthritis - on skyrizi, otezla, topical steroids.  Last skyrizi injection 12/7/23.   Plaques on legs continue to flare.  Arthritis in hands and feet has improved dramatically - no joint pain for he past few months. CBC, CMP and quant gold drawn today Using topicals - halobetasol, lidex, dovonex; also started Vtama topical cream without much improvement to stubborn plaques on lower legs.  Started Skyrizi 10/23/19.  Re-started Otezla about the same time.     Taltz initially worked best of all of the other systemic agents he has tried, but legs flared Fall 2018 with progressive new scaly plaques moving proximal on bilateral legs to thighs.  Most recently, was on Ilumya, and prior to that MTX and Tremfya, which were ineffective and discontinued.     Started Taltz 1/2018. States he was clear when he first started it, but psoriasis recurred on his legs. No infections.       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 Rubin at [facility], cutaneous and joint gout tx by Dr. Hermosillo (psoriasis flared on colchicine), stroke      PSORIASIS: failed tx with Enbrel, Humira, Soriatane, Taltz, Ilumya, tremfya, otezla, clobetasol topical, betamethasone topical, Lidex topical, elocon topical.    ILK helps temporarily. ultravate works best out of all the topicals he's tried. psorcon 0.05% oint helps.    On Humira from 6/2015 - 4/2016 until he stopped responding to it. Switched back to enbrel at that point which only helped temporarily.    Added Soriatane to Enbrel but ended up with a bad flare on lower legs.  Not a good candidate for [facility]BUVB as [place] [facility] and [place] is not light unit in this area.   Stelara 9/21/16 - 7/19/17 (stopped responding to it)  Taltz 1/2018 - 11/2018 (stopped responding to  it)  Tremfya 11/2018 - 4/2019 - never achieved significant clearance  Ilumya 4/2019 - 8/ 2019 - no improvement and progression  Currently - Savita (started Oct. 2019) and Otezla          Review of Systems   Constitutional:  Negative for weight loss and weight gain.   HENT:  Negative for mouth sores (no tongue whiteness).    Respiratory:  Negative for shortness of breath.    Gastrointestinal:  Negative for nausea, vomiting, abdominal pain and diarrhea.   Musculoskeletal:  Negative for arthralgias.   Skin:  Positive for itching and rash.        Injection site reaction - no   yes   Psychiatric/Behavioral:  Negative for depressed mood.        Objective:   Physical Exam   Constitutional: He appears well-developed and well-nourished.   Neurological: He is alert.   Skin:   Areas Examined (abnormalities noted in diagram):   Scalp / Hair Palpated and Inspected  Head / Face Inspection Performed  Neck Inspection Performed  Chest / Axilla Inspection Performed  Abdomen Inspection Performed  Back Inspection Performed  RUE Inspected  LUE Inspection Performed  RLE Inspected  LLE Inspection Performed  Nails and Digits Inspection Performed            Diagram Legend     Erythematous scaling macule/papule c/w actinic keratosis       Vascular papule c/w angioma      Pigmented verrucoid papule/plaque c/w seborrheic keratosis      Yellow umbilicated papule c/w sebaceous hyperplasia      Irregularly shaped tan macule c/w lentigo     1-2 mm smooth white papules consistent with Milia      Movable subcutaneous cyst with punctum c/w epidermal inclusion cyst      Subcutaneous movable cyst c/w pilar cyst      Firm pink to brown papule c/w dermatofibroma      Pedunculated fleshy papule(s) c/w skin tag(s)      Evenly pigmented macule c/w junctional nevus     Mildly variegated pigmented, slightly irregular-bordered macule c/w mildly atypical nevus      Flesh colored to evenly pigmented papule c/w intradermal nevus       Pink pearly papule/plaque  c/w basal cell carcinoma      Erythematous hyperkeratotic cursted plaque c/w SCC      Surgical scar with no sign of skin cancer recurrence      Open and closed comedones      Inflammatory papules and pustules      Verrucoid papule consistent consistent with wart     Erythematous eczematous patches and plaques     Dystrophic onycholytic nail with subungual debris c/w onychomycosis     Umbilicated papule    Erythematous-base heme-crusted tan verrucoid plaque consistent with inflamed seborrheic keratosis     Erythematous Silvery Scaling Plaque c/w Psoriasis     See annotation      Assessment / Plan:        Psoriasis - fairly well controlled overall but continues to have residual plaques to bilateral shins, calves, right knee, abdomen trunk with about 5% BSA still involved.  + psoriatic arthritis, as well - currently fairly well controlled.   On Skyrizi and Otezla but flares more significantly leading up to next injection.  Discussed new alternative biologic medication Bimzelx with dual targets of IL17A and IL17F - efficacy data for moderate to severe plaque psoriasis exceeds that of Skyrizi, and I would like to maintain him on one medication if possible instead of long term Skyrizi + Otezla.    DIscussed Discussed  benefits and risks of Bimzelx being similar to those of other biologic medications that he has been on previously including but not limited to injection site reactions, increased risk of infection, new onset/flare of inflammatory bowel disease, decreased tumor surveillance, and somewhat increased risk of oral candidiasis.     Patient informed to discontinue Bimzelx and notify our office if a serious (requiring hospitalization or IV/oral antibiotics) infection develops.  Patient counseled to avoid live vaccines.    CBC, CMP, Hep B, Hep C, and Quantiferon gold drawn today.    Dosing for Psoriasis:  If labs are WNL, plan to stop Skyrizi and Otezla and start Bimzelx at 320mg SQ every 2 weeks for 5 doses, then  320mg SQ q 8 weeks.    Will need CBC q 6 months. Will need Quantiferon gold annually.      Encounter for long-term (current) use of high-risk medication - CBC, CMP, Hep B, Hep C, and Quantiferon gold drawn today.    Psoriatic arthritis - currently well controlled on Skyrizi and Otezla             No follow-ups on file.

## 2024-03-08 LAB
GAMMA INTERFERON BACKGROUND BLD IA-ACNC: 0.04 IU/ML
M TB IFN-G CD4+ BCKGRND COR BLD-ACNC: 0.04 IU/ML
M TB IFN-G CD4+ BCKGRND COR BLD-ACNC: 0.04 IU/ML
MITOGEN IGNF BCKGRD COR BLD-ACNC: 9.96 IU/ML
TB GOLD PLUS: NEGATIVE

## 2024-03-19 DIAGNOSIS — L40.9 PSORIASIS: ICD-10-CM

## 2024-03-19 DIAGNOSIS — L40.0 PSORIASIS VULGARIS: ICD-10-CM

## 2024-03-19 RX ORDER — HALOBETASOL PROPIONATE 0.5 MG/G
OINTMENT TOPICAL
Qty: 100 G | Refills: 0 | OUTPATIENT
Start: 2024-03-19

## 2024-03-19 RX ORDER — CLOBETASOL PROPIONATE 0.46 MG/ML
SOLUTION TOPICAL
Qty: 50 ML | Refills: 0 | OUTPATIENT
Start: 2024-03-19

## 2024-03-19 RX ORDER — HALOBETASOL PROPIONATE 0.5 MG/G
OINTMENT TOPICAL
Qty: 100 G | Refills: 0 | Status: SHIPPED | OUTPATIENT
Start: 2024-03-19 | End: 2024-04-15

## 2024-03-19 RX ORDER — CLOBETASOL PROPIONATE 0.46 MG/ML
SOLUTION TOPICAL
Qty: 50 ML | Refills: 0 | Status: SHIPPED | OUTPATIENT
Start: 2024-03-19

## 2024-03-20 ENCOUNTER — TELEPHONE (OUTPATIENT)
Dept: ENDOSCOPY | Facility: HOSPITAL | Age: 49
End: 2024-03-20

## 2024-03-20 ENCOUNTER — CLINICAL SUPPORT (OUTPATIENT)
Dept: ENDOSCOPY | Facility: HOSPITAL | Age: 49
End: 2024-03-20
Attending: INTERNAL MEDICINE
Payer: MEDICAID

## 2024-03-20 DIAGNOSIS — Z00.00 ANNUAL PHYSICAL EXAM: ICD-10-CM

## 2024-03-20 RX ORDER — POLYETHYLENE GLYCOL 3350, SODIUM SULFATE ANHYDROUS, SODIUM BICARBONATE, SODIUM CHLORIDE, POTASSIUM CHLORIDE 236; 22.74; 6.74; 5.86; 2.97 G/4L; G/4L; G/4L; G/4L; G/4L
4 POWDER, FOR SOLUTION ORAL ONCE
Qty: 4000 ML | Refills: 0 | Status: SHIPPED | OUTPATIENT
Start: 2024-03-20 | End: 2024-03-20

## 2024-03-20 NOTE — TELEPHONE ENCOUNTER
Spoke to PT to schedule procedure(s) Colonoscopy       Physician to perform procedure(s) Dr. NICO Turpin  Date of Procedure (s) 6/27/24  Arrival Time 11:15 AM  Time of Procedure(s) 12:15 AM   Location of Procedure(s) [place] 4th Floor  Type of Rx Prep sent to patient: PEG  Instructions provided to patient via [facility]    Patient was informed on the following information and verbalized understanding. Screening questionnaire reviewed with patient and complete. If procedure requires anesthesia, a responsible adult needs to be present to accompany the patient home, patient cannot drive after receiving anesthesia. Appointment details are tentative, especially check-in time. Patient will receive a prep-op call 7 days prior to confirm check-in time for procedure. If applicable the patient should contact their pharmacy to verify Rx for procedure prep is ready for pick-up. Patient was advised to call the scheduling department at [phone] if pharmacy states no Rx is available. Patient was advised to call the endoscopy scheduling department if any questions or concerns arise.      [facility] Scheduling Department

## 2024-03-21 ENCOUNTER — HOSPITAL ENCOUNTER (EMERGENCY)
Facility: HOSPITAL | Age: 49
Discharge: HOME | End: 2024-03-21
Attending: STUDENT IN AN ORGANIZED HEALTH CARE EDUCATION/TRAINING PROGRAM
Payer: MEDICAID

## 2024-03-21 VITALS
HEIGHT: 69 IN | DIASTOLIC BLOOD PRESSURE: 90 MMHG | WEIGHT: 210 LBS | TEMPERATURE: 98 F | RESPIRATION RATE: 18 BRPM | BODY MASS INDEX: 31.1 KG/M2 | SYSTOLIC BLOOD PRESSURE: 136 MMHG | HEART RATE: 94 BPM | OXYGEN SATURATION: 96 %

## 2024-03-21 DIAGNOSIS — Z48.02 ENCOUNTER FOR REMOVAL OF SUTURES: Primary | ICD-10-CM

## 2024-03-21 PROCEDURE — 99282 EMERGENCY DEPT VISIT SF MDM: CPT

## 2024-03-21 NOTE — ED PROVIDER NOTES
Encounter Date: 3/21/2024       History     Chief Complaint   Patient presents with    Suture / Staple Removal     Pt to ER for suture removal. Pt in no distress with no other complaints      Adrien Panda is a 48-year-old male with past medical history of recent laceration to the left wrist on March 5, 2024 who presents to the emergency department for suture removal.  He denies any difficulty with wound healing including erythema, warmth, or drainage.  He has been using saline rinse to keep the wound clean.  Denies any numbness, weakness, paresthesia, or loss of function of the left hand distal to the injury.  Sutures were placed in this facility on March 5, 2024.    The history is provided by the patient. No  was used.     Review of patient's allergies indicates:  No Known Allergies  Past Medical History:   Diagnosis Date    Arthritis     Blood clotting tendency     Congestive heart failure     Gout, unspecified     High cholesterol     Hyperlipemia     Hypertension     Joint pain     Psoriasis      No past surgical history on file.  Family History   Problem Relation Age of Onset    Coronary artery disease Mother     Melanoma Neg Hx     Psoriasis Neg Hx     Lupus Neg Hx      Social History     Tobacco Use    Smoking status: Former     Current packs/day: 0.25     Types: Cigarettes    Smokeless tobacco: Former   Substance Use Topics    Alcohol use: Yes     Comment: Social    Drug use: No     Review of Systems   Constitutional:  Negative for fever.   HENT:  Negative for sore throat.    Respiratory:  Negative for shortness of breath.    Cardiovascular:  Negative for chest pain.   Gastrointestinal:  Negative for nausea.   Genitourinary:  Negative for dysuria.   Musculoskeletal:  Negative for back pain.   Skin:  Positive for wound (Healing wound, left wrist). Negative for rash.   Neurological:  Negative for weakness.   Hematological:  Does not bruise/bleed easily.       Physical Exam     Initial Vitals  [03/21/24 1458]   BP Pulse Resp Temp SpO2   (!) 136/90 94 18 98.4 °F (36.9 °C) 96 %      MAP       --         Physical Exam    Nursing note and vitals reviewed.  Constitutional: Vital signs are normal. He appears well-developed and well-nourished. He is cooperative. He does not appear ill. No distress.   HENT:   Head: Normocephalic and atraumatic.   Right Ear: External ear normal.   Left Ear: External ear normal.   Nose: Nose normal.   Eyes: Conjunctivae and EOM are normal.   Neck: Phonation normal.   Normal range of motion.  Cardiovascular:  Normal rate and regular rhythm.           No murmur heard.  Pulmonary/Chest: Effort normal. No respiratory distress.   Abdominal: Abdomen is flat. He exhibits no distension. There is no abdominal tenderness.   Musculoskeletal:        Arms:       Cervical back: Normal range of motion.      Comments: Healing laceration to the left wrist.  No significant erythema, warmth, drainage, or fluctuance.  Neurovascularly intact distal.     Neurological: He is alert and oriented to person, place, and time. GCS eye subscore is 4. GCS verbal subscore is 5. GCS motor subscore is 6.   Skin: Skin is warm and dry. Capillary refill takes less than 2 seconds. No rash noted.         ED Course   Suture Removal    Date/Time: 3/21/2024 6:22 PM  Location procedure was performed: [facility] EMERGENCY DEPARTMENT    Performed by: Jaiden Tripathi PA-C  Authorized by: Mela Hart DO  Assisting provider: Raghu Alejandro  Body area: upper extremity  Location details: left wrist  Wound Appearance: clean, well healed, normal color and no drainage  Sutures Removed: 10  Post-removal: no dressing applied  Facility: sutures placed in this facility  Complications: No  Estimated blood loss (mL): 0  Specimens: No  Implants: No  Patient tolerance: Patient tolerated the procedure well with no immediate complications        Labs Reviewed - No data to display       Imaging Results    None          Medications - No  data to display  Medical Decision Making  48-year-old male presenting to the emergency department 16 days after having a laceration repaired in this facility for suture removal.  Denies any problems with the healing process.  On physical exam there is a well healing laceration to the left wrist.  No signs of surrounding infection.  No drainage.    Sutures were removed as described in procedure note.  Patient tolerated the procedure well with no acute complications.  Stable for discharge at this time.  Discussed further wound care including moisturizing ointment and avoiding sun exposure to minimize scar formation.  Patient voices understanding.    Return precautions were discussed, all patient questions were answered, and the patient was agreeable to the plan of care.  He was discharged home in stable condition and will follow up with his primary care provider or return to the emergency department if his symptoms worsen or do not improve.     Risk  Diagnosis or treatment significantly limited by social determinants of health.                                      Clinical Impression:  Final diagnoses:  [Z48.02] Encounter for removal of sutures (Primary)          ED Disposition Condition    Discharge Stable          ED Prescriptions    None       Follow-up Information       Follow up With Specialties Details Why Contact Info    Imtiaz Gao MD Internal Medicine Schedule an appointment as soon as possible for a visit  As needed [address]  [place] [address]  [phone]               Jaiden Tripathi, PA-C  03/21/24 9965

## 2024-03-21 NOTE — ED TRIAGE NOTES
Presents to ED for suture removal to left wrist. Placed 3/5. Wound Is healed. Clean dry and intact.

## 2024-03-21 NOTE — DISCHARGE INSTRUCTIONS

## 2024-03-24 DIAGNOSIS — L40.9 PSORIASIS: ICD-10-CM

## 2024-03-25 RX ORDER — TRIAMCINOLONE ACETONIDE 1 MG/G
OINTMENT TOPICAL
Qty: 454 G | Refills: 0 | Status: SHIPPED | OUTPATIENT
Start: 2024-03-25

## 2024-03-25 NOTE — TELEPHONE ENCOUNTER
Encounter Date: 03/07/2024    Psoriasis vulgaris - fairly well controlled with residual plaques to bilateral shins, calves.  + psoriatic arthritis, as well - currently fairly well controlled.  Continue Skyrizi - injection today by LPN without complications.  Continue Otezla.  Continue topical steroids and dovonex as needed.

## 2024-03-26 ENCOUNTER — TELEPHONE (OUTPATIENT)
Dept: INTERNAL MEDICINE | Facility: CLINIC | Age: 49
End: 2024-03-26
Payer: MEDICAID

## 2024-03-26 DIAGNOSIS — L40.9 PSORIASIS: Primary | ICD-10-CM

## 2024-03-26 RX ORDER — BRODALUMAB 210 MG/1
INJECTION SUBCUTANEOUS
Qty: 3 ML | Refills: 4 | Status: SHIPPED | OUTPATIENT
Start: 2024-03-26 | End: 2024-05-22

## 2024-03-26 RX ORDER — BRODALUMAB 210 MG/1
INJECTION SUBCUTANEOUS
Qty: 6 ML | Refills: 0 | Status: SHIPPED | OUTPATIENT
Start: 2024-03-26 | End: 2024-05-22

## 2024-03-26 NOTE — TELEPHONE ENCOUNTER
Attempted to contact pt no success, left voice message.  Lvm to schedule with Kristine Anderson NP to get staples removed.

## 2024-03-26 NOTE — TELEPHONE ENCOUNTER
----- Message from Herve Conn sent at 3/26/2024 12:45 PM CDT -----  Contact: Pt [phone]  No blue slot available to schedule an appointment for the patient.  Patient is established with which PCP: Dr Gao  Reason for the visit: ER Follow up 2 weeks ago he had stitches in his arm and the ER took them out. He was told to follow up with PCP

## 2024-03-28 RX ORDER — FUROSEMIDE 40 MG/1
TABLET ORAL
Qty: 180 TABLET | Refills: 0 | OUTPATIENT
Start: 2024-03-28

## 2024-03-28 RX ORDER — CARVEDILOL 25 MG/1
25 TABLET ORAL 2 TIMES DAILY WITH MEALS
Qty: 180 TABLET | Refills: 3 | Status: SHIPPED | OUTPATIENT
Start: 2024-03-28

## 2024-04-09 ENCOUNTER — PATIENT MESSAGE (OUTPATIENT)
Dept: DERMATOLOGY | Facility: CLINIC | Age: 49
End: 2024-04-09
Payer: MEDICAID

## 2024-04-15 DIAGNOSIS — L40.0 PSORIASIS VULGARIS: ICD-10-CM

## 2024-04-15 RX ORDER — HALOBETASOL PROPIONATE 0.5 MG/G
OINTMENT TOPICAL
Qty: 100 G | Refills: 0 | Status: SHIPPED | OUTPATIENT
Start: 2024-04-15 | End: 2024-05-08

## 2024-04-15 NOTE — TELEPHONE ENCOUNTER
Encounter Date: 03/07/2024    Psoriasis - fairly well controlled overall but continues to have residual plaques to bilateral shins, calves, right knee, abdomen trunk with about 5% BSA still involved.  + psoriatic arthritis, as well - currently fairly well controlled.   On Skyrizi and Otezla but flares more significantly leading up to next injection.  Discussed new alternative biologic medication Bimzelx with dual targets of IL17A and IL17F - efficacy data for moderate to severe plaque psoriasis exceeds that of Skyrizi, and I would like to maintain him on one medication if possible instead of long term Skyrizi + Otezla.    DIscussed Discussed  benefits and risks of Bimzelx being similar to those of other biologic medications that he has been on previously including but not limited to injection site reactions, increased risk of infection, new onset/flare of inflammatory bowel disease, decreased tumor surveillance, and somewhat increased risk of oral candidiasis.      Patient informed to discontinue Bimzelx and notify our office if a serious (requiring hospitalization or IV/oral antibiotics) infection develops.  Patient counseled to avoid live vaccines.

## 2024-05-03 DIAGNOSIS — L40.0 PSORIASIS VULGARIS: ICD-10-CM

## 2024-05-08 RX ORDER — HALOBETASOL PROPIONATE 0.5 MG/G
OINTMENT TOPICAL
Qty: 100 G | Refills: 0 | Status: SHIPPED | OUTPATIENT
Start: 2024-05-08

## 2024-05-08 NOTE — TELEPHONE ENCOUNTER
Encounter Date: 03/07/2024    Psoriasis - fairly well controlled overall but continues to have residual plaques to bilateral shins, calves, right knee, abdomen trunk with about 5% BSA still involved.  + psoriatic arthritis, as well - currently fairly well controlled.   On Skyrizi and Otezla but flares more significantly leading up to next injection.  Discussed new alternative biologic medication Bimzelx with dual targets of IL17A and IL17F - efficacy data for moderate to severe plaque psoriasis exceeds that of Skyrizi, and I would like to maintain him on one medication if possible instead of long term Skyrizi + Otezla.    DIscussed Discussed  benefits and risks of Bimzelx being similar to those of other biologic medications that he has been on previously including but not limited to injection site reactions, increased risk of infection, new onset/flare of inflammatory bowel disease, decreased tumor surveillance, and somewhat increased risk of oral candidiasis.      Patient informed to discontinue Bimzelx and notify our office if a serious (requiring hospitalization or IV/oral antibiotics) infection develops.  Patient counseled to avoid live vaccines.     CBC, CMP, Hep B, Hep C, and Quantiferon gold drawn today.     Dosing for Psoriasis:  If labs are WNL, plan to stop Skyrizi and Otezla and start Bimzelx at 320mg SQ every 2 weeks for 5 doses, then 320mg SQ q 8 weeks.     Will need CBC q 6 months. Will need Quantiferon gold annually.        Encounter for long-term (current) use of high-risk medication - CBC, CMP, Hep B, Hep C, and Quantiferon gold drawn today.     Psoriatic arthritis - currently well controlled on Skyrizi and Otezla

## 2024-05-14 ENCOUNTER — TELEPHONE (OUTPATIENT)
Dept: DERMATOLOGY | Facility: CLINIC | Age: 49
End: 2024-05-14
Payer: MEDICAID

## 2024-05-14 ENCOUNTER — PATIENT MESSAGE (OUTPATIENT)
Dept: DERMATOLOGY | Facility: CLINIC | Age: 49
End: 2024-05-14
Payer: MEDICAID

## 2024-05-14 NOTE — TELEPHONE ENCOUNTER
I spoke with Mr. Panda about a message in my in-basket that he needs to fill out his Siliqi forms, so the PA can be started.  He told me that the link did not work, so I sent him a new link via the portal. I informed him to fill it out and have it submitted, so the PA can be started.

## 2024-05-15 ENCOUNTER — PATIENT MESSAGE (OUTPATIENT)
Dept: DERMATOLOGY | Facility: CLINIC | Age: 49
End: 2024-05-15
Payer: MEDICAID

## 2024-05-17 ENCOUNTER — TELEPHONE (OUTPATIENT)
Dept: DERMATOLOGY | Facility: CLINIC | Age: 49
End: 2024-05-17
Payer: MEDICAID

## 2024-05-17 NOTE — TELEPHONE ENCOUNTER
Spoke with patient, he reports that his wife was able to get him registered.  Notified [facility] as well.  They will let us know if they have any problems getting his medication cleared.

## 2024-05-17 NOTE — TELEPHONE ENCOUNTER
"----- Message from Daryl Son MD sent at 5/13/2024  9:58 PM CDT -----  Regarding: RE: Dermatology Appointment  If the [facility] registration is complete on both sides, which I think Leanna confirmed with me today, we should be able to proceed with PA process for Siliq.  I would like for him to d/c both otezla and Skyrizi as soon as we can get Siliq onboard.  Hopefully, we can get this accomplished prior to his next scheduled Skyrizi injection the week of May 27.  If so, pharm or nurse visit for initial dose is appropriate but I want to see him in clinic about 8 weeks after that.  If this is still up in the air, nurse visit for Skyrizi injection week of May 27 is ok.  NICO Son  ----- Message -----  From: Shiraz Gordon MA  Sent: 5/10/2024   3:59 PM CDT  To: Daryl Son MD  Subject: Dermatology Appointment                          I know to schedule people you've seen this year.  Does this pt need a soon appointment or later.     Enjoy your JMother's Day weekend,    LETTY  ----- Message -----  From: Rosangela Iglesias  Sent: 5/10/2024  10:46 AM CDT  To: [facility] Staff    Consult/Advisory:      Name Of Caller: Self    Contact Preference:   [phone]     What is the nature of the call?: Returning call to office , no further info given. Please call         Additional Notes:  "Thank you for all that you do for our patients"  "

## 2024-05-23 DIAGNOSIS — I42.9 CARDIOMYOPATHY, IDIOPATHIC: ICD-10-CM

## 2024-05-23 NOTE — TELEPHONE ENCOUNTER
Care Due:                  Date            Visit Type   Department     Provider  --------------------------------------------------------------------------------                                EP -                              PRIMARY      [facility]  Last Visit: 11-      CARE ([facility])   MEDICINE       DARIELA VILLAVICENCIO  Next Visit: None Scheduled  None         None Found                                                            Last  Test          Frequency    Reason                     Performed    Due Date  --------------------------------------------------------------------------------    Office Visit  15 months..  potassium................  11-   02-    [facility] Embedded Care Due Messages. Reference number: [id number].   5/23/2024 6:41:21 AM CDT

## 2024-05-24 NOTE — TELEPHONE ENCOUNTER
Refill Routing Note   Medication(s) are not appropriate for processing by [facility] for the following reason(s):        ED/Hospital Visit since last OV with provider  Patient not seen by provider within 15 months    [facility] action(s):  Defer   Requires appointment : Yes             Appointments  past 12m or future 3m with PCP    Date Provider   Last Visit   11/17/2022 Imtiaz Gao MD   Next Visit   Visit date not found Imtiaz Gao MD   ED visits in past 90 days: 2        Note composed:7:52 AM 05/24/2024

## 2024-05-27 RX ORDER — POTASSIUM CHLORIDE 20 MEQ/1
20 TABLET, EXTENDED RELEASE ORAL
Qty: 90 TABLET | Refills: 0 | Status: SHIPPED | OUTPATIENT
Start: 2024-05-27

## 2024-05-28 ENCOUNTER — TELEPHONE (OUTPATIENT)
Dept: DERMATOLOGY | Facility: CLINIC | Age: 49
End: 2024-05-28
Payer: MEDICAID

## 2024-05-28 ENCOUNTER — TELEPHONE (OUTPATIENT)
Dept: INTERNAL MEDICINE | Facility: CLINIC | Age: 49
End: 2024-05-28
Payer: MEDICAID

## 2024-05-28 NOTE — TELEPHONE ENCOUNTER
----- Message from Imtiaz Gao MD sent at 5/27/2024  5:49 PM CDT -----  Regarding: Follow up  Please contact patient. He needs a follow up with me. I have not seen in in 2 years.

## 2024-05-28 NOTE — TELEPHONE ENCOUNTER
I spoke with the pt and let him know his medication is going to be delivered tomorrow.  He confirmed he is going to his appointment on 05/30/2024

## 2024-05-30 ENCOUNTER — CLINICAL SUPPORT (OUTPATIENT)
Dept: DERMATOLOGY | Facility: CLINIC | Age: 49
End: 2024-05-30
Payer: MEDICAID

## 2024-05-30 DIAGNOSIS — L40.9 PSORIASIS: Primary | ICD-10-CM

## 2024-05-30 NOTE — PROGRESS NOTES
After obtaining consent, and per orders of Dr. Son, injection of Skyrizi 150mg/mL given subcutaneously in R posterior arm by Deyanira Pat LPN. Patient instructed to remain in clinic for 20 minutes afterwards, and to report any adverse reaction to me immediately.   NDC: 1541-9228-83  LOT: [id number]  EXP: 09/2025

## 2024-06-03 DIAGNOSIS — L40.9 PSORIASIS: ICD-10-CM

## 2024-06-04 RX ORDER — APREMILAST 30 MG/1
TABLET, FILM COATED ORAL
Qty: 60 TABLET | Refills: 2 | Status: ACTIVE | OUTPATIENT
Start: 2024-06-04

## 2024-06-11 DIAGNOSIS — L40.9 PSORIASIS: Primary | ICD-10-CM

## 2024-06-11 DIAGNOSIS — Z79.899 ENCOUNTER FOR LONG-TERM (CURRENT) USE OF HIGH-RISK MEDICATION: Primary | ICD-10-CM

## 2024-06-11 RX ORDER — BRODALUMAB 210 MG/1
INJECTION SUBCUTANEOUS
Qty: 3 ML | Refills: 4 | Status: ACTIVE | OUTPATIENT
Start: 2024-06-11

## 2024-06-11 RX ORDER — BRODALUMAB 210 MG/1
INJECTION SUBCUTANEOUS
Qty: 6 ML | Refills: 0 | Status: ACTIVE | OUTPATIENT
Start: 2024-06-11

## 2024-06-14 ENCOUNTER — PATIENT MESSAGE (OUTPATIENT)
Dept: DERMATOLOGY | Facility: CLINIC | Age: 49
End: 2024-06-14
Payer: MEDICAID

## 2024-06-22 DIAGNOSIS — L40.9 PSORIASIS: ICD-10-CM

## 2024-06-22 DIAGNOSIS — L40.0 PSORIASIS VULGARIS: ICD-10-CM

## 2024-06-23 RX ORDER — TRIAMCINOLONE ACETONIDE 1 MG/G
OINTMENT TOPICAL
Qty: 454 G | Refills: 0 | Status: SHIPPED | OUTPATIENT
Start: 2024-06-23

## 2024-06-23 RX ORDER — HALOBETASOL PROPIONATE 0.5 MG/G
OINTMENT TOPICAL
Qty: 100 G | Refills: 0 | Status: SHIPPED | OUTPATIENT
Start: 2024-06-23

## 2024-06-24 DIAGNOSIS — L40.9 PSORIASIS: ICD-10-CM

## 2024-06-24 DIAGNOSIS — L40.0 PSORIASIS VULGARIS: ICD-10-CM

## 2024-06-26 ENCOUNTER — TELEPHONE (OUTPATIENT)
Dept: ENDOSCOPY | Facility: HOSPITAL | Age: 49
End: 2024-06-26
Payer: MEDICAID

## 2024-06-26 NOTE — TELEPHONE ENCOUNTER
Contacted Pt for endoscopy pre-call for upcoming procedure.  Pre-call complete, Pt does not have any further questions. Arrival time:11:40 am

## 2024-06-27 ENCOUNTER — HOSPITAL ENCOUNTER (OUTPATIENT)
Facility: HOSPITAL | Age: 49
Discharge: HOME | End: 2024-06-27
Attending: COLON & RECTAL SURGERY | Admitting: COLON & RECTAL SURGERY
Payer: MEDICAID

## 2024-06-27 ENCOUNTER — ANESTHESIA (OUTPATIENT)
Dept: ENDOSCOPY | Facility: HOSPITAL | Age: 49
End: 2024-06-27
Payer: MEDICAID

## 2024-06-27 ENCOUNTER — ANESTHESIA EVENT (OUTPATIENT)
Dept: ENDOSCOPY | Facility: HOSPITAL | Age: 49
End: 2024-06-27
Payer: MEDICAID

## 2024-06-27 VITALS
OXYGEN SATURATION: 96 % | HEART RATE: 76 BPM | WEIGHT: 205 LBS | SYSTOLIC BLOOD PRESSURE: 141 MMHG | DIASTOLIC BLOOD PRESSURE: 80 MMHG | BODY MASS INDEX: 30.36 KG/M2 | RESPIRATION RATE: 17 BRPM | TEMPERATURE: 98 F | HEIGHT: 69 IN

## 2024-06-27 DIAGNOSIS — Z12.11 COLON CANCER SCREENING: Primary | ICD-10-CM

## 2024-06-27 PROCEDURE — 37000008 HC ANESTHESIA 1ST 15 MINUTES: Performed by: COLON & RECTAL SURGERY

## 2024-06-27 PROCEDURE — 63600175 PHARM REV CODE 636 W HCPCS: Performed by: NURSE ANESTHETIST, CERTIFIED REGISTERED

## 2024-06-27 PROCEDURE — 37000009 HC ANESTHESIA EA ADD 15 MINS: Performed by: COLON & RECTAL SURGERY

## 2024-06-27 PROCEDURE — 45380 COLONOSCOPY AND BIOPSY: CPT | Performed by: COLON & RECTAL SURGERY

## 2024-06-27 PROCEDURE — 88305 TISSUE EXAM BY PATHOLOGIST: CPT | Mod: 59 | Performed by: PATHOLOGY

## 2024-06-27 PROCEDURE — 25000003 PHARM REV CODE 250: Performed by: NURSE ANESTHETIST, CERTIFIED REGISTERED

## 2024-06-27 PROCEDURE — 27201012 HC FORCEPS, HOT/COLD, DISP: Performed by: COLON & RECTAL SURGERY

## 2024-06-27 PROCEDURE — 45380 COLONOSCOPY AND BIOPSY: CPT | Mod: ,,, | Performed by: COLON & RECTAL SURGERY

## 2024-06-27 RX ORDER — LIDOCAINE HYDROCHLORIDE 20 MG/ML
INJECTION INTRAVENOUS
Status: DISCONTINUED | OUTPATIENT
Start: 2024-06-27 | End: 2024-06-27

## 2024-06-27 RX ORDER — SODIUM CHLORIDE 9 MG/ML
INJECTION, SOLUTION INTRAVENOUS CONTINUOUS
Status: DISCONTINUED | OUTPATIENT
Start: 2024-06-27 | End: 2024-06-27 | Stop reason: HOSPADM

## 2024-06-27 RX ORDER — PROPOFOL 10 MG/ML
VIAL (ML) INTRAVENOUS
Status: DISCONTINUED | OUTPATIENT
Start: 2024-06-27 | End: 2024-06-27

## 2024-06-27 RX ADMIN — PROPOFOL 100 MG: 10 INJECTION, EMULSION INTRAVENOUS at 01:06

## 2024-06-27 RX ADMIN — PROPOFOL 200 MCG/KG/MIN: 10 INJECTION, EMULSION INTRAVENOUS at 02:06

## 2024-06-27 RX ADMIN — SODIUM CHLORIDE: 0.9 INJECTION, SOLUTION INTRAVENOUS at 01:06

## 2024-06-27 RX ADMIN — LIDOCAINE HYDROCHLORIDE 50 MG: 20 INJECTION INTRAVENOUS at 01:06

## 2024-06-27 NOTE — TRANSFER OF CARE
"Anesthesia Transfer of Care Note    Patient: Adrien Panda    Procedure(s) Performed: Procedure(s) (LRB):  COLONOSCOPY (N/A)    Patient location: GI    Anesthesia Type: MAC and general    Transport from OR: Transported from OR on room air with adequate spontaneous ventilation    Post pain: adequate analgesia    Post assessment: no apparent anesthetic complications and tolerated procedure well    Post vital signs: stable    Level of consciousness: awake and alert    Nausea/Vomiting: no nausea/vomiting    Complications: none    Transfer of care protocol was followed      Last vitals: Visit Vitals  /74 (BP Location: Left arm, Patient Position: Lying)   Pulse 86   Temp 36.6 °C (97.9 °F) (Tympanic)   Resp 18   Ht 5' 9" (1.753 m)   Wt 93 kg (205 lb)   SpO2 95%   BMI 30.27 kg/m²     "

## 2024-06-27 NOTE — ANESTHESIA PREPROCEDURE EVALUATION
06/27/2024  Adrien Panda is a 48 y.o., male.    Patient Active Problem List   Diagnosis    Cardiomyopathy, idiopathic    Psoriasis    Chondrocalcinosis    Visit for suture removal    Gout with tophi    Chronic midline low back pain without sciatica    Weakness of trunk musculature    Muscle tightness    Decreased ROM of lumbar spine    Cerebral infarction    Essential hypertension    Psoriatic arthropathy    Decreased range of motion of lumbar spine     Past Medical History:   Diagnosis Date    Arthritis     Blood clotting tendency     Congestive heart failure     Gout, unspecified     High cholesterol     Hyperlipemia     Hypertension     Joint pain     Psoriasis      No past surgical history on file.    Pre-op Assessment    I have reviewed the Patient Summary Reports.    I have reviewed the NPO Status.   I have reviewed the Medications.     Review of Systems  Anesthesia Hx:  No problems with previous Anesthesia   History of prior surgery of interest to airway management or planning:          Denies Family Hx of Anesthesia complications.    Denies Personal Hx of Anesthesia complications.                    Social:  Social Alcohol Use, Former Smoker       Hematology/Oncology:  Hematology Normal   Oncology Normal                                   EENT/Dental:  EENT/Dental Normal           Cardiovascular:     Hypertension       CHF: in the past, improved.                                 Pulmonary:  Pulmonary Normal                       Hepatic/GI:  Hepatic/GI Normal                 Musculoskeletal:  Musculoskeletal Normal                Neurological:    Denies CVA. (patient denies)                                    Endocrine:  Endocrine Normal            Dermatological:  Skin Normal    Psych:  Psychiatric Normal                    Physical Exam  General: Alert, Oriented, Cooperative and Well  nourished    Airway:  Mallampati: II   Mouth Opening: Normal  TM Distance: Normal  Tongue: Normal  Neck ROM: Normal ROM    Dental:  Intact    Chest/Lungs:  Normal Respiratory Rate    Heart:  Rate: Normal  Rhythm: Regular Rhythm        Anesthesia Plan  Type of Anesthesia, risks & benefits discussed:    Anesthesia Type: Gen Natural Airway  Intra-op Monitoring Plan: Standard ASA Monitors  Post Op Pain Control Plan: multimodal analgesia  Induction:  IV  Informed Consent: Informed consent signed with the Patient and all parties understand the risks and agree with anesthesia plan.  All questions answered.   ASA Score: 2  Day of Surgery Review of History & Physical: H&P Update referred to the surgeon/provider.    Ready For Surgery From Anesthesia Perspective.     .

## 2024-06-27 NOTE — [employer] PATIENT INSTRUCTIONS
Discharge Summary/Instructions after an Endoscopic Procedure  Patient Name: Adrien Panda  Patient MRN: [id number]  Patient YOB: 1975 Thursday, June 27, 2024  Raúl Turpin MD  Dear patient,  As a result of recent federal legislation (The Federal Cures Act), you may   receive lab or pathology results from your procedure in your [facility]   account before your physician is able to contact you. Your physician or   their representative will relay the results to you with their   recommendations at their soonest availability.  Thank you,  RESTRICTIONS:  During your procedure today, you received medications for sedation.  These   medications may affect your judgment, balance and coordination.  Therefore,   for 24 hours, you have the following restrictions:   - DO NOT drive a car, operate machinery, make legal/financial decisions,   sign important papers or drink alcohol.    ACTIVITY:  Today: no heavy lifting, straining or running due to procedural   sedation/anesthesia.  The following day: return to full activity including work.  DIET:  Eat and drink normally unless instructed otherwise.     TREATMENT FOR COMMON SIDE EFFECTS:  - Mild abdominal pain, nausea, belching, bloating or excessive gas:  rest,   eat lightly and use a heating pad.  - Sore Throat: treat with throat lozenges and/or gargle with warm salt   water.  - Because air was used during the procedure, expelling large amounts of air   from your rectum or belching is normal.  - If a bowel prep was taken, you may not have a bowel movement for 1-3 days.    This is normal.  SYMPTOMS TO WATCH FOR AND REPORT TO YOUR PHYSICIAN:  1. Abdominal pain or bloating, other than gas cramps.  2. Chest pain.  3. Back pain.  4. Signs of infection such as: chills or fever occurring within 24 hours   after the procedure.  5. Rectal bleeding, which would show as bright red, maroon, or black stools.   (A tablespoon of blood from the rectum is not serious, especially if    hemorrhoids are present.)  6. Vomiting.  7. Weakness or dizziness.  GO DIRECTLY TO THE NEAREST EMERGENCY ROOM IF YOU HAVE ANY OF THE FOLLOWING:      Difficulty breathing              Chills and/or fever over 101 F   Persistent vomiting and/or vomiting blood   Severe abdominal pain   Severe chest pain   Black, tarry stools   Bleeding- more than one tablespoon   Any other symptom or condition that you feel may need urgent attention  Your doctor recommends these additional instructions:  If any biopsies were taken, your doctors clinic will contact you in 1 to 2   weeks with any results.  - Discharge patient to home.   - Resume previous diet.   - Continue present medications.   - Await pathology results.   - Repeat colonoscopy date to be determined after pending pathology results   are reviewed for surveillance based on pathology results.   - Patient has a contact number available for emergencies.  The signs and   symptoms of potential delayed complications were discussed with the   patient.  Return to normal activities tomorrow.  Written discharge   instructions were provided to the patient.  For questions, problems or results please call your physician - Raúl Turpin MD at Work:  [phone].  [facility] [place], EMERGENCY ROOM PHONE NUMBER: [phone]  IF A COMPLICATION OR EMERGENCY SITUATION ARISES AND YOU ARE UNABLE TO REACH   YOUR PHYSICIAN - GO DIRECTLY TO THE EMERGENCY ROOM.  Raúl Turpin MD  6/27/2024 2:30:25 PM  This report has been verified and signed electronically.  Dear patient,  As a result of recent federal legislation (The Federal Cures Act), you may   receive lab or pathology results from your procedure in your [facility]   account before your physician is able to contact you. Your physician or   their representative will relay the results to you with their   recommendations at their soonest availability.  Thank you,  PROVATION

## 2024-06-27 NOTE — H&P
Procedure : Colonoscopy    Indication(s):  asymptomatic screening exam    Last colonoscopy: N/A    Review of patient's allergies indicates:  No Known Allergies    Past Medical History:   Diagnosis Date    Arthritis     Blood clotting tendency     Congestive heart failure     Gout, unspecified     High cholesterol     Hyperlipemia     Hypertension     Joint pain     Psoriasis        Prior to Admission medications    Medication Sig Start Date End Date Taking? Authorizing Provider   allopurinoL (ZYLOPRIM) 300 MG tablet TAKE 1 & 1/2 (ONE & ONE-HALF) TABLETS BY MOUTH ONCE DAILY 2/28/24  Yes Devonte Hermosillo MD   atorvastatin (LIPITOR) 10 MG tablet Take 1 tablet (10 mg total) by mouth once daily. 2/7/24 2/6/25 Yes Imtiaz Nguyen DO   carvediloL (COREG) 25 MG tablet TAKE 1 TABLET BY MOUTH TWICE DAILY WITH MEALS 3/28/24  Yes Flako Cunningham MD   furosemide (LASIX) 40 MG tablet Take 1 tablet by mouth twice daily 10/23/23  Yes Flako Cunningham MD   lisinopriL (PRINIVIL,ZESTRIL) 20 MG tablet Take 1 tablet by mouth once daily 11/25/23  Yes Flako Cunningham MD   potassium chloride SA (K-DUR,KLOR-CON) 20 MEQ tablet Take 1 tablet by mouth once daily 5/27/24  Yes Imtiaz Gao MD   brodalumab (SILIQ) 210 mg/1.5 mL Syrg Inject 1 syringe (210mg) into the skin at week 0, week 1 and week 2 then every other week 6/11/24   Daryl Son MD   calcipotriene (DOVONOX) 0.005 % cream APPLY  CREAM TOPICALLY TO AFFECTED AREA TWICE DAILY 3/1/23   Daryl Son MD   clobetasoL (TEMOVATE) 0.05 % external solution APPLY TOPICALLY TO SCALP AS NEEDED FOR ITCHING OR SCALING 3/19/24   Daryl Son MD   colchicine (COLCRYS) 0.6 mg tablet Take 1 tablet by mouth twice daily 9/6/22   Devonte Hermosillo MD   halobetasol (ULTRAVATE) 0.05 % ointment APPLY  OINTMENT TOPICALLY TO AFFECTED AREA TWICE DAILY 6/23/24   Daryl Son MD   OTEZLA 30 mg Tab Take 1 tablet by mouth twice daily. 6/4/24   Daryl Son  MD INOCENCIA   risankizumab-rzaa 150 mg/mL PnIj Inject 150 mg into the skin every 12 weeks. 9/28/23   Daryl Son MD   SILIQ 210 mg/1.5 mL Syrg Inject 1.5ml SQ qoweek 6/11/24   Daryl Son MD   tapinarof (VTAMA) 1 % Crea Apply 1 application  topically once daily. aaa qd 12/7/23   Daryl Son MD   triamcinolone acetonide 0.1% (KENALOG) 0.1 % ointment APPLY  OINTMENT TOPICALLY TO AFFECTED AREA TWICE DAILY 6/23/24   Daryl Son MD   divalproex ER (DEPAKOTE) 500 MG Tb24  6/2/18 6/1/20  Provider, Historical       Sedation Problems: NO    Family History   Problem Relation Name Age of Onset    Coronary artery disease Mother      Melanoma Neg Hx      Psoriasis Neg Hx      Lupus Neg Hx         Fam Hx of Sedation Problems: NO    Social History     Socioeconomic History    Marital status: Single   Tobacco Use    Smoking status: Former     Current packs/day: 0.25     Types: Cigarettes    Smokeless tobacco: Former   Substance and Sexual Activity    Alcohol use: Yes     Comment: Social    Drug use: No    Sexual activity: Yes       Review of Systems -     Respiratory ROS: no cough, shortness of breath, or wheezing  Cardiovascular ROS: no chest pain or dyspnea on exertion  Gastrointestinal ROS: no abdominal pain, change in bowel habits, or black or bloody stools  Musculoskeletal ROS: negative  Neurological ROS: no TIA or stroke symptoms        Physical Exam:  General: no distress  Head: normocephalic  Airway:  normal oropharynx, airway normal  Neck: supple, symmetrical, trachea midline  Lungs:  normal respiratory effort  Heart: regular rate and rhythm  Abdomen: soft, non-tender non-distented; bowel sounds normal; no masses,  no organomegaly  Extremities: no cyanosis or edema, or clubbing       Deep Sedation: Mallampati Score per anesthesia     SedationPlan :Moderate     ASA : III    Patient is medically cleared for anesthesia.    Anesthesia/Surgery risks, benefits and alternative options discussed  and understood by patient/family.

## 2024-06-28 NOTE — ANESTHESIA POSTPROCEDURE EVALUATION
Anesthesia Post Evaluation    Patient: Adrien Panda    Procedure(s) Performed: Procedure(s) (LRB):  COLONOSCOPY (N/A)    Final Anesthesia Type: general      Patient location during evaluation: PACU  Patient participation: Yes- Able to Participate  Level of consciousness: awake and alert and oriented  Post-procedure vital signs: reviewed and stable  Pain management: adequate  Airway patency: patent    PONV status at discharge: No PONV  Anesthetic complications: no      Cardiovascular status: blood pressure returned to baseline, hemodynamically stable and stable  Respiratory status: unassisted, room air and spontaneous ventilation  Hydration status: euvolemic  Follow-up not needed.              Vitals Value Taken Time   /80 06/27/24 1455   Temp 36.6 °C (97.9 °F) 06/27/24 1425   Pulse 76 06/27/24 1455   Resp 17 06/27/24 1455   SpO2 96 % 06/27/24 1455         Event Time   Out of Recovery 15:07:53         Pain/Tapan Score: Tapan Score: 10 (6/27/2024  2:55 PM)

## 2024-07-01 LAB
FINAL PATHOLOGIC DIAGNOSIS: NORMAL
GROSS: NORMAL
Lab: NORMAL

## 2024-07-01 RX ORDER — HALOBETASOL PROPIONATE 0.5 MG/G
OINTMENT TOPICAL
Qty: 100 G | Refills: 0 | Status: SHIPPED | OUTPATIENT
Start: 2024-07-01

## 2024-07-01 RX ORDER — TRIAMCINOLONE ACETONIDE 1 MG/G
OINTMENT TOPICAL
Qty: 454 G | Refills: 0 | Status: SHIPPED | OUTPATIENT
Start: 2024-07-01

## 2024-07-09 DIAGNOSIS — L40.0 PSORIASIS VULGARIS: ICD-10-CM

## 2024-07-09 RX ORDER — HALOBETASOL PROPIONATE 0.5 MG/G
OINTMENT TOPICAL
Qty: 100 G | Refills: 0 | Status: SHIPPED | OUTPATIENT
Start: 2024-07-09

## 2024-07-26 DIAGNOSIS — L40.0 PSORIASIS VULGARIS: ICD-10-CM

## 2024-07-28 DIAGNOSIS — L40.50 PSORIATIC ARTHRITIS: ICD-10-CM

## 2024-07-28 DIAGNOSIS — L40.9 PSORIASIS: Primary | ICD-10-CM

## 2024-07-28 RX ORDER — BIMEKIZUMAB 160 MG/ML
320 INJECTION, SOLUTION SUBCUTANEOUS
Qty: 10 ML | Refills: 0 | Status: ACTIVE | OUTPATIENT
Start: 2024-07-28 | End: 2024-11-18

## 2024-07-28 RX ORDER — BIMEKIZUMAB 160 MG/ML
320 INJECTION, SOLUTION SUBCUTANEOUS
Qty: 2 ML | Refills: 6 | Status: ACTIVE | OUTPATIENT
Start: 2024-07-28

## 2024-07-28 RX ORDER — HALOBETASOL PROPIONATE 0.5 MG/G
OINTMENT TOPICAL
Qty: 100 G | Refills: 0 | Status: SHIPPED | OUTPATIENT
Start: 2024-07-28

## 2024-08-23 DIAGNOSIS — I42.9 CARDIOMYOPATHY, IDIOPATHIC: ICD-10-CM

## 2024-08-23 NOTE — TELEPHONE ENCOUNTER
Care Due:                  Date            Visit Type   Department     Provider  --------------------------------------------------------------------------------                                EP -                              PRIMARY      [facility]  Last Visit: 11-      CARE ([facility])   MEDICINE       DARIELA VILLAVICENCIO  Next Visit: None Scheduled  None         None Found                                                            Last  Test          Frequency    Reason                     Performed    Due Date  --------------------------------------------------------------------------------    Office Visit  15 months..  potassium................  11-   02-    [facility] Embedded Care Due Messages. Reference number: [id number].   8/23/2024 6:40:52 AM CDT

## 2024-08-23 NOTE — TELEPHONE ENCOUNTER
Refill Request.     Spoke with Mom and he return to her care last night after being with day for 9 days. Dad did inform her that he has had a cough for 9 days and did not take him in to the Dr. Mom is concerned that he has not been seen for the cough when he was in dads care. He did have a hard time falling asleep last night due to the cough. This morning his cough is there. Mom would like him seen today. Patient scheduled at 530 pm tonight with PCP.

## 2024-08-23 NOTE — TELEPHONE ENCOUNTER
Refill Routing Note   Medication(s) are not appropriate for processing by [facility] for the following reason(s):        Patient not seen by provider within 15 months    [facility] action(s):  Defer   Requires appointment : Yes               Appointments  past 12m or future 3m with PCP    Date Provider   Last Visit   11/17/2022 Imtiaz Gao MD   Next Visit   Visit date not found Imtiaz Gao MD   ED visits in past 90 days: 0        Note composed:9:52 AM 08/23/2024

## 2024-08-24 DIAGNOSIS — L40.9 PSORIASIS: ICD-10-CM

## 2024-08-27 RX ORDER — POTASSIUM CHLORIDE 20 MEQ/1
20 TABLET, EXTENDED RELEASE ORAL
Qty: 90 TABLET | Refills: 0 | Status: SHIPPED | OUTPATIENT
Start: 2024-08-27

## 2024-08-27 RX ORDER — TRIAMCINOLONE ACETONIDE 1 MG/G
OINTMENT TOPICAL
Qty: 454 G | Refills: 0 | Status: SHIPPED | OUTPATIENT
Start: 2024-08-27

## 2024-09-05 ENCOUNTER — PATIENT MESSAGE (OUTPATIENT)
Dept: DERMATOLOGY | Facility: CLINIC | Age: 49
End: 2024-09-05
Payer: MEDICAID

## 2024-09-06 ENCOUNTER — TELEPHONE (OUTPATIENT)
Dept: DERMATOLOGY | Facility: CLINIC | Age: 49
End: 2024-09-06
Payer: MEDICAID

## 2024-09-06 NOTE — TELEPHONE ENCOUNTER
I spoke with Mr. Panda about a message, and I informed him that we are moving his appointment to 09/11/2024. He thanked me for the call.

## 2024-09-09 DIAGNOSIS — L40.9 PSORIASIS: ICD-10-CM

## 2024-09-09 RX ORDER — TRIAMCINOLONE ACETONIDE 1 MG/G
OINTMENT TOPICAL
Qty: 454 G | Refills: 0 | Status: SHIPPED | OUTPATIENT
Start: 2024-09-09

## 2024-09-18 ENCOUNTER — OFFICE VISIT (OUTPATIENT)
Dept: DERMATOLOGY | Facility: CLINIC | Age: 49
End: 2024-09-18
Payer: MEDICAID

## 2024-09-18 ENCOUNTER — LAB VISIT (OUTPATIENT)
Dept: LAB | Facility: HOSPITAL | Age: 49
End: 2024-09-18
Payer: MEDICAID

## 2024-09-18 DIAGNOSIS — Z79.899 ENCOUNTER FOR LONG-TERM (CURRENT) USE OF HIGH-RISK MEDICATION: Primary | ICD-10-CM

## 2024-09-18 DIAGNOSIS — Z79.899 ENCOUNTER FOR LONG-TERM (CURRENT) USE OF HIGH-RISK MEDICATION: ICD-10-CM

## 2024-09-18 DIAGNOSIS — L40.9 PSORIASIS: ICD-10-CM

## 2024-09-18 DIAGNOSIS — L40.50 PSORIATIC ARTHRITIS: ICD-10-CM

## 2024-09-18 LAB
ALBUMIN SERPL BCP-MCNC: 3.5 G/DL (ref 3.5–5.2)
ALP SERPL-CCNC: 56 U/L (ref 55–135)
ALT SERPL W/O P-5'-P-CCNC: 48 U/L (ref 10–44)
ANION GAP SERPL CALC-SCNC: 8 MMOL/L (ref 8–16)
AST SERPL-CCNC: 33 U/L (ref 10–40)
BASOPHILS # BLD AUTO: 0.04 K/UL (ref 0–0.2)
BASOPHILS NFR BLD: 0.5 % (ref 0–1.9)
BILIRUB SERPL-MCNC: 0.5 MG/DL (ref 0.1–1)
BUN SERPL-MCNC: 12 MG/DL (ref 6–20)
CALCIUM SERPL-MCNC: 9.4 MG/DL (ref 8.7–10.5)
CHLORIDE SERPL-SCNC: 104 MMOL/L (ref 95–110)
CO2 SERPL-SCNC: 27 MMOL/L (ref 23–29)
CREAT SERPL-MCNC: 1 MG/DL (ref 0.5–1.4)
DIFFERENTIAL METHOD BLD: NORMAL
EOSINOPHIL # BLD AUTO: 0.3 K/UL (ref 0–0.5)
EOSINOPHIL NFR BLD: 3.7 % (ref 0–8)
ERYTHROCYTE [DISTWIDTH] IN BLOOD BY AUTOMATED COUNT: 13.2 % (ref 11.5–14.5)
EST. GFR  (NO RACE VARIABLE): >60 ML/MIN/1.73 M^2
GLUCOSE SERPL-MCNC: 173 MG/DL (ref 70–110)
HCT VFR BLD AUTO: 53.2 % (ref 40–54)
HGB BLD-MCNC: 17.8 G/DL (ref 14–18)
IMM GRANULOCYTES # BLD AUTO: 0.04 K/UL (ref 0–0.04)
IMM GRANULOCYTES NFR BLD AUTO: 0.5 % (ref 0–0.5)
LYMPHOCYTES # BLD AUTO: 1.6 K/UL (ref 1–4.8)
LYMPHOCYTES NFR BLD: 20.5 % (ref 18–48)
MCH RBC QN AUTO: 30.6 PG (ref 27–31)
MCHC RBC AUTO-ENTMCNC: 33.5 G/DL (ref 32–36)
MCV RBC AUTO: 91 FL (ref 82–98)
MONOCYTES # BLD AUTO: 0.5 K/UL (ref 0.3–1)
MONOCYTES NFR BLD: 6.1 % (ref 4–15)
NEUTROPHILS # BLD AUTO: 5.4 K/UL (ref 1.8–7.7)
NEUTROPHILS NFR BLD: 68.7 % (ref 38–73)
NRBC BLD-RTO: 0 /100 WBC
PLATELET # BLD AUTO: 218 K/UL (ref 150–450)
PMV BLD AUTO: 11.2 FL (ref 9.2–12.9)
POTASSIUM SERPL-SCNC: 3.8 MMOL/L (ref 3.5–5.1)
PROT SERPL-MCNC: 7.1 G/DL (ref 6–8.4)
RBC # BLD AUTO: 5.82 M/UL (ref 4.6–6.2)
SODIUM SERPL-SCNC: 139 MMOL/L (ref 136–145)
WBC # BLD AUTO: 7.89 K/UL (ref 3.9–12.7)

## 2024-09-18 PROCEDURE — 80053 COMPREHEN METABOLIC PANEL: CPT | Performed by: PATHOLOGY

## 2024-09-18 PROCEDURE — 4010F ACE/ARB THERAPY RXD/TAKEN: CPT | Mod: CPTII,,, | Performed by: PATHOLOGY

## 2024-09-18 PROCEDURE — 85025 COMPLETE CBC W/AUTO DIFF WBC: CPT | Performed by: PATHOLOGY

## 2024-09-18 PROCEDURE — 1160F RVW MEDS BY RX/DR IN RCRD: CPT | Mod: CPTII,,, | Performed by: PATHOLOGY

## 2024-09-18 PROCEDURE — 3044F HG A1C LEVEL LT 7.0%: CPT | Mod: CPTII,,, | Performed by: PATHOLOGY

## 2024-09-18 PROCEDURE — 36415 COLL VENOUS BLD VENIPUNCTURE: CPT | Performed by: PATHOLOGY

## 2024-09-18 PROCEDURE — 99212 OFFICE O/P EST SF 10 MIN: CPT | Mod: PBBFAC | Performed by: PATHOLOGY

## 2024-09-18 PROCEDURE — 99999 PR PBB SHADOW E&M-EST. PATIENT-LVL II: CPT | Mod: PBBFAC,,, | Performed by: PATHOLOGY

## 2024-09-18 PROCEDURE — 1159F MED LIST DOCD IN RCRD: CPT | Mod: CPTII,,, | Performed by: PATHOLOGY

## 2024-09-18 PROCEDURE — 99214 OFFICE O/P EST MOD 30 MIN: CPT | Mod: S$PBB,,, | Performed by: PATHOLOGY

## 2024-09-18 NOTE — PROGRESS NOTES
Subjective:      Patient ID:  Adrien Panda is a 49 y.o. male who presents for No chief complaint on file.    HPI  Pt well known to me for psoriasis/ psoriatic arthritis - on Bimzelx - started August 20.  Plaques on torso resolved; plaques on legs thinner and less inflammatory with peripheral hypopigmentation and central hyperpigmentation.  Most recently on Siliq prior to this May - June 2024 but had new onset shortness of breath and worsening joint pain left foot and left hand.  Prior to May 2024, had been on Skyrizi since 2019 but was having worsening flares over time despite skyrizi, otezla, topical steroids.  Using topicals - halobetasol, lidex, dovonex;  Taltz initially worked best of all of the other systemic agents he has tried, but legs flared Fall 2018 with progressive new scaly plaques moving proximal on bilateral legs to thighs.  Most recently, was on Ilumya, and prior to that MTX and Tremfya, which were ineffective and discontinued.     Started Taltz 1/2018. States he was clear when he first started it, but psoriasis recurred on his legs. No infections.       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 Rubin at [facility], cutaneous and joint gout tx by Dr. Hermosillo (psoriasis flared on colchicine), stroke      PSORIASIS: failed tx with Enbrel, Humira, Soriatane, Taltz, Ilumya, tremfya, otezla, clobetasol topical, betamethasone topical, Lidex topical, elocon topical.    ILK helps temporarily. ultravate works best out of all the topicals he's tried. psorcon 0.05% oint helps.    On Humira from 6/2015 - 4/2016 until he stopped responding to it. Switched back to enbrel at that point which only helped temporarily.    Added Soriatane to Enbrel but ended up with a bad flare on lower legs.  Not a good candidate for NBUVB as he lives on the [facility] and there is not light unit in this area.   Stelara 9/21/16 - 7/19/17 (stopped responding to it)  Taltz 1/2018 - 11/2018 (stopped  responding to it)  Tremfya 11/2018 - 4/2019 - never achieved significant clearance  Ilumya 4/2019 - 8/ 2019 - no improvement and progression  Skyrizi (started Oct. 2019) and Otezla - became resistant over time  Siliq May-June 2024 - shortness of breath and joint pain  Bimzelx started August 2024    Review of Systems   Constitutional:  Negative for fever, chills, weight gain, fatigue and malaise.   HENT:  Positive for headaches.    Respiratory:  Negative for cough and shortness of breath.    Gastrointestinal:  Negative for nausea, vomiting and diarrhea.   Musculoskeletal:  Positive for arthralgias. Negative for joint swelling.   Skin:  Positive for rash. Negative for itching and abscesses.   Neurological:  Positive for headaches.       Objective:   Physical Exam   Constitutional: He appears well-developed and well-nourished.   Neurological: He is alert.   Skin:   Areas Examined (abnormalities noted in diagram):   Scalp / Hair Palpated and Inspected  Head / Face Inspection Performed  Neck Inspection Performed  Chest / Axilla Inspection Performed  Abdomen Inspection Performed  Back Inspection Performed  RUE Inspected  LUE Inspection Performed  RLE Inspected  LLE Inspection Performed  Nails and Digits Inspection Performed            Diagram Legend     Erythematous scaling macule/papule c/w actinic keratosis       Vascular papule c/w angioma      Pigmented verrucoid papule/plaque c/w seborrheic keratosis      Yellow umbilicated papule c/w sebaceous hyperplasia      Irregularly shaped tan macule c/w lentigo     1-2 mm smooth white papules consistent with Milia      Movable subcutaneous cyst with punctum c/w epidermal inclusion cyst      Subcutaneous movable cyst c/w pilar cyst      Firm pink to brown papule c/w dermatofibroma      Pedunculated fleshy papule(s) c/w skin tag(s)      Evenly pigmented macule c/w junctional nevus     Mildly variegated pigmented, slightly irregular-bordered macule c/w mildly atypical nevus       Flesh colored to evenly pigmented papule c/w intradermal nevus       Pink pearly papule/plaque c/w basal cell carcinoma      Erythematous hyperkeratotic cursted plaque c/w SCC      Surgical scar with no sign of skin cancer recurrence      Open and closed comedones      Inflammatory papules and pustules      Verrucoid papule consistent consistent with wart     Erythematous eczematous patches and plaques     Dystrophic onycholytic nail with subungual debris c/w onychomycosis     Umbilicated papule    Erythematous-base heme-crusted tan verrucoid plaque consistent with inflamed seborrheic keratosis     Erythematous Silvery Scaling Plaque c/w Psoriasis     See annotation      Assessment / Plan:        Encounter for long-term (current) use of high-risk medication  -     CBC Auto Differential; Future; Expected date: 09/18/2024  -     Comprehensive Metabolic Panel; Future; Expected date: 09/18/2024  Quant gold due march 2025    Discussed  benefits and risks of Bimzelx being similar to those of other biologic medications that he has been on previously including but not limited to injection site reactions, increased risk of infection, new onset/flare of inflammatory bowel disease, decreased tumor surveillance, and somewhat increased risk of oral candidiasis.      Patient informed to discontinue Bimzelx and notify our office if a serious (requiring hospitalization or IV/oral antibiotics) infection develops.  Patient counseled to avoid live vaccines.    Psoriasis - continue Bimzelx (has only been on this medication for 1 month); significant improvement in skin involvement; still pain in left foot and hand and had headaches initially but these have resolved; topical steroids as needed    Psoriatic arthritis - Bimzelx as above             No follow-ups on file.

## 2024-09-27 DIAGNOSIS — L40.9 PSORIASIS: ICD-10-CM

## 2024-09-27 NOTE — TELEPHONE ENCOUNTER
Encounter Date: 09/18/2024    Encounter for long-term (current) use of high-risk medication  -     CBC Auto Differential; Future; Expected date: 09/18/2024  -     Comprehensive Metabolic Panel; Future; Expected date: 09/18/2024  Suzette gold due march 2025     Discussed  benefits and risks of Bimzelx being similar to those of other biologic medications that he has been on previously including but not limited to injection site reactions, increased risk of infection, new onset/flare of inflammatory bowel disease, decreased tumor surveillance, and somewhat increased risk of oral candidiasis.      Patient informed to discontinue Bimzelx and notify our office if a serious (requiring hospitalization or IV/oral antibiotics) infection develops.  Patient counseled to avoid live vaccines.     Psoriasis - continue Bimzelx (has only been on this medication for 1 month); significant improvement in skin involvement; still pain in left foot and hand and had headaches initially but these have resolved; topical steroids as needed     Psoriatic arthritis - Bimzelx as above

## 2024-09-30 RX ORDER — TRIAMCINOLONE ACETONIDE 1 MG/G
OINTMENT TOPICAL
Qty: 454 G | Refills: 0 | Status: SHIPPED | OUTPATIENT
Start: 2024-09-30

## 2024-10-07 ENCOUNTER — PATIENT MESSAGE (OUTPATIENT)
Dept: DERMATOLOGY | Facility: CLINIC | Age: 49
End: 2024-10-07
Payer: MEDICAID

## 2024-11-16 DIAGNOSIS — L40.9 PSORIASIS: ICD-10-CM

## 2024-11-17 DIAGNOSIS — L40.0 PSORIASIS VULGARIS: ICD-10-CM

## 2024-11-19 RX ORDER — HALOBETASOL PROPIONATE 0.5 MG/G
OINTMENT TOPICAL
Qty: 100 G | Refills: 0 | Status: SHIPPED | OUTPATIENT
Start: 2024-11-19

## 2024-11-19 RX ORDER — TRIAMCINOLONE ACETONIDE 1 MG/G
OINTMENT TOPICAL
Qty: 454 G | Refills: 0 | Status: SHIPPED | OUTPATIENT
Start: 2024-11-19

## 2024-12-01 DIAGNOSIS — L40.0 PSORIASIS VULGARIS: ICD-10-CM

## 2024-12-02 DIAGNOSIS — I42.9 CARDIOMYOPATHY, IDIOPATHIC: ICD-10-CM

## 2024-12-02 RX ORDER — HALOBETASOL PROPIONATE 0.5 MG/G
OINTMENT TOPICAL
Qty: 100 G | Refills: 0 | Status: SHIPPED | OUTPATIENT
Start: 2024-12-02

## 2024-12-02 RX ORDER — LISINOPRIL 20 MG/1
TABLET ORAL
Qty: 90 TABLET | Refills: 0 | Status: SHIPPED | OUTPATIENT
Start: 2024-12-02

## 2024-12-02 NOTE — TELEPHONE ENCOUNTER
Care Due:                  Date            Visit Type   Department     Provider  --------------------------------------------------------------------------------    Last Visit: None Found      None         None Found  Next Visit: None Scheduled  None         None Found                                                            Last  Test          Frequency    Reason                     Performed    Due Date  --------------------------------------------------------------------------------    Office Visit  15 months..  potassium................  Not Found    Overdue    [employer] Embedded Care Due Messages. Reference number: [id number].   12/02/2024 6:41:07 AM CST

## 2024-12-02 NOTE — TELEPHONE ENCOUNTER
Please confirm office visit in January 2025.  No further refills will be authorized unless patient is seen.

## 2024-12-03 RX ORDER — POTASSIUM CHLORIDE 20 MEQ/1
20 TABLET, EXTENDED RELEASE ORAL
Qty: 90 TABLET | Refills: 0 | Status: SHIPPED | OUTPATIENT
Start: 2024-12-03

## 2024-12-03 NOTE — TELEPHONE ENCOUNTER
Refill Routing Note   Medication(s) are not appropriate for processing by [facility] for the following reason(s):        Patient not seen by provider within 15 months  ED/Hospital Visit since last OV with provider    [facility] action(s):  Defer   Requires appointment : Yes               Appointments  past 12m or future 3m with PCP    Date Provider   Last Visit   11/17/2022 Imtiaz Gao MD   Next Visit   Visit date not found Imtiaz Gao MD   ED visits in past 90 days: 0        Note composed:7:11 PM 12/02/2024

## 2024-12-08 DIAGNOSIS — L40.9 PSORIASIS: ICD-10-CM

## 2024-12-09 RX ORDER — TRIAMCINOLONE ACETONIDE 1 MG/G
OINTMENT TOPICAL
Qty: 454 G | Refills: 0 | Status: SHIPPED | OUTPATIENT
Start: 2024-12-09

## 2024-12-17 ENCOUNTER — TELEPHONE (OUTPATIENT)
Dept: DERMATOLOGY | Facility: CLINIC | Age: 49
End: 2024-12-17
Payer: MEDICAID

## 2024-12-18 ENCOUNTER — OFFICE VISIT (OUTPATIENT)
Dept: DERMATOLOGY | Facility: CLINIC | Age: 49
End: 2024-12-18
Payer: MEDICAID

## 2024-12-18 DIAGNOSIS — Z79.899 ENCOUNTER FOR LONG-TERM (CURRENT) USE OF HIGH-RISK MEDICATION: Primary | ICD-10-CM

## 2024-12-18 DIAGNOSIS — L40.50 PSORIATIC ARTHRITIS: ICD-10-CM

## 2024-12-18 DIAGNOSIS — L40.0 PSORIASIS VULGARIS: ICD-10-CM

## 2024-12-18 PROCEDURE — 1159F MED LIST DOCD IN RCRD: CPT | Mod: CPTII,,, | Performed by: PATHOLOGY

## 2024-12-18 PROCEDURE — 99999 PR PBB SHADOW E&M-EST. PATIENT-LVL III: CPT | Mod: PBBFAC,,, | Performed by: PATHOLOGY

## 2024-12-18 PROCEDURE — 1160F RVW MEDS BY RX/DR IN RCRD: CPT | Mod: CPTII,,, | Performed by: PATHOLOGY

## 2024-12-18 PROCEDURE — 99214 OFFICE O/P EST MOD 30 MIN: CPT | Mod: S$PBB,,, | Performed by: PATHOLOGY

## 2024-12-18 PROCEDURE — 99213 OFFICE O/P EST LOW 20 MIN: CPT | Mod: PBBFAC | Performed by: PATHOLOGY

## 2024-12-20 DIAGNOSIS — L40.9 PSORIASIS: ICD-10-CM

## 2024-12-30 DIAGNOSIS — L40.0 PSORIASIS VULGARIS: ICD-10-CM

## 2024-12-30 NOTE — TELEPHONE ENCOUNTER
Encounter Date: 09/18/2024    Psoriasis - fairly well controlled overall but continues to have residual plaques to bilateral shins, calves, right knee, abdomen trunk with about 5% BSA still involved.  + psoriatic arthritis, as well - currently fairly well controlled.   On Skyrizi and Otezla but flares more significantly leading up to next injection.  Discussed new alternative biologic medication Bimzelx with dual targets of IL17A and IL17F - efficacy data for moderate to severe plaque psoriasis exceeds that of Skyrizi, and I would like to maintain him on one medication if possible instead of long term Skyrizi + Otezla.    DIscussed Discussed  benefits and risks of Bimzelx being similar to those of other biologic medications that he has been on previously including but not limited to injection site reactions, increased risk of infection, new onset/flare of inflammatory bowel disease, decreased tumor surveillance, and somewhat increased risk of oral candidiasis.      Patient informed to discontinue Bimzelx and notify our office if a serious (requiring hospitalization or IV/oral antibiotics) infection develops.  Patient counseled to avoid live vaccines.     CBC, CMP, Hep B, Hep C, and Quantiferon gold drawn today.     Dosing for Psoriasis:  If labs are WNL, plan to stop Skyrizi and Otezla and start Bimzelx at 320mg SQ every 2 weeks for 5 doses, then 320mg SQ q 8 weeks.     Will need CBC q 6 months. Will need Quantiferon gold annually.        Encounter for long-term (current) use of high-risk medication - CBC, CMP, Hep B, Hep C, and Quantiferon gold drawn today.     Psoriatic arthritis - currently well controlled on Skyrizi and Otezla

## 2025-01-06 RX ORDER — TRIAMCINOLONE ACETONIDE 1 MG/G
OINTMENT TOPICAL
Qty: 454 G | Refills: 0 | Status: SHIPPED | OUTPATIENT
Start: 2025-01-06

## 2025-01-06 RX ORDER — HALOBETASOL PROPIONATE 0.5 MG/G
OINTMENT TOPICAL
Qty: 100 G | Refills: 0 | Status: SHIPPED | OUTPATIENT
Start: 2025-01-06

## 2025-01-06 NOTE — PROGRESS NOTES
Subjective:      Patient ID:  Adrien Panda is a 49 y.o. male who presents for   Chief Complaint   Patient presents with    Psoriasis     HPI Pt well known to me for psoriasis/ psoriatic arthritis - on Bimzelx - started August 20.  Plaques completely resolved at this time with only some residual peripheral hypopigmentation and central hyperpigmentation.  Arthritis and swollen feet have essentially entirely resolved and he can now wear lace up shoes for the first time in several years comfortably.  He does note intermittent headaches since starting Minzelx, but they are manageable and seem to improving over time.  Most recently on Siliq prior to this May - June 2024 but had new onset shortness of breath and worsening joint pain left foot and left hand.  Prior to May 2024, had been on Skyrizi since 2019 but was having worsening flares over time despite skyrizi, otezla, topical steroids.  Using topicals - halobetasol, lidex, dovonex;  Taltz initially worked best of all of the other systemic agents he has tried, but legs flared Fall 2018 with progressive new scaly plaques moving proximal on bilateral legs to thighs.  Most recently, was on Ilumya, and prior to that MTX and Tremfya, which were ineffective and discontinued.     Started Taltz 1/2018. States he was clear when he first started it, but psoriasis recurred on his legs. No infections.       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 Rubin at [facility], cutaneous and joint gout tx by Dr. Hermosillo (psoriasis flared on colchicine), stroke      PSORIASIS: failed tx with Enbrel, Humira, Soriatane, Taltz, Ilumya, tremfya, otezla, clobetasol topical, betamethasone topical, Lidex topical, elocon topical.    ILK helps temporarily. ultravate works best out of all the topicals he's tried. psorcon 0.05% oint helps.    On Humira from 6/2015 - 4/2016 until he stopped responding to it. Switched back to enbrel at that point which only helped  temporarily.    Added Soriatane to Enbrel but ended up with a bad flare on lower legs.  Not a good candidate for NBUVB as he lives on the [facility] and there is not light unit in this area.   Stelara 9/21/16 - 7/19/17 (stopped responding to it)  Taltz 1/2018 - 11/2018 (stopped responding to it)  Tremfya 11/2018 - 4/2019 - never achieved significant clearance  Ilumya 4/2019 - 8/ 2019 - no improvement and progression  Skyrizi (started Oct. 2019) and Otezla - became resistant over time  Siliq May-June 2024 - shortness of breath and joint pain  Bimzelx started August 2024    Review of Systems   Constitutional:  Negative for weight loss and weight gain.   HENT:  Positive for headaches. Negative for mouth sores (no tongue whiteness).    Respiratory:  Negative for shortness of breath.    Gastrointestinal:  Negative for nausea, vomiting, abdominal pain and diarrhea.   Musculoskeletal:  Negative for joint swelling and arthralgias.   Skin:  Negative for itching and rash (but no rashes/itching in folds).        Injection site reaction - no   yes   Neurological:  Positive for headaches.   Psychiatric/Behavioral:  Negative for depressed mood.        Objective:   Physical Exam   Constitutional: He appears well-developed and well-nourished.   Neurological: He is alert.   Skin:   Areas Examined (abnormalities noted in diagram):   Scalp / Hair Palpated and Inspected  Head / Face Inspection Performed  Neck Inspection Performed  Chest / Axilla Inspection Performed  Abdomen Inspection Performed  Back Inspection Performed  RUE Inspected  LUE Inspection Performed  RLE Inspected  LLE Inspection Performed  Nails and Digits Inspection Performed            Diagram Legend     Erythematous scaling macule/papule c/w actinic keratosis       Vascular papule c/w angioma      Pigmented verrucoid papule/plaque c/w seborrheic keratosis      Yellow umbilicated papule c/w sebaceous hyperplasia      Irregularly shaped tan macule c/w lentigo     1-2 mm  smooth white papules consistent with Milia      Movable subcutaneous cyst with punctum c/w epidermal inclusion cyst      Subcutaneous movable cyst c/w pilar cyst      Firm pink to brown papule c/w dermatofibroma      Pedunculated fleshy papule(s) c/w skin tag(s)      Evenly pigmented macule c/w junctional nevus     Mildly variegated pigmented, slightly irregular-bordered macule c/w mildly atypical nevus      Flesh colored to evenly pigmented papule c/w intradermal nevus       Pink pearly papule/plaque c/w basal cell carcinoma      Erythematous hyperkeratotic cursted plaque c/w SCC      Surgical scar with no sign of skin cancer recurrence      Open and closed comedones      Inflammatory papules and pustules      Verrucoid papule consistent consistent with wart     Erythematous eczematous patches and plaques     Dystrophic onycholytic nail with subungual debris c/w onychomycosis     Umbilicated papule    Erythematous-base heme-crusted tan verrucoid plaque consistent with inflamed seborrheic keratosis     Erythematous Silvery Scaling Plaque c/w Psoriasis     See annotation      Assessment / Plan:        Encounter for long-term (current) use of high-risk medication  -     CBC Auto Differential; Future; Expected date: 03/18/2025  -     Comprehensive Metabolic Panel; Future; Expected date: 03/18/2025  -     Quantiferon Gold TB; Future; Expected date: 03/18/2025    Discussed  benefits and risks of Bimzelx being similar to those of other biologic medications that he has been on previously including but not limited to injection site reactions, increased risk of infection, new onset/flare of inflammatory bowel disease, decreased tumor surveillance, and somewhat increased risk of oral candidiasis.      Patient informed to discontinue Bimzelx and notify our office if a serious (requiring hospitalization or IV/oral antibiotics) infection develops.  Patient counseled to avoid live vaccines.    Psoriasis vulgaris - continue  Bimzelx 320 mg sq q 8 weeks    Psoriatic arthritis - continue Bomzelx as above             No follow-ups on file.

## 2025-01-08 ENCOUNTER — OFFICE VISIT (OUTPATIENT)
Dept: RHEUMATOLOGY | Facility: CLINIC | Age: 50
End: 2025-01-08
Payer: MEDICAID

## 2025-01-08 ENCOUNTER — LAB VISIT (OUTPATIENT)
Dept: LAB | Facility: HOSPITAL | Age: 50
End: 2025-01-08
Attending: INTERNAL MEDICINE
Payer: MEDICAID

## 2025-01-08 VITALS
HEART RATE: 102 BPM | SYSTOLIC BLOOD PRESSURE: 143 MMHG | BODY MASS INDEX: 31.35 KG/M2 | DIASTOLIC BLOOD PRESSURE: 83 MMHG | WEIGHT: 212.31 LBS

## 2025-01-08 DIAGNOSIS — M47.816 LUMBAR SPONDYLOSIS: Primary | ICD-10-CM

## 2025-01-08 DIAGNOSIS — M1A.9XX1 GOUT WITH TOPHI: ICD-10-CM

## 2025-01-08 DIAGNOSIS — I42.9 CARDIOMYOPATHY, IDIOPATHIC: ICD-10-CM

## 2025-01-08 DIAGNOSIS — L40.9 PSORIASIS: ICD-10-CM

## 2025-01-08 LAB
ALBUMIN SERPL BCP-MCNC: 3.9 G/DL (ref 3.5–5.2)
ALP SERPL-CCNC: 61 U/L (ref 40–150)
ALT SERPL W/O P-5'-P-CCNC: 44 U/L (ref 10–44)
ANION GAP SERPL CALC-SCNC: 9 MMOL/L (ref 8–16)
AST SERPL-CCNC: 27 U/L (ref 10–40)
BASOPHILS # BLD AUTO: 0.04 K/UL (ref 0–0.2)
BASOPHILS NFR BLD: 0.4 % (ref 0–1.9)
BILIRUB SERPL-MCNC: 0.4 MG/DL (ref 0.1–1)
BUN SERPL-MCNC: 14 MG/DL (ref 6–20)
CALCIUM SERPL-MCNC: 9.5 MG/DL (ref 8.7–10.5)
CHLORIDE SERPL-SCNC: 105 MMOL/L (ref 95–110)
CO2 SERPL-SCNC: 25 MMOL/L (ref 23–29)
CREAT SERPL-MCNC: 1 MG/DL (ref 0.5–1.4)
CRP SERPL-MCNC: 4.3 MG/L (ref 0–8.2)
DIFFERENTIAL METHOD BLD: ABNORMAL
EOSINOPHIL # BLD AUTO: 0.3 K/UL (ref 0–0.5)
EOSINOPHIL NFR BLD: 3 % (ref 0–8)
ERYTHROCYTE [DISTWIDTH] IN BLOOD BY AUTOMATED COUNT: 12.8 % (ref 11.5–14.5)
ERYTHROCYTE [SEDIMENTATION RATE] IN BLOOD BY PHOTOMETRIC METHOD: 26 MM/HR (ref 0–23)
EST. GFR  (NO RACE VARIABLE): >60 ML/MIN/1.73 M^2
GLUCOSE SERPL-MCNC: 83 MG/DL (ref 70–110)
HCT VFR BLD AUTO: 54.2 % (ref 40–54)
HGB BLD-MCNC: 17.3 G/DL (ref 14–18)
IMM GRANULOCYTES # BLD AUTO: 0.04 K/UL (ref 0–0.04)
IMM GRANULOCYTES NFR BLD AUTO: 0.4 % (ref 0–0.5)
LYMPHOCYTES # BLD AUTO: 2.1 K/UL (ref 1–4.8)
LYMPHOCYTES NFR BLD: 22 % (ref 18–48)
MCH RBC QN AUTO: 29.6 PG (ref 27–31)
MCHC RBC AUTO-ENTMCNC: 31.9 G/DL (ref 32–36)
MCV RBC AUTO: 93 FL (ref 82–98)
MONOCYTES # BLD AUTO: 0.7 K/UL (ref 0.3–1)
MONOCYTES NFR BLD: 7.8 % (ref 4–15)
NEUTROPHILS # BLD AUTO: 6.3 K/UL (ref 1.8–7.7)
NEUTROPHILS NFR BLD: 66.4 % (ref 38–73)
NRBC BLD-RTO: 0 /100 WBC
PLATELET # BLD AUTO: 240 K/UL (ref 150–450)
PMV BLD AUTO: 11.5 FL (ref 9.2–12.9)
POTASSIUM SERPL-SCNC: 4.3 MMOL/L (ref 3.5–5.1)
PROT SERPL-MCNC: 8.3 G/DL (ref 6–8.4)
RBC # BLD AUTO: 5.85 M/UL (ref 4.6–6.2)
SODIUM SERPL-SCNC: 139 MMOL/L (ref 136–145)
URATE SERPL-MCNC: 8.8 MG/DL (ref 3.4–7)
WBC # BLD AUTO: 9.44 K/UL (ref 3.9–12.7)

## 2025-01-08 PROCEDURE — 36415 COLL VENOUS BLD VENIPUNCTURE: CPT | Performed by: INTERNAL MEDICINE

## 2025-01-08 PROCEDURE — 99213 OFFICE O/P EST LOW 20 MIN: CPT | Mod: PBBFAC | Performed by: INTERNAL MEDICINE

## 2025-01-08 PROCEDURE — 86140 C-REACTIVE PROTEIN: CPT | Performed by: INTERNAL MEDICINE

## 2025-01-08 PROCEDURE — 80053 COMPREHEN METABOLIC PANEL: CPT | Performed by: INTERNAL MEDICINE

## 2025-01-08 PROCEDURE — 99999 PR PBB SHADOW E&M-EST. PATIENT-LVL III: CPT | Mod: PBBFAC,,, | Performed by: INTERNAL MEDICINE

## 2025-01-08 PROCEDURE — 3077F SYST BP >= 140 MM HG: CPT | Mod: CPTII,,, | Performed by: INTERNAL MEDICINE

## 2025-01-08 PROCEDURE — 1160F RVW MEDS BY RX/DR IN RCRD: CPT | Mod: CPTII,,, | Performed by: INTERNAL MEDICINE

## 2025-01-08 PROCEDURE — 3008F BODY MASS INDEX DOCD: CPT | Mod: CPTII,,, | Performed by: INTERNAL MEDICINE

## 2025-01-08 PROCEDURE — 84550 ASSAY OF BLOOD/URIC ACID: CPT | Performed by: INTERNAL MEDICINE

## 2025-01-08 PROCEDURE — 1159F MED LIST DOCD IN RCRD: CPT | Mod: CPTII,,, | Performed by: INTERNAL MEDICINE

## 2025-01-08 PROCEDURE — 85025 COMPLETE CBC W/AUTO DIFF WBC: CPT | Performed by: INTERNAL MEDICINE

## 2025-01-08 PROCEDURE — 99214 OFFICE O/P EST MOD 30 MIN: CPT | Mod: S$PBB,,, | Performed by: INTERNAL MEDICINE

## 2025-01-08 PROCEDURE — 3079F DIAST BP 80-89 MM HG: CPT | Mod: CPTII,,, | Performed by: INTERNAL MEDICINE

## 2025-01-08 PROCEDURE — 85652 RBC SED RATE AUTOMATED: CPT | Performed by: INTERNAL MEDICINE

## 2025-01-08 RX ORDER — METHOCARBAMOL 500 MG/1
500 TABLET, FILM COATED ORAL 3 TIMES DAILY
Qty: 60 TABLET | Refills: 4 | Status: SHIPPED | OUTPATIENT
Start: 2025-01-08

## 2025-01-11 NOTE — PROGRESS NOTES
History of present illness:  49-year-old gentleman with idiopathic cardiomyopathy.  He has had no recent evidence of congestive heart failure and sees his cardiologist infrequently.  He had had a previous CVA.  I am following him for several different problems.  He has chronic tophaceous gout.  He is on allopurinol 450 mg daily.  He has x-ray evidence of chondrocalcinosis.  His main problem has been psoriasis.  He also has psoriatic arthritis. He had been treated in the past with Enbrel, Humira, Taltz, Tremfya, MTX and Ilumya.  When I saw him 1 year ago he was on Skyrizi.  He was having some pain in his wrists.  I felt he most likely had tendinitis.  I recommended the use of topical medications but made no other changes in his medication.      He has dermatologist changed him toBimzelx.  He is doing much better since the medication was changed.  He still has occasional low back pain for the past several months.  He has been doing his exercises.  Tylenol did not help.  Heat helps.  He has not tried topical medications.    He has had no recent gout attacks.  He remains on allopurinol.    Physical examination:   Skin: No rashes   Musculoskeletal:  Peripheral joints reveal no tenderness.  He has no swelling.  He has decreased range of motion of the lumbar spine.  He has tenderness to palpation.  Straight leg raising is negative.      Assessment:   1. No evidence of active psoriatic arthritis   2. Inter critical gout   3.  Mechanical back pain    Plans:  1.  Continue medication as before   2. I recommend the use of a Lidoderm patch for his back  3.  Check uric acid level  4.  Return to see me in 12 months

## 2025-01-15 ENCOUNTER — TELEPHONE (OUTPATIENT)
Dept: CARDIOLOGY | Facility: CLINIC | Age: 50
End: 2025-01-15
Payer: MEDICAID

## 2025-01-15 DIAGNOSIS — L40.0 PSORIASIS VULGARIS: ICD-10-CM

## 2025-01-15 DIAGNOSIS — L40.9 PSORIASIS: ICD-10-CM

## 2025-01-15 NOTE — TELEPHONE ENCOUNTER
----- Message from Flako Cunningham MD sent at 2025  8:09 PM CST -----  Contact: Patient  Order done  ----- Message -----  From: Sushma Mukherjee MA  Sent: 2025   3:08 PM CST  To: Flako Cunningham MD    Patient needs new echo orders since the last order is . Please advise  ----- Message -----  From: Kyle Pacheco  Sent: 2025   3:05 PM CST  To: Octavio MCNAIR Staff    Type:  Patient Call          Who Called: Patient         Does the patient know what this is regarding?: Requesting a call back to have orders for a echo ;pt said that he was told that the previous orders have  ; please advise           Would the patient rather a call back or a response via [facility]?call           Best Call Back Number:  [phone]             Additional Information:

## 2025-01-16 ENCOUNTER — HOSPITAL ENCOUNTER (OUTPATIENT)
Dept: CARDIOLOGY | Facility: HOSPITAL | Age: 50
Discharge: HOME | End: 2025-01-16
Attending: INTERNAL MEDICINE
Payer: MEDICAID

## 2025-01-16 VITALS — HEIGHT: 69 IN | WEIGHT: 212 LBS | BODY MASS INDEX: 31.4 KG/M2

## 2025-01-16 DIAGNOSIS — I10 ESSENTIAL HYPERTENSION: ICD-10-CM

## 2025-01-16 DIAGNOSIS — I42.9 CARDIOMYOPATHY, IDIOPATHIC: ICD-10-CM

## 2025-01-16 DIAGNOSIS — R94.31 ABNORMAL EKG: ICD-10-CM

## 2025-01-16 LAB
APICAL FOUR CHAMBER EJECTION FRACTION: 41 %
APICAL TWO CHAMBER EJECTION FRACTION: 60 %
ASCENDING AORTA: 3.7 CM
AV INDEX (PROSTH): 0.65
AV MEAN GRADIENT: 2.4 MMHG
AV PEAK GRADIENT: 4 MMHG
AV VALVE AREA BY VELOCITY RATIO: 4 CM²
AV VALVE AREA: 3.7 CM²
AV VELOCITY RATIO: 0.7
BSA FOR ECHO PROCEDURE: 2.16 M2
CV ECHO LV RWT: 0.53 CM
DOP CALC AO PEAK VEL: 1 M/S
DOP CALC AO VTI: 19.6 CM
DOP CALC LVOT AREA: 5.7 CM2
DOP CALC LVOT DIAMETER: 2.7 CM
DOP CALC LVOT PEAK VEL: 0.7 M/S
DOP CALC LVOT STROKE VOLUME: 72.7 CM3
DOP CALC MV VTI: 12.1 CM
DOP CALCLVOT PEAK VEL VTI: 12.7 CM
E WAVE DECELERATION TIME: 210.63 MSEC
E/A RATIO: 0.68
E/E' RATIO: 8 M/S
ECHO LV POSTERIOR WALL: 1.3 CM (ref 0.6–1.1)
FRACTIONAL SHORTENING: 38.8 % (ref 28–44)
INTERVENTRICULAR SEPTUM: 1.1 CM (ref 0.6–1.1)
IVC DIAMETER: 2.33 CM
LA MINOR: 3.67 CM
LA WIDTH: 3.7 CM
LEFT ATRIUM SIZE: 5.13 CM
LEFT INTERNAL DIMENSION IN SYSTOLE: 3 CM (ref 2.1–4)
LEFT VENTRICLE DIASTOLIC VOLUME INDEX: 52.17 ML/M2
LEFT VENTRICLE DIASTOLIC VOLUME: 110.6 ML
LEFT VENTRICLE END DIASTOLIC VOLUME APICAL 2 CHAMBER: 104.83 ML
LEFT VENTRICLE END DIASTOLIC VOLUME APICAL 4 CHAMBER: 114.35 ML
LEFT VENTRICLE MASS INDEX: 106.8 G/M2
LEFT VENTRICLE SYSTOLIC VOLUME INDEX: 16.5 ML/M2
LEFT VENTRICLE SYSTOLIC VOLUME: 35.05 ML
LEFT VENTRICULAR INTERNAL DIMENSION IN DIASTOLE: 4.9 CM (ref 3.5–6)
LEFT VENTRICULAR MASS: 226.4 G
LV LATERAL E/E' RATIO: 5.6 M/S
LV SEPTAL E/E' RATIO: 13 M/S
LVED V (TEICH): 110.6 ML
LVES V (TEICH): 35.05 ML
LVOT MG: 1.03 MMHG
LVOT MV: 0.48 CM/S
MV MEAN GRADIENT: 1 MMHG
MV PEAK A VEL: 0.57 M/S
MV PEAK E VEL: 0.39 M/S
MV PEAK GRADIENT: 2 MMHG
MV STENOSIS PRESSURE HALF TIME: 72.33 MS
MV VALVE AREA BY CONTINUITY EQUATION: 6.01 CM2
MV VALVE AREA P 1/2 METHOD: 3.04 CM2
OHS CV RV/LV RATIO: 1 CM
OHS LV EJECTION FRACTION SIMPSONS BIPLANE MOD: 49 %
PISA TR MAX VEL: 1.49 M/S
PV PEAK GRADIENT: 2 MMHG
PV PEAK VELOCITY: 0.73 M/S
RA MAJOR: 4.66 CM
RA PRESSURE ESTIMATED: 8 MMHG
RA WIDTH: 5.19 CM
RIGHT VENTRICLE DIASTOLIC BASEL DIMENSION: 4.9 CM
RIGHT VENTRICULAR END-DIASTOLIC DIMENSION: 4.85 CM
RV TB RVSP: 9 MMHG
SINUS: 3.77 CM
STJ: 3.2 CM
TDI LATERAL: 0.07 M/S
TDI SEPTAL: 0.03 M/S
TDI: 0.05 M/S
TR MAX PG: 9 MMHG
TRICUSPID ANNULAR PLANE SYSTOLIC EXCURSION: 1.62 CM
TV REST PULMONARY ARTERY PRESSURE: 17 MMHG
Z-SCORE OF LEFT VENTRICULAR DIMENSION IN END DIASTOLE: -3.13
Z-SCORE OF LEFT VENTRICULAR DIMENSION IN END SYSTOLE: -2.45

## 2025-01-16 PROCEDURE — 93306 TTE W/DOPPLER COMPLETE: CPT | Mod: 26,,, | Performed by: INTERNAL MEDICINE

## 2025-01-16 PROCEDURE — 93306 TTE W/DOPPLER COMPLETE: CPT

## 2025-01-23 NOTE — TELEPHONE ENCOUNTER
Encounter Date 12/18/2024    Encounter for long-term (current) use of high-risk medication  -     CBC Auto Differential; Future; Expected date: 03/18/2025  -     Comprehensive Metabolic Panel; Future; Expected date: 03/18/2025  -     Quantiferon Gold TB; Future; Expected date: 03/18/2025     Discussed  benefits and risks of Bimzelx being similar to those of other biologic medications that he has been on previously including but not limited to injection site reactions, increased risk of infection, new onset/flare of inflammatory bowel disease, decreased tumor surveillance, and somewhat increased risk of oral candidiasis.      Patient informed to discontinue Bimzelx and notify our office if a serious (requiring hospitalization or IV/oral antibiotics) infection develops.  Patient counseled to avoid live vaccines.     Psoriasis vulgaris - continue Bimzelx 320 mg sq q 8 weeks     Psoriatic arthritis - continue Bomzelx as above

## 2025-01-27 RX ORDER — TRIAMCINOLONE ACETONIDE 1 MG/G
OINTMENT TOPICAL
Qty: 454 G | Refills: 0 | Status: SHIPPED | OUTPATIENT
Start: 2025-01-27

## 2025-01-27 RX ORDER — HALOBETASOL PROPIONATE 0.5 MG/G
OINTMENT TOPICAL
Qty: 100 G | Refills: 0 | Status: SHIPPED | OUTPATIENT
Start: 2025-01-27

## 2025-02-07 ENCOUNTER — TELEPHONE (OUTPATIENT)
Dept: CARDIOLOGY | Facility: CLINIC | Age: 50
End: 2025-02-07
Payer: MEDICAID

## 2025-02-07 NOTE — TELEPHONE ENCOUNTER
I called patient to inform him that his echo is pending with the insurance and he has an appointment on 2/10, that I need to reschedule his testing. Lvm for patient to call me back to reschedule

## 2025-03-01 DIAGNOSIS — L40.0 PSORIASIS VULGARIS: ICD-10-CM

## 2025-03-01 DIAGNOSIS — L40.9 PSORIASIS: ICD-10-CM

## 2025-03-01 DIAGNOSIS — I42.9 CARDIOMYOPATHY, IDIOPATHIC: ICD-10-CM

## 2025-03-01 NOTE — TELEPHONE ENCOUNTER
No care due was identified.  [facility] Embedded Care Due Messages. Reference number: [id number].   3/01/2025 6:41:14 AM CST

## 2025-03-03 RX ORDER — ALLOPURINOL 300 MG/1
600 TABLET ORAL DAILY
Qty: 60 TABLET | Refills: 6 | Status: SHIPPED | OUTPATIENT
Start: 2025-03-03

## 2025-03-03 NOTE — TELEPHONE ENCOUNTER
Encounter Date: 12/18/2024    Encounter for long-term (current) use of high-risk medication  -     CBC Auto Differential; Future; Expected date: 03/18/2025  -     Comprehensive Metabolic Panel; Future; Expected date: 03/18/2025  -     Quantiferon Gold TB; Future; Expected date: 03/18/2025     Discussed  benefits and risks of Bimzelx being similar to those of other biologic medications that he has been on previously including but not limited to injection site reactions, increased risk of infection, new onset/flare of inflammatory bowel disease, decreased tumor surveillance, and somewhat increased risk of oral candidiasis.      Patient informed to discontinue Bimzelx and notify our office if a serious (requiring hospitalization or IV/oral antibiotics) infection develops.  Patient counseled to avoid live vaccines.     Psoriasis vulgaris - continue Bimzelx 320 mg sq q 8 weeks     Psoriatic arthritis - continue Bomzelx as above

## 2025-03-03 NOTE — TELEPHONE ENCOUNTER
Refill Routing Note   Medication(s) are not appropriate for processing by [facility] for the following reason(s):        Patient not seen by provider within 15 months  ED/Hospital Visit since last OV with provider    [facility] action(s):  Defer               Appointments  past 12m or future 3m with PCP    Date Provider   Last Visit   11/17/2022 Imtiaz Gao MD   Next Visit   Visit date not found Imtiaz Gao MD   ED visits in past 90 days: 0        Note composed:8:53 AM 03/03/2025

## 2025-03-05 RX ORDER — LISINOPRIL 20 MG/1
20 TABLET ORAL
Qty: 90 TABLET | Refills: 0 | Status: SHIPPED | OUTPATIENT
Start: 2025-03-05

## 2025-03-05 RX ORDER — POTASSIUM CHLORIDE 20 MEQ/1
20 TABLET, EXTENDED RELEASE ORAL
Qty: 90 TABLET | Refills: 0 | Status: SHIPPED | OUTPATIENT
Start: 2025-03-05

## 2025-03-06 NOTE — TELEPHONE ENCOUNTER
Please confirm office visit in Apr 2025.  No further refills will be authorized without being seen.   Statement Selected

## 2025-03-11 ENCOUNTER — TELEPHONE (OUTPATIENT)
Dept: DERMATOLOGY | Facility: CLINIC | Age: 50
End: 2025-03-11
Payer: MEDICAID

## 2025-03-11 RX ORDER — HALOBETASOL PROPIONATE 0.5 MG/G
OINTMENT TOPICAL
Qty: 100 G | Refills: 0 | Status: SHIPPED | OUTPATIENT
Start: 2025-03-11

## 2025-03-11 RX ORDER — TRIAMCINOLONE ACETONIDE 1 MG/G
OINTMENT TOPICAL
Qty: 454 G | Refills: 0 | Status: SHIPPED | OUTPATIENT
Start: 2025-03-11

## 2025-03-12 ENCOUNTER — OFFICE VISIT (OUTPATIENT)
Dept: DERMATOLOGY | Facility: CLINIC | Age: 50
End: 2025-03-12
Payer: MEDICAID

## 2025-03-12 ENCOUNTER — LAB VISIT (OUTPATIENT)
Dept: LAB | Facility: HOSPITAL | Age: 50
End: 2025-03-12
Payer: MEDICAID

## 2025-03-12 DIAGNOSIS — L40.9 PSORIASIS: Primary | ICD-10-CM

## 2025-03-12 DIAGNOSIS — Z79.899 ENCOUNTER FOR LONG-TERM (CURRENT) USE OF HIGH-RISK MEDICATION: ICD-10-CM

## 2025-03-12 LAB
ALBUMIN SERPL BCP-MCNC: 3.5 G/DL (ref 3.5–5.2)
ALP SERPL-CCNC: 61 U/L (ref 40–150)
ALT SERPL W/O P-5'-P-CCNC: 17 U/L (ref 10–44)
ANION GAP SERPL CALC-SCNC: 7 MMOL/L (ref 8–16)
AST SERPL-CCNC: 20 U/L (ref 10–40)
BASOPHILS # BLD AUTO: 0.05 K/UL (ref 0–0.2)
BASOPHILS # BLD AUTO: 0.05 K/UL (ref 0–0.2)
BASOPHILS NFR BLD: 0.7 % (ref 0–1.9)
BASOPHILS NFR BLD: 0.7 % (ref 0–1.9)
BILIRUB SERPL-MCNC: 0.3 MG/DL (ref 0.1–1)
BUN SERPL-MCNC: 13 MG/DL (ref 6–20)
CALCIUM SERPL-MCNC: 9 MG/DL (ref 8.7–10.5)
CHLORIDE SERPL-SCNC: 106 MMOL/L (ref 95–110)
CO2 SERPL-SCNC: 27 MMOL/L (ref 23–29)
CREAT SERPL-MCNC: 0.8 MG/DL (ref 0.5–1.4)
DIFFERENTIAL METHOD BLD: NORMAL
DIFFERENTIAL METHOD BLD: NORMAL
EOSINOPHIL # BLD AUTO: 0.4 K/UL (ref 0–0.5)
EOSINOPHIL # BLD AUTO: 0.4 K/UL (ref 0–0.5)
EOSINOPHIL NFR BLD: 5.2 % (ref 0–8)
EOSINOPHIL NFR BLD: 5.2 % (ref 0–8)
ERYTHROCYTE [DISTWIDTH] IN BLOOD BY AUTOMATED COUNT: 12.8 % (ref 11.5–14.5)
ERYTHROCYTE [DISTWIDTH] IN BLOOD BY AUTOMATED COUNT: 12.8 % (ref 11.5–14.5)
EST. GFR  (NO RACE VARIABLE): >60 ML/MIN/1.73 M^2
GLUCOSE SERPL-MCNC: 71 MG/DL (ref 70–110)
HCT VFR BLD AUTO: 52 % (ref 40–54)
HCT VFR BLD AUTO: 52 % (ref 40–54)
HGB BLD-MCNC: 17.4 G/DL (ref 14–18)
HGB BLD-MCNC: 17.4 G/DL (ref 14–18)
IMM GRANULOCYTES # BLD AUTO: 0.04 K/UL (ref 0–0.04)
IMM GRANULOCYTES # BLD AUTO: 0.04 K/UL (ref 0–0.04)
IMM GRANULOCYTES NFR BLD AUTO: 0.5 % (ref 0–0.5)
IMM GRANULOCYTES NFR BLD AUTO: 0.5 % (ref 0–0.5)
LYMPHOCYTES # BLD AUTO: 2 K/UL (ref 1–4.8)
LYMPHOCYTES # BLD AUTO: 2 K/UL (ref 1–4.8)
LYMPHOCYTES NFR BLD: 27 % (ref 18–48)
LYMPHOCYTES NFR BLD: 27 % (ref 18–48)
MCH RBC QN AUTO: 30.3 PG (ref 27–31)
MCH RBC QN AUTO: 30.3 PG (ref 27–31)
MCHC RBC AUTO-ENTMCNC: 33.5 G/DL (ref 32–36)
MCHC RBC AUTO-ENTMCNC: 33.5 G/DL (ref 32–36)
MCV RBC AUTO: 90 FL (ref 82–98)
MCV RBC AUTO: 90 FL (ref 82–98)
MONOCYTES # BLD AUTO: 0.4 K/UL (ref 0.3–1)
MONOCYTES # BLD AUTO: 0.4 K/UL (ref 0.3–1)
MONOCYTES NFR BLD: 6 % (ref 4–15)
MONOCYTES NFR BLD: 6 % (ref 4–15)
NEUTROPHILS # BLD AUTO: 4.5 K/UL (ref 1.8–7.7)
NEUTROPHILS # BLD AUTO: 4.5 K/UL (ref 1.8–7.7)
NEUTROPHILS NFR BLD: 60.6 % (ref 38–73)
NEUTROPHILS NFR BLD: 60.6 % (ref 38–73)
NRBC BLD-RTO: 0 /100 WBC
NRBC BLD-RTO: 0 /100 WBC
PLATELET # BLD AUTO: 206 K/UL (ref 150–450)
PLATELET # BLD AUTO: 206 K/UL (ref 150–450)
PMV BLD AUTO: 10.9 FL (ref 9.2–12.9)
PMV BLD AUTO: 10.9 FL (ref 9.2–12.9)
POTASSIUM SERPL-SCNC: 4.1 MMOL/L (ref 3.5–5.1)
PROT SERPL-MCNC: 6.9 G/DL (ref 6–8.4)
RBC # BLD AUTO: 5.75 M/UL (ref 4.6–6.2)
RBC # BLD AUTO: 5.75 M/UL (ref 4.6–6.2)
SODIUM SERPL-SCNC: 140 MMOL/L (ref 136–145)
WBC # BLD AUTO: 7.37 K/UL (ref 3.9–12.7)
WBC # BLD AUTO: 7.37 K/UL (ref 3.9–12.7)

## 2025-03-12 PROCEDURE — 99212 OFFICE O/P EST SF 10 MIN: CPT | Mod: PBBFAC | Performed by: PATHOLOGY

## 2025-03-12 PROCEDURE — 1160F RVW MEDS BY RX/DR IN RCRD: CPT | Mod: CPTII,,, | Performed by: PATHOLOGY

## 2025-03-12 PROCEDURE — 36415 COLL VENOUS BLD VENIPUNCTURE: CPT | Performed by: PATHOLOGY

## 2025-03-12 PROCEDURE — 80053 COMPREHEN METABOLIC PANEL: CPT | Performed by: PATHOLOGY

## 2025-03-12 PROCEDURE — 99999 PR PBB SHADOW E&M-EST. PATIENT-LVL II: CPT | Mod: PBBFAC,,, | Performed by: PATHOLOGY

## 2025-03-12 PROCEDURE — 86480 TB TEST CELL IMMUN MEASURE: CPT | Performed by: PATHOLOGY

## 2025-03-12 PROCEDURE — 4010F ACE/ARB THERAPY RXD/TAKEN: CPT | Mod: CPTII,,, | Performed by: PATHOLOGY

## 2025-03-12 PROCEDURE — 1159F MED LIST DOCD IN RCRD: CPT | Mod: CPTII,,, | Performed by: PATHOLOGY

## 2025-03-12 PROCEDURE — 85025 COMPLETE CBC W/AUTO DIFF WBC: CPT | Performed by: PATHOLOGY

## 2025-03-12 PROCEDURE — 99213 OFFICE O/P EST LOW 20 MIN: CPT | Mod: S$PBB,,, | Performed by: PATHOLOGY

## 2025-03-12 NOTE — PROGRESS NOTES
Subjective:      Patient ID:  Adrien Panda is a 49 y.o. male who presents for No chief complaint on file.    HPI  Pt well known to me for psoriasis/ psoriatic arthritis - on Bimzelx - started August 20.  Plaques completely resolved at this time with only some residual peripheral hypopigmentation and central hyperpigmentation.  Arthritis and swollen feet have essentially entirely resolved and he can now wear lace up shoes for the first time in several years comfortably.  He does note intermittent headaches since starting Minzelx, but they are manageable and seem to improving over time.  Most recently on Siliq prior to this May - June 2024 but had new onset shortness of breath and worsening joint pain left foot and left hand.  Prior to May 2024, had been on Skyrizi since 2019 but was having worsening flares over time despite skyrizi, otezla, topical steroids.  Using topicals - halobetasol, lidex, dovonex;  Taltz initially worked best of all of the other systemic agents he has tried, but legs flared Fall 2018 with progressive new scaly plaques moving proximal on bilateral legs to thighs.  Most recently, was on Ilumya, and prior to that MTX and Tremfya, which were ineffective and discontinued.     Started Taltz 1/2018. States he was clear when he first started it, but psoriasis recurred on his legs. No infections.       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 Rubin at [facility], cutaneous and joint gout tx by Dr. Hermosillo (psoriasis flared on colchicine), stroke      PSORIASIS: failed tx with Enbrel, Humira, Soriatane, Taltz, Ilumya, tremfya, otezla, clobetasol topical, betamethasone topical, Lidex topical, elocon topical.    ILK helps temporarily. ultravate works best out of all the topicals he's tried. psorcon 0.05% oint helps.    On Humira from 6/2015 - 4/2016 until he stopped responding to it. Switched back to enbrel at that point which only helped temporarily.    Added  Soriatane to Enbrel but ended up with a bad flare on lower legs.  Not a good candidate for NBUVB as he lives on the [facility] and there is not light unit in this area.   Stelara 9/21/16 - 7/19/17 (stopped responding to it)  Taltz 1/2018 - 11/2018 (stopped responding to it)  Tremfya 11/2018 - 4/2019 - never achieved significant clearance  Ilumya 4/2019 - 8/ 2019 - no improvement and progression  Skyrizi (started Oct. 2019) and Otezla - became resistant over time  Siliq May-June 2024 - shortness of breath and joint pain  Bimzelx started August 2024       Review of Systems   Constitutional:  Negative for fever, chills, fatigue and malaise.   Skin:  Negative for itching and rash.       Objective:   Physical Exam   Constitutional: He appears well-developed and well-nourished.   Neurological: He is alert.   Skin:   Areas Examined (abnormalities noted in diagram):   Scalp / Hair Palpated and Inspected  Head / Face Inspection Performed  Neck Inspection Performed  Chest / Axilla Inspection Performed  Abdomen Inspection Performed  Back Inspection Performed  RUE Inspected  LUE Inspection Performed  RLE Inspected  LLE Inspection Performed  Nails and Digits Inspection Performed            Diagram Legend     Erythematous scaling macule/papule c/w actinic keratosis       Vascular papule c/w angioma      Pigmented verrucoid papule/plaque c/w seborrheic keratosis      Yellow umbilicated papule c/w sebaceous hyperplasia      Irregularly shaped tan macule c/w lentigo     1-2 mm smooth white papules consistent with Milia      Movable subcutaneous cyst with punctum c/w epidermal inclusion cyst      Subcutaneous movable cyst c/w pilar cyst      Firm pink to brown papule c/w dermatofibroma      Pedunculated fleshy papule(s) c/w skin tag(s)      Evenly pigmented macule c/w junctional nevus     Mildly variegated pigmented, slightly irregular-bordered macule c/w mildly atypical nevus      Flesh colored to evenly pigmented papule c/w  intradermal nevus       Pink pearly papule/plaque c/w basal cell carcinoma      Erythematous hyperkeratotic cursted plaque c/w SCC      Surgical scar with no sign of skin cancer recurrence      Open and closed comedones      Inflammatory papules and pustules      Verrucoid papule consistent consistent with wart     Erythematous eczematous patches and plaques     Dystrophic onycholytic nail with subungual debris c/w onychomycosis     Umbilicated papule    Erythematous-base heme-crusted tan verrucoid plaque consistent with inflamed seborrheic keratosis     Erythematous Silvery Scaling Plaque c/w Psoriasis     See annotation      Assessment / Plan:        Psoriasis - continue Bimzelx 320 mg sq q 8 weeks     Encounter for long-term (current) use of high-risk medication - CBC, CMP March 2025 WNL; quantiferon gold negative March 2025             No follow-ups on file.

## 2025-03-15 LAB
GAMMA INTERFERON BACKGROUND BLD IA-ACNC: 0.01 IU/ML
M TB IFN-G CD4+ BCKGRND COR BLD-ACNC: 0.04 IU/ML
M TB IFN-G CD4+ BCKGRND COR BLD-ACNC: 0.04 IU/ML
MITOGEN IGNF BCKGRD COR BLD-ACNC: 9.99 IU/ML
TB GOLD PLUS: NEGATIVE

## 2025-03-31 DIAGNOSIS — L40.0 PSORIASIS VULGARIS: ICD-10-CM

## 2025-03-31 NOTE — TELEPHONE ENCOUNTER
Encounter Date: 03/12/2025    Psoriasis - continue Bimzelx 320 mg sq q 8 weeks      Encounter for long-term (current) use of high-risk medication - CBC, CMP March 2025 WNL; quantiferon gold negative March 2025

## 2025-04-02 RX ORDER — HALOBETASOL PROPIONATE 0.5 MG/G
OINTMENT TOPICAL
Qty: 100 G | Refills: 0 | Status: SHIPPED | OUTPATIENT
Start: 2025-04-02

## 2025-04-03 ENCOUNTER — TELEPHONE (OUTPATIENT)
Dept: INTERNAL MEDICINE | Facility: CLINIC | Age: 50
End: 2025-04-03
Payer: MEDICAID

## 2025-04-03 ENCOUNTER — OFFICE VISIT (OUTPATIENT)
Dept: CARDIOLOGY | Facility: CLINIC | Age: 50
End: 2025-04-03
Payer: MEDICAID

## 2025-04-03 VITALS
WEIGHT: 213.06 LBS | RESPIRATION RATE: 18 BRPM | DIASTOLIC BLOOD PRESSURE: 82 MMHG | OXYGEN SATURATION: 95 % | SYSTOLIC BLOOD PRESSURE: 160 MMHG | BODY MASS INDEX: 31.56 KG/M2 | HEART RATE: 82 BPM | HEIGHT: 69 IN

## 2025-04-03 DIAGNOSIS — R94.31 ABNORMAL EKG: ICD-10-CM

## 2025-04-03 DIAGNOSIS — I42.9 CARDIOMYOPATHY, IDIOPATHIC: Primary | ICD-10-CM

## 2025-04-03 DIAGNOSIS — I10 HYPERTENSION, UNSPECIFIED TYPE: ICD-10-CM

## 2025-04-03 DIAGNOSIS — L40.9 PSORIASIS: ICD-10-CM

## 2025-04-03 DIAGNOSIS — I10 ESSENTIAL HYPERTENSION: ICD-10-CM

## 2025-04-03 DIAGNOSIS — E78.2 MIXED HYPERLIPIDEMIA: ICD-10-CM

## 2025-04-03 DIAGNOSIS — E66.9 NON MORBID OBESITY, UNSPECIFIED OBESITY TYPE: ICD-10-CM

## 2025-04-03 DIAGNOSIS — I77.810 AORTIC ROOT DILATATION: ICD-10-CM

## 2025-04-03 LAB
OHS QRS DURATION: 102 MS
OHS QTC CALCULATION: 444 MS

## 2025-04-03 PROCEDURE — 1159F MED LIST DOCD IN RCRD: CPT | Mod: CPTII,,, | Performed by: INTERNAL MEDICINE

## 2025-04-03 PROCEDURE — 3008F BODY MASS INDEX DOCD: CPT | Mod: CPTII,,, | Performed by: INTERNAL MEDICINE

## 2025-04-03 PROCEDURE — 1160F RVW MEDS BY RX/DR IN RCRD: CPT | Mod: CPTII,,, | Performed by: INTERNAL MEDICINE

## 2025-04-03 PROCEDURE — 99214 OFFICE O/P EST MOD 30 MIN: CPT | Mod: S$PBB,,, | Performed by: INTERNAL MEDICINE

## 2025-04-03 PROCEDURE — 99999 PR PBB SHADOW E&M-EST. PATIENT-LVL IV: CPT | Mod: PBBFAC,,, | Performed by: INTERNAL MEDICINE

## 2025-04-03 PROCEDURE — 3079F DIAST BP 80-89 MM HG: CPT | Mod: CPTII,,, | Performed by: INTERNAL MEDICINE

## 2025-04-03 PROCEDURE — 3077F SYST BP >= 140 MM HG: CPT | Mod: CPTII,,, | Performed by: INTERNAL MEDICINE

## 2025-04-03 PROCEDURE — 93005 ELECTROCARDIOGRAM TRACING: CPT | Mod: PBBFAC | Performed by: INTERNAL MEDICINE

## 2025-04-03 PROCEDURE — 93010 ELECTROCARDIOGRAM REPORT: CPT | Mod: S$PBB,,, | Performed by: INTERNAL MEDICINE

## 2025-04-03 PROCEDURE — 99214 OFFICE O/P EST MOD 30 MIN: CPT | Mod: PBBFAC | Performed by: INTERNAL MEDICINE

## 2025-04-03 PROCEDURE — 4010F ACE/ARB THERAPY RXD/TAKEN: CPT | Mod: CPTII,,, | Performed by: INTERNAL MEDICINE

## 2025-04-03 PROCEDURE — G2211 COMPLEX E/M VISIT ADD ON: HCPCS | Mod: S$PBB,,, | Performed by: INTERNAL MEDICINE

## 2025-04-03 RX ORDER — ATORVASTATIN CALCIUM 20 MG/1
20 TABLET, FILM COATED ORAL DAILY
Qty: 90 TABLET | Refills: 3 | Status: SHIPPED | OUTPATIENT
Start: 2025-04-03 | End: 2026-04-03

## 2025-04-03 RX ORDER — LISINOPRIL 40 MG/1
40 TABLET ORAL DAILY
Qty: 90 TABLET | Refills: 3 | Status: SHIPPED | OUTPATIENT
Start: 2025-04-03

## 2025-04-03 NOTE — PROGRESS NOTES
CARDIOVASCULAR PROGRESS NOTE    REASON FOR CONSULT:   Adrien Panda is a 49 y.o. male who presents for follow up of NICM.    PCP:  Sima  Rheum: Jaimee/Huy  HISTORY OF PRESENT ILLNESS:   Last seen November 2023.      Patient returns for follow-up after greater than 1 year hiatus.  He reports generally stable cardiovascular status without angina, dyspnea, palpitations or syncope.  He denies PND, orthopnea wife, or lower extremity edema.  There has been no melena, hematuria, or claudicant symptoms.  He is no longer taking Lasix and I have removed this from his medication list.    CARDIOVASCULAR HISTORY:   NICM/?psoritiac myocarditis, EF 25%->55% ([facility] records, Media tab 11/30/18).    Cath 8/20/12 with nonobst CAD ([facility] records, Media tab 11/30/18).  Hx LV apical thrombus ([facility] records, Media tab 11/30/18).    Ao root dil, 3.8cm (echo 1/2025)    PAST MEDICAL HISTORY:     Past Medical History:   Diagnosis Date    Arthritis     Blood clotting tendency     Congestive heart failure     Gout, unspecified     High cholesterol     Hyperlipemia     Hypertension     Joint pain     Psoriasis        PAST SURGICAL HISTORY:     Past Surgical History:   Procedure Laterality Date    COLONOSCOPY N/A 6/27/2024    Procedure: COLONOSCOPY;  Surgeon: Raúl Turpin MD;  Location: [facility] ([address] [place] [address]);  Service: Endoscopy;  Laterality: N/A;  Referral Imtiaz Dan DO. Golytely PREP iNSTR. TO PORTAL.ec  6/20/24- precall LVM- js  6/26-precall complete-Kpvt       ALLERGIES AND MEDICATION:   Review of patient's allergies indicates:  No Known Allergies     Medication List            Accurate as of April 3, 2025 10:05 AM. If you have any questions, ask your nurse or doctor.                CONTINUE taking these medications      allopurinoL 300 MG tablet  Commonly known as: ZYLOPRIM  Take 2 tablets (600 mg total) by mouth once daily.     atorvastatin 10 MG tablet  Commonly known as: LIPITOR  Take 1 tablet (10 mg total) by mouth  once daily.     BIMZELX AUTOINJECTOR 160 mg/mL Atin  Generic drug: bimekizumab-bkzx  Inject 320 mg into the skin every 8 weeks.     calcipotriene 0.005 % cream  Commonly known as: DOVONOX  APPLY  CREAM TOPICALLY TO AFFECTED AREA TWICE DAILY     carvediloL 25 MG tablet  Commonly known as: COREG  TAKE 1 TABLET BY MOUTH TWICE DAILY WITH MEALS     clobetasoL 0.05 % external solution  Commonly known as: TEMOVATE  APPLY TOPICALLY TO SCALP AS NEEDED FOR ITCHING OR SCALING     colchicine 0.6 mg tablet  Commonly known as: COLCRYS  Take 1 tablet by mouth twice daily     furosemide 40 MG tablet  Commonly known as: LASIX  Take 1 tablet by mouth twice daily     halobetasol 0.05 % ointment  Commonly known as: ULTRAVATE  APPLY  OINTMENT TOPICALLY TO AFFECTED AREA TWICE DAILY     lisinopriL 20 MG tablet  Commonly known as: PRINIVIL,ZESTRIL  Take 1 tablet by mouth once daily     methocarbamoL 500 MG Tab  Commonly known as: Robaxin  Take 1 tablet (500 mg total) by mouth 3 (three) times daily.     potassium chloride SA 20 MEQ tablet  Commonly known as: K-DUR,KLOR-CON  Take 1 tablet by mouth once daily     triamcinolone acetonide 0.1% 0.1 % ointment  Commonly known as: KENALOG  APPLY  OINTMENT TOPICALLY TO AFFECTED AREA TWICE DAILY     VTAMA 1 % Crea  Generic drug: tapinarof  Apply 1 application  topically once daily. aaa qd              SOCIAL HISTORY:     Social History     Socioeconomic History    Marital status: Single   Tobacco Use    Smoking status: Former     Current packs/day: 0.25     Types: Cigarettes    Smokeless tobacco: Former   Substance and Sexual Activity    Alcohol use: Yes     Comment: Social    Drug use: No    Sexual activity: Yes       FAMILY HISTORY:     Family History   Problem Relation Name Age of Onset    Coronary artery disease Mother      Melanoma Neg Hx      Psoriasis Neg Hx      Lupus Neg Hx         REVIEW OF SYSTEMS:   Review of Systems   Constitutional:  Negative for chills, diaphoresis and fever.   HENT:   "Negative for nosebleeds.    Eyes:  Negative for blurred vision, double vision and photophobia.   Respiratory:  Negative for hemoptysis, shortness of breath and wheezing.    Cardiovascular:  Negative for chest pain, palpitations, orthopnea, claudication, leg swelling and PND.   Gastrointestinal:  Negative for abdominal pain, blood in stool, heartburn, melena, nausea and vomiting.   Genitourinary:  Negative for flank pain and hematuria.   Musculoskeletal:  Negative for falls, myalgias and neck pain.   Skin:  Positive for rash (psoriatic).   Neurological:  Negative for dizziness, seizures, loss of consciousness, weakness and headaches.   Endo/Heme/Allergies:  Negative for polydipsia. Does not bruise/bleed easily.   Psychiatric/Behavioral:  Negative for depression and memory loss. The patient is not nervous/anxious.        PHYSICAL EXAM:     Vitals:    04/03/25 1004   BP: (!) 160/82   Pulse: 82   Resp: 18      Body mass index is 31.47 kg/m².  Weight: 96.6 kg (213 lb 1.2 oz)   Height: 5' 9" (175.3 cm)     Physical Exam  Vitals reviewed.   Constitutional:       General: He is not in acute distress.     Appearance: He is well-developed. He is obese. He is not ill-appearing, toxic-appearing or diaphoretic.   HENT:      Head: Normocephalic and atraumatic.   Eyes:      General: No scleral icterus.     Conjunctiva/sclera: Conjunctivae normal.      Pupils: Pupils are equal, round, and reactive to light.   Neck:      Thyroid: No thyromegaly.      Vascular: Normal carotid pulses. No carotid bruit or JVD.      Trachea: Trachea normal. No tracheal deviation.   Cardiovascular:      Rate and Rhythm: Normal rate and regular rhythm.      Pulses:           Carotid pulses are 2+ on the right side and 2+ on the left side.     Heart sounds: S1 normal and S2 normal. No murmur heard.     No friction rub. No gallop.   Pulmonary:      Effort: Pulmonary effort is normal. No respiratory distress.      Breath sounds: Normal breath sounds. No " stridor. No wheezing, rhonchi or rales.   Chest:      Chest wall: No tenderness.   Abdominal:      General: There is no distension.      Palpations: Abdomen is soft.   Musculoskeletal:         General: No swelling or tenderness. Normal range of motion.      Cervical back: Normal range of motion and neck supple. No edema or rigidity.      Right lower leg: No edema.      Left lower leg: No edema.   Feet:      Right foot:      Skin integrity: No ulcer.      Left foot:      Skin integrity: No ulcer.   Skin:     General: Skin is warm and dry.      Coloration: Skin is not jaundiced.      Findings: Rash (psoriatic) present.   Neurological:      General: No focal deficit present.      Mental Status: He is alert and oriented to person, place, and time.      Cranial Nerves: No cranial nerve deficit.   Psychiatric:         Mood and Affect: Mood normal.         Speech: Speech normal.         Behavior: Behavior normal. Behavior is cooperative.         DATA:   EKG: (personally reviewed tracing(s))  4/3/25 SR 84, IRBBB, similar to 11/2/23    Laboratory:  CBC:  Recent Labs   Lab 09/18/24  1110 01/08/25  1621 03/12/25  1135   WBC 7.89 9.44 7.37  7.37   Hemoglobin 17.8 17.3 17.4  17.4   Hematocrit 53.2 54.2 H 52.0  52.0   Platelets 218 240 206  206       CHEMISTRIES:  Recent Labs   Lab 06/15/22  1026 03/07/24  0950 09/18/24  1110 01/08/25  1621 03/12/25  1135   Glucose 92   < > 173 H 83 71   Sodium 138   < > 139 139 140   Potassium 4.1   < > 3.8 4.3 4.1   BUN 23 H   < > 12 14 13   Creatinine 0.8   < > 1.0 1.0 0.8   eGFR if  >60.0  --   --   --   --    eGFR if non  >60.0  --   --   --   --    Calcium 9.2   < > 9.4 9.5 9.0    < > = values in this interval not displayed.       CARDIAC BIOMARKERS:        COAGS:        LIPIDS/LFTS:  Recent Labs   Lab 11/17/22  1140 03/01/23  1111 02/07/24  1553 03/07/24  0950 09/18/24  1110 01/08/25  1621 03/12/25  1135   Cholesterol 203 H  --  189  --   --   --   --     Triglycerides 133  --  147  --   --   --   --    HDL 46  --  51  --   --   --   --    LDL Cholesterol 130.4  --  108.6  --   --   --   --    Non-HDL Cholesterol 157  --  138  --   --   --   --    AST 17   < >  --    < > 33 27 20   ALT 25   < >  --    < > 48 H 44 17    < > = values in this interval not displayed.     The 10-year ASCVD risk score (Johann DK, et al., 2019) is: 3.9%    Values used to calculate the score:      Age: 49 years      Sex: Male      Is Non- : No      Diabetic: No      Tobacco smoker: No      Systolic Blood Pressure: 160 mmHg      Is BP treated: Yes      HDL Cholesterol: 51 mg/dL      Total Cholesterol: 189 mg/dL      Cardiovascular Testing:  Echo 1/16/25    Left Ventricle: The left ventricle is normal in size. Mildly increased wall thickness. There is mild concentric hypertrophy. There is low normal systolic function with a visually estimated ejection fraction of 50%. Grade I diastolic dysfunction.    Right Ventricle: Mild right ventricular enlargement. Systolic function is mildly reduced.    Aorta: Aortic root is mildly dilated measuring 3.77 cm. Ascending aorta is mildly dilated measuring 3.70 cm.    Holter 3/23/21  The diary was properly completed  Sinus rhythm with heart rates varying between 60 and 148 bpm with an average of 95 bpm  There were very rare PVCs  There were frequent PACs totalling 1458 and averaging 30.39 per hour  Symptoms associated with sinus tachycardia.    Carotid US 1/5/18   Less than 50% stenosis of the internal carotid arteries bilaterally  Antegrade vertebral arteries bilateral    ASSESSMENT:   # hx NICM, ?psoriatic myocarditis 2012, LVEF normalized.  Euvolemic.  # HTN, uncontrolled  # ?hx CVA r/t LV apical thrombus.  # dyslipidemia, on atorva 10mg, LDL not at goal  # psoriasis/gout, followed by Rheum  # BMI 31, down 5 unit(s) vs last OV  # Ao root dil, 3.8cm (echo 1/2025)    PLAN:   Cont med rx/GDMT  Lasix removed from med list  Stop  KCL  Inc lisinopril 40mg qd  Check BMP 1 week  RN BP check 2 weeks (Dr. Gao).  Next med: aldactone +/- titrate coreg.  Inc atorva 20mg qhs  Diet/exercise/weight loss  RTC 6 months with surveillance echo and lipids/LFT (Oct 2025)    The above documents medical care services that are part of ongoing care related to this patient's serious/complex condition (Code ). (NICM/HTN/HLP/Ao root dil)        Flako Cunningham MD, FACC

## 2025-04-03 NOTE — TELEPHONE ENCOUNTER
----- Message from Med Assistant Sushma sent at 4/3/2025 10:14 AM CDT -----  Regarding: bp check  Good morning,Dr. Cunningham would like patient to be seen in 2 weeks with nurse for a bp check. Please assist with scheduling and calling patient with appointment info. thanks

## 2025-04-05 DIAGNOSIS — L40.9 PSORIASIS: ICD-10-CM

## 2025-04-11 RX ORDER — TRIAMCINOLONE ACETONIDE 1 MG/G
OINTMENT TOPICAL
Qty: 454 G | Refills: 0 | Status: SHIPPED | OUTPATIENT
Start: 2025-04-11

## 2025-04-19 DIAGNOSIS — L40.0 PSORIASIS VULGARIS: ICD-10-CM

## 2025-04-24 ENCOUNTER — TELEPHONE (OUTPATIENT)
Dept: INTERNAL MEDICINE | Facility: CLINIC | Age: 50
End: 2025-04-24
Payer: MEDICAID

## 2025-04-25 ENCOUNTER — OFFICE VISIT (OUTPATIENT)
Dept: INTERNAL MEDICINE | Facility: CLINIC | Age: 50
End: 2025-04-25
Payer: MEDICAID

## 2025-04-25 ENCOUNTER — LAB VISIT (OUTPATIENT)
Dept: LAB | Facility: HOSPITAL | Age: 50
End: 2025-04-25
Attending: INTERNAL MEDICINE
Payer: MEDICAID

## 2025-04-25 VITALS
WEIGHT: 218.69 LBS | HEART RATE: 69 BPM | OXYGEN SATURATION: 97 % | HEIGHT: 69 IN | SYSTOLIC BLOOD PRESSURE: 118 MMHG | BODY MASS INDEX: 32.39 KG/M2 | DIASTOLIC BLOOD PRESSURE: 80 MMHG

## 2025-04-25 DIAGNOSIS — Z00.00 ANNUAL PHYSICAL EXAM: ICD-10-CM

## 2025-04-25 DIAGNOSIS — Z12.5 PROSTATE CANCER SCREENING: ICD-10-CM

## 2025-04-25 DIAGNOSIS — L40.50 PSORIATIC ARTHROPATHY: ICD-10-CM

## 2025-04-25 DIAGNOSIS — N52.9 ERECTILE DYSFUNCTION, UNSPECIFIED ERECTILE DYSFUNCTION TYPE: ICD-10-CM

## 2025-04-25 DIAGNOSIS — M1A.9XX1 GOUT WITH TOPHI: ICD-10-CM

## 2025-04-25 DIAGNOSIS — Z00.00 ANNUAL PHYSICAL EXAM: Primary | ICD-10-CM

## 2025-04-25 DIAGNOSIS — I42.9 CARDIOMYOPATHY, IDIOPATHIC: ICD-10-CM

## 2025-04-25 DIAGNOSIS — I10 ESSENTIAL HYPERTENSION: ICD-10-CM

## 2025-04-25 DIAGNOSIS — E78.5 HYPERLIPIDEMIA, UNSPECIFIED HYPERLIPIDEMIA TYPE: ICD-10-CM

## 2025-04-25 DIAGNOSIS — E78.2 MIXED HYPERLIPIDEMIA: ICD-10-CM

## 2025-04-25 LAB
ABSOLUTE EOSINOPHIL (OHS): 0.39 K/UL
ABSOLUTE MONOCYTE (OHS): 0.83 K/UL (ref 0.3–1)
ABSOLUTE NEUTROPHIL COUNT (OHS): 5.49 K/UL (ref 1.8–7.7)
ALBUMIN SERPL BCP-MCNC: 3.8 G/DL (ref 3.5–5.2)
ALP SERPL-CCNC: 62 UNIT/L (ref 40–150)
ALT SERPL W/O P-5'-P-CCNC: 31 UNIT/L (ref 10–44)
ANION GAP (OHS): 9 MMOL/L (ref 8–16)
AST SERPL-CCNC: 23 UNIT/L (ref 11–45)
BASOPHILS # BLD AUTO: 0.07 K/UL
BASOPHILS NFR BLD AUTO: 0.8 %
BILIRUB DIRECT SERPL-MCNC: 0.2 MG/DL (ref 0.1–0.3)
BILIRUB SERPL-MCNC: 0.4 MG/DL (ref 0.1–1)
BUN SERPL-MCNC: 18 MG/DL (ref 6–20)
CALCIUM SERPL-MCNC: 9.8 MG/DL (ref 8.7–10.5)
CHLORIDE SERPL-SCNC: 103 MMOL/L (ref 95–110)
CHOLEST SERPL-MCNC: 219 MG/DL (ref 120–199)
CHOLEST/HDLC SERPL: 3.7 {RATIO} (ref 2–5)
CO2 SERPL-SCNC: 27 MMOL/L (ref 23–29)
CREAT SERPL-MCNC: 0.8 MG/DL (ref 0.5–1.4)
ERYTHROCYTE [DISTWIDTH] IN BLOOD BY AUTOMATED COUNT: 12.9 % (ref 11.5–14.5)
GFR SERPLBLD CREATININE-BSD FMLA CKD-EPI: >60 ML/MIN/1.73/M2
GLUCOSE SERPL-MCNC: 84 MG/DL (ref 70–110)
HCT VFR BLD AUTO: 51.4 % (ref 40–54)
HDLC SERPL-MCNC: 60 MG/DL (ref 40–75)
HDLC SERPL: 27.4 % (ref 20–50)
HGB BLD-MCNC: 16.6 GM/DL (ref 14–18)
IMM GRANULOCYTES # BLD AUTO: 0.06 K/UL (ref 0–0.04)
IMM GRANULOCYTES NFR BLD AUTO: 0.7 % (ref 0–0.5)
LDLC SERPL CALC-MCNC: 121 MG/DL (ref 63–159)
LYMPHOCYTES # BLD AUTO: 2.31 K/UL (ref 1–4.8)
MCH RBC QN AUTO: 29.2 PG (ref 27–31)
MCHC RBC AUTO-ENTMCNC: 32.3 G/DL (ref 32–36)
MCV RBC AUTO: 91 FL (ref 82–98)
NONHDLC SERPL-MCNC: 159 MG/DL
NUCLEATED RBC (/100WBC) (OHS): 0 /100 WBC
PLATELET # BLD AUTO: 249 K/UL (ref 150–450)
PMV BLD AUTO: 11.5 FL (ref 9.2–12.9)
POTASSIUM SERPL-SCNC: 4.1 MMOL/L (ref 3.5–5.1)
PROT SERPL-MCNC: 7.6 GM/DL (ref 6–8.4)
PSA SERPL-MCNC: 0.81 NG/ML
RBC # BLD AUTO: 5.68 M/UL (ref 4.6–6.2)
RELATIVE EOSINOPHIL (OHS): 4.3 %
RELATIVE LYMPHOCYTE (OHS): 25.2 % (ref 18–48)
RELATIVE MONOCYTE (OHS): 9.1 % (ref 4–15)
RELATIVE NEUTROPHIL (OHS): 59.9 % (ref 38–73)
SODIUM SERPL-SCNC: 139 MMOL/L (ref 136–145)
TRIGL SERPL-MCNC: 190 MG/DL (ref 30–150)
WBC # BLD AUTO: 9.15 K/UL (ref 3.9–12.7)

## 2025-04-25 PROCEDURE — 36415 COLL VENOUS BLD VENIPUNCTURE: CPT

## 2025-04-25 PROCEDURE — 99999 PR PBB SHADOW E&M-EST. PATIENT-LVL IV: CPT | Mod: PBBFAC,,, | Performed by: INTERNAL MEDICINE

## 2025-04-25 PROCEDURE — 85025 COMPLETE CBC W/AUTO DIFF WBC: CPT

## 2025-04-25 PROCEDURE — 82248 BILIRUBIN DIRECT: CPT

## 2025-04-25 PROCEDURE — 99214 OFFICE O/P EST MOD 30 MIN: CPT | Mod: PBBFAC | Performed by: INTERNAL MEDICINE

## 2025-04-25 PROCEDURE — 84153 ASSAY OF PSA TOTAL: CPT

## 2025-04-25 PROCEDURE — 80053 COMPREHEN METABOLIC PANEL: CPT

## 2025-04-25 PROCEDURE — 80061 LIPID PANEL: CPT

## 2025-04-25 RX ORDER — SILDENAFIL 50 MG/1
50 TABLET, FILM COATED ORAL DAILY PRN
Qty: 30 TABLET | Refills: 0 | Status: SHIPPED | OUTPATIENT
Start: 2025-04-25 | End: 2026-04-25

## 2025-04-25 NOTE — PROGRESS NOTES
CC:  Annual exam     HPI: The patient is a  49-year-old male with hypertension, blood clotting tendencies, nonischemic cardiomyopathy, psoriatic arthritis, gout and hyperlipidemia presents today for annual exam.    ROS: Patient reports some weight gain.  No visual changes.  No auditory changes.  No dysphagia.  Last eye exam was 2 years ago.  No chest pain.  No shortness a breath.  No nausea vomiting.  Does report some left-sided abdominal pain about once a week which can last a half a day.  He does complain of trouble getting an erection.  No skin changes.    Physical exam:   General appearance: No acute distress  HEENT: Conjunctiva is clear.  Pupils equal.  TMs are clear.  Nasal septum is midline without discharge.  Oropharynx is without erythema.  Trachea is midline without JVD or thyromegaly.    Pulmonary:  Good inspiratory, expiratory breath sounds are heard.  Lungs are clear auscultation.  Cardiovascular: S1-S2, extremities with trace edema.  GI: Abdomen was nontender, nondistended without hepatosplenomegaly    Assessment:    Annual exam   2.  Hypertension  3.  Hyperlipidemia  4.  Idiopathic cardiomyopathy   5.  Gout   6.  Psoriatic arthropathy  7.  Erectile dysfunction   Plan:    The patient has chart was reviewed.  Will order a CBC, PSA   2.  Will start the patient on sildenafil 50 mg.  The patient is to contact us with the medication does not work or any problems with the medication.

## 2025-04-28 ENCOUNTER — RESULTS FOLLOW-UP (OUTPATIENT)
Dept: INTERNAL MEDICINE | Facility: CLINIC | Age: 50
End: 2025-04-28

## 2025-04-28 ENCOUNTER — RESULTS FOLLOW-UP (OUTPATIENT)
Dept: CARDIOLOGY | Facility: CLINIC | Age: 50
End: 2025-04-28

## 2025-04-30 RX ORDER — HALOBETASOL PROPIONATE 0.5 MG/G
OINTMENT TOPICAL
Qty: 100 G | Refills: 0 | Status: SHIPPED | OUTPATIENT
Start: 2025-04-30

## 2025-06-12 ENCOUNTER — TELEPHONE (OUTPATIENT)
Dept: DERMATOLOGY | Facility: CLINIC | Age: 50
End: 2025-06-12
Payer: MEDICAID

## 2025-06-13 ENCOUNTER — OFFICE VISIT (OUTPATIENT)
Dept: DERMATOLOGY | Facility: CLINIC | Age: 50
End: 2025-06-13
Payer: MEDICAID

## 2025-06-13 DIAGNOSIS — L40.0 PSORIASIS VULGARIS: Primary | ICD-10-CM

## 2025-06-13 DIAGNOSIS — Z79.899 ENCOUNTER FOR LONG-TERM (CURRENT) USE OF HIGH-RISK MEDICATION: ICD-10-CM

## 2025-06-13 PROCEDURE — 4010F ACE/ARB THERAPY RXD/TAKEN: CPT | Mod: CPTII,95,, | Performed by: PATHOLOGY

## 2025-06-13 PROCEDURE — 1160F RVW MEDS BY RX/DR IN RCRD: CPT | Mod: CPTII,95,, | Performed by: PATHOLOGY

## 2025-06-13 PROCEDURE — 98006 SYNCH AUDIO-VIDEO EST MOD 30: CPT | Mod: 95,,, | Performed by: PATHOLOGY

## 2025-06-13 PROCEDURE — 1159F MED LIST DOCD IN RCRD: CPT | Mod: CPTII,95,, | Performed by: PATHOLOGY

## 2025-06-13 NOTE — PROGRESS NOTES
The patient location is: {State:[facility]}  The chief complaint leading to consultation is: ***    Visit type: audiovisual    Face to Face time with patient: ***  *** minutes of total time spent on the encounter, which includes face to face time and non-face to face time preparing to see the patient (eg, review of tests), Obtaining and/or reviewing separately obtained history, Documenting clinical information in the electronic or other health record, Independently interpreting results (not separately reported) and communicating results to the patient/family/caregiver, or Care coordination (not separately reported).         Each patient to whom he or she provides medical services by telemedicine is:  (1) informed of the relationship between the physician and patient and the respective role of any other health care provider with respect to management of the patient; and (2) notified that he or she may decline to receive medical services by telemedicine and may withdraw from such care at any time.    Notes:       {New Patient Virtual Visit requirements-  40603  SYNCHRONOUS AUDIO-VIDEO VISIT, NEW, SF MDM 15+ MIN  99503  SYNCHRONOUS AUDIO-VIDEO VISIT, NEW, LOW MDM 30+ MIN  81041  SYNCHRONOUS AUDIO-VIDEO VISIT, NEW, MOD MDM 45+ MIN  72495  SYNCHRONOUS AUDIO-VIDEO VISIT, NEW, HIGH MDM 60+ MIN     Est Patient Virtual Visit requirements-  27547  SYNCHRONOUS AUDIO-VIDEO VISIT, EST, SF MDM 10+ MIN  57853  SYNCHRONOUS AUDIO-VIDEO VISIT, EST, LOW MDM 20+ MIN  30375  SYNCHRONOUS AUDIO-VIDEO VISIT, EST, MOD MDM 30+ MIN  26932  SYNCHRONOUS AUDIO-VIDEO VISIT, EST, HIGH MDM 40+ MIN  (This text will automatically delete):32186}

## 2025-06-13 NOTE — PROGRESS NOTES
[facility]  Tele-Consult    Consultation started: 6/24/2025 at 10:14 AM   The patient location is home - [facility]  Consult requested by: No ref. provider found     Each patient to whom he or she provides medical services by telemedicine is: (1) informed of the relationship between the physician and patient and the respective role of any other health care provider with respect to management of the patient; and (2) notified that he or she may decline to receive medical services by telemedicine and may withdraw from such care at any time.        Subjective:       Patient ID:  Adrien Panda is a 49 y.o. male who presents for No chief complaint on file.    HPI  Pt well known to me for psoriasis/ psoriatic arthritis - on Bimzelx - started August 20.  Plaques completely resolved at this time with only some residual peripheral hypopigmentation and central hyperpigmentation.  Arthritis and swollen feet have essentially entirely resolved and he can now wear lace up shoes for the first time in several years comfortably.  Most recently on Siliq prior to this May - June 2024 but had new onset shortness of breath and worsening joint pain left foot and left hand.  Prior to May 2024, had been on Skyrizi since 2019 but was having worsening flares over time despite skyrizi, otezla, topical steroids.  Using topicals - halobetasol, lidex, dovonex;  Taltz initially worked best of all of the other systemic agents he has tried, but legs flared Fall 2018 with progressive new scaly plaques moving proximal on bilateral legs to thighs.  Most recently, was on Ilumya, and prior to that MTX and Tremfya, which were ineffective and discontinued.     Started Taltz 1/2018. States he was clear when he first started it, but psoriasis recurred on his legs. No infections.       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 Rubin at [facility], cutaneous and joint gout tx by Dr. Hermosillo (psoriasis flared on colchicine),  stroke      PSORIASIS: failed tx with Enbrel, Humira, Soriatane, Taltz, Ilumya, tremfya, otezla, clobetasol topical, betamethasone topical, Lidex topical, elocon topical.    ILK helps temporarily. ultravate works best out of all the topicals he's tried. psorcon 0.05% oint helps.    On Humira from 6/2015 - 4/2016 until he stopped responding to it. Switched back to enbrel at that point which only helped temporarily.    Added Soriatane to Enbrel but ended up with a bad flare on lower legs.  Not a good candidate for NBUVB as he lives on the [facility] and there is not light unit in this area.   Stelara 9/21/16 - 7/19/17 (stopped responding to it)  Taltz 1/2018 - 11/2018 (stopped responding to it)  Tremfya 11/2018 - 4/2019 - never achieved significant clearance  Ilumya 4/2019 - 8/ 2019 - no improvement and progression  Skyrizi (started Oct. 2019) and Otezla - became resistant over time  Siliq May-June 2024 - shortness of breath and joint pain  Bimzelx started August 2024    Review of Systems   Constitutional:  Negative for fever, chills, weight loss and weight gain.   HENT:  Negative for mouth sores (no tongue whiteness).    Respiratory:  Negative for shortness of breath.    Gastrointestinal:  Negative for nausea, vomiting, abdominal pain and diarrhea.   Musculoskeletal:  Negative for arthralgias.   Skin:  Negative for itching and rash (but no rashes/itching in folds).        Injection site reaction - no   yes   Psychiatric/Behavioral:  Negative for depressed mood.         Objective:    Physical Exam   Constitutional: He appears well-developed and well-nourished.   Neurological: He is alert.   Skin:   Areas Examined (abnormalities noted in diagram):   Head / Face Inspection Performed  Neck Inspection Performed              Diagram Legend     Erythematous scaling macule/papule c/w actinic keratosis       Vascular papule c/w angioma      Pigmented verrucoid papule/plaque c/w seborrheic keratosis      Yellow umbilicated  papule c/w sebaceous hyperplasia      Irregularly shaped tan macule c/w lentigo     1-2 mm smooth white papules consistent with Milia      Movable subcutaneous cyst with punctum c/w epidermal inclusion cyst      Subcutaneous movable cyst c/w pilar cyst      Firm pink to brown papule c/w dermatofibroma      Pedunculated fleshy papule(s) c/w skin tag(s)      Evenly pigmented macule c/w junctional nevus     Mildly variegated pigmented, slightly irregular-bordered macule c/w mildly atypical nevus      Flesh colored to evenly pigmented papule c/w intradermal nevus       Pink pearly papule/plaque c/w basal cell carcinoma      Erythematous hyperkeratotic cursted plaque c/w SCC      Surgical scar with no sign of skin cancer recurrence      Open and closed comedones      Inflammatory papules and pustules      Verrucoid papule consistent consistent with wart     Erythematous eczematous patches and plaques     Dystrophic onycholytic nail with subungual debris c/w onychomycosis     Umbilicated papule    Erythematous-base heme-crusted tan verrucoid plaque consistent with inflamed seborrheic keratosis     Erythematous Silvery Scaling Plaque c/w Psoriasis     See annotation      Assessment / Plan:        Psoriasis vulgaris - clear on Bimzelx - continue Bimzelx 320 mg sq q 8 weeks; topical steroids prn flares    Encounter for long-term (current) use of high-risk medication - quant gold negative March 2025; CBC, LFTs WNL April 2025             No follow-ups on file.  Patient provided medical information necessary for recommendation.

## 2025-06-26 ENCOUNTER — PATIENT MESSAGE (OUTPATIENT)
Dept: DERMATOLOGY | Facility: CLINIC | Age: 50
End: 2025-06-26
Payer: MEDICAID

## 2025-07-30 DIAGNOSIS — L40.9 PSORIASIS: ICD-10-CM

## 2025-07-30 DIAGNOSIS — L40.50 PSORIATIC ARTHRITIS: ICD-10-CM

## 2025-07-30 NOTE — TELEPHONE ENCOUNTER
Encounter Date: 06/13/2025    Psoriasis vulgaris - clear on Bimzelx - continue Bimzelx 320 mg sq q 8 weeks; topical steroids prn flares     Encounter for long-term (current) use of high-risk medication - quant gold negative March 2025; CBC, LFTs WNL April 2025              No follow-ups on file.  Patient provided medical information necessary for recommendation.

## 2025-07-31 RX ORDER — BIMEKIZUMAB 160 MG/ML
320 INJECTION, SOLUTION SUBCUTANEOUS
Qty: 2 ML | Refills: 6 | Status: ACTIVE | OUTPATIENT
Start: 2025-07-31